# Patient Record
Sex: MALE | Race: WHITE | Employment: PART TIME | URBAN - METROPOLITAN AREA
[De-identification: names, ages, dates, MRNs, and addresses within clinical notes are randomized per-mention and may not be internally consistent; named-entity substitution may affect disease eponyms.]

---

## 2016-12-06 LAB — ANTIBODY: NONREACTIVE

## 2017-12-11 LAB
CHOLESTEROL, TOTAL: 194 MG/DL
CHOLESTEROL/HDL RATIO: 3.1
GLUCOSE BLD-MCNC: 96 MG/DL
HDLC SERPL-MCNC: 63 MG/DL (ref 35–70)
LDL CHOLESTEROL CALCULATED: 112 MG/DL (ref 0–160)
TRIGL SERPL-MCNC: 93 MG/DL
VLDLC SERPL CALC-MCNC: NORMAL MG/DL

## 2018-09-18 ENCOUNTER — OFFICE VISIT (OUTPATIENT)
Dept: ORTHOPEDIC SURGERY | Age: 68
End: 2018-09-18

## 2018-09-18 VITALS
HEIGHT: 70 IN | DIASTOLIC BLOOD PRESSURE: 86 MMHG | HEART RATE: 78 BPM | SYSTOLIC BLOOD PRESSURE: 142 MMHG | WEIGHT: 190 LBS | BODY MASS INDEX: 27.2 KG/M2

## 2018-09-18 DIAGNOSIS — M10.9 ACUTE GOUT OF LEFT WRIST, UNSPECIFIED CAUSE: ICD-10-CM

## 2018-09-18 DIAGNOSIS — M25.532 WRIST PAIN, LEFT: Primary | ICD-10-CM

## 2018-09-18 PROCEDURE — 4040F PNEUMOC VAC/ADMIN/RCVD: CPT | Performed by: PHYSICIAN ASSISTANT

## 2018-09-18 PROCEDURE — 1101F PT FALLS ASSESS-DOCD LE1/YR: CPT | Performed by: PHYSICIAN ASSISTANT

## 2018-09-18 PROCEDURE — G8427 DOCREV CUR MEDS BY ELIG CLIN: HCPCS | Performed by: PHYSICIAN ASSISTANT

## 2018-09-18 PROCEDURE — 4004F PT TOBACCO SCREEN RCVD TLK: CPT | Performed by: PHYSICIAN ASSISTANT

## 2018-09-18 PROCEDURE — 3017F COLORECTAL CA SCREEN DOC REV: CPT | Performed by: PHYSICIAN ASSISTANT

## 2018-09-18 PROCEDURE — 99203 OFFICE O/P NEW LOW 30 MIN: CPT | Performed by: PHYSICIAN ASSISTANT

## 2018-09-18 PROCEDURE — G8419 CALC BMI OUT NRM PARAM NOF/U: HCPCS | Performed by: PHYSICIAN ASSISTANT

## 2018-09-18 PROCEDURE — 1123F ACP DISCUSS/DSCN MKR DOCD: CPT | Performed by: PHYSICIAN ASSISTANT

## 2018-09-18 RX ORDER — INDOMETHACIN 50 MG/1
50 CAPSULE ORAL 3 TIMES DAILY
Qty: 30 CAPSULE | Refills: 0 | Status: SHIPPED | OUTPATIENT
Start: 2018-09-18 | End: 2019-08-07 | Stop reason: ALTCHOICE

## 2018-09-18 NOTE — PROGRESS NOTES
Subjective    Chief Complaint   Patient presents with    Wrist Pain     CK LEFT WRIST, PAIN OVER PAST WEEK - NO INJURY  PAIN HAS PROGRESSIVELY GOTTEN WORSE AND RADIATES INTO HAND. PATIENT C/O DIFFICULTY SLEEPING DUE TO DISCOMFORT AND INCREASED PAIN WITH MOVEMENT       Juan Lennon presents today for evaluation of pain in His  left wrist.  He has been having pain for the past 1 weeks. The pain is located over dorsal wrist. There is not a specific injury. The pain is better with rest and worse with movement, extension of wrist, palpation. The patient is right hand dominant. The patient is not employed. The patient  is complaining of night pain at times. he denies acute trauma. He denies injury. He states he has intermittent shooting pain to his fingers, he denies true numbness/tingling to left hand/fingers. He has noted swelling and erythema to his wrist.  He denies constitutional symptoms. Pt denies gout hx    Patient's medications, allergies, past medical, surgical, social and family histories were reviewed and updated as appropriate. Physical Exam  Constitutional:  Pt well groomed, no acute distress, well developed, no obvious deformities  Vitals:    09/18/18 1704   BP: (!) 142/86   Pulse: 78   Weight: 190 lb (86.2 kg)   Height: 5' 10\" (1.778 m)     -Oriented to person, place, and time  -mood and affect are appropriate    Wrist Exam: Left wrist: Pain over DRUJ only. No bony tenderness.    -There is not deformity  -There is not ecchymosis  - There is swelling to DRUJ only. -ROM: decreased with wrist extension and flexion. Painful ROM noted as well. Painful passive wrist extension. Full radial/ulnar deviation, supination/pronation.  strength good.       -special testing not indicated    Contralateral Exam:  -No obvious deformities  -No abrasions or cellulitis noted, NVI   -Full ROM   -No joint laxity  -no palpable tenderness noted    Neurological:   -There is not any complaints of numbness and tingling.    -Motor and sensory is at median, radial and ulnar nerve distributions. -NVI to upper extremities bilaterally. Skin:  No abrasions, lesions, obvious deformities noted   Erythema noted to dorsum of wrist only. Localized swelling as well          Xray:  3 view (AP/Lat/Oblique) of left wrist show:   no fracture or dislocation noted, soft tissue swelling    Assessment: left wrist rule out gout flare    Plan:  Natural history and expected course discussed. Questions answered. Rest, ice, compression, and elevation (RICE) therapy. Follow up in 1 week. with PCP  We discussed his x ray results today as well as his STS  We discussed possible gout flare given his swelling, atraumatic pain, painful ROM, erythema  We discussed recommendation for indomethacin treatment, this has been e prescribed to his pharmacy  We discussed recommendation for lab collection, uric acid/sed rate/crp  He will follow up with PCP    Priyank Blackwell PA-C      No orders of the defined types were placed in this encounter.

## 2018-09-24 ENCOUNTER — HOSPITAL ENCOUNTER (EMERGENCY)
Age: 68
Discharge: HOME | End: 2018-09-24
Payer: MEDICARE

## 2018-09-24 VITALS
BODY MASS INDEX: 27.2 KG/M2 | SYSTOLIC BLOOD PRESSURE: 146 MMHG | TEMPERATURE: 97.9 F | WEIGHT: 190 LBS | HEART RATE: 56 BPM | DIASTOLIC BLOOD PRESSURE: 86 MMHG | OXYGEN SATURATION: 99 % | HEIGHT: 70 IN | RESPIRATION RATE: 16 BRPM

## 2018-09-24 DIAGNOSIS — M25.532 LEFT WRIST PAIN: Primary | ICD-10-CM

## 2018-09-24 DIAGNOSIS — M25.432 WRIST SWELLING, LEFT: ICD-10-CM

## 2018-09-24 LAB
A/G RATIO: 1.2 (ref 1.1–2.2)
ALBUMIN SERPL-MCNC: 3.8 G/DL (ref 3.4–5)
ALP BLD-CCNC: 88 U/L (ref 40–129)
ALT SERPL-CCNC: 12 U/L (ref 10–40)
ANION GAP SERPL CALCULATED.3IONS-SCNC: 10 MMOL/L (ref 3–16)
AST SERPL-CCNC: 13 U/L (ref 15–37)
BASOPHILS ABSOLUTE: 0 K/UL (ref 0–0.2)
BASOPHILS RELATIVE PERCENT: 0.7 %
BILIRUB SERPL-MCNC: 0.3 MG/DL (ref 0–1)
BUN BLDV-MCNC: 18 MG/DL (ref 7–20)
C-REACTIVE PROTEIN: 10.5 MG/L (ref 0–5.1)
CALCIUM SERPL-MCNC: 9.4 MG/DL (ref 8.3–10.6)
CHLORIDE BLD-SCNC: 100 MMOL/L (ref 99–110)
CO2: 30 MMOL/L (ref 21–32)
CREAT SERPL-MCNC: 0.7 MG/DL (ref 0.8–1.3)
EOSINOPHILS ABSOLUTE: 0.2 K/UL (ref 0–0.6)
EOSINOPHILS RELATIVE PERCENT: 2.5 %
GFR AFRICAN AMERICAN: >60
GFR NON-AFRICAN AMERICAN: >60
GLOBULIN: 3.2 G/DL
GLUCOSE BLD-MCNC: 103 MG/DL (ref 70–99)
HCT VFR BLD CALC: 35.2 % (ref 40.5–52.5)
HEMOGLOBIN: 11.4 G/DL (ref 13.5–17.5)
LYMPHOCYTES ABSOLUTE: 1 K/UL (ref 1–5.1)
LYMPHOCYTES RELATIVE PERCENT: 16 %
MCH RBC QN AUTO: 26.3 PG (ref 26–34)
MCHC RBC AUTO-ENTMCNC: 32.4 G/DL (ref 31–36)
MCV RBC AUTO: 81.2 FL (ref 80–100)
MONOCYTES ABSOLUTE: 1.1 K/UL (ref 0–1.3)
MONOCYTES RELATIVE PERCENT: 16.6 %
NEUTROPHILS ABSOLUTE: 4.2 K/UL (ref 1.7–7.7)
NEUTROPHILS RELATIVE PERCENT: 64.2 %
PDW BLD-RTO: 16.8 % (ref 12.4–15.4)
PLATELET # BLD: 359 K/UL (ref 135–450)
PMV BLD AUTO: 7.8 FL (ref 5–10.5)
POTASSIUM SERPL-SCNC: 3.9 MMOL/L (ref 3.5–5.1)
RBC # BLD: 4.33 M/UL (ref 4.2–5.9)
SEDIMENTATION RATE, ERYTHROCYTE: 60 MM/HR (ref 0–20)
SODIUM BLD-SCNC: 140 MMOL/L (ref 136–145)
TOTAL PROTEIN: 7 G/DL (ref 6.4–8.2)
URIC ACID, SERUM: 5.9 MG/DL (ref 3.5–7.2)
WBC # BLD: 6.6 K/UL (ref 4–11)

## 2018-09-24 PROCEDURE — 85025 COMPLETE CBC W/AUTO DIFF WBC: CPT

## 2018-09-24 PROCEDURE — 84550 ASSAY OF BLOOD/URIC ACID: CPT

## 2018-09-24 PROCEDURE — 85652 RBC SED RATE AUTOMATED: CPT

## 2018-09-24 PROCEDURE — 99283 EMERGENCY DEPT VISIT LOW MDM: CPT

## 2018-09-24 PROCEDURE — 6370000000 HC RX 637 (ALT 250 FOR IP): Performed by: PHYSICIAN ASSISTANT

## 2018-09-24 PROCEDURE — 86140 C-REACTIVE PROTEIN: CPT

## 2018-09-24 PROCEDURE — 80053 COMPREHEN METABOLIC PANEL: CPT

## 2018-09-24 RX ORDER — PREDNISONE 20 MG/1
60 TABLET ORAL ONCE
Status: COMPLETED | OUTPATIENT
Start: 2018-09-24 | End: 2018-09-24

## 2018-09-24 RX ORDER — PREDNISONE 50 MG/1
50 TABLET ORAL DAILY
Qty: 4 TABLET | Refills: 0 | Status: SHIPPED | OUTPATIENT
Start: 2018-09-24 | End: 2018-09-28

## 2018-09-24 RX ORDER — ACETAMINOPHEN 325 MG/1
650 TABLET ORAL ONCE
Status: COMPLETED | OUTPATIENT
Start: 2018-09-24 | End: 2018-09-24

## 2018-09-24 RX ORDER — PREDNISONE 50 MG/1
50 TABLET ORAL DAILY
Qty: 3 TABLET | Refills: 0 | Status: SHIPPED | OUTPATIENT
Start: 2018-09-24 | End: 2018-09-24 | Stop reason: CLARIF

## 2018-09-24 RX ORDER — OXYCODONE HYDROCHLORIDE AND ACETAMINOPHEN 5; 325 MG/1; MG/1
1 TABLET ORAL EVERY 6 HOURS PRN
Qty: 10 TABLET | Refills: 0 | Status: SHIPPED | OUTPATIENT
Start: 2018-09-24 | End: 2018-09-30

## 2018-09-24 RX ORDER — OXYCODONE HYDROCHLORIDE AND ACETAMINOPHEN 5; 325 MG/1; MG/1
1 TABLET ORAL EVERY 8 HOURS PRN
Qty: 10 TABLET | Refills: 0 | Status: SHIPPED | OUTPATIENT
Start: 2018-09-24 | End: 2018-09-24 | Stop reason: CLARIF

## 2018-09-24 RX ADMIN — PREDNISONE 60 MG: 20 TABLET ORAL at 13:07

## 2018-09-24 RX ADMIN — ACETAMINOPHEN 650 MG: 325 TABLET ORAL at 10:46

## 2018-09-24 ASSESSMENT — PAIN DESCRIPTION - LOCATION
LOCATION: WRIST
LOCATION: WRIST

## 2018-09-24 ASSESSMENT — ENCOUNTER SYMPTOMS
EYES NEGATIVE: 1
COLOR CHANGE: 0
SHORTNESS OF BREATH: 0
ABDOMINAL PAIN: 0

## 2018-09-24 ASSESSMENT — PAIN DESCRIPTION - ORIENTATION
ORIENTATION: LEFT
ORIENTATION: LEFT

## 2018-09-24 ASSESSMENT — PAIN DESCRIPTION - DESCRIPTORS: DESCRIPTORS: CONSTANT

## 2018-09-24 ASSESSMENT — PAIN SCALES - GENERAL
PAINLEVEL_OUTOF10: 7
PAINLEVEL_OUTOF10: 7

## 2018-09-24 ASSESSMENT — PAIN DESCRIPTION - PAIN TYPE: TYPE: ACUTE PAIN

## 2018-09-24 NOTE — ED NOTES
Findings and plan of care discussed at bedside by provider. Pt verbalized understanding of plan of care, pt discharged with all instructions reviewed and any prescriptions as applicable. All belongings in hand including discharge instructions, prescriptions x2, and personal belongings. Pt in no acute distress.      Michael Trivedi RN  09/24/18 6810

## 2018-09-24 NOTE — ED PROVIDER NOTES
Given 9/24/18 0454)         I evaluated this patient in the ED. He describes atraumatic left wrist pain ×2 weeks. He has no fever. There is no erythema to the joint. He is not immunocompromised. He does not have a leukocytosis on CBC. His white count is 6.6. His hemoglobin is 11.4 and hematocrit 35.2. CMP is also unremarkable. His sed rate is elevated though nonspecific at 60. Uric acid is 5.9.  C-reactive protein is pending. Because the patient saw orthopedics last week, I paged them again. I spoke with the office staff of Dr. Ekaterina Rodriguez. They have agreed to see this patient in the office on Wednesday, 9/26 at 1:30 PM.  I relayed this information to the patient. He is given a dose of prednisone in the ED as well as Tylenol. He will be discharged on prednisone for 3 days and will additionally be given a prescription for Percocet to take at night as needed. I told him to discontinue the Indocin while he is on prednisone until he sees orthopedics    I estimate there is LOW risk for ACUTE FRACTURE, COMPARTMENT SYNDROME, DEEP VENOUS THROMBOSIS, CELLULITIS, SEPTIC ARTHRITIS, GOUT, TENDON OR NEUROVASCULAR INJURY, thus I consider the discharge disposition reasonable. Horacio Baltazar and I have discussed the diagnosis and risks, and we agree with discharging home to follow-up with their primary doctor or the referral orthopedist. We also discussed returning to the Emergency Department immediately if new or worsening symptoms occur. We have discussed the symptoms which are most concerning (e.g., changing or worsening pain, numbness, weakness) that necessitate immediate return. FINAL IMPRESSION:      1. Left wrist pain    2.  Wrist swelling, left          DISPOSITION/PLAN:   DISPOSITION Decision To Discharge      PATIENT REFERRED TO:  Parvez Clements MD  [address]  [phone]    Go in 2 days  1:30 PM      DISCHARGE MEDICATIONS:  Percocet 5/325 one tablet every 8 hours-- 10 tablets  Prednisone 50 mg 1 tablet daily-- 3 tablets             (Please note that portions of this note were completed with a voice recognition program.  Efforts were made to edit the dictations, but occasionally words are mis-transcribed.)    Zhang Alan PA-C (electronically signed)              Jennifer Sánchez, [place]  09/24/18 6773

## 2018-09-24 NOTE — PROGRESS NOTES
Orthopedic Surgery consult placed @ 1943  RE: left wrist pain per ANIKET Ring. [facility] with Dr. Carly Evans returned call @ [phone], spoke to Rony Daley.

## 2018-09-26 ENCOUNTER — OFFICE VISIT (OUTPATIENT)
Dept: ORTHOPEDIC SURGERY | Age: 68
End: 2018-09-26
Payer: MEDICARE

## 2018-09-26 VITALS — WEIGHT: 190 LBS | BODY MASS INDEX: 27.2 KG/M2 | HEIGHT: 70 IN

## 2018-09-26 DIAGNOSIS — M10.9 ACUTE GOUT OF LEFT WRIST, UNSPECIFIED CAUSE: ICD-10-CM

## 2018-09-26 DIAGNOSIS — M25.532 WRIST PAIN, LEFT: Primary | ICD-10-CM

## 2018-09-26 PROCEDURE — 3017F COLORECTAL CA SCREEN DOC REV: CPT | Performed by: ORTHOPAEDIC SURGERY

## 2018-09-26 PROCEDURE — 99213 OFFICE O/P EST LOW 20 MIN: CPT | Performed by: ORTHOPAEDIC SURGERY

## 2018-09-26 PROCEDURE — 1036F TOBACCO NON-USER: CPT | Performed by: ORTHOPAEDIC SURGERY

## 2018-09-26 PROCEDURE — 4040F PNEUMOC VAC/ADMIN/RCVD: CPT | Performed by: ORTHOPAEDIC SURGERY

## 2018-09-26 PROCEDURE — G8427 DOCREV CUR MEDS BY ELIG CLIN: HCPCS | Performed by: ORTHOPAEDIC SURGERY

## 2018-09-26 PROCEDURE — G8419 CALC BMI OUT NRM PARAM NOF/U: HCPCS | Performed by: ORTHOPAEDIC SURGERY

## 2018-09-26 PROCEDURE — 1123F ACP DISCUSS/DSCN MKR DOCD: CPT | Performed by: ORTHOPAEDIC SURGERY

## 2018-09-26 PROCEDURE — 1101F PT FALLS ASSESS-DOCD LE1/YR: CPT | Performed by: ORTHOPAEDIC SURGERY

## 2018-09-26 RX ORDER — MELOXICAM 15 MG/1
15 TABLET ORAL DAILY
Qty: 30 TABLET | Refills: 1 | Status: SHIPPED | OUTPATIENT
Start: 2018-09-29 | End: 2018-10-04 | Stop reason: ALTCHOICE

## 2018-09-26 NOTE — PROGRESS NOTES
Chief Complaint  Wrist Pain (left volar wrist pain, started on 9/3/18. progressively got worse. 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 He was seen in the emergency room and there was concern for a gout attack. He was placed on Indocin and steroid, he has been utilizing his brace intermittently. Overall his symptoms are much improved but not fully gone. No fevers. Medical History  Past Medical History:   Diagnosis Date    Atrial fibrillation ([address] 2007    S/P CARDIOVERSION, EF 49% on stress test 2009    Hyperlipidemia     Hypertension     SCC (squamous cell carcinoma)     Dr. Fay Mayberry     Past Surgical History:   Procedure Laterality Date    CARDIOVERSION  2005    COLONOSCOPY  [id number]    tics    TOTAL HIP ARTHROPLASTY Left 2013     Family History   Problem Relation Age of Onset    Cancer Mother         BONE    High Blood Pressure Mother     Heart Disease Father 79     Social History     Social History    Marital status:      Spouse name: N/A    Number of children: N/A    Years of education: N/A     Social History Main Topics    Smoking status: Never Smoker    Smokeless tobacco: Never Used    Alcohol use No      Comment: social    Drug use: No    Sexual activity: Not on file     Other Topics Concern    Not on file     Social History Narrative    No narrative on file     Current Outpatient Prescriptions   Medication Sig Dispense Refill    [START ON 9/29/2018] meloxicam (MOBIC) 15 MG tablet Take 1 tablet by mouth daily 30 tablet 1    predniSONE (DELTASONE) 50 MG tablet Take 1 tablet by mouth daily for 4 days 4 tablet 0    oxyCODONE-acetaminophen (PERCOCET) 5-325 MG per tablet Take 1 tablet by mouth every 6 hours as needed for Pain for up to 10 doses. Intended supply: 3 days. well perfused       Radiology:     X-rays reviewed in office:  3 views, left wrist, impression:  No fracture or dislocation. Overall good alignment      Assessment:  Left wrist pain, improved    Impression:   Encounter Diagnoses   Name Primary?  Wrist pain, left Yes    Acute gout of left wrist, unspecified cause        Office Procedures:  No orders of the defined types were placed in this encounter. Treatment Plan:  Patient has responded well with conservative measures for either a gouty attack or potentially pseudogout. He is much pleased with his progress. We will continue the current care plan. He will finish the Indocin. He does have a brace at home, I recommend he utilize this at night. We have ordered Mobic, which begins after Indocin is over. He will continue with observation of the left wrist.  As long as he continues to improve and symptoms full resolve he will follow-up as necessary. All questions and concerns were addressed today. Patient is in agreement with the plan. Luna Rendon MD  Hand & Upper Extremity Surgery  [address]  A partner of [facility] ([facility])        Please note that this transcription was created using voice recognition software. Any errors are unintentional and may be due to voice recognition transcription.

## 2018-10-04 ENCOUNTER — OFFICE VISIT (OUTPATIENT)
Dept: FAMILY MEDICINE CLINIC | Age: 68
End: 2018-10-04
Payer: MEDICARE

## 2018-10-04 VITALS
BODY MASS INDEX: 27.35 KG/M2 | OXYGEN SATURATION: 99 % | HEART RATE: 67 BPM | WEIGHT: 191 LBS | DIASTOLIC BLOOD PRESSURE: 71 MMHG | SYSTOLIC BLOOD PRESSURE: 123 MMHG | HEIGHT: 70 IN

## 2018-10-04 DIAGNOSIS — Z13.1 ENCOUNTER FOR SCREENING FOR DIABETES MELLITUS: ICD-10-CM

## 2018-10-04 DIAGNOSIS — Z87.39 HISTORY OF JOINT SWELLING: ICD-10-CM

## 2018-10-04 DIAGNOSIS — Z23 NEED FOR PROPHYLACTIC VACCINATION AND INOCULATION AGAINST VARICELLA: ICD-10-CM

## 2018-10-04 DIAGNOSIS — Z23 NEED FOR PROPHYLACTIC VACCINATION AND INOCULATION AGAINST INFLUENZA: ICD-10-CM

## 2018-10-04 DIAGNOSIS — M54.2 NECK PAIN: Primary | ICD-10-CM

## 2018-10-04 PROCEDURE — G8419 CALC BMI OUT NRM PARAM NOF/U: HCPCS | Performed by: FAMILY MEDICINE

## 2018-10-04 PROCEDURE — 1123F ACP DISCUSS/DSCN MKR DOCD: CPT | Performed by: FAMILY MEDICINE

## 2018-10-04 PROCEDURE — 90662 IIV NO PRSV INCREASED AG IM: CPT | Performed by: FAMILY MEDICINE

## 2018-10-04 PROCEDURE — G8427 DOCREV CUR MEDS BY ELIG CLIN: HCPCS | Performed by: FAMILY MEDICINE

## 2018-10-04 PROCEDURE — 99204 OFFICE O/P NEW MOD 45 MIN: CPT | Performed by: FAMILY MEDICINE

## 2018-10-04 PROCEDURE — 1036F TOBACCO NON-USER: CPT | Performed by: FAMILY MEDICINE

## 2018-10-04 PROCEDURE — G8482 FLU IMMUNIZE ORDER/ADMIN: HCPCS | Performed by: FAMILY MEDICINE

## 2018-10-04 PROCEDURE — G0008 ADMIN INFLUENZA VIRUS VAC: HCPCS | Performed by: FAMILY MEDICINE

## 2018-10-04 PROCEDURE — 3017F COLORECTAL CA SCREEN DOC REV: CPT | Performed by: FAMILY MEDICINE

## 2018-10-04 PROCEDURE — 1101F PT FALLS ASSESS-DOCD LE1/YR: CPT | Performed by: FAMILY MEDICINE

## 2018-10-04 PROCEDURE — 4040F PNEUMOC VAC/ADMIN/RCVD: CPT | Performed by: FAMILY MEDICINE

## 2018-10-04 RX ORDER — ASPIRIN 81 MG
1 TABLET, DELAYED RELEASE (ENTERIC COATED) ORAL
Status: ON HOLD | COMMUNITY
End: 2019-07-24 | Stop reason: SDUPTHER

## 2018-10-04 RX ORDER — LORATADINE 10 MG/1
10 TABLET ORAL
Status: ON HOLD | COMMUNITY
End: 2019-07-24 | Stop reason: HOSPADM

## 2018-10-04 RX ORDER — OMEPRAZOLE 20 MG/1
20 TABLET, DELAYED RELEASE ORAL
COMMUNITY
End: 2019-06-11 | Stop reason: CLARIF

## 2018-10-04 RX ORDER — SOTALOL HYDROCHLORIDE 120 MG/1
TABLET ORAL
COMMUNITY
Start: 2018-08-10 | End: 2019-06-11 | Stop reason: CLARIF

## 2018-10-04 ASSESSMENT — PATIENT HEALTH QUESTIONNAIRE - PHQ9
SUM OF ALL RESPONSES TO PHQ QUESTIONS 1-9: 0
SUM OF ALL RESPONSES TO PHQ9 QUESTIONS 1 & 2: 0
SUM OF ALL RESPONSES TO PHQ QUESTIONS 1-9: 0
1. LITTLE INTEREST OR PLEASURE IN DOING THINGS: 0
2. FEELING DOWN, DEPRESSED OR HOPELESS: 0

## 2018-10-04 ASSESSMENT — ENCOUNTER SYMPTOMS
WHEEZING: 0
CONSTIPATION: 0

## 2018-10-12 ENCOUNTER — OFFICE VISIT (OUTPATIENT)
Dept: ORTHOPEDIC SURGERY | Age: 68
End: 2018-10-12
Payer: MEDICARE

## 2018-10-12 VITALS — HEIGHT: 70 IN | WEIGHT: 190.92 LBS | BODY MASS INDEX: 27.33 KG/M2

## 2018-10-12 DIAGNOSIS — M25.532 WRIST PAIN, LEFT: Primary | ICD-10-CM

## 2018-10-12 DIAGNOSIS — M10.9 ACUTE GOUT OF LEFT WRIST, UNSPECIFIED CAUSE: ICD-10-CM

## 2018-10-12 PROCEDURE — 4040F PNEUMOC VAC/ADMIN/RCVD: CPT | Performed by: ORTHOPAEDIC SURGERY

## 2018-10-12 PROCEDURE — G8419 CALC BMI OUT NRM PARAM NOF/U: HCPCS | Performed by: ORTHOPAEDIC SURGERY

## 2018-10-12 PROCEDURE — 1123F ACP DISCUSS/DSCN MKR DOCD: CPT | Performed by: ORTHOPAEDIC SURGERY

## 2018-10-12 PROCEDURE — 20605 DRAIN/INJ JOINT/BURSA W/O US: CPT | Performed by: ORTHOPAEDIC SURGERY

## 2018-10-12 PROCEDURE — G8482 FLU IMMUNIZE ORDER/ADMIN: HCPCS | Performed by: ORTHOPAEDIC SURGERY

## 2018-10-12 PROCEDURE — 3017F COLORECTAL CA SCREEN DOC REV: CPT | Performed by: ORTHOPAEDIC SURGERY

## 2018-10-12 PROCEDURE — 99213 OFFICE O/P EST LOW 20 MIN: CPT | Performed by: ORTHOPAEDIC SURGERY

## 2018-10-12 PROCEDURE — 1101F PT FALLS ASSESS-DOCD LE1/YR: CPT | Performed by: ORTHOPAEDIC SURGERY

## 2018-10-12 PROCEDURE — 1036F TOBACCO NON-USER: CPT | Performed by: ORTHOPAEDIC SURGERY

## 2018-10-12 PROCEDURE — G8427 DOCREV CUR MEDS BY ELIG CLIN: HCPCS | Performed by: ORTHOPAEDIC SURGERY

## 2018-10-18 ENCOUNTER — APPOINTMENT (OUTPATIENT)
Dept: CT IMAGING | Age: 68
End: 2018-10-18
Payer: MEDICARE

## 2018-10-18 ENCOUNTER — HOSPITAL ENCOUNTER (EMERGENCY)
Age: 68
Discharge: HOME | End: 2018-10-18
Payer: MEDICARE

## 2018-10-18 VITALS
DIASTOLIC BLOOD PRESSURE: 83 MMHG | TEMPERATURE: 98.3 F | RESPIRATION RATE: 16 BRPM | BODY MASS INDEX: 27.26 KG/M2 | WEIGHT: 190 LBS | SYSTOLIC BLOOD PRESSURE: 137 MMHG | OXYGEN SATURATION: 99 % | HEART RATE: 62 BPM

## 2018-10-18 DIAGNOSIS — M54.2 CERVICAL PAIN: Primary | ICD-10-CM

## 2018-10-18 PROCEDURE — 72125 CT NECK SPINE W/O DYE: CPT

## 2018-10-18 PROCEDURE — 99283 EMERGENCY DEPT VISIT LOW MDM: CPT

## 2018-10-18 RX ORDER — DIAZEPAM 5 MG/1
5 TABLET ORAL EVERY 12 HOURS PRN
Qty: 12 TABLET | Refills: 0 | Status: SHIPPED | OUTPATIENT
Start: 2018-10-18 | End: 2018-10-24

## 2018-10-18 ASSESSMENT — ENCOUNTER SYMPTOMS
BACK PAIN: 0
ABDOMINAL PAIN: 0
SHORTNESS OF BREATH: 0
COLOR CHANGE: 0

## 2018-10-18 ASSESSMENT — PAIN SCALES - GENERAL: PAINLEVEL_OUTOF10: 3

## 2018-10-23 ENCOUNTER — OFFICE VISIT (OUTPATIENT)
Dept: ORTHOPEDIC SURGERY | Age: 68
End: 2018-10-23
Payer: MEDICARE

## 2018-10-23 VITALS — HEIGHT: 70 IN | WEIGHT: 190.04 LBS | BODY MASS INDEX: 27.21 KG/M2

## 2018-10-23 DIAGNOSIS — M25.532 WRIST PAIN, LEFT: Primary | ICD-10-CM

## 2018-10-23 DIAGNOSIS — M10.232: ICD-10-CM

## 2018-10-23 PROCEDURE — 1123F ACP DISCUSS/DSCN MKR DOCD: CPT | Performed by: ORTHOPAEDIC SURGERY

## 2018-10-23 PROCEDURE — 99213 OFFICE O/P EST LOW 20 MIN: CPT | Performed by: ORTHOPAEDIC SURGERY

## 2018-10-23 PROCEDURE — 1101F PT FALLS ASSESS-DOCD LE1/YR: CPT | Performed by: ORTHOPAEDIC SURGERY

## 2018-10-23 PROCEDURE — 1036F TOBACCO NON-USER: CPT | Performed by: ORTHOPAEDIC SURGERY

## 2018-10-23 PROCEDURE — G8427 DOCREV CUR MEDS BY ELIG CLIN: HCPCS | Performed by: ORTHOPAEDIC SURGERY

## 2018-10-23 PROCEDURE — G8482 FLU IMMUNIZE ORDER/ADMIN: HCPCS | Performed by: ORTHOPAEDIC SURGERY

## 2018-10-23 PROCEDURE — 3017F COLORECTAL CA SCREEN DOC REV: CPT | Performed by: ORTHOPAEDIC SURGERY

## 2018-10-23 PROCEDURE — G8419 CALC BMI OUT NRM PARAM NOF/U: HCPCS | Performed by: ORTHOPAEDIC SURGERY

## 2018-10-23 PROCEDURE — 4040F PNEUMOC VAC/ADMIN/RCVD: CPT | Performed by: ORTHOPAEDIC SURGERY

## 2018-10-23 RX ORDER — PREDNISONE 50 MG/1
50 TABLET ORAL DAILY
Qty: 10 TABLET | Refills: 0 | Status: SHIPPED | OUTPATIENT
Start: 2018-10-23 | End: 2018-11-02

## 2018-10-24 ENCOUNTER — OFFICE VISIT (OUTPATIENT)
Dept: ORTHOPEDIC SURGERY | Age: 68
End: 2018-10-24
Payer: MEDICARE

## 2018-10-24 VITALS
HEART RATE: 79 BPM | SYSTOLIC BLOOD PRESSURE: 123 MMHG | HEIGHT: 70 IN | DIASTOLIC BLOOD PRESSURE: 70 MMHG | WEIGHT: 190.04 LBS | BODY MASS INDEX: 27.21 KG/M2

## 2018-10-24 DIAGNOSIS — M50.30 DDD (DEGENERATIVE DISC DISEASE), CERVICAL: Primary | ICD-10-CM

## 2018-10-24 DIAGNOSIS — M54.2 NECK PAIN ON RIGHT SIDE: ICD-10-CM

## 2018-10-24 PROCEDURE — 1036F TOBACCO NON-USER: CPT | Performed by: PHYSICIAN ASSISTANT

## 2018-10-24 PROCEDURE — 99213 OFFICE O/P EST LOW 20 MIN: CPT | Performed by: PHYSICIAN ASSISTANT

## 2018-10-24 PROCEDURE — 1101F PT FALLS ASSESS-DOCD LE1/YR: CPT | Performed by: PHYSICIAN ASSISTANT

## 2018-10-24 PROCEDURE — 1123F ACP DISCUSS/DSCN MKR DOCD: CPT | Performed by: PHYSICIAN ASSISTANT

## 2018-10-24 PROCEDURE — G8427 DOCREV CUR MEDS BY ELIG CLIN: HCPCS | Performed by: PHYSICIAN ASSISTANT

## 2018-10-24 PROCEDURE — 3017F COLORECTAL CA SCREEN DOC REV: CPT | Performed by: PHYSICIAN ASSISTANT

## 2018-10-24 PROCEDURE — G8482 FLU IMMUNIZE ORDER/ADMIN: HCPCS | Performed by: PHYSICIAN ASSISTANT

## 2018-10-24 PROCEDURE — 4040F PNEUMOC VAC/ADMIN/RCVD: CPT | Performed by: PHYSICIAN ASSISTANT

## 2018-10-24 PROCEDURE — G8419 CALC BMI OUT NRM PARAM NOF/U: HCPCS | Performed by: PHYSICIAN ASSISTANT

## 2018-10-24 NOTE — PROGRESS NOTES
New Patient: SPINE    CHIEF COMPLAINT:    Chief Complaint   Patient presents with    Neck Pain       HISTORY OF PRESENT ILLNESS:                The patient is a 77 y.o. male history of invasive on Eliquis, hypertension referred by the ED for a 2 week history of atraumatic intermittent aching and stiffness affecting the right neck/trap. Symptoms are increased with cervical range of motion or 1st thing in the morning. Relief with Aleve. Other conservative care includes prednisone, muscle relaxers. He currently reports 95% improvement at this time. He denies any upper extremity radiating pain, no progressive numbness tingling weakness. No fine motor difficulty or gait instability. No recent trauma. Overall feeling much improved. Pain Assessment  Location of Pain: Neck  Severity of Pain: 1  Quality of Pain: Sharp, Dull, Aching  Duration of Pain: Persistent  Frequency of Pain: Constant  Aggravating Factors: Stairs, Walking, Standing, Squatting, Kneeling, Exercise, Straightening, Stretching, Bending  Limiting Behavior: Yes  Relieving Factors: Rest  Result of Injury: No  Work-Related Injury: No  Are there other pain locations you wish to document?: No    The pain assessment was noted & reviewed in the medical record today.      Current/Past Treatment:   · Physical Therapy: no  · Chiropractic:   no  · Injection:   no  Medications:  · Current:  NSAIDs  · Past: Prednisone, Valium   · Surgery/Consult: no     Work Status/Functionality: owns company installing in-ground pools     Past Medical History: Medical history form was reviewed today & scanned into the media tab  Past Medical History:   Diagnosis Date    Atrial fibrillation ([address] 2007    S/P CARDIOVERSION, EF 49% on stress test 2009    Hyperlipidemia     Hypertension     SCC (squamous cell carcinoma)     Dr. Katherine Nice      Past Surgical History:     Past Surgical History:   Procedure Laterality Date    CARDIOVERSION  2005    COLONOSCOPY  [id number]    tics progressive weakness  MUSCULOSKELETAL: ADMITs joint swelling or redness  PSYCHOLOGICAL: Denies anxiety, depression   SKIN: Denies skin changes, delayed healing, rash, itching   HEMATOLOGIC: ADMITs easy bleeding or bruising  ENDOCRINE: Denies excessive thirst, urination, heat/cold  RESPIRATORY: Denies current dyspnea, cough  GI: Denies nausea, vomiting, diarrhea   : Denies bowel or bladder issues       PHYSICAL EXAM:    Vitals: Blood pressure 123/70, pulse 79, height 5' 10\" (1.778 m), weight 190 lb 0.6 oz (86.2 kg). GENERAL EXAM:  · General Apparence: Patient is adequately groomed with no evidence of malnutrition. · Orientation: The patient is oriented to time, place and person. · Mood & Affect:The patient's mood and affect are appropriate  · Lymphatic: The lymphatic examination bilaterally reveals all areas to be without enlargement or induration  · Sensation: Sensation is intact without deficit  · Coordination/Balance: Good coordination     CERVICAL EXAMINATION:  · Inspection: Local inspection shows no step-off or bruising. Cervical alignment is normal.     · Palpation: No evidence of tenderness at the midline. Paraspinal tenderness is present, right trap. There is no step-off or paraspinal spasm. · Range of Motion: Intact flexion moderate loss of extension and lateral rotation  · Strength: 5/5 bilateral upper extremities   · Special Tests:    ·   Spurling's, L'Hermitte's & Kelsey's negative bilaterally. ·  Cubital and Carpal tunnel Tinel's negative bilaterally. · Skin:There are no rashes, ulcerations or lesions in right & left upper extremities. · Reflexes: Bilaterally triceps, biceps and brachioradialis are 1+. Clonus absent bilaterally at the feet. · Additional Examinations:       · RIGHT UPPER EXTREMITY:  Inspection/examination of the right upper extremity does not show any tenderness, deformity or injury. Range of motion is full. There is no gross instability.   There are no rashes,

## 2018-11-21 ENCOUNTER — NURSE ONLY (OUTPATIENT)
Dept: FAMILY MEDICINE CLINIC | Age: 68
End: 2018-11-21
Payer: MEDICARE

## 2018-11-21 ENCOUNTER — OFFICE VISIT (OUTPATIENT)
Dept: ORTHOPEDIC SURGERY | Age: 68
End: 2018-11-21
Payer: MEDICARE

## 2018-11-21 ENCOUNTER — TELEPHONE (OUTPATIENT)
Dept: ORTHOPEDIC SURGERY | Age: 68
End: 2018-11-21

## 2018-11-21 VITALS — BODY MASS INDEX: 27.21 KG/M2 | HEIGHT: 70 IN | WEIGHT: 190.04 LBS

## 2018-11-21 DIAGNOSIS — M19.031 ARTHRITIS OF WRIST, RIGHT: ICD-10-CM

## 2018-11-21 DIAGNOSIS — M25.531 RIGHT WRIST PAIN: Primary | ICD-10-CM

## 2018-11-21 DIAGNOSIS — R73.9 ELEVATED SERUM GLUCOSE: Primary | ICD-10-CM

## 2018-11-21 DIAGNOSIS — M10.9 ACUTE GOUT OF LEFT WRIST, UNSPECIFIED CAUSE: ICD-10-CM

## 2018-11-21 LAB
C-REACTIVE PROTEIN: 22.3 MG/L (ref 0–5.1)
SEDIMENTATION RATE, ERYTHROCYTE: 77 MM/HR (ref 0–20)
URIC ACID, SERUM: 4.7 MG/DL (ref 3.5–7.2)

## 2018-11-21 PROCEDURE — 1036F TOBACCO NON-USER: CPT | Performed by: ORTHOPAEDIC SURGERY

## 2018-11-21 PROCEDURE — 99213 OFFICE O/P EST LOW 20 MIN: CPT | Performed by: ORTHOPAEDIC SURGERY

## 2018-11-21 PROCEDURE — 36415 COLL VENOUS BLD VENIPUNCTURE: CPT | Performed by: FAMILY MEDICINE

## 2018-11-21 PROCEDURE — 20605 DRAIN/INJ JOINT/BURSA W/O US: CPT | Performed by: ORTHOPAEDIC SURGERY

## 2018-11-21 PROCEDURE — 1123F ACP DISCUSS/DSCN MKR DOCD: CPT | Performed by: ORTHOPAEDIC SURGERY

## 2018-11-21 PROCEDURE — G8427 DOCREV CUR MEDS BY ELIG CLIN: HCPCS | Performed by: ORTHOPAEDIC SURGERY

## 2018-11-21 PROCEDURE — G8482 FLU IMMUNIZE ORDER/ADMIN: HCPCS | Performed by: ORTHOPAEDIC SURGERY

## 2018-11-21 PROCEDURE — 1101F PT FALLS ASSESS-DOCD LE1/YR: CPT | Performed by: ORTHOPAEDIC SURGERY

## 2018-11-21 PROCEDURE — 4040F PNEUMOC VAC/ADMIN/RCVD: CPT | Performed by: ORTHOPAEDIC SURGERY

## 2018-11-21 PROCEDURE — L3908 WHO COCK-UP NONMOLDE PRE OTS: HCPCS | Performed by: ORTHOPAEDIC SURGERY

## 2018-11-21 PROCEDURE — 3017F COLORECTAL CA SCREEN DOC REV: CPT | Performed by: ORTHOPAEDIC SURGERY

## 2018-11-21 PROCEDURE — G8419 CALC BMI OUT NRM PARAM NOF/U: HCPCS | Performed by: ORTHOPAEDIC SURGERY

## 2018-11-21 RX ORDER — PREDNISONE 20 MG/1
20 TABLET ORAL DAILY
Qty: 10 TABLET | Refills: 0 | Status: SHIPPED | OUTPATIENT
Start: 2018-11-21 | End: 2018-12-01

## 2018-11-21 NOTE — PROGRESS NOTES
that this transcription was created using voice recognition software. Any errors are unintentional and may be due to voice recognition transcription.

## 2018-11-21 NOTE — PROGRESS NOTES
Patient came into the office per physician's request for the following blood test(s): uric acid, crp, sed rate, and a1c.       Blood drawn in office by Grace Rod    # of tubes sent: 1 sst 2 lav

## 2018-11-22 LAB
ESTIMATED AVERAGE GLUCOSE: 128.4 MG/DL
HBA1C MFR BLD: 6.1 %

## 2018-11-26 PROBLEM — R73.03 PRE-DIABETES: Status: ACTIVE | Noted: 2018-11-26

## 2018-11-27 ENCOUNTER — TELEPHONE (OUTPATIENT)
Dept: FAMILY MEDICINE CLINIC | Age: 68
End: 2018-11-27

## 2018-11-27 DIAGNOSIS — R70.0 ELEVATED SED RATE: Primary | ICD-10-CM

## 2019-06-11 ENCOUNTER — OFFICE VISIT (OUTPATIENT)
Dept: FAMILY MEDICINE CLINIC | Age: 69
End: 2019-06-11
Payer: MEDICARE

## 2019-06-11 VITALS
HEART RATE: 100 BPM | DIASTOLIC BLOOD PRESSURE: 62 MMHG | BODY MASS INDEX: 28.27 KG/M2 | WEIGHT: 197 LBS | TEMPERATURE: 98.8 F | SYSTOLIC BLOOD PRESSURE: 106 MMHG | OXYGEN SATURATION: 94 %

## 2019-06-11 DIAGNOSIS — D64.9 ANEMIA, UNSPECIFIED TYPE: Primary | ICD-10-CM

## 2019-06-11 DIAGNOSIS — I10 HYPERTENSION, UNSPECIFIED TYPE: ICD-10-CM

## 2019-06-11 DIAGNOSIS — I48.0 PAROXYSMAL ATRIAL FIBRILLATION (HCC): ICD-10-CM

## 2019-06-11 LAB
HCT VFR BLD CALC: 29.6 % (ref 40.5–52.5)
HEMOGLOBIN: 9.2 G/DL (ref 13.5–17.5)
MCH RBC QN AUTO: 25.2 PG (ref 26–34)
MCHC RBC AUTO-ENTMCNC: 30.9 G/DL (ref 31–36)
MCV RBC AUTO: 81.4 FL (ref 80–100)
PDW BLD-RTO: 19.6 % (ref 12.4–15.4)
PLATELET # BLD: 423 K/UL (ref 135–450)
PMV BLD AUTO: 7.9 FL (ref 5–10.5)
RBC # BLD: 3.64 M/UL (ref 4.2–5.9)
WBC # BLD: 7 K/UL (ref 4–11)

## 2019-06-11 PROCEDURE — 36415 COLL VENOUS BLD VENIPUNCTURE: CPT | Performed by: FAMILY MEDICINE

## 2019-06-11 PROCEDURE — 1123F ACP DISCUSS/DSCN MKR DOCD: CPT | Performed by: FAMILY MEDICINE

## 2019-06-11 PROCEDURE — 4040F PNEUMOC VAC/ADMIN/RCVD: CPT | Performed by: FAMILY MEDICINE

## 2019-06-11 PROCEDURE — G8419 CALC BMI OUT NRM PARAM NOF/U: HCPCS | Performed by: FAMILY MEDICINE

## 2019-06-11 PROCEDURE — 1036F TOBACCO NON-USER: CPT | Performed by: FAMILY MEDICINE

## 2019-06-11 PROCEDURE — 3017F COLORECTAL CA SCREEN DOC REV: CPT | Performed by: FAMILY MEDICINE

## 2019-06-11 PROCEDURE — 99214 OFFICE O/P EST MOD 30 MIN: CPT | Performed by: FAMILY MEDICINE

## 2019-06-11 PROCEDURE — G8427 DOCREV CUR MEDS BY ELIG CLIN: HCPCS | Performed by: FAMILY MEDICINE

## 2019-06-11 RX ORDER — SULFASALAZINE 500 MG/1
TABLET ORAL
COMMUNITY
Start: 2019-06-03 | End: 2020-02-13

## 2019-06-11 RX ORDER — AMIODARONE HYDROCHLORIDE 200 MG/1
TABLET ORAL DAILY
Status: ON HOLD | COMMUNITY
Start: 2019-04-24 | End: 2019-12-18 | Stop reason: HOSPADM

## 2019-06-11 RX ORDER — FERROUS SULFATE 325(65) MG
325 TABLET ORAL
Status: ON HOLD | COMMUNITY
Start: 2019-06-07 | End: 2019-07-24 | Stop reason: SDUPTHER

## 2019-06-11 RX ORDER — PANTOPRAZOLE SODIUM 40 MG/1
40 TABLET, DELAYED RELEASE ORAL
Status: ON HOLD | COMMUNITY
Start: 2019-06-07 | End: 2019-07-24 | Stop reason: HOSPADM

## 2019-06-11 NOTE — PROGRESS NOTES
2019     Braulio Carver (:  1950)is a 68 y.o. male, here for evaluation of the following medical concerns:    CC: anemia    HPI    Anemia  Was in hospital for fatigue had low Hgb, Hgb of 7.7 on 19  Had dark stool    EGD showed 2 cm segment Whyte's mucosa which was biopsied along with mild gastroduodenitis  colonoscopy showed hemorrhoids, a 4 mm polyp and diverticulosis   video capsule endoscopy which showed trace red tinged liquid   tagged RBC scan was negative for any active gastrointestinal evidence of bleeding      A fib  On eliquis, amiodarone, diltiazem as prescribed, going to see new cardiologist to discuss ablation    HTN  Controlled, taking medications as prescribed    Review of Systems   Constitutional: Positive for fatigue. Overall feeling better than when he was in hospital   Cardiovascular: Positive for palpitations. Pt can tell he's in A fib     Prior to Visit Medications    Medication Sig Taking? Authorizing Provider   amiodarone (CORDARONE) 200 MG tablet TAKE 1 TABLET BY MOUTH TWICE DAILY Yes Historical Provider, MD   pantoprazole (PROTONIX) 40 MG tablet Take 40 mg by mouth Yes Historical Provider, MD   sulfaSALAzine (AZULFIDINE) 500 MG tablet TAKE 1 TABLET BY MOUTH TWICE DAILY AS DIRECTED Yes Historical Provider, MD   ferrous sulfate 325 (65 Fe) MG tablet Take 325 mg by mouth Yes Historical Provider, MD   apixaban (ELIQUIS) 5 MG TABS tablet Take 5 mg by mouth Yes Historical Provider, MD   loratadine (CLARITIN) 10 MG tablet Take 10 mg by mouth Yes Historical Provider, MD   Multiple Vitamins-Minerals (MULTIVITAMIN-MINERALS) TABS tablet Take 1 tablet by mouth Yes Historical Provider, MD   zoster recombinant adjuvanted vaccine (SHINGRIX) 50 MCG SUSR injection 50 MCG IM then repeat 2-6 months.  Yes Pastor Carter MD   simvastatin (ZOCOR) 80 MG tablet TAKE ONE TABLET BY MOUTH EVERY EVENING Yes Claudean Hall, MD   lisinopril-hydrochlorothiazide (PRINZIDE;ZESTORETIC) 10-12.5 MG per tablet Take 1 tablet by mouth daily. Yes Sheeba Villanueva MD   atenolol (TENORMIN) 50 MG tablet Take 1 tablet by mouth daily. Yes Tiffany Krishnamurthy MD   indomethacin (INDOCIN) 50 MG capsule Take 1 capsule by mouth 3 times daily for 10 days  ANIKET White        Social History     Tobacco Use    Smoking status: Never Smoker    Smokeless tobacco: Never Used   Substance Use Topics    Alcohol use: No     Alcohol/week: 0.0 oz     Comment: social        Vitals:    06/11/19 1448   BP: 106/62   Pulse: 100  Comment: IRR   Temp: 98.8 °F (37.1 °C)   TempSrc: Oral   SpO2: 94%   Weight: 197 lb (89.4 kg)     Estimated body mass index is 28.27 kg/m² as calculated from the following:    Height as of 11/21/18: 5' 10\" (1.778 m). Weight as of this encounter: 197 lb (89.4 kg). Physical Exam  Constitutional: appears well-developed and well-nourished. No distress. HENT:   Head: Normocephalic and atraumatic   Eyes: EOM are normal. Right eye exhibits no discharge. Left eye exhibits no discharge   Pulmonary/Chest: Effort normal   Skin: Patient is not diaphoretic     ASSESSMENT/PLAN:  Chito Chaparro was seen today for follow-up from hospital.    Diagnoses and all orders for this visit:    Anemia, unspecified type  Presumably from aspirin and eliquis, now off of aspirin  - cont oral iron supplemenation  -     CBC    Hypertension, unspecified type  Controlled, continue taking medications as prescribed    Paroxysmal atrial fibrillation (HCC)   cont eliquis, amiodarone, diltiazem    Return in about 3 months (around 9/11/2019). An electronic signature was used to authenticate this note.     --Reno Guzman MD on 6/11/2019 at 3:32 PM

## 2019-06-24 ENCOUNTER — TELEPHONE (OUTPATIENT)
Dept: FAMILY MEDICINE CLINIC | Age: 69
End: 2019-06-24

## 2019-06-25 ENCOUNTER — OFFICE VISIT (OUTPATIENT)
Dept: CARDIOLOGY CLINIC | Age: 69
End: 2019-06-25
Payer: MEDICARE

## 2019-06-25 VITALS
HEART RATE: 98 BPM | SYSTOLIC BLOOD PRESSURE: 116 MMHG | WEIGHT: 197 LBS | DIASTOLIC BLOOD PRESSURE: 64 MMHG | HEIGHT: 69 IN | BODY MASS INDEX: 29.18 KG/M2

## 2019-06-25 DIAGNOSIS — I10 HYPERTENSION, UNSPECIFIED TYPE: ICD-10-CM

## 2019-06-25 DIAGNOSIS — I48.0 PAROXYSMAL ATRIAL FIBRILLATION (HCC): Primary | ICD-10-CM

## 2019-06-25 DIAGNOSIS — E78.2 MIXED HYPERLIPIDEMIA: ICD-10-CM

## 2019-06-25 PROCEDURE — 3017F COLORECTAL CA SCREEN DOC REV: CPT | Performed by: INTERNAL MEDICINE

## 2019-06-25 PROCEDURE — 1036F TOBACCO NON-USER: CPT | Performed by: INTERNAL MEDICINE

## 2019-06-25 PROCEDURE — 4040F PNEUMOC VAC/ADMIN/RCVD: CPT | Performed by: INTERNAL MEDICINE

## 2019-06-25 PROCEDURE — 1123F ACP DISCUSS/DSCN MKR DOCD: CPT | Performed by: INTERNAL MEDICINE

## 2019-06-25 PROCEDURE — G8427 DOCREV CUR MEDS BY ELIG CLIN: HCPCS | Performed by: INTERNAL MEDICINE

## 2019-06-25 PROCEDURE — 93000 ELECTROCARDIOGRAM COMPLETE: CPT | Performed by: INTERNAL MEDICINE

## 2019-06-25 PROCEDURE — G8419 CALC BMI OUT NRM PARAM NOF/U: HCPCS | Performed by: INTERNAL MEDICINE

## 2019-06-25 PROCEDURE — 99204 OFFICE O/P NEW MOD 45 MIN: CPT | Performed by: INTERNAL MEDICINE

## 2019-06-25 RX ORDER — DILTIAZEM HYDROCHLORIDE 120 MG/1
120 CAPSULE, COATED, EXTENDED RELEASE ORAL 2 TIMES DAILY
COMMUNITY
End: 2019-10-10

## 2019-06-25 NOTE — PATIENT INSTRUCTIONS
Plan:  Referral to EP to discuss A fib ablation  Continue current medications   Check blood pressure at home weekly  Regular exercise and following a healthy diet encouraged   Follow up with me as needed

## 2019-06-25 NOTE — PROGRESS NOTES
impulse is not displaced  · Heart tones are crisp and normal. irregular S1 and S2.  · Jugular venous pulsation Normal  · The carotid upstroke is normal in amplitude and contour without delay or bruit  · Peripheral pulses are symmetrical and full  Abdomen:  · No masses or tenderness  · Bowel sounds present  Extremities:  ·  No Cyanosis or Clubbing  ·  Lower extremity edema: No  · Skin: Warm and dry  Neurological:  · Alert and oriented. · Moves all extremities well  · No abnormalities of mood, affect, memory, mentation, or behavior are noted      DATA:    ECG:    ECHO (LUIZA) [facility] 6/9/19:   Summary:  Overall left ventricular ejection fraction is estimated to be 40-45%. There is mild global hypokinesis of the left ventricle. Mild aortic regurgitation. There is moderate mitral regurgitation. The left atrial size is moderately dilated. No left atrial clot detected. The right atrium is mildly dilated. Valves not fully evaluated due to respiratory distress. [id number] Score for Atrial Fibrillation Stroke Risk   Risk   Factors  Component Value   C CHF No 0   H HTN Yes 1   A2 Age >= 76 No,  (73 y.o.) 0   D DM No 0   S2 Prior Stroke/TIA No 0   V Vascular Disease No 0   A Age 74-69 Yes,  (73 y.o.) 1   Sc Sex male 0    [id number]  Score  2   Score last updated 6/66/05 6:93 PM    Click here for a link to the UpToDate guideline \"Atrial Fibrillation: Anticoagulation therapy to prevent embolization    Disclaimer: Risk Score calculation is dependent on accuracy of patient problem list and past encounter diagnosis. IMPRESSION:      -Persistent symptomatic worsening atrial fibrillation limiting daily activity  -Symptomatic, worsening and are life style limiting  -History of multiple cardioversions, failed antiarrhythmic therapy  -Discussed options for symptoms- Watchman and  Ablation  2. Anemia- unknown source of bleed. Anticoagulated on Eliquis for atrial fibrillation-worsening  3.  Essential approval.      Watchman protocol: discussed    ·  Before implant start Coumadin and remain on ASA  ·        LUIZA to size left atrial appendage  ·  After implant ASA and Coumadin for 45 days. ·  Transesophageal echo on day 45. If no leak around watchman, discontinue Coumadin and start Plavix along with ASA  ·  Transesophageal echo at 6 months. If no leak around watchman, discontinue Plavix and continue ASA for life      QUALITY MEASURES  1. Tobacco Cessation Counseling: NA  2. Retake of BP if >140/90:   NA  3. Documentation to PCP/referring for new patient:  Sent to PCP at close of office visit  4. CAD patient on anti-platelet: NA  5. CAD patient on STATIN therapy:  Yes  6. Patient with CHF and aFib on anticoagulation:  Yes, [place]'s attestation: This note was scribed in the presence of Dr. Elvi Rogers by Quintin May RN  I, Elvira Tejeda, personally performed the services described in this documentation, as scribed by the above signed scribe in my presence. It is both accurate and complete to my knowledge. I agree with the details independently gathered by the clinical support staff, while the remaining scribed note accurately describes my personal service to the patient.        All questions and concerns were addressed to the patient/family. Alternatives to my treatment were discussed. DANILO Garcia F.A.C.C. Electrophysiology  [facility]. [address]. [address].   [place], [address]  Phone: [phone]  Fax: [phone]

## 2019-06-25 NOTE — PROGRESS NOTES
1 TABLET BY MOUTH TWICE DAILY 4/24/19  Yes Historical Provider, MD   pantoprazole (PROTONIX) 40 MG tablet Take 40 mg by mouth 6/7/19  Yes Historical Provider, MD   sulfaSALAzine (AZULFIDINE) 500 MG tablet TAKE 1 TABLET BY MOUTH TWICE DAILY AS DIRECTED 6/3/19  Yes Historical Provider, MD   ferrous sulfate 325 (65 Fe) MG tablet Take 325 mg by mouth 6/7/19  Yes Historical Provider, MD   apixaban (ELIQUIS) 5 MG TABS tablet Take 5 mg by mouth 9/28/18  Yes Historical Provider, MD   loratadine (CLARITIN) 10 MG tablet Take 10 mg by mouth   Yes Historical Provider, MD   Multiple Vitamins-Minerals (MULTIVITAMIN-MINERALS) TABS tablet Take 1 tablet by mouth   Yes Historical Provider, MD   zoster recombinant adjuvanted vaccine (SHINGRIX) 50 MCG SUSR injection 50 MCG IM then repeat 2-6 months. 10/4/18 10/4/19 Yes Jessenia Gillespie MD   simvastatin (ZOCOR) 80 MG tablet TAKE ONE TABLET BY MOUTH EVERY EVENING 9/24/13  Yes Mario Zamora MD   lisinopril-hydrochlorothiazide (PRINZIDE;ZESTORETIC) 10-12.5 MG per tablet Take 1 tablet by mouth daily. 7/15/13  Yes Mario Zamora MD   indomethacin (INDOCIN) 50 MG capsule Take 1 capsule by mouth 3 times daily for 10 days 9/18/18 9/28/18  Dwaine Angelucci, PA   atenolol (TENORMIN) 50 MG tablet Take 1 tablet by mouth daily. 12/27/12   Dimitry Rudolph MD        Allergies:  Patient has no known allergies. Review of Systems:   · Constitutional: there has been no unanticipated weight loss. There's been no change in energy level, sleep pattern, or activity level. · Eyes: No visual changes or diplopia. No scleral icterus. · ENT: No Headaches, hearing loss or vertigo. No mouth sores or sore throat. · Cardiovascular: Reviewed in HPI  · Respiratory: No cough or wheezing, no sputum production. No hematemesis. · Gastrointestinal: No abdominal pain, appetite loss, blood in stools. No change in bowel or bladder habits.   · Genitourinary: No dysuria, trouble voiding, or hematuria. · Musculoskeletal:  No gait disturbance, weakness or joint complaints. · Integumentary: No rash or pruritis. · Neurological: No headache, diplopia, change in muscle strength, numbness or tingling. No change in gait, balance, coordination, mood, affect, memory, mentation, behavior. · Psychiatric: No anxiety, no depression. · Endocrine: No malaise, fatigue or temperature intolerance. No excessive thirst, fluid intake, or urination. No tremor. · Hematologic/Lymphatic: No abnormal bruising or bleeding, blood clots or swollen lymph nodes. · Allergic/Immunologic: No nasal congestion or hives. Physical Examination:    Vitals:    06/25/19 1254   BP: 116/64   Pulse: 98        Constitutional and General Appearance: NAD   Respiratory:  · Normal excursion and expansion without use of accessory muscles  · Resp Auscultation: Normal breath sounds without dullness  Cardiovascular:  · The apical impulses not displaced  · Heart tones are crisp and normal  · Cervical veins are not engorged  · The carotid upstroke is normal in amplitude and contour without delay or bruit  · Normal S1S2, No S3, No Murmur, irregular rhythm   · Peripheral pulses are symmetrical and full  · There is no clubbing, cyanosis of the extremities. · No edema  · Femoral Arteries: 2+ and equal  · Pedal Pulses: 2+ and equal   Abdomen:  · No masses or tenderness  · Liver/Spleen: No Abnormalities Noted  Neurological/Psychiatric:  · Alert and oriented in all spheres  · Moves all extremities well  · Exhibits normal gait balance and coordination  · No abnormalities of mood, affect, memory, mentation, or behavior are noted        LUIZA 2013  Summary   LVEF 50-55%, Normal size and wall function   The mitral valve appears normal in structure and function. Mild Mitral   regurgitation   Left atrial size is normal with no obvious mass or thrombus. ARCENIO: No thrombus. low velocities   The aortic valve is normal in structure and function.  Mild aortic valve   regurgitation. The aortic root is normal in size. The thoracic aorta is free of significant   plaque. The right ventricle is normal in size and function. The tricuspid valve is normal in structure and function. trace/mild   tricuspid regurgitation. The right atrium is normal in size. The pulmonic valve is normal in structure and function. No evidence of   pulmonic valve regurgitation. No pericardial effusion or pleural effusion noted. No intracardiac shunts by color Doppler       Stress test 2013  Interpetation Summary:  The LV EF is 60%  Normal global and regional wall motion in all territories. 1.Negative EKG for ischemia at 80% PMHR. 2.Negative nuclear stress imaging for inducible ischemia. LUIZA 2014  Summary:  Overall left ventricular ejection fraction is estimated to be 50-55%. The left ventricular wall motion is normal.  Mild spontaneous echo contrast is noted in the left atrial cavity. There is mild mitral regurgitation. Mild tricuspid regurgitation is present. Stress echocardiogram [facility] 10/2018  Interpetation Summary:  1. Negative ECG for ischemia with graded exercise test.  2. Suboptimal exercise stress echo despite the use of contrast      poorly visualized images. Would recommend alternative imaging      modality such as exercise nuclear. Findings right 84% of target heart rate. Baseline Echo Findings: The LV ejection fraction at rest is 50-55%. Post Stress Echo Findings: The LV ejection fraction post stress is  55-60%. Echocardiogram 4/12/19 [facility]   Summary:  Overall left ventricular ejection fraction is estimated to be 35-40%. There is mild to moderate global hypokinesis of the left ventricle. There is mild concentric left ventricular hypertrophy. The diastolic function is impaired and classified as Grade 1  (impaired relaxation). The left atrium is moderately dilated. The Aortic Valve is mildly calcified.   Mild to moderate aortic regurgitation. Cardioversion 4/18/19 [facility]   Conclusion:  1. Successful DC cardioversion to NSR      [facility]   Conclusion:  1. Successful DC cardioversion to NSR      LUIZA [facility] 6/5/19  Summary:  Overall left ventricular ejection fraction is estimated to be 40-45%. There is mild global hypokinesis of the left ventricle. Mild aortic regurgitation. There is moderate mitral regurgitation. The left atrial size is moderately dilated. No left atrial clot detected. The right atrium is mildly dilated. Valves not fully evaluated due to respiratory distress. Reason for Study: Afib. Cardioversion 6/5/19 [facility]   Conclusion:  1. Successful DC cardioversion to NSR        Assessment:   Palpitations - due to AF  A fib - refractory  SOB - due to AF  HTN - controlled   HLD  Mild to moderate aortic regurgitation       Plan:  Referral to EP to discuss A fib ablation  Continue current medications   Check blood pressure at home weekly  Regular exercise and following a healthy diet encouraged   Follow up with me as needed     Scribe's attestation: This note was scribed in the presence of Dr. Kash Winston M.D. By Obed Bush RN      The scribes docuementation has been prepared under my direction and personally reviewed by me in its entirety. I confirm that the note above accurately reflects all work, treatment, procedures, and medical decision making performed by me. Dr. Kash Winston MD      Thank you for allowing me to participate in the care of this individual.      Rita Reynolds.  Maria R Campa M.D., Adrianna Singh

## 2019-06-26 ENCOUNTER — OFFICE VISIT (OUTPATIENT)
Dept: CARDIOLOGY CLINIC | Age: 69
End: 2019-06-26
Payer: MEDICARE

## 2019-06-26 VITALS
HEART RATE: 74 BPM | SYSTOLIC BLOOD PRESSURE: 102 MMHG | HEIGHT: 69 IN | WEIGHT: 197.2 LBS | OXYGEN SATURATION: 97 % | DIASTOLIC BLOOD PRESSURE: 70 MMHG | BODY MASS INDEX: 29.21 KG/M2

## 2019-06-26 DIAGNOSIS — I48.0 PAROXYSMAL ATRIAL FIBRILLATION (HCC): Primary | ICD-10-CM

## 2019-06-26 PROCEDURE — G8427 DOCREV CUR MEDS BY ELIG CLIN: HCPCS | Performed by: INTERNAL MEDICINE

## 2019-06-26 PROCEDURE — 99205 OFFICE O/P NEW HI 60 MIN: CPT | Performed by: INTERNAL MEDICINE

## 2019-06-26 PROCEDURE — G8419 CALC BMI OUT NRM PARAM NOF/U: HCPCS | Performed by: INTERNAL MEDICINE

## 2019-06-26 NOTE — PATIENT INSTRUCTIONS
1. Schedule ablation. Katelin will be calling you to schedule  2. Check CBC before procedure  3.  Follow up after the procedure

## 2019-06-28 ENCOUNTER — TELEPHONE (OUTPATIENT)
Dept: CARDIOLOGY CLINIC | Age: 69
End: 2019-06-28

## 2019-07-19 ENCOUNTER — HOSPITAL ENCOUNTER (OUTPATIENT)
Age: 69
Discharge: HOME | End: 2019-07-19
Payer: MEDICARE

## 2019-07-19 DIAGNOSIS — I48.0 PAROXYSMAL ATRIAL FIBRILLATION (HCC): ICD-10-CM

## 2019-07-19 LAB
HCT VFR BLD CALC: 37.7 % (ref 40.5–52.5)
HEMOGLOBIN: 11.7 G/DL (ref 13.5–17.5)
MCH RBC QN AUTO: 26 PG (ref 26–34)
MCHC RBC AUTO-ENTMCNC: 31.1 G/DL (ref 31–36)
MCV RBC AUTO: 83.5 FL (ref 80–100)
PDW BLD-RTO: 19.7 % (ref 12.4–15.4)
PLATELET # BLD: 398 K/UL (ref 135–450)
PMV BLD AUTO: 8 FL (ref 5–10.5)
RBC # BLD: 4.51 M/UL (ref 4.2–5.9)
WBC # BLD: 5.2 K/UL (ref 4–11)

## 2019-07-19 PROCEDURE — 85027 COMPLETE CBC AUTOMATED: CPT

## 2019-07-19 PROCEDURE — 36415 COLL VENOUS BLD VENIPUNCTURE: CPT

## 2019-07-23 ENCOUNTER — HOSPITAL ENCOUNTER (OUTPATIENT)
Dept: CARDIAC CATH/INVASIVE PROCEDURES | Age: 69
Discharge: HOME | End: 2019-07-24
Attending: INTERNAL MEDICINE | Admitting: INTERNAL MEDICINE
Payer: MEDICARE

## 2019-07-23 ENCOUNTER — ANESTHESIA (OUTPATIENT)
Dept: CARDIAC CATH/INVASIVE PROCEDURES | Age: 69
End: 2019-07-23
Payer: MEDICARE

## 2019-07-23 ENCOUNTER — HOSPITAL ENCOUNTER (OUTPATIENT)
Dept: NON INVASIVE DIAGNOSTICS | Age: 69
Discharge: HOME | End: 2019-07-23
Payer: MEDICARE

## 2019-07-23 ENCOUNTER — ANESTHESIA EVENT (OUTPATIENT)
Dept: CARDIAC CATH/INVASIVE PROCEDURES | Age: 69
End: 2019-07-23
Payer: MEDICARE

## 2019-07-23 VITALS — TEMPERATURE: 95.4 F | SYSTOLIC BLOOD PRESSURE: 119 MMHG | OXYGEN SATURATION: 97 % | DIASTOLIC BLOOD PRESSURE: 69 MMHG

## 2019-07-23 PROBLEM — I48.19 PERSISTENT ATRIAL FIBRILLATION (HCC): Status: ACTIVE | Noted: 2019-07-23

## 2019-07-23 LAB
ANION GAP SERPL CALCULATED.3IONS-SCNC: 11 MMOL/L (ref 3–16)
BUN BLDV-MCNC: 16 MG/DL (ref 7–20)
CALCIUM SERPL-MCNC: 9.9 MG/DL (ref 8.3–10.6)
CHLORIDE BLD-SCNC: 98 MMOL/L (ref 99–110)
CO2: 27 MMOL/L (ref 21–32)
CREAT SERPL-MCNC: 1 MG/DL (ref 0.8–1.3)
GFR AFRICAN AMERICAN: >60
GFR NON-AFRICAN AMERICAN: >60
GLUCOSE BLD-MCNC: 95 MG/DL (ref 70–99)
HCT VFR BLD CALC: 39.1 % (ref 40.5–52.5)
HEMOGLOBIN: 12.7 G/DL (ref 13.5–17.5)
INR BLD: 1.04 (ref 0.86–1.14)
LV EF: 58 %
LVEF MODALITY: NORMAL
MCH RBC QN AUTO: 27 PG (ref 26–34)
MCHC RBC AUTO-ENTMCNC: 32.5 G/DL (ref 31–36)
MCV RBC AUTO: 83 FL (ref 80–100)
PDW BLD-RTO: 19.9 % (ref 12.4–15.4)
PLATELET # BLD: 372 K/UL (ref 135–450)
PMV BLD AUTO: 7.5 FL (ref 5–10.5)
POTASSIUM SERPL-SCNC: 4 MMOL/L (ref 3.5–5.1)
PROTHROMBIN TIME: 11.9 SEC (ref 9.8–13)
RBC # BLD: 4.71 M/UL (ref 4.2–5.9)
SODIUM BLD-SCNC: 136 MMOL/L (ref 136–145)
WBC # BLD: 5.5 K/UL (ref 4–11)

## 2019-07-23 PROCEDURE — C1894 INTRO/SHEATH, NON-LASER: HCPCS

## 2019-07-23 PROCEDURE — 85347 COAGULATION TIME ACTIVATED: CPT

## 2019-07-23 PROCEDURE — 6360000002 HC RX W HCPCS

## 2019-07-23 PROCEDURE — C1732 CATH, EP, DIAG/ABL, 3D/VECT: HCPCS

## 2019-07-23 PROCEDURE — 84439 ASSAY OF FREE THYROXINE: CPT

## 2019-07-23 PROCEDURE — 93657 TX L/R ATRIAL FIB ADDL: CPT

## 2019-07-23 PROCEDURE — 93320 DOPPLER ECHO COMPLETE: CPT

## 2019-07-23 PROCEDURE — 2580000003 HC RX 258

## 2019-07-23 PROCEDURE — 93655 ICAR CATH ABLTJ DSCRT ARRHYT: CPT | Performed by: INTERNAL MEDICINE

## 2019-07-23 PROCEDURE — C1730 CATH, EP, 19 OR FEW ELECT: HCPCS

## 2019-07-23 PROCEDURE — 2580000003 HC RX 258: Performed by: NURSE ANESTHETIST, CERTIFIED REGISTERED

## 2019-07-23 PROCEDURE — 93613 INTRACARDIAC EPHYS 3D MAPG: CPT | Performed by: INTERNAL MEDICINE

## 2019-07-23 PROCEDURE — 80048 BASIC METABOLIC PNL TOTAL CA: CPT

## 2019-07-23 PROCEDURE — C1769 GUIDE WIRE: HCPCS

## 2019-07-23 PROCEDURE — 2500000003 HC RX 250 WO HCPCS: Performed by: NURSE ANESTHETIST, CERTIFIED REGISTERED

## 2019-07-23 PROCEDURE — 93623 PRGRMD STIMJ&PACG IV RX NFS: CPT | Performed by: INTERNAL MEDICINE

## 2019-07-23 PROCEDURE — 2709999900 HC NON-CHARGEABLE SUPPLY

## 2019-07-23 PROCEDURE — 93312 ECHO TRANSESOPHAGEAL: CPT

## 2019-07-23 PROCEDURE — 93662 INTRACARDIAC ECG (ICE): CPT | Performed by: INTERNAL MEDICINE

## 2019-07-23 PROCEDURE — 2060000000 HC ICU INTERMEDIATE R&B

## 2019-07-23 PROCEDURE — 93623 PRGRMD STIMJ&PACG IV RX NFS: CPT

## 2019-07-23 PROCEDURE — C1759 CATH, INTRA ECHOCARDIOGRAPHY: HCPCS

## 2019-07-23 PROCEDURE — 93657 TX L/R ATRIAL FIB ADDL: CPT | Performed by: INTERNAL MEDICINE

## 2019-07-23 PROCEDURE — 93655 ICAR CATH ABLTJ DSCRT ARRHYT: CPT

## 2019-07-23 PROCEDURE — 93005 ELECTROCARDIOGRAM TRACING: CPT | Performed by: INTERNAL MEDICINE

## 2019-07-23 PROCEDURE — 7100000000 HC PACU RECOVERY - FIRST 15 MIN

## 2019-07-23 PROCEDURE — 6370000000 HC RX 637 (ALT 250 FOR IP): Performed by: INTERNAL MEDICINE

## 2019-07-23 PROCEDURE — 3700000001 HC ADD 15 MINUTES (ANESTHESIA)

## 2019-07-23 PROCEDURE — 3700000000 HC ANESTHESIA ATTENDED CARE

## 2019-07-23 PROCEDURE — 93325 DOPPLER ECHO COLOR FLOW MAPG: CPT

## 2019-07-23 PROCEDURE — 93662 INTRACARDIAC ECG (ICE): CPT

## 2019-07-23 PROCEDURE — 7100000001 HC PACU RECOVERY - ADDTL 15 MIN

## 2019-07-23 PROCEDURE — 93613 INTRACARDIAC EPHYS 3D MAPG: CPT

## 2019-07-23 PROCEDURE — 6360000002 HC RX W HCPCS: Performed by: INTERNAL MEDICINE

## 2019-07-23 PROCEDURE — 84443 ASSAY THYROID STIM HORMONE: CPT

## 2019-07-23 PROCEDURE — 36415 COLL VENOUS BLD VENIPUNCTURE: CPT

## 2019-07-23 PROCEDURE — 93656 COMPRE EP EVAL ABLTJ ATR FIB: CPT

## 2019-07-23 PROCEDURE — 6360000002 HC RX W HCPCS: Performed by: NURSE ANESTHETIST, CERTIFIED REGISTERED

## 2019-07-23 PROCEDURE — 85610 PROTHROMBIN TIME: CPT

## 2019-07-23 PROCEDURE — 85027 COMPLETE CBC AUTOMATED: CPT

## 2019-07-23 PROCEDURE — 93656 COMPRE EP EVAL ABLTJ ATR FIB: CPT | Performed by: INTERNAL MEDICINE

## 2019-07-23 PROCEDURE — 2500000003 HC RX 250 WO HCPCS

## 2019-07-23 RX ORDER — HYDROCHLOROTHIAZIDE 25 MG/1
12.5 TABLET ORAL DAILY
Status: DISCONTINUED | OUTPATIENT
Start: 2019-07-24 | End: 2019-07-24 | Stop reason: HOSPADM

## 2019-07-23 RX ORDER — OXYCODONE HYDROCHLORIDE AND ACETAMINOPHEN 5; 325 MG/1; MG/1
1 TABLET ORAL PRN
Status: ACTIVE | OUTPATIENT
Start: 2019-07-23 | End: 2019-07-23

## 2019-07-23 RX ORDER — SIMVASTATIN 40 MG
40 TABLET ORAL EVERY EVENING
Status: DISCONTINUED | OUTPATIENT
Start: 2019-07-23 | End: 2019-07-24 | Stop reason: HOSPADM

## 2019-07-23 RX ORDER — PROPOFOL 10 MG/ML
INJECTION, EMULSION INTRAVENOUS PRN
Status: DISCONTINUED | OUTPATIENT
Start: 2019-07-23 | End: 2019-07-23 | Stop reason: SDUPTHER

## 2019-07-23 RX ORDER — SODIUM CHLORIDE 0.9 % (FLUSH) 0.9 %
10 SYRINGE (ML) INJECTION EVERY 12 HOURS SCHEDULED
Status: DISCONTINUED | OUTPATIENT
Start: 2019-07-23 | End: 2019-07-24 | Stop reason: HOSPADM

## 2019-07-23 RX ORDER — ACETAMINOPHEN 325 MG/1
650 TABLET ORAL EVERY 4 HOURS PRN
Status: DISCONTINUED | OUTPATIENT
Start: 2019-07-23 | End: 2019-07-24 | Stop reason: HOSPADM

## 2019-07-23 RX ORDER — SODIUM CHLORIDE 9 MG/ML
INJECTION, SOLUTION INTRAVENOUS CONTINUOUS
Status: DISCONTINUED | OUTPATIENT
Start: 2019-07-23 | End: 2019-07-24 | Stop reason: HOSPADM

## 2019-07-23 RX ORDER — ROCURONIUM BROMIDE 10 MG/ML
INJECTION, SOLUTION INTRAVENOUS PRN
Status: DISCONTINUED | OUTPATIENT
Start: 2019-07-23 | End: 2019-07-23 | Stop reason: SDUPTHER

## 2019-07-23 RX ORDER — SODIUM CHLORIDE 0.9 % (FLUSH) 0.9 %
10 SYRINGE (ML) INJECTION PRN
Status: DISCONTINUED | OUTPATIENT
Start: 2019-07-23 | End: 2019-07-24 | Stop reason: HOSPADM

## 2019-07-23 RX ORDER — HEPARIN SODIUM 1000 [USP'U]/ML
INJECTION, SOLUTION INTRAVENOUS; SUBCUTANEOUS
Status: COMPLETED | OUTPATIENT
Start: 2019-07-23 | End: 2019-07-23

## 2019-07-23 RX ORDER — MEPERIDINE HYDROCHLORIDE 50 MG/ML
12.5 INJECTION INTRAMUSCULAR; INTRAVENOUS; SUBCUTANEOUS EVERY 5 MIN PRN
Status: DISCONTINUED | OUTPATIENT
Start: 2019-07-23 | End: 2019-07-24 | Stop reason: HOSPADM

## 2019-07-23 RX ORDER — LIDOCAINE HYDROCHLORIDE 20 MG/ML
INJECTION, SOLUTION INFILTRATION; PERINEURAL PRN
Status: DISCONTINUED | OUTPATIENT
Start: 2019-07-23 | End: 2019-07-23 | Stop reason: SDUPTHER

## 2019-07-23 RX ORDER — ONDANSETRON 2 MG/ML
INJECTION INTRAMUSCULAR; INTRAVENOUS PRN
Status: DISCONTINUED | OUTPATIENT
Start: 2019-07-23 | End: 2019-07-23 | Stop reason: SDUPTHER

## 2019-07-23 RX ORDER — ONDANSETRON 2 MG/ML
4 INJECTION INTRAMUSCULAR; INTRAVENOUS EVERY 10 MIN PRN
Status: DISCONTINUED | OUTPATIENT
Start: 2019-07-23 | End: 2019-07-24 | Stop reason: HOSPADM

## 2019-07-23 RX ORDER — DEXAMETHASONE SODIUM PHOSPHATE 4 MG/ML
INJECTION, SOLUTION INTRA-ARTICULAR; INTRALESIONAL; INTRAMUSCULAR; INTRAVENOUS; SOFT TISSUE PRN
Status: DISCONTINUED | OUTPATIENT
Start: 2019-07-23 | End: 2019-07-23 | Stop reason: SDUPTHER

## 2019-07-23 RX ORDER — HYDROCHLOROTHIAZIDE 25 MG/1
12.5 TABLET ORAL DAILY
Status: DISCONTINUED | OUTPATIENT
Start: 2019-07-23 | End: 2019-07-23

## 2019-07-23 RX ORDER — LISINOPRIL AND HYDROCHLOROTHIAZIDE 12.5; 1 MG/1; MG/1
1 TABLET ORAL DAILY
Status: DISCONTINUED | OUTPATIENT
Start: 2019-07-23 | End: 2019-07-23

## 2019-07-23 RX ORDER — LISINOPRIL 10 MG/1
10 TABLET ORAL DAILY
Status: DISCONTINUED | OUTPATIENT
Start: 2019-07-24 | End: 2019-07-24 | Stop reason: HOSPADM

## 2019-07-23 RX ORDER — FERROUS SULFATE 325(65) MG
325 TABLET ORAL
Status: DISCONTINUED | OUTPATIENT
Start: 2019-07-23 | End: 2019-07-24 | Stop reason: HOSPADM

## 2019-07-23 RX ORDER — HYDRALAZINE HYDROCHLORIDE 20 MG/ML
5 INJECTION INTRAMUSCULAR; INTRAVENOUS EVERY 10 MIN PRN
Status: DISCONTINUED | OUTPATIENT
Start: 2019-07-23 | End: 2019-07-24 | Stop reason: HOSPADM

## 2019-07-23 RX ORDER — HEPARIN SODIUM 10000 [USP'U]/100ML
INJECTION, SOLUTION INTRAVENOUS CONTINUOUS PRN
Status: DISCONTINUED | OUTPATIENT
Start: 2019-07-23 | End: 2019-07-24 | Stop reason: HOSPADM

## 2019-07-23 RX ORDER — PANTOPRAZOLE SODIUM 40 MG/1
40 TABLET, DELAYED RELEASE ORAL
Status: DISCONTINUED | OUTPATIENT
Start: 2019-07-24 | End: 2019-07-24 | Stop reason: HOSPADM

## 2019-07-23 RX ORDER — AMIODARONE HYDROCHLORIDE 200 MG/1
200 TABLET ORAL 2 TIMES DAILY
Status: DISCONTINUED | OUTPATIENT
Start: 2019-07-23 | End: 2019-07-24 | Stop reason: HOSPADM

## 2019-07-23 RX ORDER — ATENOLOL 25 MG/1
50 TABLET ORAL DAILY
Status: DISCONTINUED | OUTPATIENT
Start: 2019-07-23 | End: 2019-07-24 | Stop reason: HOSPADM

## 2019-07-23 RX ORDER — FUROSEMIDE 10 MG/ML
40 INJECTION INTRAMUSCULAR; INTRAVENOUS ONCE
Status: COMPLETED | OUTPATIENT
Start: 2019-07-23 | End: 2019-07-23

## 2019-07-23 RX ORDER — OXYCODONE HYDROCHLORIDE AND ACETAMINOPHEN 5; 325 MG/1; MG/1
2 TABLET ORAL PRN
Status: ACTIVE | OUTPATIENT
Start: 2019-07-23 | End: 2019-07-23

## 2019-07-23 RX ORDER — MIDAZOLAM HYDROCHLORIDE 1 MG/ML
2 INJECTION INTRAMUSCULAR; INTRAVENOUS ONCE
Status: COMPLETED | OUTPATIENT
Start: 2019-07-23 | End: 2019-07-23

## 2019-07-23 RX ORDER — SODIUM CHLORIDE 0.9 % (FLUSH) 0.9 %
SYRINGE (ML) INJECTION
Status: DISPENSED
Start: 2019-07-23 | End: 2019-07-24

## 2019-07-23 RX ORDER — DILTIAZEM HYDROCHLORIDE 120 MG/1
120 CAPSULE, COATED, EXTENDED RELEASE ORAL DAILY
Status: DISCONTINUED | OUTPATIENT
Start: 2019-07-23 | End: 2019-07-24 | Stop reason: HOSPADM

## 2019-07-23 RX ORDER — LABETALOL HYDROCHLORIDE 5 MG/ML
5 INJECTION, SOLUTION INTRAVENOUS EVERY 10 MIN PRN
Status: DISCONTINUED | OUTPATIENT
Start: 2019-07-23 | End: 2019-07-24 | Stop reason: HOSPADM

## 2019-07-23 RX ORDER — SODIUM CHLORIDE 9 MG/ML
INJECTION, SOLUTION INTRAVENOUS CONTINUOUS PRN
Status: DISCONTINUED | OUTPATIENT
Start: 2019-07-23 | End: 2019-07-23 | Stop reason: SDUPTHER

## 2019-07-23 RX ORDER — LISINOPRIL 10 MG/1
10 TABLET ORAL DAILY
Status: DISCONTINUED | OUTPATIENT
Start: 2019-07-23 | End: 2019-07-23

## 2019-07-23 RX ORDER — FENTANYL CITRATE 50 UG/ML
INJECTION, SOLUTION INTRAMUSCULAR; INTRAVENOUS PRN
Status: DISCONTINUED | OUTPATIENT
Start: 2019-07-23 | End: 2019-07-23 | Stop reason: SDUPTHER

## 2019-07-23 RX ORDER — INDOMETHACIN 25 MG/1
50 CAPSULE ORAL 3 TIMES DAILY
Status: DISCONTINUED | OUTPATIENT
Start: 2019-07-23 | End: 2019-07-24 | Stop reason: HOSPADM

## 2019-07-23 RX ADMIN — SODIUM CHLORIDE: 9 INJECTION, SOLUTION INTRAVENOUS at 16:58

## 2019-07-23 RX ADMIN — PHENYLEPHRINE HYDROCHLORIDE 50 MCG/MIN: 10 INJECTION INTRAVENOUS at 12:32

## 2019-07-23 RX ADMIN — DEXAMETHASONE SODIUM PHOSPHATE 8 MG: 4 INJECTION, SOLUTION INTRAMUSCULAR; INTRAVENOUS at 12:36

## 2019-07-23 RX ADMIN — HEPARIN SODIUM 4000 UNITS: 1000 INJECTION, SOLUTION INTRAVENOUS; SUBCUTANEOUS at 15:08

## 2019-07-23 RX ADMIN — HEPARIN SODIUM 1200 UNITS/HR: 10000 INJECTION, SOLUTION INTRAVENOUS at 13:35

## 2019-07-23 RX ADMIN — SUGAMMADEX 100 MG: 100 INJECTION, SOLUTION INTRAVENOUS at 16:58

## 2019-07-23 RX ADMIN — SODIUM CHLORIDE: 9 INJECTION, SOLUTION INTRAVENOUS at 12:01

## 2019-07-23 RX ADMIN — ONDANSETRON 4 MG: 2 INJECTION INTRAMUSCULAR; INTRAVENOUS at 12:36

## 2019-07-23 RX ADMIN — FERROUS SULFATE TAB 325 MG (65 MG ELEMENTAL FE) 325 MG: 325 (65 FE) TAB at 22:04

## 2019-07-23 RX ADMIN — FUROSEMIDE 40 MG: 10 INJECTION, SOLUTION INTRAMUSCULAR; INTRAVENOUS at 21:54

## 2019-07-23 RX ADMIN — ROCURONIUM BROMIDE 100 MG: 10 SOLUTION INTRAVENOUS at 12:28

## 2019-07-23 RX ADMIN — HEPARIN SODIUM 2000 UNITS: 1000 INJECTION, SOLUTION INTRAVENOUS; SUBCUTANEOUS at 13:48

## 2019-07-23 RX ADMIN — HEPARIN SODIUM 4000 UNITS: 1000 INJECTION, SOLUTION INTRAVENOUS; SUBCUTANEOUS at 13:35

## 2019-07-23 RX ADMIN — FENTANYL CITRATE 100 MCG: 50 INJECTION INTRAMUSCULAR; INTRAVENOUS at 12:26

## 2019-07-23 RX ADMIN — LIDOCAINE HYDROCHLORIDE 60 MG: 20 INJECTION, SOLUTION INFILTRATION; PERINEURAL at 12:27

## 2019-07-23 RX ADMIN — SIMVASTATIN 40 MG: 40 TABLET, FILM COATED ORAL at 22:04

## 2019-07-23 RX ADMIN — HEPARIN SODIUM 6000 UNITS: 1000 INJECTION, SOLUTION INTRAVENOUS; SUBCUTANEOUS at 13:19

## 2019-07-23 RX ADMIN — PROPOFOL 150 MG: 10 INJECTION, EMULSION INTRAVENOUS at 12:28

## 2019-07-23 RX ADMIN — PHENYLEPHRINE HYDROCHLORIDE 50 MCG: 10 INJECTION INTRAVENOUS at 12:36

## 2019-07-23 RX ADMIN — SODIUM CHLORIDE: 9 INJECTION, SOLUTION INTRAVENOUS at 12:26

## 2019-07-23 RX ADMIN — SODIUM CHLORIDE: 9 INJECTION, SOLUTION INTRAVENOUS at 11:30

## 2019-07-23 RX ADMIN — APIXABAN 5 MG: 5 TABLET, FILM COATED ORAL at 22:04

## 2019-07-23 RX ADMIN — AMIODARONE HYDROCHLORIDE 200 MG: 200 TABLET ORAL at 22:04

## 2019-07-23 RX ADMIN — MIDAZOLAM HYDROCHLORIDE 2 MG: 1 INJECTION INTRAMUSCULAR; INTRAVENOUS at 11:30

## 2019-07-23 ASSESSMENT — PULMONARY FUNCTION TESTS
PIF_VALUE: 20
PIF_VALUE: 19
PIF_VALUE: 19
PIF_VALUE: 20
PIF_VALUE: 19
PIF_VALUE: 20
PIF_VALUE: 20
PIF_VALUE: 19
PIF_VALUE: 19
PIF_VALUE: 20
PIF_VALUE: 19
PIF_VALUE: 20
PIF_VALUE: 18
PIF_VALUE: 20
PIF_VALUE: 19
PIF_VALUE: 19
PIF_VALUE: 20
PIF_VALUE: 19
PIF_VALUE: 20
PIF_VALUE: 19
PIF_VALUE: 19
PIF_VALUE: 5
PIF_VALUE: 20
PIF_VALUE: 20
PIF_VALUE: 19
PIF_VALUE: 20
PIF_VALUE: 19
PIF_VALUE: 20
PIF_VALUE: 7
PIF_VALUE: 19
PIF_VALUE: 20
PIF_VALUE: 19
PIF_VALUE: 0
PIF_VALUE: 19
PIF_VALUE: 20
PIF_VALUE: 19
PIF_VALUE: 18
PIF_VALUE: 20
PIF_VALUE: 19
PIF_VALUE: 20
PIF_VALUE: 19
PIF_VALUE: 20
PIF_VALUE: 20
PIF_VALUE: 19
PIF_VALUE: 21
PIF_VALUE: 20
PIF_VALUE: 1
PIF_VALUE: 20
PIF_VALUE: 19
PIF_VALUE: 20
PIF_VALUE: 20
PIF_VALUE: 19
PIF_VALUE: 20
PIF_VALUE: 19
PIF_VALUE: 20
PIF_VALUE: 19
PIF_VALUE: 20
PIF_VALUE: 8
PIF_VALUE: 19
PIF_VALUE: 20
PIF_VALUE: 19
PIF_VALUE: 20
PIF_VALUE: 19
PIF_VALUE: 20
PIF_VALUE: 20
PIF_VALUE: 18
PIF_VALUE: 19
PIF_VALUE: 18
PIF_VALUE: 20
PIF_VALUE: 19
PIF_VALUE: 20
PIF_VALUE: 19
PIF_VALUE: 26
PIF_VALUE: 20
PIF_VALUE: 20
PIF_VALUE: 19
PIF_VALUE: 20
PIF_VALUE: 19
PIF_VALUE: 20
PIF_VALUE: 20
PIF_VALUE: 19
PIF_VALUE: 19
PIF_VALUE: 20
PIF_VALUE: 19
PIF_VALUE: 19
PIF_VALUE: 20
PIF_VALUE: 19
PIF_VALUE: 19
PIF_VALUE: 20
PIF_VALUE: 19
PIF_VALUE: 20
PIF_VALUE: 19
PIF_VALUE: 19
PIF_VALUE: 0
PIF_VALUE: 19
PIF_VALUE: 20
PIF_VALUE: 19
PIF_VALUE: 20
PIF_VALUE: 19
PIF_VALUE: 20
PIF_VALUE: 20
PIF_VALUE: 19
PIF_VALUE: 19
PIF_VALUE: 20
PIF_VALUE: 19
PIF_VALUE: 17
PIF_VALUE: 19
PIF_VALUE: 18
PIF_VALUE: 20
PIF_VALUE: 20
PIF_VALUE: 19
PIF_VALUE: 20
PIF_VALUE: 20
PIF_VALUE: 19
PIF_VALUE: 19
PIF_VALUE: 20
PIF_VALUE: 19
PIF_VALUE: 18
PIF_VALUE: 19
PIF_VALUE: 18
PIF_VALUE: 19
PIF_VALUE: 18
PIF_VALUE: 19
PIF_VALUE: 20
PIF_VALUE: 18
PIF_VALUE: 19
PIF_VALUE: 19
PIF_VALUE: 18
PIF_VALUE: 20
PIF_VALUE: 18
PIF_VALUE: 19
PIF_VALUE: 20
PIF_VALUE: 20
PIF_VALUE: 19
PIF_VALUE: 20
PIF_VALUE: 21
PIF_VALUE: 20
PIF_VALUE: 19
PIF_VALUE: 17
PIF_VALUE: 20
PIF_VALUE: 19
PIF_VALUE: 19
PIF_VALUE: 20
PIF_VALUE: 21
PIF_VALUE: 19
PIF_VALUE: 25
PIF_VALUE: 19
PIF_VALUE: 20
PIF_VALUE: 20
PIF_VALUE: 19
PIF_VALUE: 20
PIF_VALUE: 19
PIF_VALUE: 19
PIF_VALUE: 20
PIF_VALUE: 18
PIF_VALUE: 19
PIF_VALUE: 20
PIF_VALUE: 5
PIF_VALUE: 19
PIF_VALUE: 20
PIF_VALUE: 20
PIF_VALUE: 19
PIF_VALUE: 20
PIF_VALUE: 19
PIF_VALUE: 20
PIF_VALUE: 19
PIF_VALUE: 20
PIF_VALUE: 20
PIF_VALUE: 8
PIF_VALUE: 19
PIF_VALUE: 19
PIF_VALUE: 20
PIF_VALUE: 19
PIF_VALUE: 3
PIF_VALUE: 20
PIF_VALUE: 19
PIF_VALUE: 20
PIF_VALUE: 19
PIF_VALUE: 20
PIF_VALUE: 21
PIF_VALUE: 20
PIF_VALUE: 19
PIF_VALUE: 18
PIF_VALUE: 11
PIF_VALUE: 20
PIF_VALUE: 19
PIF_VALUE: 18
PIF_VALUE: 19
PIF_VALUE: 19
PIF_VALUE: 20
PIF_VALUE: 20
PIF_VALUE: 19
PIF_VALUE: 20
PIF_VALUE: 19
PIF_VALUE: 20
PIF_VALUE: 19
PIF_VALUE: 20

## 2019-07-23 ASSESSMENT — PAIN SCALES - GENERAL
PAINLEVEL_OUTOF10: 0

## 2019-07-23 NOTE — PROGRESS NOTES
Report given to Dale Sanz /MILEY. family taken to room with RN. Skin assessment to be completed after bedrest is completed.

## 2019-07-23 NOTE — H&P
carcinoma). Past Surgical History:   has a past surgical history that includes Cardioversion (2005); Total hip arthroplasty (Left, 2013); and Colonoscopy ([id number]). Allergies:  Patient has no known allergies. Social History:   reports that he has never smoked. He has never used smokeless tobacco. He reports that he does not drink alcohol or use drugs. Family History: family history includes Cancer in his mother; Heart Disease (age of onset: 79) in his father; High Blood Pressure in his mother. Home Medications:    Prior to Admission medications    Medication Sig Start Date End Date Taking? Authorizing Provider   ROMANA GARCIA by Combination route   Yes Historical Provider, MD   diltiazem (CARDIZEM CD) 120 MG extended release capsule Take 120 mg by mouth daily   Yes Historical Provider, MD   amiodarone (CORDARONE) 200 MG tablet TAKE 1 TABLET BY MOUTH TWICE DAILY 4/24/19  Yes Historical Provider, MD   pantoprazole (PROTONIX) 40 MG tablet Take 40 mg by mouth 6/7/19  Yes Historical Provider, MD   sulfaSALAzine (AZULFIDINE) 500 MG tablet TAKE 1 TABLET BY MOUTH TWICE DAILY AS DIRECTED 6/3/19  Yes Historical Provider, MD   ferrous sulfate 325 (65 Fe) MG tablet Take 325 mg by mouth 6/7/19  Yes Historical Provider, MD   Multiple Vitamins-Minerals (MULTIVITAMIN-MINERALS) TABS tablet Take 1 tablet by mouth   Yes Historical Provider, MD   simvastatin (ZOCOR) 80 MG tablet TAKE ONE TABLET BY MOUTH EVERY EVENING 9/24/13  Yes Salma Cunningham MD   lisinopril-hydrochlorothiazide (PRINZIDE;ZESTORETIC) 10-12.5 MG per tablet Take 1 tablet by mouth daily. 7/15/13  Yes Bessie Patino MD   atenolol (TENORMIN) 50 MG tablet Take 1 tablet by mouth daily.  12/27/12  Yes Fanny Osorio MD   apixaban (ELIQUIS) 5 MG TABS tablet Take 5 mg by mouth 9/28/18   Historical Provider, MD   loratadine (CLARITIN) 10 MG tablet Take 10 mg by mouth    Historical Provider, MD   zoster recombinant adjuvanted vaccine ([address] [place] [address] [address] [place]) 50 MCG SUSR injection 50 MCG IM then repeat 2-6 months. 10/4/18 10/4/19  Lona Gaona MD   indomethacin (INDOCIN) 50 MG capsule Take 1 capsule by mouth 3 times daily for 10 days 9/18/18 9/28/18  ANIKET Ramon          REVIEW OF SYSTEMS:      All 14-point review of systems are completed and  pertinent positives are mentioned in the history of present illness. Other  systems are reviewed and are negative. Physical Examination:    Ht 5' 9\" (1.753 m)   Wt 187 lb (84.8 kg)   BMI 27.62 kg/m²      Constitutional and General Appearance:    alert, cooperative, no distress and appears stated age  [de-identified]:    PERRLA, no cervical lymphadenopathy. No masses palpable. Normal oral  mucosa  Respiratory:  · Normal excursion and expansion without use of accessory muscles  · Resp Auscultation: Normal breath sounds without dullness or wheezing  Cardiovascular:  · The apical impulse is not displaced  · Heart tones are crisp and normal. irregular S1 and S2.  · Jugular venous pulsation Normal  · The carotid upstroke is normal in amplitude and contour without delay or bruit  · Peripheral pulses are symmetrical and full  Abdomen:  · No masses or tenderness  · Bowel sounds present  Extremities:  ·  No Cyanosis or Clubbing  ·  Lower extremity edema: No  · Skin: Warm and dry  Neurological:  · Alert and oriented. · Moves all extremities well  · No abnormalities of mood, affect, memory, mentation, or behavior are noted      DATA:    ECG:    ECHO (LUIZA) [facility] 6/9/19:   Summary:  Overall left ventricular ejection fraction is estimated to be 40-45%. There is mild global hypokinesis of the left ventricle. Mild aortic regurgitation. There is moderate mitral regurgitation. The left atrial size is moderately dilated. No left atrial clot detected. The right atrium is mildly dilated. Valves not fully evaluated due to respiratory distress.     [id number] Score for Atrial Fibrillation Stroke Risk   Risk   Factors  Component Value   C CHF No 0 including hemorrhage. Watchman device only protects against the stroke associated with left atrial appendage related clots/thrombus. The procedure has been discussed in detail with patient and family members along with the risk and benefits. Success rate and complications associated with such a procedure have been discussed. Continued treatment with anti-coagulation agents will also be a reasonable strategy and can be pursued. It was also emphasized that the watchman procedure can only be pursued if there is no evidence of thrombus in the left atrial appendage. It was also explained the need for short term coumadin therapy followed by antiplatelet therapy. Patient will need a preoperative LUIZA. All the questions were answered. Patient wishes to proceed. We will proceed with further workup including if necessary insurance approval.      Watchman protocol: discussed    ·  Before implant start Coumadin and remain on ASA  ·        LUIZA to size left atrial appendage  ·  After implant ASA and Coumadin for 45 days. ·  Transesophageal echo on day 45. If no leak around watchman, discontinue Coumadin and start Plavix along with ASA  ·  Transesophageal echo at 6 months. If no leak around watchman, discontinue Plavix and continue ASA for life            All questions and concerns were addressed to the patient/family. Alternatives to my treatment were discussed. AUGUSTINE GrahamC. Electrophysiology  [facility] [address].   [place], [address]  Phone: [phone]  Fax: [phone]

## 2019-07-24 VITALS
SYSTOLIC BLOOD PRESSURE: 123 MMHG | HEART RATE: 78 BPM | TEMPERATURE: 97.4 F | HEIGHT: 69 IN | WEIGHT: 189.6 LBS | DIASTOLIC BLOOD PRESSURE: 78 MMHG | OXYGEN SATURATION: 97 % | RESPIRATION RATE: 18 BRPM | BODY MASS INDEX: 28.08 KG/M2

## 2019-07-24 LAB
EKG ATRIAL RATE: 100 BPM
EKG DIAGNOSIS: NORMAL
EKG Q-T INTERVAL: 396 MS
EKG QRS DURATION: 88 MS
EKG QTC CALCULATION (BAZETT): 495 MS
EKG R AXIS: 18 DEGREES
EKG T AXIS: 47 DEGREES
EKG VENTRICULAR RATE: 94 BPM
POC ACT LR: 220 SEC
POC ACT LR: 263 SEC
POC ACT LR: 269 SEC
POC ACT LR: 279 SEC
POC ACT LR: 327 SEC
POC ACT LR: 347 SEC
POC ACT LR: 354 SEC
T4 FREE: 1.2 NG/DL (ref 0.9–1.8)
TSH REFLEX FT4: 11.56 UIU/ML (ref 0.27–4.2)

## 2019-07-24 PROCEDURE — 2580000003 HC RX 258: Performed by: INTERNAL MEDICINE

## 2019-07-24 PROCEDURE — 99217 PR OBSERVATION CARE DISCHARGE MANAGEMENT: CPT | Performed by: NURSE PRACTITIONER

## 2019-07-24 PROCEDURE — 6370000000 HC RX 637 (ALT 250 FOR IP): Performed by: INTERNAL MEDICINE

## 2019-07-24 PROCEDURE — 93010 ELECTROCARDIOGRAM REPORT: CPT | Performed by: INTERNAL MEDICINE

## 2019-07-24 RX ORDER — ASPIRIN 81 MG
1 TABLET, DELAYED RELEASE (ENTERIC COATED) ORAL DAILY
Qty: 30 TABLET | Refills: 0 | COMMUNITY
Start: 2019-07-24 | End: 2022-07-27

## 2019-07-24 RX ORDER — FERROUS SULFATE 325(65) MG
325 TABLET ORAL
Qty: 30 TABLET | Refills: 3
Start: 2019-07-24 | End: 2019-10-02 | Stop reason: SDUPTHER

## 2019-07-24 RX ORDER — PANTOPRAZOLE SODIUM 40 MG/1
40 TABLET, DELAYED RELEASE ORAL 2 TIMES DAILY
Qty: 60 TABLET | Refills: 0 | Status: CANCELLED
Start: 2019-07-24 | End: 2019-08-23

## 2019-07-24 RX ORDER — OMEPRAZOLE 40 MG/1
40 CAPSULE, DELAYED RELEASE ORAL 2 TIMES DAILY WITH MEALS
Qty: 60 CAPSULE | Refills: 0 | Status: SHIPPED | OUTPATIENT
Start: 2019-07-24 | End: 2019-07-24 | Stop reason: SDUPTHER

## 2019-07-24 RX ADMIN — APIXABAN 5 MG: 5 TABLET, FILM COATED ORAL at 10:04

## 2019-07-24 RX ADMIN — FERROUS SULFATE TAB 325 MG (65 MG ELEMENTAL FE) 325 MG: 325 (65 FE) TAB at 10:03

## 2019-07-24 RX ADMIN — LISINOPRIL 10 MG: 10 TABLET ORAL at 10:02

## 2019-07-24 RX ADMIN — HYDROCHLOROTHIAZIDE 12.5 MG: 25 TABLET ORAL at 10:03

## 2019-07-24 RX ADMIN — AMIODARONE HYDROCHLORIDE 200 MG: 200 TABLET ORAL at 10:04

## 2019-07-24 RX ADMIN — Medication 10 ML: at 09:45

## 2019-07-24 RX ADMIN — ATENOLOL 50 MG: 25 TABLET ORAL at 10:01

## 2019-07-24 RX ADMIN — FERROUS SULFATE TAB 325 MG (65 MG ELEMENTAL FE) 325 MG: 325 (65 FE) TAB at 14:18

## 2019-07-24 RX ADMIN — DILTIAZEM HYDROCHLORIDE 120 MG: 120 CAPSULE, COATED, EXTENDED RELEASE ORAL at 10:04

## 2019-07-24 RX ADMIN — PANTOPRAZOLE SODIUM 40 MG: 40 TABLET, DELAYED RELEASE ORAL at 10:05

## 2019-07-24 RX ADMIN — INDOMETHACIN 50 MG: 25 CAPSULE ORAL at 14:10

## 2019-07-24 ASSESSMENT — PAIN SCALES - GENERAL
PAINLEVEL_OUTOF10: 2
PAINLEVEL_OUTOF10: 0

## 2019-07-24 NOTE — FLOWSHEET NOTE
07/23/19 1943   Assessment   Charting Type Shift assessment   Neurological   Neuro (WDL) WDL   Level of Consciousness 0   Orientation Level Oriented X4   Cognition Appropriate judgement; Appropriate safety awareness; Appropriate attention/concentration; Appropriate for developmental age; Follows commands   Sensation RLE Full sensation   Sensation LLE Full sensation   NIH/MNIHSS Stroke Scale Assessed No   [place] Coma Scale   Eye Opening 4   Best Verbal Response 5   Best Motor Response 6   [place] Coma Scale Score 15   HEENT   HEENT (WDL) WDL   Respiratory   Respiratory (WDL) WDL   Cardiac   Cardiac (WDL) X  (s/p afib ablation)   Cardiac Regularity Regular   Heart Sounds S1, S2   Cardiac Rhythm NSR   Rhythm Interpretation   Pulse 72   Cardiac Monitor   Telemetry Monitor On Yes   Telemetry Audible Yes   Telemetry Alarms Set Yes   Gastrointestinal   Abdominal (WDL) WDL   Peripheral Vascular   Peripheral Vascular (WDL) WDL   Edema None   RLE Neurovascular Assessment   Capillary Refill Less than/equal to 3 seconds   Color Appropriate for ethnicity   Temperature Cool   R Pedal Pulse +2   Puncture Site Assessment 1   Location Femoral - right   Site Assessment No redness, drainage, swelling or hematoma   Hemostasis Intervention Other (comment)  (sutures)   Dressing Applied Transparent occlusive dressing   Multiple puncture sites No   Skin Color/Condition   Skin Color/Condition (WDL) WDL   Skin Integrity   Skin Integrity (WDL) WDL   Musculoskeletal   Musculoskeletal (WDL) WDL   Genitourinary   Genitourinary (WDL) X  (griffin)   Flank Tenderness No   Suprapubic Tenderness No   Dysuria BEATRICE   Anus/Rectum   Anus/Rectum (WDL) WDL   Urethral Catheter Straight-tip 16 fr   Placement Date/Time: 07/23/19 1106   Urethral Catheter Timeout: Patient;Procedure;Sterile technique; Appropriate Equipment;Correct Position  Catheter Type: Straight-tip  Tube Size (fr): 16 fr  Catheter Balloon Size: 10 mL  Collection Container: Standar. ..    Catheter

## 2019-07-24 NOTE — PROCEDURES
[address] [address]                            CARDIAC CATHETERIZATION    PATIENT NAME: Kimberly Perdomo                     :        1950  MED REC NO:   [id number]                          ROOM:       3605  ACCOUNT NO:   [de-identified]                           ADMIT DATE: 2019  PROVIDER:     Andrei Leger MD    DATE OF PROCEDURE:  2019    PROCEDURE NOTE    PROCEDURE PERFORMED:  1. Complex electrophysiology study with attempted induction of  arrhythmia. 2.  Drug challenge with IV isoproterenol. 3.  Left atrial pacing, mapping, and recording using a coronary sinus  catheter. 4.  Three-dimensional map using a CARTO mapping system. 5.  Transseptal puncture through intact septum x1.  6.  Intracardiac ICE catheter placement. 7.  Pulmonary vein isolation with wide circumferential lesions at the  antrum. 8.  Left atrial roof line from the right superior to the left superior  pulmonary vein. 9.  Anterior left atrial ablation from the right superior pulmonary vein  down to the lateral mitral isthmus. 10.  Posterior wall ablation. 11.  Cavotricuspid isthmus ablation. 12.  Direct current cardioversion x1. 13.  Extensive scarring of the left atrium demonstrated by  three-dimensional mapping and voltage mapping. INDICATION:  Drug refractory symptomatic persistent atrial fibrillation. PROCEDURE IN DETAIL:  The patient was brought to the EP lab in fasting,  non-sedated state, procedure being performed and the patient was  identified. A transesophageal echocardiogram was done after the patient  was intubated. Please see separate report. After ensuring adequate  sedation, three separate venous access were obtained on the right  femoral vein. [place] catheter from [facility] [address]. was advanced into the  coronary sinus with pacing, mapping, and recording.   Next, 9-[place]  sheath was placed for intracardiac ICE synchronized  joule shock, the patient converted to right sinus rhythm. I also  checked the line created at the isthmus, by pacing on the line, there  was no capture. Post ablation, a complete EP study was done. Post ablation sinus cycle length 1187 milliseconds, AH interval 48  milliseconds, HV interval 54 milliseconds. Next, decremental coronary  sinus pacing was done. AV block 560. There was no VA conduction at  baseline. AV corey ERP was 600:480 and VERP was 600:270. Next, the  patient was started on IV isoproterenol. On IV isoproterenol, the  patient's sinus cycle length was 850 milliseconds, AV block was 540  milliseconds. There was no VA conduction. AV corey ERP was 600:410 and  then VERP was 600:260. The patient remained in sinus rhythm. At this  point, I deemed the procedure being complete. Catheters were withdrawn. Sheaths were aspirated and pulled. Figure-of-eight suture was applied  and then handheld pressure was applied to achieve hemostasis. The  patient tolerated the procedure well, made complete hemodynamic and  neurovascular recovery. CONCLUSION:  1. Complex electrophysiology study with normal baseline conduction  parameters. 2.  Status post pulmonary vein isolation on extensive left atrial  ablation. 3.  Status post cavotricuspid isthmus ablation. PLAN:  Continue all medications. I will give the patient Lasix x1 and  reevaluate him in the morning.         Jimbo Alvarenga MD    D: 07/23/2019 18:02:33       T: 07/23/2019 18:59:34     [id number]  Job#: [id number]     Doc#: [id number]    CC:

## 2019-07-25 ENCOUNTER — TELEPHONE (OUTPATIENT)
Dept: CARDIOLOGY CLINIC | Age: 69
End: 2019-07-25

## 2019-07-25 DIAGNOSIS — I48.0 PAROXYSMAL ATRIAL FIBRILLATION (HCC): Primary | ICD-10-CM

## 2019-07-26 ENCOUNTER — TELEPHONE (OUTPATIENT)
Dept: CARDIOLOGY CLINIC | Age: 69
End: 2019-07-26

## 2019-07-26 DIAGNOSIS — E05.90 HYPERTHYROIDISM: Primary | ICD-10-CM

## 2019-07-26 PROCEDURE — 93312 ECHO TRANSESOPHAGEAL: CPT | Performed by: INTERNAL MEDICINE

## 2019-07-26 NOTE — TELEPHONE ENCOUNTER
----- Message from DARSHANA Loja CNP sent at 7/25/2019  5:22 PM EDT -----  Please inform the patient that we send a thyroid study while hospitalized due to amiodarone use. Reviewed with Dr. Claudia Marshall and MD wants the patient to decrease amiodarone to 200mg daily and be referred to Dr. Titus Gomez for further evaluation.

## 2019-07-29 RX ORDER — OMEPRAZOLE 40 MG/1
CAPSULE, DELAYED RELEASE ORAL
Qty: 180 CAPSULE | Refills: 0 | Status: SHIPPED | OUTPATIENT
Start: 2019-07-29 | End: 2020-02-13

## 2019-08-02 ENCOUNTER — OFFICE VISIT (OUTPATIENT)
Dept: PULMONOLOGY | Age: 69
End: 2019-08-02
Payer: MEDICARE

## 2019-08-02 VITALS
HEIGHT: 69 IN | HEART RATE: 78 BPM | DIASTOLIC BLOOD PRESSURE: 78 MMHG | OXYGEN SATURATION: 96 % | BODY MASS INDEX: 27.99 KG/M2 | WEIGHT: 189 LBS | SYSTOLIC BLOOD PRESSURE: 104 MMHG | RESPIRATION RATE: 16 BRPM | TEMPERATURE: 98 F

## 2019-08-02 DIAGNOSIS — R06.83 SNORING: Primary | ICD-10-CM

## 2019-08-02 DIAGNOSIS — G47.30 OBSERVED SLEEP APNEA: ICD-10-CM

## 2019-08-02 DIAGNOSIS — G47.00 INSOMNIA, UNSPECIFIED TYPE: ICD-10-CM

## 2019-08-02 DIAGNOSIS — G25.81 RLS (RESTLESS LEGS SYNDROME): ICD-10-CM

## 2019-08-02 DIAGNOSIS — R53.83 OTHER FATIGUE: ICD-10-CM

## 2019-08-02 DIAGNOSIS — I48.19 PERSISTENT ATRIAL FIBRILLATION (HCC): ICD-10-CM

## 2019-08-02 PROCEDURE — G8427 DOCREV CUR MEDS BY ELIG CLIN: HCPCS | Performed by: NURSE PRACTITIONER

## 2019-08-02 PROCEDURE — 99204 OFFICE O/P NEW MOD 45 MIN: CPT | Performed by: NURSE PRACTITIONER

## 2019-08-02 PROCEDURE — G8419 CALC BMI OUT NRM PARAM NOF/U: HCPCS | Performed by: NURSE PRACTITIONER

## 2019-08-02 ASSESSMENT — SLEEP AND FATIGUE QUESTIONNAIRES
HOW LIKELY ARE YOU TO NOD OFF OR FALL ASLEEP WHEN YOU ARE A PASSENGER IN A CAR FOR AN HOUR WITHOUT A BREAK: 0
HOW LIKELY ARE YOU TO NOD OFF OR FALL ASLEEP WHILE LYING DOWN TO REST IN THE AFTERNOON WHEN CIRCUMSTANCES PERMIT: 0
NECK CIRCUMFERENCE (INCHES): 17
HOW LIKELY ARE YOU TO NOD OFF OR FALL ASLEEP WHILE SITTING AND READING: 0
HOW LIKELY ARE YOU TO NOD OFF OR FALL ASLEEP IN A CAR, WHILE STOPPED FOR A FEW MINUTES IN TRAFFIC: 0
HOW LIKELY ARE YOU TO NOD OFF OR FALL ASLEEP WHILE WATCHING TV: 0
ESS TOTAL SCORE: 0
HOW LIKELY ARE YOU TO NOD OFF OR FALL ASLEEP WHILE SITTING QUIETLY AFTER LUNCH WITHOUT ALCOHOL: 0
HOW LIKELY ARE YOU TO NOD OFF OR FALL ASLEEP WHILE SITTING AND TALKING TO SOMEONE: 0
HOW LIKELY ARE YOU TO NOD OFF OR FALL ASLEEP WHILE SITTING INACTIVE IN A PUBLIC PLACE: 0

## 2019-08-02 NOTE — LETTER
Genesis Galloway, APRN - CNP    CC providers:  Rachel Hernandez MD  [facility] [address]  [facility]

## 2019-08-07 ENCOUNTER — OFFICE VISIT (OUTPATIENT)
Dept: CARDIOLOGY CLINIC | Age: 69
End: 2019-08-07
Payer: MEDICARE

## 2019-08-07 VITALS
HEART RATE: 76 BPM | DIASTOLIC BLOOD PRESSURE: 74 MMHG | SYSTOLIC BLOOD PRESSURE: 118 MMHG | HEIGHT: 69 IN | BODY MASS INDEX: 28.14 KG/M2 | WEIGHT: 190 LBS

## 2019-08-07 DIAGNOSIS — R00.2 PALPITATION: Primary | ICD-10-CM

## 2019-08-07 PROCEDURE — 1036F TOBACCO NON-USER: CPT | Performed by: INTERNAL MEDICINE

## 2019-08-07 PROCEDURE — 99215 OFFICE O/P EST HI 40 MIN: CPT | Performed by: INTERNAL MEDICINE

## 2019-08-07 PROCEDURE — 3017F COLORECTAL CA SCREEN DOC REV: CPT | Performed by: INTERNAL MEDICINE

## 2019-08-07 PROCEDURE — 93000 ELECTROCARDIOGRAM COMPLETE: CPT | Performed by: INTERNAL MEDICINE

## 2019-08-07 PROCEDURE — 4040F PNEUMOC VAC/ADMIN/RCVD: CPT | Performed by: INTERNAL MEDICINE

## 2019-08-07 PROCEDURE — G8427 DOCREV CUR MEDS BY ELIG CLIN: HCPCS | Performed by: INTERNAL MEDICINE

## 2019-08-07 PROCEDURE — G8419 CALC BMI OUT NRM PARAM NOF/U: HCPCS | Performed by: INTERNAL MEDICINE

## 2019-08-07 PROCEDURE — 1123F ACP DISCUSS/DSCN MKR DOCD: CPT | Performed by: INTERNAL MEDICINE

## 2019-08-07 PROCEDURE — G8598 ASA/ANTIPLAT THER USED: HCPCS | Performed by: INTERNAL MEDICINE

## 2019-08-07 RX ORDER — LISINOPRIL 10 MG/1
10 TABLET ORAL DAILY
Qty: 30 TABLET | Refills: 5 | Status: SHIPPED | OUTPATIENT
Start: 2019-08-07 | End: 2019-08-07 | Stop reason: SDUPTHER

## 2019-08-07 RX ORDER — HYDROCHLOROTHIAZIDE 25 MG/1
25 TABLET ORAL EVERY MORNING
Qty: 90 TABLET | Refills: 3 | Status: SHIPPED | OUTPATIENT
Start: 2019-08-07 | End: 2020-01-29

## 2019-08-07 RX ORDER — LISINOPRIL 10 MG/1
10 TABLET ORAL DAILY
Qty: 90 TABLET | Refills: 3 | Status: SHIPPED | OUTPATIENT
Start: 2019-08-07 | End: 2020-01-29

## 2019-08-07 RX ORDER — HYDROCHLOROTHIAZIDE 25 MG/1
25 TABLET ORAL EVERY MORNING
Qty: 30 TABLET | Refills: 5 | Status: SHIPPED | OUTPATIENT
Start: 2019-08-07 | End: 2019-08-07 | Stop reason: SDUPTHER

## 2019-08-07 NOTE — PROGRESS NOTES
sounds present  Extremities:  ·  No Cyanosis or Clubbing  ·  Lower extremity edema: No  · Skin: Warm and dry  Neurological:  · Alert and oriented. · Moves all extremities well  · No abnormalities of mood, affect, memory, mentation, or behavior are noted      DATA:    ECG:  Sinus Rhythm  ECHO (LUIZA) [facility] 6/9/19:   Summary:  Overall left ventricular ejection fraction is estimated to be 40-45%. There is mild global hypokinesis of the left ventricle. Mild aortic regurgitation. There is moderate mitral regurgitation. The left atrial size is moderately dilated. No left atrial clot detected. The right atrium is mildly dilated. Valves not fully evaluated due to respiratory distress. [id number] Score for Atrial Fibrillation Stroke Risk   Risk   Factors  Component Value   C CHF EF 35-40% 1   H HTN Yes 1   A2 Age >= 76 No,  (73 y.o.) 0   D DM No 0   S2 Prior Stroke/TIA No 0   V Vascular Disease No 0   A Age 74-69 Yes,  (73 y.o.) 1   Sc Sex male 0    [id number]  Score  3   Score last updated 4/16/06 2:34 PM    Click here for a link to the UpToDate guideline \"Atrial Fibrillation: Anticoagulation therapy to prevent embolization    Disclaimer: Risk Score calculation is dependent on accuracy of patient problem list and past encounter diagnosis. IMPRESSION:    1. Longstanding HTN  2. BASSAM ? Sleep study scheduled for 9/5/19  3. Shortness of breath, started in March   4. GI Bleed   5. Atrial Fibrillation    RECOMMENDATIONS:  1 stop combination Lisinopril 10 mg/HCTZ 12.5 mg  daily   2. Increase HCTZ to 25 mg daily  3. Lisinopril 10 mg daily  4. 2nd opinion Dr. Char Obrien or Aneudy Mclean for Watchman       HASBLED score:3    [employer] discussed    I discussed left atrial colsure with watchman device. Alternatives including surgical ligation of ARCENIO also discussed. I explained to the patient that most ischemic stroke in patient with atrial fibrillation are due to a thrombus/clot in the left atrial appendage.  However some

## 2019-08-08 ENCOUNTER — OFFICE VISIT (OUTPATIENT)
Dept: CARDIOLOGY CLINIC | Age: 69
End: 2019-08-08
Payer: MEDICARE

## 2019-08-08 VITALS
OXYGEN SATURATION: 94 % | BODY MASS INDEX: 28.58 KG/M2 | WEIGHT: 193 LBS | HEIGHT: 69 IN | SYSTOLIC BLOOD PRESSURE: 124 MMHG | DIASTOLIC BLOOD PRESSURE: 68 MMHG | HEART RATE: 81 BPM

## 2019-08-08 DIAGNOSIS — I10 ESSENTIAL HYPERTENSION: ICD-10-CM

## 2019-08-08 DIAGNOSIS — R06.02 SOB (SHORTNESS OF BREATH): ICD-10-CM

## 2019-08-08 DIAGNOSIS — E78.2 MIXED HYPERLIPIDEMIA: ICD-10-CM

## 2019-08-08 DIAGNOSIS — I48.19 PERSISTENT ATRIAL FIBRILLATION (HCC): Primary | ICD-10-CM

## 2019-08-08 DIAGNOSIS — I25.5 ISCHEMIC CARDIOMYOPATHY: ICD-10-CM

## 2019-08-08 PROBLEM — I42.9 CARDIOMYOPATHY (HCC): Status: ACTIVE | Noted: 2019-08-08

## 2019-08-08 PROCEDURE — G8419 CALC BMI OUT NRM PARAM NOF/U: HCPCS | Performed by: INTERNAL MEDICINE

## 2019-08-08 PROCEDURE — 99214 OFFICE O/P EST MOD 30 MIN: CPT | Performed by: INTERNAL MEDICINE

## 2019-08-08 PROCEDURE — G8427 DOCREV CUR MEDS BY ELIG CLIN: HCPCS | Performed by: INTERNAL MEDICINE

## 2019-08-08 RX ORDER — METOPROLOL SUCCINATE 50 MG/1
50 TABLET, EXTENDED RELEASE ORAL DAILY
Qty: 30 TABLET | Refills: 11 | Status: SHIPPED | OUTPATIENT
Start: 2019-08-08 | End: 2019-08-16 | Stop reason: ALTCHOICE

## 2019-08-08 NOTE — PROGRESS NOTES
EVERY EVENING 9/24/13  Yes Orquidea Trujillo MD   zoster recombinant adjuvanted vaccine (SHINGRIX) 50 MCG SUSR injection 50 MCG IM then repeat 2-6 months. 10/4/18 10/4/19  Addi Harris MD        Allergies:  Patient has no known allergies. Review of Systems:   · Constitutional: there has been no unanticipated weight loss. There's been no change in energy level, sleep pattern, or activity level. · Eyes: No visual changes or diplopia. No scleral icterus. · ENT: No Headaches, hearing loss or vertigo. No mouth sores or sore throat. · Cardiovascular: Reviewed in HPI  · Respiratory: No cough or wheezing, no sputum production. No hematemesis. · Gastrointestinal: No abdominal pain, appetite loss, blood in stools. No change in bowel or bladder habits. · Genitourinary: No dysuria, trouble voiding, or hematuria. · Musculoskeletal:  No gait disturbance, weakness or joint complaints. · Integumentary: No rash or pruritis. · Neurological: No headache, diplopia, change in muscle strength, numbness or tingling. No change in gait, balance, coordination, mood, affect, memory, mentation, behavior. · Psychiatric: No anxiety, no depression. · Endocrine: No malaise, fatigue or temperature intolerance. No excessive thirst, fluid intake, or urination. No tremor. · Hematologic/Lymphatic: No abnormal bruising or bleeding, blood clots or swollen lymph nodes. · Allergic/Immunologic: No nasal congestion or hives.     Physical Examination:    Vitals:    08/08/19 1438   BP: 124/68   Pulse: 81   SpO2: 94%        Constitutional and General Appearance: NAD   Respiratory:  · Normal excursion and expansion without use of accessory muscles  · Resp Auscultation: Normal breath sounds without dullness  Cardiovascular:  · The apical impulses not displaced  · Heart tones are crisp and normal  · Cervical veins are not engorged  · The carotid upstroke is normal in amplitude and contour without delay or bruit  · Normal S1S2, No S3, 2/6 ENOCH

## 2019-08-08 NOTE — LETTER
[address]   Cardiac Consultation    Referring Provider:  Bowen Torres MD     Chief Complaint   Patient presents with    New Patient     second opinion for Watchman    Shortness of Breath        History of Present Illness: Mame Oliver is a 68 y.o. who presents as new patient today for second opinion for Watchman procedure. He has a history of afib since 2005, pre-diabetes, HTN, HLD, squamous cell carcinoma of the skin on nose and arm, and GI bleed on Eliquis 05/2019 requiring transfusion. Formerly saw Dr. Venessa Ponce at [facility]. In 05/2019 he had been in atrial fibrillation for 2 months and was scheduled for a routine ablation. Noted to be anemic (H/H=9.2/29.6) and his procedure was cancelled and admitted. He was transfused w/ 1U PRBC and given IV iron supplementation. He had anEGD by GI which showed 2 cm segment Whyte's mucosa and mild gastroduodenitis. His colonoscopy revealed hemorrhoids, a 4 mm polyp and diverticulosis. He had a video capsule endoscopy which showed trace red tinged liquid and a tagged RBC scan was negative for any active bleeding. He has undergone cardioversions 6 times at [facility]. His ECHO from 06/05/19 showed his EF was 40-45%; mild global HK; Mild AR. Moderate MR; moderate LAE; mild PENELOPE; NO left atrial clot detected. His EKG from 06/25/19 showed AFib . He was admitted on 7/23/2019 and had an elective EP study and extensive RFCA for PAF. His EKG from 08/07/19 NSR HR 77bpm; nonspecific ST depression. Note LUIZA from 7/23/19 showed no evidence for mass or thrombus in LA or appendage. Today he reports he has SOB which is typically due to AFib. He reports SOB occurs with exertion such as stairs with faster heart rate. He has been in SR since his ablation but still feels SOB with exertion such as stairs or longer walking. He is scheduled for sleep study next month. He has been on eliquis since around 2014.  He reports being increased on simvastatin (ZOCOR) 80 MG tablet TAKE ONE TABLET BY MOUTH EVERY EVENING 9/24/13  Yes Orquidea Trujillo MD   zoster recombinant adjuvanted vaccine (SHINGRIX) 50 MCG SUSR injection 50 MCG IM then repeat 2-6 months. 10/4/18 10/4/19  Addi Harris MD        Allergies:  Patient has no known allergies. Review of Systems:   · Constitutional: there has been no unanticipated weight loss. There's been no change in energy level, sleep pattern, or activity level. · Eyes: No visual changes or diplopia. No scleral icterus. · ENT: No Headaches, hearing loss or vertigo. No mouth sores or sore throat. · Cardiovascular: Reviewed in HPI  · Respiratory: No cough or wheezing, no sputum production. No hematemesis. · Gastrointestinal: No abdominal pain, appetite loss, blood in stools. No change in bowel or bladder habits. · Genitourinary: No dysuria, trouble voiding, or hematuria. · Musculoskeletal:  No gait disturbance, weakness or joint complaints. · Integumentary: No rash or pruritis. · Neurological: No headache, diplopia, change in muscle strength, numbness or tingling. No change in gait, balance, coordination, mood, affect, memory, mentation, behavior. · Psychiatric: No anxiety, no depression. · Endocrine: No malaise, fatigue or temperature intolerance. No excessive thirst, fluid intake, or urination. No tremor. · Hematologic/Lymphatic: No abnormal bruising or bleeding, blood clots or swollen lymph nodes. · Allergic/Immunologic: No nasal congestion or hives.     Physical Examination:    Vitals:    08/08/19 1438   BP: 124/68   Pulse: 81   SpO2: 94%        Constitutional and General Appearance: NAD   Respiratory:  · Normal excursion and expansion without use of accessory muscles  · Resp Auscultation: Normal breath sounds without dullness  Cardiovascular:  · The apical impulses not displaced  · Heart tones are crisp and normal  · Cervical veins are not engorged

## 2019-08-15 ENCOUNTER — TELEPHONE (OUTPATIENT)
Dept: ENDOCRINOLOGY | Age: 69
End: 2019-08-15

## 2019-08-16 ENCOUNTER — TELEPHONE (OUTPATIENT)
Dept: CARDIOLOGY CLINIC | Age: 69
End: 2019-08-16

## 2019-08-16 ENCOUNTER — OFFICE VISIT (OUTPATIENT)
Dept: FAMILY MEDICINE CLINIC | Age: 69
End: 2019-08-16
Payer: MEDICARE

## 2019-08-16 VITALS
BODY MASS INDEX: 28.29 KG/M2 | RESPIRATION RATE: 16 BRPM | OXYGEN SATURATION: 98 % | SYSTOLIC BLOOD PRESSURE: 118 MMHG | DIASTOLIC BLOOD PRESSURE: 64 MMHG | WEIGHT: 191 LBS | HEIGHT: 69 IN | HEART RATE: 85 BPM

## 2019-08-16 DIAGNOSIS — I48.19 PERSISTENT ATRIAL FIBRILLATION (HCC): Primary | ICD-10-CM

## 2019-08-16 DIAGNOSIS — Z00.00 HEALTHCARE MAINTENANCE: Primary | ICD-10-CM

## 2019-08-16 PROCEDURE — 3017F COLORECTAL CA SCREEN DOC REV: CPT | Performed by: FAMILY MEDICINE

## 2019-08-16 PROCEDURE — G8598 ASA/ANTIPLAT THER USED: HCPCS | Performed by: FAMILY MEDICINE

## 2019-08-16 PROCEDURE — 4040F PNEUMOC VAC/ADMIN/RCVD: CPT | Performed by: FAMILY MEDICINE

## 2019-08-16 PROCEDURE — 1123F ACP DISCUSS/DSCN MKR DOCD: CPT | Performed by: FAMILY MEDICINE

## 2019-08-16 PROCEDURE — G0439 PPPS, SUBSEQ VISIT: HCPCS | Performed by: FAMILY MEDICINE

## 2019-08-16 ASSESSMENT — LIFESTYLE VARIABLES
HOW MANY STANDARD DRINKS CONTAINING ALCOHOL DO YOU HAVE ON A TYPICAL DAY: 0
AUDIT-C TOTAL SCORE: 2
HOW OFTEN DURING THE LAST YEAR HAVE YOU FAILED TO DO WHAT WAS NORMALLY EXPECTED FROM YOU BECAUSE OF DRINKING: 0
HOW OFTEN DURING THE LAST YEAR HAVE YOU HAD A FEELING OF GUILT OR REMORSE AFTER DRINKING: 0
HAVE YOU OR SOMEONE ELSE BEEN INJURED AS A RESULT OF YOUR DRINKING: 0
HAS A RELATIVE, FRIEND, DOCTOR, OR ANOTHER HEALTH PROFESSIONAL EXPRESSED CONCERN ABOUT YOUR DRINKING OR SUGGESTED YOU CUT DOWN: 0
HOW OFTEN DURING THE LAST YEAR HAVE YOU FOUND THAT YOU WERE NOT ABLE TO STOP DRINKING ONCE YOU HAD STARTED: 0
HOW OFTEN DO YOU HAVE A DRINK CONTAINING ALCOHOL: 2
HOW OFTEN DO YOU HAVE SIX OR MORE DRINKS ON ONE OCCASION: 0
HOW OFTEN DURING THE LAST YEAR HAVE YOU BEEN UNABLE TO REMEMBER WHAT HAPPENED THE NIGHT BEFORE BECAUSE YOU HAD BEEN DRINKING: 0
HOW OFTEN DURING THE LAST YEAR HAVE YOU NEEDED AN ALCOHOLIC DRINK FIRST THING IN THE MORNING TO GET YOURSELF GOING AFTER A NIGHT OF HEAVY DRINKING: 0
AUDIT TOTAL SCORE: 2

## 2019-08-16 ASSESSMENT — PATIENT HEALTH QUESTIONNAIRE - PHQ9
SUM OF ALL RESPONSES TO PHQ QUESTIONS 1-9: 0
SUM OF ALL RESPONSES TO PHQ QUESTIONS 1-9: 0

## 2019-08-20 NOTE — TELEPHONE ENCOUNTER
Spoke with patient. Let him know that he will be getting a call for one of the future Watchman dates and how these are scheduled. He understood and will wait for the call.

## 2019-09-05 ENCOUNTER — HOSPITAL ENCOUNTER (OUTPATIENT)
Dept: SLEEP CENTER | Age: 69
Discharge: HOME | End: 2019-09-07
Payer: MEDICARE

## 2019-09-05 DIAGNOSIS — R06.83 SNORING: ICD-10-CM

## 2019-09-05 DIAGNOSIS — R53.83 OTHER FATIGUE: ICD-10-CM

## 2019-09-05 DIAGNOSIS — I48.19 PERSISTENT ATRIAL FIBRILLATION (HCC): ICD-10-CM

## 2019-09-05 DIAGNOSIS — G47.30 OBSERVED SLEEP APNEA: ICD-10-CM

## 2019-09-05 DIAGNOSIS — G47.00 INSOMNIA, UNSPECIFIED TYPE: ICD-10-CM

## 2019-09-05 DIAGNOSIS — G25.81 RLS (RESTLESS LEGS SYNDROME): ICD-10-CM

## 2019-09-05 PROCEDURE — 95810 POLYSOM 6/> YRS 4/> PARAM: CPT

## 2019-09-09 ENCOUNTER — TELEPHONE (OUTPATIENT)
Dept: PULMONOLOGY | Age: 69
End: 2019-09-09

## 2019-09-09 DIAGNOSIS — G47.33 OSA (OBSTRUCTIVE SLEEP APNEA): Primary | ICD-10-CM

## 2019-09-12 ENCOUNTER — OFFICE VISIT (OUTPATIENT)
Dept: CARDIOLOGY CLINIC | Age: 69
End: 2019-09-12
Payer: MEDICARE

## 2019-09-12 VITALS
SYSTOLIC BLOOD PRESSURE: 110 MMHG | DIASTOLIC BLOOD PRESSURE: 60 MMHG | WEIGHT: 181 LBS | HEART RATE: 80 BPM | HEIGHT: 69 IN | OXYGEN SATURATION: 98 % | BODY MASS INDEX: 26.81 KG/M2

## 2019-09-12 DIAGNOSIS — R06.02 SOB (SHORTNESS OF BREATH): ICD-10-CM

## 2019-09-12 DIAGNOSIS — I48.0 PAF (PAROXYSMAL ATRIAL FIBRILLATION) (HCC): ICD-10-CM

## 2019-09-12 DIAGNOSIS — D64.9 ANEMIA, UNSPECIFIED TYPE: ICD-10-CM

## 2019-09-12 DIAGNOSIS — I42.9 CARDIOMYOPATHY, UNSPECIFIED TYPE (HCC): ICD-10-CM

## 2019-09-12 DIAGNOSIS — G47.33 OSA (OBSTRUCTIVE SLEEP APNEA): ICD-10-CM

## 2019-09-12 DIAGNOSIS — I50.22 CHRONIC SYSTOLIC HEART FAILURE (HCC): Primary | ICD-10-CM

## 2019-09-12 PROCEDURE — 1036F TOBACCO NON-USER: CPT | Performed by: NURSE PRACTITIONER

## 2019-09-12 PROCEDURE — 4040F PNEUMOC VAC/ADMIN/RCVD: CPT | Performed by: NURSE PRACTITIONER

## 2019-09-12 PROCEDURE — 3017F COLORECTAL CA SCREEN DOC REV: CPT | Performed by: NURSE PRACTITIONER

## 2019-09-12 PROCEDURE — 1123F ACP DISCUSS/DSCN MKR DOCD: CPT | Performed by: NURSE PRACTITIONER

## 2019-09-12 PROCEDURE — G8598 ASA/ANTIPLAT THER USED: HCPCS | Performed by: NURSE PRACTITIONER

## 2019-09-12 PROCEDURE — G8419 CALC BMI OUT NRM PARAM NOF/U: HCPCS | Performed by: NURSE PRACTITIONER

## 2019-09-12 PROCEDURE — 99214 OFFICE O/P EST MOD 30 MIN: CPT | Performed by: NURSE PRACTITIONER

## 2019-09-12 PROCEDURE — G8427 DOCREV CUR MEDS BY ELIG CLIN: HCPCS | Performed by: NURSE PRACTITIONER

## 2019-09-12 NOTE — LETTER
[facility]   Cardiac Follow-up    Primary Care Doctor:  Jose Orourke MD    Chief Complaint   Patient presents with    Shortness of Breath        History of Present Illness:   I had the pleasure of seeing Raynald Saint in follow up for cardiomyopathy. He follows with Dr. Frank Shah and EP. Hx of afib s/p multiple DCCV then s/p EP study with extensive RFCA for Afib, HTN, mild cardiomyopathy with LVEF 40-45%, previous EF 35-40%. Since last visit, Dr. Frank Shah started Toprol in place of atenolol. However, looks like the toprol was discontinued on 8/16/19? Not sure why. Patient is not sure if he is taking it now. Continues on the HCTZ 25mg daily. No dizziness. Some lightheadedness at times. Gets short of breath at rest. Has some bendopnea. No shortness of breath with activity. 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 Raynald Saint describes symptoms including dyspnea but denies chest pain, orthopnea, early saiety, syncope. Home weights: 180lbs    Past Medical History:   has a past medical history of Arthritis, Atrial fibrillation ([address] Hyperlipidemia, Hypertension, and SCC (squamous cell carcinoma). Surgical History:   has a past surgical history that includes Cardioversion (2005); Total hip arthroplasty (Left, 2013); Colonoscopy ([id number]); and ablation of dysrhythmic focus. Social History:   reports that he has never smoked. He has never used smokeless tobacco. He reports that he does not drink alcohol or use drugs. Family History:   Family History   Problem Relation Age of Onset    Cancer Mother         BONE    High Blood Pressure Mother     Heart Disease Father 79       Home Medications:  Prior to Admission medications    Medication Sig Start Date End Date Taking?  Authorizing Provider   hydrochlorothiazide (HYDRODIURIL) 25 MG tablet TAKE 1 TABLET BY MOUTH EVERY MORNING 8/7/19   DARSHANA Hirsch - CNP

## 2019-09-12 NOTE — PROGRESS NOTES
[address]   Cardiac Follow-up    Primary Care Doctor:  Sumit Medeiros MD    Chief Complaint   Patient presents with    Shortness of Breath        History of Present Illness:   I had the pleasure of seeing Marshall Carrizales in follow up for cardiomyopathy. He follows with Dr. Jefferson Hopper and EP. Hx of afib s/p multiple DCCV then s/p EP study with extensive RFCA for Afib, HTN, mild cardiomyopathy with LVEF 40-45%, previous EF 35-40%. Since last visit, Dr. Jefferson Hopper started Toprol in place of atenolol. However, looks like the toprol was discontinued on 8/16/19? Not sure why. Patient is not sure if he is taking it now. Continues on the HCTZ 25mg daily. No dizziness. Some lightheadedness at times. Gets short of breath at rest. Has some bendopnea. No shortness of breath with activity. 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 Marshall Carrizales describes symptoms including dyspnea but denies chest pain, orthopnea, early saiety, syncope. Home weights: 180lbs    Past Medical History:   has a past medical history of Arthritis, Atrial fibrillation ([facility] [address] Hyperlipidemia, Hypertension, and SCC (squamous cell carcinoma). Surgical History:   has a past surgical history that includes Cardioversion (2005); Total hip arthroplasty (Left, 2013); Colonoscopy ([id number]); and ablation of dysrhythmic focus. Social History:   reports that he has never smoked. He has never used smokeless tobacco. He reports that he does not drink alcohol or use drugs. Family History:   Family History   Problem Relation Age of Onset    Cancer Mother         BONE    High Blood Pressure Mother     Heart Disease Father 79       Home Medications:  Prior to Admission medications    Medication Sig Start Date End Date Taking?  Authorizing Provider   hydrochlorothiazide (HYDRODIURIL) 25 MG tablet TAKE 1 TABLET BY MOUTH EVERY MORNING 8/7/19   DARSHANA Garland - CNP   lisinopril (PRINIVIL;ZESTRIL) 10 MG tablet BNP: No results found for: PROBNP  Lipids:   Lab Results   Component Value Date    CHOL 194 12/11/2017        Lab Results   Component Value Date    TRIG 93 12/11/2017        Lab Results   Component Value Date    HDL 63 12/11/2017        Lab Results   Component Value Date    LDLCALC 112 12/11/2017        Lab Results   Component Value Date    LABVLDL 14 07/06/2012        Lab Results   Component Value Date    CHOLHDLRATIO 3.1 12/11/2017       EF:   Lab Results   Component Value Date    LVEF 58 07/23/2019       Recent Testing:  Echo 2013   Summary   Mild concentric left ventricular hypertrophy is present.   Ejection fraction is visually estimated to be 50-55 %.   Moderately dilated left atrium. Stress echocardiogram [facility] 10/2018  Interpetation Summary:  1. Negative ECG for ischemia with graded exercise test.  2. Suboptimal exercise stress echo despite the use of contrast      poorly visualized images. Would recommend alternative imaging      modality such as exercise nuclear. Findings right 84% of target heart rate. Baseline Echo Findings: The LV ejection fraction at rest is 50-55%. Post Stress Echo Findings: The LV ejection fraction post stress is  55-60%.    Cardioversion 4/18/19 [facility]   Conclusion:  1. Successful DC cardioversion to NSR        Cardioversion 4/23/19 [facility]   Conclusion:  1. Successful DC cardioversion to NSR    Echo 4/2019  Left Ventricle: Overall left ventricular ejection fraction is  estimated to be 35-40%. The left ventricle is normal in size. There  is mild concentric left ventricular hypertrophy. There is mild to  moderate global hypokinesis of the left ventricle. No left  ventricular thrombus detected. Right Ventricle: The right ventricle is normal size. There is normal  right ventricular wall thickness. The right ventricular systolic  function is normal.  Left Atrium: The left atrium is moderately dilated  Right Atrium: Right atrial size is normal.    Mitral Valve:  The

## 2019-09-13 ENCOUNTER — TELEPHONE (OUTPATIENT)
Dept: CARDIOLOGY CLINIC | Age: 69
End: 2019-09-13

## 2019-09-17 ENCOUNTER — HOSPITAL ENCOUNTER (OUTPATIENT)
Dept: SLEEP CENTER | Age: 69
Discharge: HOME | End: 2019-09-19
Payer: MEDICARE

## 2019-09-17 DIAGNOSIS — G47.33 OSA (OBSTRUCTIVE SLEEP APNEA): ICD-10-CM

## 2019-09-17 PROCEDURE — 95811 POLYSOM 6/>YRS CPAP 4/> PARM: CPT

## 2019-09-20 ENCOUNTER — TELEPHONE (OUTPATIENT)
Dept: PULMONOLOGY | Age: 69
End: 2019-09-20

## 2019-09-20 DIAGNOSIS — G47.33 OSA (OBSTRUCTIVE SLEEP APNEA): Primary | ICD-10-CM

## 2019-10-01 DIAGNOSIS — I42.9 CARDIOMYOPATHY, UNSPECIFIED TYPE (HCC): ICD-10-CM

## 2019-10-01 DIAGNOSIS — R06.02 SOB (SHORTNESS OF BREATH): ICD-10-CM

## 2019-10-01 DIAGNOSIS — D64.9 ANEMIA, UNSPECIFIED TYPE: ICD-10-CM

## 2019-10-01 LAB
HCT VFR BLD CALC: 38.1 % (ref 40.5–52.5)
HEMOGLOBIN: 12.1 G/DL (ref 13.5–17.5)
MCH RBC QN AUTO: 27.2 PG (ref 26–34)
MCHC RBC AUTO-ENTMCNC: 31.8 G/DL (ref 31–36)
MCV RBC AUTO: 85.5 FL (ref 80–100)
PDW BLD-RTO: 17.1 % (ref 12.4–15.4)
PLATELET # BLD: 301 K/UL (ref 135–450)
PMV BLD AUTO: 8.1 FL (ref 5–10.5)
RBC # BLD: 4.46 M/UL (ref 4.2–5.9)
WBC # BLD: 4.6 K/UL (ref 4–11)

## 2019-10-02 LAB
ANION GAP SERPL CALCULATED.3IONS-SCNC: 15 MMOL/L (ref 3–16)
BUN BLDV-MCNC: 15 MG/DL (ref 7–20)
CALCIUM SERPL-MCNC: 10.6 MG/DL (ref 8.3–10.6)
CHLORIDE BLD-SCNC: 96 MMOL/L (ref 99–110)
CO2: 28 MMOL/L (ref 21–32)
CREAT SERPL-MCNC: 1 MG/DL (ref 0.8–1.3)
GFR AFRICAN AMERICAN: >60
GFR NON-AFRICAN AMERICAN: >60
GLUCOSE BLD-MCNC: 93 MG/DL (ref 70–99)
IRON SATURATION: 31 % (ref 20–50)
IRON: 102 UG/DL (ref 59–158)
POTASSIUM SERPL-SCNC: 4 MMOL/L (ref 3.5–5.1)
PRO-BNP: 389 PG/ML (ref 0–124)
SODIUM BLD-SCNC: 139 MMOL/L (ref 136–145)
TOTAL IRON BINDING CAPACITY: 330 UG/DL (ref 260–445)

## 2019-10-02 RX ORDER — FERROUS SULFATE 325(65) MG
TABLET ORAL
Qty: 30 TABLET | Refills: 0 | Status: SHIPPED | OUTPATIENT
Start: 2019-10-02 | End: 2019-11-02 | Stop reason: SDUPTHER

## 2019-10-03 ENCOUNTER — TELEPHONE (OUTPATIENT)
Dept: CARDIOLOGY CLINIC | Age: 69
End: 2019-10-03

## 2019-10-07 RX ORDER — SIMVASTATIN 80 MG
80 TABLET ORAL NIGHTLY
Qty: 90 TABLET | Refills: 3 | Status: ON HOLD | OUTPATIENT
Start: 2019-10-07 | End: 2019-11-19 | Stop reason: HOSPADM

## 2019-10-10 ENCOUNTER — OFFICE VISIT (OUTPATIENT)
Dept: CARDIOLOGY CLINIC | Age: 69
End: 2019-10-10
Payer: MEDICARE

## 2019-10-10 VITALS
OXYGEN SATURATION: 97 % | HEART RATE: 63 BPM | WEIGHT: 181.8 LBS | DIASTOLIC BLOOD PRESSURE: 62 MMHG | BODY MASS INDEX: 26.93 KG/M2 | SYSTOLIC BLOOD PRESSURE: 120 MMHG | HEIGHT: 69 IN

## 2019-10-10 DIAGNOSIS — I48.0 PAF (PAROXYSMAL ATRIAL FIBRILLATION) (HCC): ICD-10-CM

## 2019-10-10 DIAGNOSIS — G47.33 OSA (OBSTRUCTIVE SLEEP APNEA): ICD-10-CM

## 2019-10-10 DIAGNOSIS — I42.9 CARDIOMYOPATHY, UNSPECIFIED TYPE (HCC): ICD-10-CM

## 2019-10-10 DIAGNOSIS — I50.22 CHRONIC SYSTOLIC HEART FAILURE (HCC): Primary | ICD-10-CM

## 2019-10-10 DIAGNOSIS — I10 ESSENTIAL HYPERTENSION: ICD-10-CM

## 2019-10-10 PROCEDURE — 3017F COLORECTAL CA SCREEN DOC REV: CPT | Performed by: NURSE PRACTITIONER

## 2019-10-10 PROCEDURE — 99213 OFFICE O/P EST LOW 20 MIN: CPT | Performed by: NURSE PRACTITIONER

## 2019-10-10 PROCEDURE — G8598 ASA/ANTIPLAT THER USED: HCPCS | Performed by: NURSE PRACTITIONER

## 2019-10-10 PROCEDURE — G8484 FLU IMMUNIZE NO ADMIN: HCPCS | Performed by: NURSE PRACTITIONER

## 2019-10-10 PROCEDURE — G8427 DOCREV CUR MEDS BY ELIG CLIN: HCPCS | Performed by: NURSE PRACTITIONER

## 2019-10-10 PROCEDURE — G8419 CALC BMI OUT NRM PARAM NOF/U: HCPCS | Performed by: NURSE PRACTITIONER

## 2019-10-10 PROCEDURE — 1123F ACP DISCUSS/DSCN MKR DOCD: CPT | Performed by: NURSE PRACTITIONER

## 2019-10-10 PROCEDURE — 1036F TOBACCO NON-USER: CPT | Performed by: NURSE PRACTITIONER

## 2019-10-10 PROCEDURE — 4040F PNEUMOC VAC/ADMIN/RCVD: CPT | Performed by: NURSE PRACTITIONER

## 2019-10-10 RX ORDER — METOPROLOL SUCCINATE 50 MG/1
50 TABLET, EXTENDED RELEASE ORAL DAILY
Status: ON HOLD | COMMUNITY
End: 2020-05-21 | Stop reason: SDUPTHER

## 2019-10-15 NOTE — TELEPHONE ENCOUNTER
Margarito   Called to ask Daya Velazquez if he would be able to move from 11/18/19 to 11/4/19 procedure date? He would like to keep it on 11/18/19 because of work. He is leaving 12/19/19 for [place]. He also wanted to confirm that he will remain on Eliquis 5 mg BID post procedure. He was told he might have to switch to coumadin. I instructed him that as long as he is tolerating Eliquis, we will not have to switch his AC. He vu and asked him to contact me with any further questions between now and the next time we speak.

## 2019-10-24 ENCOUNTER — OFFICE VISIT (OUTPATIENT)
Dept: ENDOCRINOLOGY | Age: 69
End: 2019-10-24
Payer: MEDICARE

## 2019-10-24 VITALS
HEART RATE: 63 BPM | SYSTOLIC BLOOD PRESSURE: 125 MMHG | WEIGHT: 186.4 LBS | BODY MASS INDEX: 27.61 KG/M2 | HEIGHT: 69 IN | OXYGEN SATURATION: 98 % | DIASTOLIC BLOOD PRESSURE: 73 MMHG

## 2019-10-24 DIAGNOSIS — E03.2 HYPOTHYROIDISM DUE TO MEDICATION: ICD-10-CM

## 2019-10-24 DIAGNOSIS — E03.2 HYPOTHYROIDISM DUE TO MEDICATION: Primary | ICD-10-CM

## 2019-10-24 LAB
T4 FREE: 0.8 NG/DL (ref 0.9–1.8)
THYROID PEROXIDASE (TPO) ABS: 8 IU/ML
TSH SERPL DL<=0.05 MIU/L-ACNC: 23.29 UIU/ML (ref 0.27–4.2)

## 2019-10-24 PROCEDURE — 3017F COLORECTAL CA SCREEN DOC REV: CPT | Performed by: INTERNAL MEDICINE

## 2019-10-24 PROCEDURE — 99203 OFFICE O/P NEW LOW 30 MIN: CPT | Performed by: INTERNAL MEDICINE

## 2019-10-24 PROCEDURE — 4040F PNEUMOC VAC/ADMIN/RCVD: CPT | Performed by: INTERNAL MEDICINE

## 2019-10-24 PROCEDURE — G8598 ASA/ANTIPLAT THER USED: HCPCS | Performed by: INTERNAL MEDICINE

## 2019-10-24 PROCEDURE — 1036F TOBACCO NON-USER: CPT | Performed by: INTERNAL MEDICINE

## 2019-10-24 PROCEDURE — G8417 CALC BMI ABV UP PARAM F/U: HCPCS | Performed by: INTERNAL MEDICINE

## 2019-10-24 PROCEDURE — G8427 DOCREV CUR MEDS BY ELIG CLIN: HCPCS | Performed by: INTERNAL MEDICINE

## 2019-10-24 PROCEDURE — 1123F ACP DISCUSS/DSCN MKR DOCD: CPT | Performed by: INTERNAL MEDICINE

## 2019-10-24 PROCEDURE — G8484 FLU IMMUNIZE NO ADMIN: HCPCS | Performed by: INTERNAL MEDICINE

## 2019-10-25 DIAGNOSIS — E03.2 HYPOTHYROIDISM DUE TO MEDICATION: Primary | ICD-10-CM

## 2019-10-25 RX ORDER — LEVOTHYROXINE SODIUM 0.05 MG/1
50 TABLET ORAL DAILY
Qty: 90 TABLET | Refills: 5 | Status: SHIPPED | OUTPATIENT
Start: 2019-10-25 | End: 2019-12-31 | Stop reason: SDUPTHER

## 2019-10-25 RX ORDER — LEVOTHYROXINE SODIUM 0.05 MG/1
50 TABLET ORAL DAILY
Qty: 30 TABLET | Refills: 5 | Status: SHIPPED | OUTPATIENT
Start: 2019-10-25 | End: 2019-10-25 | Stop reason: SDUPTHER

## 2019-11-06 RX ORDER — CHLORHEXIDINE GLUCONATE 4 G/100ML
SOLUTION TOPICAL
Qty: 1 BOTTLE | Refills: 0 | Status: ON HOLD | OUTPATIENT
Start: 2019-11-06 | End: 2019-11-19 | Stop reason: HOSPADM

## 2019-11-12 DIAGNOSIS — I48.19 PERSISTENT ATRIAL FIBRILLATION (HCC): ICD-10-CM

## 2019-11-12 LAB
ABO/RH: NORMAL
ANION GAP SERPL CALCULATED.3IONS-SCNC: 16 MMOL/L (ref 3–16)
ANTIBODY SCREEN: NORMAL
BILIRUBIN URINE: NEGATIVE
BLOOD, URINE: NEGATIVE
BUN BLDV-MCNC: 14 MG/DL (ref 7–20)
CALCIUM SERPL-MCNC: 10.6 MG/DL (ref 8.3–10.6)
CHLORIDE BLD-SCNC: 96 MMOL/L (ref 99–110)
CLARITY: CLEAR
CO2: 26 MMOL/L (ref 21–32)
COLOR: YELLOW
CREAT SERPL-MCNC: 1 MG/DL (ref 0.8–1.3)
GFR AFRICAN AMERICAN: >60
GFR NON-AFRICAN AMERICAN: >60
GLUCOSE BLD-MCNC: 119 MG/DL (ref 70–99)
GLUCOSE URINE: NEGATIVE MG/DL
HCT VFR BLD CALC: 38.1 % (ref 40.5–52.5)
HEMOGLOBIN: 12.8 G/DL (ref 13.5–17.5)
KETONES, URINE: NEGATIVE MG/DL
LEUKOCYTE ESTERASE, URINE: NEGATIVE
MCH RBC QN AUTO: 29.9 PG (ref 26–34)
MCHC RBC AUTO-ENTMCNC: 33.5 G/DL (ref 31–36)
MCV RBC AUTO: 89.1 FL (ref 80–100)
MICROSCOPIC EXAMINATION: NORMAL
NITRITE, URINE: NEGATIVE
PDW BLD-RTO: 16.9 % (ref 12.4–15.4)
PH UA: 7 (ref 5–8)
PLATELET # BLD: 316 K/UL (ref 135–450)
PMV BLD AUTO: 7.8 FL (ref 5–10.5)
POTASSIUM SERPL-SCNC: 3.9 MMOL/L (ref 3.5–5.1)
PROTEIN UA: NEGATIVE MG/DL
RBC # BLD: 4.27 M/UL (ref 4.2–5.9)
SODIUM BLD-SCNC: 138 MMOL/L (ref 136–145)
SPECIFIC GRAVITY UA: 1.02 (ref 1–1.03)
URINE REFLEX TO CULTURE: NORMAL
URINE TYPE: NORMAL
UROBILINOGEN, URINE: 0.2 E.U./DL
WBC # BLD: 5.6 K/UL (ref 4–11)

## 2019-11-15 NOTE — TELEPHONE ENCOUNTER
Left Bubba Ng a detailed message to confirm Monday's procedure, date, time and any q/c regarding pre-procedure instructions and that Dr. Kashmir Gandhi has reviewed labs for Monday and no further recs at this time. If he should have any q/c over the weekend to call the [facility] phone over the weekend and left the number.

## 2019-11-18 ENCOUNTER — TELEPHONE (OUTPATIENT)
Dept: CARDIOLOGY CLINIC | Age: 69
End: 2019-11-18

## 2019-11-18 ENCOUNTER — ANESTHESIA (OUTPATIENT)
Dept: CARDIAC CATH/INVASIVE PROCEDURES | Age: 69
DRG: 274 | End: 2019-11-18
Payer: MEDICARE

## 2019-11-18 ENCOUNTER — ANESTHESIA EVENT (OUTPATIENT)
Dept: CARDIAC CATH/INVASIVE PROCEDURES | Age: 69
DRG: 274 | End: 2019-11-18
Payer: MEDICARE

## 2019-11-18 ENCOUNTER — HOSPITAL ENCOUNTER (INPATIENT)
Dept: CARDIAC CATH/INVASIVE PROCEDURES | Age: 69
LOS: 1 days | Discharge: HOME | DRG: 274 | End: 2019-11-19
Attending: INTERNAL MEDICINE | Admitting: INTERNAL MEDICINE
Payer: MEDICARE

## 2019-11-18 VITALS
TEMPERATURE: 96.4 F | RESPIRATION RATE: 1 BRPM | DIASTOLIC BLOOD PRESSURE: 67 MMHG | SYSTOLIC BLOOD PRESSURE: 114 MMHG | OXYGEN SATURATION: 95 %

## 2019-11-18 DIAGNOSIS — Z95.818 PRESENCE OF WATCHMAN LEFT ATRIAL APPENDAGE CLOSURE DEVICE: Primary | ICD-10-CM

## 2019-11-18 DIAGNOSIS — I48.0 PAF (PAROXYSMAL ATRIAL FIBRILLATION) (HCC): ICD-10-CM

## 2019-11-18 LAB
ABO/RH: NORMAL
ANTIBODY SCREEN: NORMAL
EKG ATRIAL RATE: 61 BPM
EKG DIAGNOSIS: NORMAL
EKG P AXIS: 59 DEGREES
EKG P-R INTERVAL: 170 MS
EKG Q-T INTERVAL: 440 MS
EKG QRS DURATION: 80 MS
EKG QTC CALCULATION (BAZETT): 442 MS
EKG R AXIS: 17 DEGREES
EKG T AXIS: 41 DEGREES
EKG VENTRICULAR RATE: 61 BPM
INR BLD: 1.04 (ref 0.86–1.14)
POC ACT LR: 271 SEC
POC ACT LR: 276 SEC
POC ACT LR: 325 SEC
PROTHROMBIN TIME: 11.9 SEC (ref 9.8–13)

## 2019-11-18 PROCEDURE — 2580000003 HC RX 258

## 2019-11-18 PROCEDURE — 6360000002 HC RX W HCPCS: Performed by: NURSE ANESTHETIST, CERTIFIED REGISTERED

## 2019-11-18 PROCEDURE — C1769 GUIDE WIRE: HCPCS

## 2019-11-18 PROCEDURE — 6360000002 HC RX W HCPCS: Performed by: INTERNAL MEDICINE

## 2019-11-18 PROCEDURE — 33340 PERQ CLSR TCAT L ATR APNDGE: CPT

## 2019-11-18 PROCEDURE — C1894 INTRO/SHEATH, NON-LASER: HCPCS

## 2019-11-18 PROCEDURE — 2709999900 HC NON-CHARGEABLE SUPPLY

## 2019-11-18 PROCEDURE — 2580000003 HC RX 258: Performed by: INTERNAL MEDICINE

## 2019-11-18 PROCEDURE — 86900 BLOOD TYPING SEROLOGIC ABO: CPT

## 2019-11-18 PROCEDURE — 93355 ECHO TRANSESOPHAGEAL (TEE): CPT

## 2019-11-18 PROCEDURE — 93005 ELECTROCARDIOGRAM TRACING: CPT | Performed by: INTERNAL MEDICINE

## 2019-11-18 PROCEDURE — C1893 INTRO/SHEATH, FIXED,NON-PEEL: HCPCS

## 2019-11-18 PROCEDURE — 2500000003 HC RX 250 WO HCPCS: Performed by: NURSE ANESTHETIST, CERTIFIED REGISTERED

## 2019-11-18 PROCEDURE — 3700000000 HC ANESTHESIA ATTENDED CARE

## 2019-11-18 PROCEDURE — 2780000010 HC IMPLANT OTHER

## 2019-11-18 PROCEDURE — 86901 BLOOD TYPING SEROLOGIC RH(D): CPT

## 2019-11-18 PROCEDURE — 6360000004 HC RX CONTRAST MEDICATION: Performed by: INTERNAL MEDICINE

## 2019-11-18 PROCEDURE — B24BZZ4 ULTRASONOGRAPHY OF HEART WITH AORTA, TRANSESOPHAGEAL: ICD-10-PCS | Performed by: INTERNAL MEDICINE

## 2019-11-18 PROCEDURE — 2720000010 HC SURG SUPPLY STERILE

## 2019-11-18 PROCEDURE — 1200000000 HC SEMI PRIVATE

## 2019-11-18 PROCEDURE — 2580000003 HC RX 258: Performed by: NURSE ANESTHETIST, CERTIFIED REGISTERED

## 2019-11-18 PROCEDURE — 6360000002 HC RX W HCPCS

## 2019-11-18 PROCEDURE — 93010 ELECTROCARDIOGRAM REPORT: CPT | Performed by: INTERNAL MEDICINE

## 2019-11-18 PROCEDURE — 86850 RBC ANTIBODY SCREEN: CPT

## 2019-11-18 PROCEDURE — 6370000000 HC RX 637 (ALT 250 FOR IP): Performed by: INTERNAL MEDICINE

## 2019-11-18 PROCEDURE — 3700000001 HC ADD 15 MINUTES (ANESTHESIA)

## 2019-11-18 PROCEDURE — 85610 PROTHROMBIN TIME: CPT

## 2019-11-18 PROCEDURE — 85347 COAGULATION TIME ACTIVATED: CPT

## 2019-11-18 PROCEDURE — 2500000003 HC RX 250 WO HCPCS

## 2019-11-18 PROCEDURE — 33340 PERQ CLSR TCAT L ATR APNDGE: CPT | Performed by: INTERNAL MEDICINE

## 2019-11-18 PROCEDURE — 7100000000 HC PACU RECOVERY - FIRST 15 MIN

## 2019-11-18 PROCEDURE — 02L73DK OCCLUSION OF LEFT ATRIAL APPENDAGE WITH INTRALUMINAL DEVICE, PERCUTANEOUS APPROACH: ICD-10-PCS | Performed by: INTERNAL MEDICINE

## 2019-11-18 RX ORDER — ACETAMINOPHEN 325 MG/1
650 TABLET ORAL EVERY 4 HOURS PRN
Status: DISCONTINUED | OUTPATIENT
Start: 2019-11-18 | End: 2019-11-18 | Stop reason: SDUPTHER

## 2019-11-18 RX ORDER — VECURONIUM BROMIDE 1 MG/ML
INJECTION, POWDER, LYOPHILIZED, FOR SOLUTION INTRAVENOUS PRN
Status: DISCONTINUED | OUTPATIENT
Start: 2019-11-18 | End: 2019-11-18 | Stop reason: SDUPTHER

## 2019-11-18 RX ORDER — FENTANYL CITRATE 50 UG/ML
25 INJECTION, SOLUTION INTRAMUSCULAR; INTRAVENOUS EVERY 5 MIN PRN
Status: DISCONTINUED | OUTPATIENT
Start: 2019-11-18 | End: 2019-11-18 | Stop reason: SDUPTHER

## 2019-11-18 RX ORDER — AMIODARONE HYDROCHLORIDE 200 MG/1
200 TABLET ORAL DAILY
Status: DISCONTINUED | OUTPATIENT
Start: 2019-11-18 | End: 2019-11-19 | Stop reason: HOSPADM

## 2019-11-18 RX ORDER — LIDOCAINE HYDROCHLORIDE 20 MG/ML
INJECTION, SOLUTION EPIDURAL; INFILTRATION; INTRACAUDAL; PERINEURAL PRN
Status: DISCONTINUED | OUTPATIENT
Start: 2019-11-18 | End: 2019-11-18 | Stop reason: SDUPTHER

## 2019-11-18 RX ORDER — ACETAMINOPHEN 325 MG/1
650 TABLET ORAL EVERY 4 HOURS PRN
Status: DISCONTINUED | OUTPATIENT
Start: 2019-11-18 | End: 2019-11-19 | Stop reason: HOSPADM

## 2019-11-18 RX ORDER — EPHEDRINE SULFATE/0.9% NACL/PF 50 MG/5 ML
SYRINGE (ML) INTRAVENOUS PRN
Status: DISCONTINUED | OUTPATIENT
Start: 2019-11-18 | End: 2019-11-18 | Stop reason: SDUPTHER

## 2019-11-18 RX ORDER — SODIUM CHLORIDE 9 MG/ML
INJECTION, SOLUTION INTRAVENOUS CONTINUOUS
Status: DISCONTINUED | OUTPATIENT
Start: 2019-11-18 | End: 2019-11-19

## 2019-11-18 RX ORDER — ONDANSETRON 2 MG/ML
INJECTION INTRAMUSCULAR; INTRAVENOUS PRN
Status: DISCONTINUED | OUTPATIENT
Start: 2019-11-18 | End: 2019-11-18 | Stop reason: SDUPTHER

## 2019-11-18 RX ORDER — HYDROCHLOROTHIAZIDE 25 MG/1
25 TABLET ORAL EVERY MORNING
Status: DISCONTINUED | OUTPATIENT
Start: 2019-11-19 | End: 2019-11-19 | Stop reason: HOSPADM

## 2019-11-18 RX ORDER — ASPIRIN 81 MG/1
81 TABLET, CHEWABLE ORAL DAILY
Status: DISCONTINUED | OUTPATIENT
Start: 2019-11-18 | End: 2019-11-19 | Stop reason: HOSPADM

## 2019-11-18 RX ORDER — SULFASALAZINE 500 MG/1
500 TABLET ORAL DAILY
Status: DISCONTINUED | OUTPATIENT
Start: 2019-11-19 | End: 2019-11-19 | Stop reason: HOSPADM

## 2019-11-18 RX ORDER — MIDAZOLAM HYDROCHLORIDE 1 MG/ML
INJECTION INTRAMUSCULAR; INTRAVENOUS PRN
Status: DISCONTINUED | OUTPATIENT
Start: 2019-11-18 | End: 2019-11-18 | Stop reason: SDUPTHER

## 2019-11-18 RX ORDER — PROPOFOL 10 MG/ML
INJECTION, EMULSION INTRAVENOUS PRN
Status: DISCONTINUED | OUTPATIENT
Start: 2019-11-18 | End: 2019-11-18 | Stop reason: SDUPTHER

## 2019-11-18 RX ORDER — SIMVASTATIN 40 MG
80 TABLET ORAL NIGHTLY
Status: DISCONTINUED | OUTPATIENT
Start: 2019-11-18 | End: 2019-11-19 | Stop reason: HOSPADM

## 2019-11-18 RX ORDER — LEVOTHYROXINE SODIUM 0.05 MG/1
50 TABLET ORAL
Status: DISCONTINUED | OUTPATIENT
Start: 2019-11-19 | End: 2019-11-19 | Stop reason: HOSPADM

## 2019-11-18 RX ORDER — HEPARIN SODIUM 1000 [USP'U]/ML
INJECTION, SOLUTION INTRAVENOUS; SUBCUTANEOUS PRN
Status: DISCONTINUED | OUTPATIENT
Start: 2019-11-18 | End: 2019-11-18 | Stop reason: SDUPTHER

## 2019-11-18 RX ORDER — SODIUM CHLORIDE 0.9 % (FLUSH) 0.9 %
10 SYRINGE (ML) INJECTION EVERY 12 HOURS SCHEDULED
Status: DISCONTINUED | OUTPATIENT
Start: 2019-11-18 | End: 2019-11-19 | Stop reason: HOSPADM

## 2019-11-18 RX ORDER — OXYCODONE HYDROCHLORIDE AND ACETAMINOPHEN 5; 325 MG/1; MG/1
2 TABLET ORAL PRN
Status: ACTIVE | OUTPATIENT
Start: 2019-11-18 | End: 2019-11-18

## 2019-11-18 RX ORDER — M-VIT,TX,IRON,MINS/CALC/FOLIC 27MG-0.4MG
1 TABLET ORAL DAILY
Status: DISCONTINUED | OUTPATIENT
Start: 2019-11-19 | End: 2019-11-19 | Stop reason: HOSPADM

## 2019-11-18 RX ORDER — PHENYLEPHRINE HCL IN 0.9% NACL 1 MG/10 ML
SYRINGE (ML) INTRAVENOUS PRN
Status: DISCONTINUED | OUTPATIENT
Start: 2019-11-18 | End: 2019-11-18 | Stop reason: SDUPTHER

## 2019-11-18 RX ORDER — DEXAMETHASONE SODIUM PHOSPHATE 10 MG/ML
INJECTION, SOLUTION INTRAMUSCULAR; INTRAVENOUS PRN
Status: DISCONTINUED | OUTPATIENT
Start: 2019-11-18 | End: 2019-11-18 | Stop reason: SDUPTHER

## 2019-11-18 RX ORDER — PANTOPRAZOLE SODIUM 40 MG/1
40 TABLET, DELAYED RELEASE ORAL
Status: DISCONTINUED | OUTPATIENT
Start: 2019-11-19 | End: 2019-11-19 | Stop reason: HOSPADM

## 2019-11-18 RX ORDER — FENTANYL CITRATE 50 UG/ML
INJECTION, SOLUTION INTRAMUSCULAR; INTRAVENOUS PRN
Status: DISCONTINUED | OUTPATIENT
Start: 2019-11-18 | End: 2019-11-18 | Stop reason: SDUPTHER

## 2019-11-18 RX ORDER — SODIUM CHLORIDE 9 MG/ML
INJECTION, SOLUTION INTRAVENOUS CONTINUOUS PRN
Status: DISCONTINUED | OUTPATIENT
Start: 2019-11-18 | End: 2019-11-18 | Stop reason: SDUPTHER

## 2019-11-18 RX ORDER — GLYCOPYRROLATE 0.2 MG/ML
INJECTION INTRAMUSCULAR; INTRAVENOUS PRN
Status: DISCONTINUED | OUTPATIENT
Start: 2019-11-18 | End: 2019-11-18 | Stop reason: SDUPTHER

## 2019-11-18 RX ORDER — SODIUM CHLORIDE 0.9 % (FLUSH) 0.9 %
10 SYRINGE (ML) INJECTION PRN
Status: DISCONTINUED | OUTPATIENT
Start: 2019-11-18 | End: 2019-11-19 | Stop reason: HOSPADM

## 2019-11-18 RX ORDER — OXYCODONE HYDROCHLORIDE AND ACETAMINOPHEN 5; 325 MG/1; MG/1
1 TABLET ORAL PRN
Status: ACTIVE | OUTPATIENT
Start: 2019-11-18 | End: 2019-11-18

## 2019-11-18 RX ORDER — FERROUS SULFATE TAB EC 324 MG (65 MG FE EQUIVALENT) 324 (65 FE) MG
325 TABLET DELAYED RESPONSE ORAL 2 TIMES DAILY WITH MEALS
Status: DISCONTINUED | OUTPATIENT
Start: 2019-11-18 | End: 2019-11-19 | Stop reason: HOSPADM

## 2019-11-18 RX ORDER — ONDANSETRON 2 MG/ML
4 INJECTION INTRAMUSCULAR; INTRAVENOUS
Status: ACTIVE | OUTPATIENT
Start: 2019-11-18 | End: 2019-11-18

## 2019-11-18 RX ORDER — SUCCINYLCHOLINE/SOD CL,ISO/PF 200MG/10ML
SYRINGE (ML) INTRAVENOUS PRN
Status: DISCONTINUED | OUTPATIENT
Start: 2019-11-18 | End: 2019-11-18 | Stop reason: SDUPTHER

## 2019-11-18 RX ADMIN — APIXABAN 5 MG: 5 TABLET, FILM COATED ORAL at 23:04

## 2019-11-18 RX ADMIN — LIDOCAINE HYDROCHLORIDE 60 MG: 20 INJECTION, SOLUTION EPIDURAL; INFILTRATION; INTRACAUDAL; PERINEURAL at 15:17

## 2019-11-18 RX ADMIN — HEPARIN SODIUM 6000 UNITS: 1000 INJECTION, SOLUTION INTRAVENOUS; SUBCUTANEOUS at 15:37

## 2019-11-18 RX ADMIN — HEPARIN SODIUM 2000 UNITS: 1000 INJECTION, SOLUTION INTRAVENOUS; SUBCUTANEOUS at 15:54

## 2019-11-18 RX ADMIN — SODIUM CHLORIDE: 9 INJECTION, SOLUTION INTRAVENOUS at 15:05

## 2019-11-18 RX ADMIN — MIDAZOLAM 2 MG: 1 INJECTION INTRAMUSCULAR; INTRAVENOUS at 15:14

## 2019-11-18 RX ADMIN — GLYCOPYRROLATE 0.2 MG: 0.2 INJECTION, SOLUTION INTRAMUSCULAR; INTRAVENOUS at 15:30

## 2019-11-18 RX ADMIN — Medication 10 MG: at 15:37

## 2019-11-18 RX ADMIN — VECURONIUM BROMIDE 7 MG: 1 INJECTION, POWDER, LYOPHILIZED, FOR SOLUTION INTRAVENOUS at 15:24

## 2019-11-18 RX ADMIN — HEPARIN SODIUM 1000 UNITS: 1000 INJECTION, SOLUTION INTRAVENOUS; SUBCUTANEOUS at 16:10

## 2019-11-18 RX ADMIN — ONDANSETRON 4 MG: 2 INJECTION INTRAMUSCULAR; INTRAVENOUS at 15:30

## 2019-11-18 RX ADMIN — Medication 10 ML: at 23:04

## 2019-11-18 RX ADMIN — Medication 15 MG: at 15:54

## 2019-11-18 RX ADMIN — ASPIRIN 81 MG 81 MG: 81 TABLET ORAL at 18:07

## 2019-11-18 RX ADMIN — Medication 100 MCG: at 16:19

## 2019-11-18 RX ADMIN — SODIUM CHLORIDE: 9 INJECTION, SOLUTION INTRAVENOUS at 16:36

## 2019-11-18 RX ADMIN — Medication 100 MG: at 15:17

## 2019-11-18 RX ADMIN — SIMVASTATIN 80 MG: 40 TABLET, FILM COATED ORAL at 21:38

## 2019-11-18 RX ADMIN — CEFAZOLIN 2 G: 1 INJECTION, POWDER, FOR SOLUTION INTRAMUSCULAR; INTRAVENOUS; PARENTERAL at 15:21

## 2019-11-18 RX ADMIN — FENTANYL CITRATE 100 MCG: 50 INJECTION INTRAMUSCULAR; INTRAVENOUS at 15:17

## 2019-11-18 RX ADMIN — DEXAMETHASONE SODIUM PHOSPHATE 8 MG: 10 INJECTION, SOLUTION INTRAMUSCULAR; INTRAVENOUS at 15:30

## 2019-11-18 RX ADMIN — Medication 100 MCG: at 16:03

## 2019-11-18 RX ADMIN — IOPAMIDOL 65 ML: 755 INJECTION, SOLUTION INTRAVENOUS at 16:53

## 2019-11-18 RX ADMIN — SUGAMMADEX 200 MG: 100 INJECTION, SOLUTION INTRAVENOUS at 16:31

## 2019-11-18 RX ADMIN — Medication 10 MG: at 15:47

## 2019-11-18 RX ADMIN — HEPARIN SODIUM 2000 UNITS: 1000 INJECTION, SOLUTION INTRAVENOUS; SUBCUTANEOUS at 16:09

## 2019-11-18 RX ADMIN — FERROUS SULFATE TAB EC 324 MG (65 MG FE EQUIVALENT) 324 MG: 324 (65 FE) TABLET DELAYED RESPONSE at 18:07

## 2019-11-18 RX ADMIN — Medication 100 MCG: at 15:31

## 2019-11-18 RX ADMIN — PROPOFOL 60 MG: 10 INJECTION, EMULSION INTRAVENOUS at 15:17

## 2019-11-18 RX ADMIN — Medication 100 MCG: at 15:35

## 2019-11-18 RX ADMIN — Medication 15 MG: at 15:43

## 2019-11-18 RX ADMIN — Medication 100 MCG: at 16:11

## 2019-11-18 ASSESSMENT — PULMONARY FUNCTION TESTS
PIF_VALUE: 18
PIF_VALUE: 17
PIF_VALUE: 1
PIF_VALUE: 1
PIF_VALUE: 18
PIF_VALUE: 0
PIF_VALUE: 17
PIF_VALUE: 0
PIF_VALUE: 18
PIF_VALUE: 17
PIF_VALUE: 18
PIF_VALUE: 17
PIF_VALUE: 18
PIF_VALUE: 17
PIF_VALUE: 15
PIF_VALUE: 17
PIF_VALUE: 18
PIF_VALUE: 18
PIF_VALUE: 20
PIF_VALUE: 16
PIF_VALUE: 18
PIF_VALUE: 17
PIF_VALUE: 18
PIF_VALUE: 20
PIF_VALUE: 4
PIF_VALUE: 17
PIF_VALUE: 31
PIF_VALUE: 0
PIF_VALUE: 10
PIF_VALUE: 0
PIF_VALUE: 17
PIF_VALUE: 15
PIF_VALUE: 19
PIF_VALUE: 18
PIF_VALUE: 17
PIF_VALUE: 17
PIF_VALUE: 18
PIF_VALUE: 17
PIF_VALUE: 17
PIF_VALUE: 18
PIF_VALUE: 0
PIF_VALUE: 19
PIF_VALUE: 2
PIF_VALUE: 17
PIF_VALUE: 15
PIF_VALUE: 19
PIF_VALUE: 18
PIF_VALUE: 18
PIF_VALUE: 19
PIF_VALUE: 17
PIF_VALUE: 18
PIF_VALUE: 17
PIF_VALUE: 0
PIF_VALUE: 19
PIF_VALUE: 17
PIF_VALUE: 1
PIF_VALUE: 18
PIF_VALUE: 17
PIF_VALUE: 19
PIF_VALUE: 18
PIF_VALUE: 17
PIF_VALUE: 18
PIF_VALUE: 17
PIF_VALUE: 18
PIF_VALUE: 17
PIF_VALUE: 17
PIF_VALUE: 18
PIF_VALUE: 18
PIF_VALUE: 17
PIF_VALUE: 19
PIF_VALUE: 18
PIF_VALUE: 18
PIF_VALUE: 17
PIF_VALUE: 17
PIF_VALUE: 18
PIF_VALUE: 2
PIF_VALUE: 17
PIF_VALUE: 18
PIF_VALUE: 17

## 2019-11-18 ASSESSMENT — PAIN SCALES - GENERAL
PAINLEVEL_OUTOF10: 0

## 2019-11-18 ASSESSMENT — ENCOUNTER SYMPTOMS: SHORTNESS OF BREATH: 1

## 2019-11-18 ASSESSMENT — LIFESTYLE VARIABLES: SMOKING_STATUS: 0

## 2019-11-19 VITALS
OXYGEN SATURATION: 90 % | BODY MASS INDEX: 28.9 KG/M2 | SYSTOLIC BLOOD PRESSURE: 102 MMHG | RESPIRATION RATE: 18 BRPM | HEART RATE: 68 BPM | HEIGHT: 69 IN | TEMPERATURE: 98.1 F | DIASTOLIC BLOOD PRESSURE: 61 MMHG | WEIGHT: 195.11 LBS

## 2019-11-19 PROBLEM — I48.0 PAROXYSMAL ATRIAL FIBRILLATION (HCC): Status: ACTIVE | Noted: 2019-11-19

## 2019-11-19 LAB
ANION GAP SERPL CALCULATED.3IONS-SCNC: 13 MMOL/L (ref 3–16)
BUN BLDV-MCNC: 18 MG/DL (ref 7–20)
CALCIUM SERPL-MCNC: 9.3 MG/DL (ref 8.3–10.6)
CHLORIDE BLD-SCNC: 96 MMOL/L (ref 99–110)
CO2: 26 MMOL/L (ref 21–32)
CREAT SERPL-MCNC: 1 MG/DL (ref 0.8–1.3)
GFR AFRICAN AMERICAN: >60
GFR NON-AFRICAN AMERICAN: >60
GLUCOSE BLD-MCNC: 143 MG/DL (ref 70–99)
HCT VFR BLD CALC: 36.5 % (ref 40.5–52.5)
HEMOGLOBIN: 12.1 G/DL (ref 13.5–17.5)
MCH RBC QN AUTO: 29.6 PG (ref 26–34)
MCHC RBC AUTO-ENTMCNC: 33.1 G/DL (ref 31–36)
MCV RBC AUTO: 89.2 FL (ref 80–100)
PDW BLD-RTO: 16.9 % (ref 12.4–15.4)
PLATELET # BLD: 307 K/UL (ref 135–450)
PMV BLD AUTO: 7.7 FL (ref 5–10.5)
POTASSIUM SERPL-SCNC: 3.9 MMOL/L (ref 3.5–5.1)
RBC # BLD: 4.09 M/UL (ref 4.2–5.9)
SODIUM BLD-SCNC: 135 MMOL/L (ref 136–145)
WBC # BLD: 5 K/UL (ref 4–11)

## 2019-11-19 PROCEDURE — 93308 TTE F-UP OR LMTD: CPT

## 2019-11-19 PROCEDURE — 85027 COMPLETE CBC AUTOMATED: CPT

## 2019-11-19 PROCEDURE — 93325 DOPPLER ECHO COLOR FLOW MAPG: CPT

## 2019-11-19 PROCEDURE — 99238 HOSP IP/OBS DSCHRG MGMT 30/<: CPT | Performed by: NURSE PRACTITIONER

## 2019-11-19 PROCEDURE — 80048 BASIC METABOLIC PNL TOTAL CA: CPT

## 2019-11-19 PROCEDURE — 2580000003 HC RX 258: Performed by: INTERNAL MEDICINE

## 2019-11-19 PROCEDURE — 6370000000 HC RX 637 (ALT 250 FOR IP): Performed by: INTERNAL MEDICINE

## 2019-11-19 PROCEDURE — 36415 COLL VENOUS BLD VENIPUNCTURE: CPT

## 2019-11-19 RX ORDER — ASPIRIN 81 MG/1
81 TABLET, CHEWABLE ORAL DAILY
Qty: 30 TABLET | Refills: 3 | Status: SHIPPED | OUTPATIENT
Start: 2019-11-20 | End: 2021-03-19 | Stop reason: DRUGHIGH

## 2019-11-19 RX ORDER — PRAVASTATIN SODIUM 80 MG/1
80 TABLET ORAL DAILY
Qty: 30 TABLET | Refills: 5 | Status: SHIPPED | OUTPATIENT
Start: 2019-11-19 | End: 2019-12-12 | Stop reason: SDUPTHER

## 2019-11-19 RX ADMIN — APIXABAN 5 MG: 5 TABLET, FILM COATED ORAL at 09:03

## 2019-11-19 RX ADMIN — FERROUS SULFATE TAB EC 324 MG (65 MG FE EQUIVALENT) 324 MG: 324 (65 FE) TABLET DELAYED RESPONSE at 09:02

## 2019-11-19 RX ADMIN — Medication 10 ML: at 09:03

## 2019-11-19 RX ADMIN — LEVOTHYROXINE SODIUM 50 MCG: 50 TABLET ORAL at 06:36

## 2019-11-19 RX ADMIN — PANTOPRAZOLE SODIUM 40 MG: 40 TABLET, DELAYED RELEASE ORAL at 06:36

## 2019-11-19 RX ADMIN — AMIODARONE HYDROCHLORIDE 200 MG: 200 TABLET ORAL at 09:03

## 2019-11-19 RX ADMIN — HYDROCHLOROTHIAZIDE 25 MG: 25 TABLET ORAL at 09:03

## 2019-11-19 RX ADMIN — MULTIPLE VITAMINS W/ MINERALS TAB 1 TABLET: TAB at 09:03

## 2019-11-19 RX ADMIN — SULFASALAZINE 500 MG: 500 TABLET ORAL at 09:03

## 2019-11-19 RX ADMIN — ASPIRIN 81 MG 81 MG: 81 TABLET ORAL at 09:02

## 2019-11-19 ASSESSMENT — PAIN SCALES - GENERAL
PAINLEVEL_OUTOF10: 0

## 2019-11-21 ENCOUNTER — TELEPHONE (OUTPATIENT)
Dept: PULMONOLOGY | Age: 69
End: 2019-11-21

## 2019-11-26 ENCOUNTER — OFFICE VISIT (OUTPATIENT)
Dept: PULMONOLOGY | Age: 69
End: 2019-11-26
Payer: MEDICARE

## 2019-11-26 ENCOUNTER — TELEPHONE (OUTPATIENT)
Dept: PULMONOLOGY | Age: 69
End: 2019-11-26

## 2019-11-26 VITALS
HEART RATE: 63 BPM | OXYGEN SATURATION: 97 % | BODY MASS INDEX: 26.81 KG/M2 | WEIGHT: 181 LBS | DIASTOLIC BLOOD PRESSURE: 76 MMHG | SYSTOLIC BLOOD PRESSURE: 119 MMHG | TEMPERATURE: 98.3 F | HEIGHT: 69 IN | RESPIRATION RATE: 16 BRPM

## 2019-11-26 DIAGNOSIS — R68.2 DRY MOUTH: ICD-10-CM

## 2019-11-26 DIAGNOSIS — Z71.89 CPAP USE COUNSELING: ICD-10-CM

## 2019-11-26 DIAGNOSIS — G47.33 MODERATE OBSTRUCTIVE SLEEP APNEA: Primary | ICD-10-CM

## 2019-11-26 PROCEDURE — 99214 OFFICE O/P EST MOD 30 MIN: CPT | Performed by: NURSE PRACTITIONER

## 2019-11-26 PROCEDURE — G8598 ASA/ANTIPLAT THER USED: HCPCS | Performed by: NURSE PRACTITIONER

## 2019-11-26 PROCEDURE — 4040F PNEUMOC VAC/ADMIN/RCVD: CPT | Performed by: NURSE PRACTITIONER

## 2019-11-26 PROCEDURE — 1036F TOBACCO NON-USER: CPT | Performed by: NURSE PRACTITIONER

## 2019-11-26 PROCEDURE — 1111F DSCHRG MED/CURRENT MED MERGE: CPT | Performed by: NURSE PRACTITIONER

## 2019-11-26 PROCEDURE — G8417 CALC BMI ABV UP PARAM F/U: HCPCS | Performed by: NURSE PRACTITIONER

## 2019-11-26 PROCEDURE — 3017F COLORECTAL CA SCREEN DOC REV: CPT | Performed by: NURSE PRACTITIONER

## 2019-11-26 PROCEDURE — G8482 FLU IMMUNIZE ORDER/ADMIN: HCPCS | Performed by: NURSE PRACTITIONER

## 2019-11-26 PROCEDURE — 1123F ACP DISCUSS/DSCN MKR DOCD: CPT | Performed by: NURSE PRACTITIONER

## 2019-11-26 PROCEDURE — G8427 DOCREV CUR MEDS BY ELIG CLIN: HCPCS | Performed by: NURSE PRACTITIONER

## 2019-11-26 ASSESSMENT — SLEEP AND FATIGUE QUESTIONNAIRES
ESS TOTAL SCORE: 0
NECK CIRCUMFERENCE (INCHES): 17
HOW LIKELY ARE YOU TO NOD OFF OR FALL ASLEEP WHILE SITTING INACTIVE IN A PUBLIC PLACE: 0
HOW LIKELY ARE YOU TO NOD OFF OR FALL ASLEEP WHILE SITTING AND TALKING TO SOMEONE: 0
HOW LIKELY ARE YOU TO NOD OFF OR FALL ASLEEP WHILE SITTING QUIETLY AFTER LUNCH WITHOUT ALCOHOL: 0
HOW LIKELY ARE YOU TO NOD OFF OR FALL ASLEEP WHILE LYING DOWN TO REST IN THE AFTERNOON WHEN CIRCUMSTANCES PERMIT: 0
HOW LIKELY ARE YOU TO NOD OFF OR FALL ASLEEP WHILE WATCHING TV: 0
HOW LIKELY ARE YOU TO NOD OFF OR FALL ASLEEP WHILE SITTING AND READING: 0
HOW LIKELY ARE YOU TO NOD OFF OR FALL ASLEEP IN A CAR, WHILE STOPPED FOR A FEW MINUTES IN TRAFFIC: 0
HOW LIKELY ARE YOU TO NOD OFF OR FALL ASLEEP WHEN YOU ARE A PASSENGER IN A CAR FOR AN HOUR WITHOUT A BREAK: 0

## 2019-12-10 ENCOUNTER — TELEPHONE (OUTPATIENT)
Dept: FAMILY MEDICINE CLINIC | Age: 69
End: 2019-12-10

## 2019-12-12 ENCOUNTER — OFFICE VISIT (OUTPATIENT)
Dept: CARDIOLOGY CLINIC | Age: 69
End: 2019-12-12
Payer: MEDICARE

## 2019-12-12 VITALS
WEIGHT: 183 LBS | SYSTOLIC BLOOD PRESSURE: 118 MMHG | OXYGEN SATURATION: 97 % | HEART RATE: 63 BPM | HEIGHT: 69 IN | DIASTOLIC BLOOD PRESSURE: 60 MMHG | BODY MASS INDEX: 27.11 KG/M2

## 2019-12-12 DIAGNOSIS — I10 ESSENTIAL HYPERTENSION: ICD-10-CM

## 2019-12-12 DIAGNOSIS — I42.9 CARDIOMYOPATHY, UNSPECIFIED TYPE (HCC): ICD-10-CM

## 2019-12-12 DIAGNOSIS — E78.2 MIXED HYPERLIPIDEMIA: ICD-10-CM

## 2019-12-12 DIAGNOSIS — I48.0 PAF (PAROXYSMAL ATRIAL FIBRILLATION) (HCC): Primary | ICD-10-CM

## 2019-12-12 DIAGNOSIS — G47.33 MODERATE OBSTRUCTIVE SLEEP APNEA: ICD-10-CM

## 2019-12-12 DIAGNOSIS — Z95.818 PRESENCE OF WATCHMAN LEFT ATRIAL APPENDAGE CLOSURE DEVICE: ICD-10-CM

## 2019-12-12 DIAGNOSIS — I50.22 CHRONIC SYSTOLIC HEART FAILURE (HCC): ICD-10-CM

## 2019-12-12 PROCEDURE — 1111F DSCHRG MED/CURRENT MED MERGE: CPT | Performed by: NURSE PRACTITIONER

## 2019-12-12 PROCEDURE — 99213 OFFICE O/P EST LOW 20 MIN: CPT | Performed by: NURSE PRACTITIONER

## 2019-12-12 PROCEDURE — 4040F PNEUMOC VAC/ADMIN/RCVD: CPT | Performed by: NURSE PRACTITIONER

## 2019-12-12 PROCEDURE — G8417 CALC BMI ABV UP PARAM F/U: HCPCS | Performed by: NURSE PRACTITIONER

## 2019-12-12 PROCEDURE — G8482 FLU IMMUNIZE ORDER/ADMIN: HCPCS | Performed by: NURSE PRACTITIONER

## 2019-12-12 PROCEDURE — 3017F COLORECTAL CA SCREEN DOC REV: CPT | Performed by: NURSE PRACTITIONER

## 2019-12-12 PROCEDURE — 1036F TOBACCO NON-USER: CPT | Performed by: NURSE PRACTITIONER

## 2019-12-12 PROCEDURE — G8427 DOCREV CUR MEDS BY ELIG CLIN: HCPCS | Performed by: NURSE PRACTITIONER

## 2019-12-12 PROCEDURE — G8598 ASA/ANTIPLAT THER USED: HCPCS | Performed by: NURSE PRACTITIONER

## 2019-12-12 PROCEDURE — 1123F ACP DISCUSS/DSCN MKR DOCD: CPT | Performed by: NURSE PRACTITIONER

## 2019-12-12 RX ORDER — PRAVASTATIN SODIUM 80 MG/1
80 TABLET ORAL DAILY
Qty: 90 TABLET | Refills: 1 | Status: SHIPPED | OUTPATIENT
Start: 2019-12-12 | End: 2020-06-19

## 2019-12-12 ASSESSMENT — ENCOUNTER SYMPTOMS
SHORTNESS OF BREATH: 0
GASTROINTESTINAL NEGATIVE: 1

## 2019-12-13 ENCOUNTER — OFFICE VISIT (OUTPATIENT)
Dept: FAMILY MEDICINE CLINIC | Age: 69
End: 2019-12-13
Payer: MEDICARE

## 2019-12-13 VITALS
DIASTOLIC BLOOD PRESSURE: 78 MMHG | WEIGHT: 181 LBS | SYSTOLIC BLOOD PRESSURE: 124 MMHG | BODY MASS INDEX: 26.81 KG/M2 | HEIGHT: 69 IN | OXYGEN SATURATION: 97 % | RESPIRATION RATE: 16 BRPM | HEART RATE: 70 BPM

## 2019-12-13 DIAGNOSIS — I48.0 PAROXYSMAL ATRIAL FIBRILLATION (HCC): ICD-10-CM

## 2019-12-13 DIAGNOSIS — R79.89 HIGH SERUM CALCIUM: Primary | ICD-10-CM

## 2019-12-13 DIAGNOSIS — I10 ESSENTIAL HYPERTENSION: ICD-10-CM

## 2019-12-13 LAB
PARATHYROID HORMONE INTACT: 6.3 PG/ML (ref 14–72)
PROSTATE SPECIFIC ANTIGEN: 2.53 NG/ML (ref 0–4)

## 2019-12-13 PROCEDURE — 1123F ACP DISCUSS/DSCN MKR DOCD: CPT | Performed by: FAMILY MEDICINE

## 2019-12-13 PROCEDURE — 99214 OFFICE O/P EST MOD 30 MIN: CPT | Performed by: FAMILY MEDICINE

## 2019-12-13 PROCEDURE — G8598 ASA/ANTIPLAT THER USED: HCPCS | Performed by: FAMILY MEDICINE

## 2019-12-13 PROCEDURE — 4040F PNEUMOC VAC/ADMIN/RCVD: CPT | Performed by: FAMILY MEDICINE

## 2019-12-13 PROCEDURE — G8417 CALC BMI ABV UP PARAM F/U: HCPCS | Performed by: FAMILY MEDICINE

## 2019-12-13 PROCEDURE — 36415 COLL VENOUS BLD VENIPUNCTURE: CPT | Performed by: FAMILY MEDICINE

## 2019-12-13 PROCEDURE — 1036F TOBACCO NON-USER: CPT | Performed by: FAMILY MEDICINE

## 2019-12-13 PROCEDURE — G8482 FLU IMMUNIZE ORDER/ADMIN: HCPCS | Performed by: FAMILY MEDICINE

## 2019-12-13 PROCEDURE — 3017F COLORECTAL CA SCREEN DOC REV: CPT | Performed by: FAMILY MEDICINE

## 2019-12-13 PROCEDURE — G8427 DOCREV CUR MEDS BY ELIG CLIN: HCPCS | Performed by: FAMILY MEDICINE

## 2019-12-13 PROCEDURE — 1111F DSCHRG MED/CURRENT MED MERGE: CPT | Performed by: FAMILY MEDICINE

## 2019-12-13 ASSESSMENT — ENCOUNTER SYMPTOMS: BLOOD IN STOOL: 0

## 2019-12-15 LAB
CALCIUM IONIZED, CALC AT PH 7.4: 1.82
CALCIUM IONIZED: 1.76

## 2019-12-16 ENCOUNTER — TELEPHONE (OUTPATIENT)
Dept: FAMILY MEDICINE CLINIC | Age: 69
End: 2019-12-16

## 2019-12-17 ENCOUNTER — TELEPHONE (OUTPATIENT)
Dept: ENDOCRINOLOGY | Age: 69
End: 2019-12-17

## 2019-12-17 DIAGNOSIS — E83.52 HYPERCALCEMIA: ICD-10-CM

## 2019-12-17 DIAGNOSIS — E55.9 VITAMIN D DEFICIENCY: ICD-10-CM

## 2019-12-17 DIAGNOSIS — E03.2 HYPOTHYROIDISM DUE TO MEDICATION: Primary | ICD-10-CM

## 2019-12-17 RX ORDER — AMIODARONE HYDROCHLORIDE 200 MG/1
200 TABLET ORAL DAILY
Qty: 30 TABLET | Refills: 5 | Status: CANCELLED | OUTPATIENT
Start: 2019-12-17

## 2019-12-17 NOTE — TELEPHONE ENCOUNTER
Nayana Perez is a current patient of Dr. Catrachita Lawrence.  Advised him to follow up with him and will route labs to him in epic

## 2019-12-18 ENCOUNTER — HOSPITAL ENCOUNTER (OUTPATIENT)
Dept: CARDIAC CATH/INVASIVE PROCEDURES | Age: 69
Discharge: HOME | End: 2019-12-18
Attending: INTERNAL MEDICINE | Admitting: INTERNAL MEDICINE
Payer: MEDICARE

## 2019-12-18 ENCOUNTER — ANESTHESIA (OUTPATIENT)
Dept: OPERATING ROOM | Age: 69
End: 2019-12-18
Payer: MEDICARE

## 2019-12-18 ENCOUNTER — ANESTHESIA EVENT (OUTPATIENT)
Dept: CARDIAC CATH/INVASIVE PROCEDURES | Age: 69
End: 2019-12-18
Payer: MEDICARE

## 2019-12-18 ENCOUNTER — ANESTHESIA EVENT (OUTPATIENT)
Dept: OPERATING ROOM | Age: 69
End: 2019-12-18
Payer: MEDICARE

## 2019-12-18 ENCOUNTER — ANESTHESIA (OUTPATIENT)
Dept: CARDIAC CATH/INVASIVE PROCEDURES | Age: 69
End: 2019-12-18
Payer: MEDICARE

## 2019-12-18 ENCOUNTER — TELEPHONE (OUTPATIENT)
Dept: CARDIOLOGY | Age: 69
End: 2019-12-18

## 2019-12-18 VITALS — WEIGHT: 182.6 LBS | HEIGHT: 69 IN | BODY MASS INDEX: 27.05 KG/M2

## 2019-12-18 VITALS — OXYGEN SATURATION: 99 % | DIASTOLIC BLOOD PRESSURE: 83 MMHG | SYSTOLIC BLOOD PRESSURE: 131 MMHG

## 2019-12-18 LAB
A/G RATIO: 1.3 (ref 1.1–2.2)
ALBUMIN SERPL-MCNC: 3.8 G/DL (ref 3.4–5)
ALP BLD-CCNC: 134 U/L (ref 40–129)
ALT SERPL-CCNC: 34 U/L (ref 10–40)
ANION GAP SERPL CALCULATED.3IONS-SCNC: 11 MMOL/L (ref 3–16)
ANION GAP SERPL CALCULATED.3IONS-SCNC: 12 MMOL/L (ref 3–16)
AST SERPL-CCNC: 40 U/L (ref 15–37)
BILIRUB SERPL-MCNC: 0.4 MG/DL (ref 0–1)
BUN BLDV-MCNC: 23 MG/DL (ref 7–20)
BUN BLDV-MCNC: 23 MG/DL (ref 7–20)
CALCIUM SERPL-MCNC: 11.7 MG/DL (ref 8.3–10.6)
CALCIUM SERPL-MCNC: 12.4 MG/DL (ref 8.3–10.6)
CHLORIDE BLD-SCNC: 98 MMOL/L (ref 99–110)
CHLORIDE BLD-SCNC: 99 MMOL/L (ref 99–110)
CO2: 24 MMOL/L (ref 21–32)
CO2: 29 MMOL/L (ref 21–32)
CREAT SERPL-MCNC: 1.1 MG/DL (ref 0.8–1.3)
CREAT SERPL-MCNC: 1.2 MG/DL (ref 0.8–1.3)
EKG ATRIAL RATE: 58 BPM
EKG DIAGNOSIS: NORMAL
EKG P AXIS: 13 DEGREES
EKG P-R INTERVAL: 190 MS
EKG Q-T INTERVAL: 442 MS
EKG QRS DURATION: 92 MS
EKG QTC CALCULATION (BAZETT): 433 MS
EKG R AXIS: 21 DEGREES
EKG T AXIS: 42 DEGREES
EKG VENTRICULAR RATE: 58 BPM
GFR AFRICAN AMERICAN: >60
GFR AFRICAN AMERICAN: >60
GFR NON-AFRICAN AMERICAN: 60
GFR NON-AFRICAN AMERICAN: >60
GLOBULIN: 2.9 G/DL
GLUCOSE BLD-MCNC: 84 MG/DL (ref 70–99)
GLUCOSE BLD-MCNC: 92 MG/DL (ref 70–99)
HCT VFR BLD CALC: 37.3 % (ref 40.5–52.5)
HEMOGLOBIN: 12.4 G/DL (ref 13.5–17.5)
INR BLD: 1.29 (ref 0.86–1.14)
MCH RBC QN AUTO: 30.3 PG (ref 26–34)
MCHC RBC AUTO-ENTMCNC: 33.3 G/DL (ref 31–36)
MCV RBC AUTO: 91 FL (ref 80–100)
PDW BLD-RTO: 14.4 % (ref 12.4–15.4)
PLATELET # BLD: 329 K/UL (ref 135–450)
PMV BLD AUTO: 7.6 FL (ref 5–10.5)
POTASSIUM SERPL-SCNC: 3.4 MMOL/L (ref 3.5–5.1)
POTASSIUM SERPL-SCNC: 3.8 MMOL/L (ref 3.5–5.1)
PROTHROMBIN TIME: 15 SEC (ref 10–13.2)
RBC # BLD: 4.1 M/UL (ref 4.2–5.9)
SODIUM BLD-SCNC: 135 MMOL/L (ref 136–145)
SODIUM BLD-SCNC: 138 MMOL/L (ref 136–145)
T4 FREE: 1.1 NG/DL (ref 0.9–1.8)
TOTAL PROTEIN: 6.7 G/DL (ref 6.4–8.2)
TSH REFLEX FT4: 18.75 UIU/ML (ref 0.27–4.2)
WBC # BLD: 6.2 K/UL (ref 4–11)

## 2019-12-18 PROCEDURE — 3700000000 HC ANESTHESIA ATTENDED CARE

## 2019-12-18 PROCEDURE — 93325 DOPPLER ECHO COLOR FLOW MAPG: CPT

## 2019-12-18 PROCEDURE — 80053 COMPREHEN METABOLIC PANEL: CPT

## 2019-12-18 PROCEDURE — 3700000001 HC ADD 15 MINUTES (ANESTHESIA)

## 2019-12-18 PROCEDURE — 85027 COMPLETE CBC AUTOMATED: CPT

## 2019-12-18 PROCEDURE — 2500000003 HC RX 250 WO HCPCS: Performed by: NURSE ANESTHETIST, CERTIFIED REGISTERED

## 2019-12-18 PROCEDURE — 93005 ELECTROCARDIOGRAM TRACING: CPT | Performed by: INTERNAL MEDICINE

## 2019-12-18 PROCEDURE — 84439 ASSAY OF FREE THYROXINE: CPT

## 2019-12-18 PROCEDURE — 6360000002 HC RX W HCPCS: Performed by: NURSE ANESTHETIST, CERTIFIED REGISTERED

## 2019-12-18 PROCEDURE — 2580000003 HC RX 258

## 2019-12-18 PROCEDURE — 2580000003 HC RX 258: Performed by: NURSE ANESTHETIST, CERTIFIED REGISTERED

## 2019-12-18 PROCEDURE — 84443 ASSAY THYROID STIM HORMONE: CPT

## 2019-12-18 PROCEDURE — 6370000000 HC RX 637 (ALT 250 FOR IP)

## 2019-12-18 PROCEDURE — 85610 PROTHROMBIN TIME: CPT

## 2019-12-18 PROCEDURE — 93320 DOPPLER ECHO COMPLETE: CPT

## 2019-12-18 PROCEDURE — 93312 ECHO TRANSESOPHAGEAL: CPT

## 2019-12-18 RX ORDER — SODIUM CHLORIDE 9 MG/ML
INJECTION, SOLUTION INTRAVENOUS CONTINUOUS PRN
Status: DISCONTINUED | OUTPATIENT
Start: 2019-12-18 | End: 2019-12-18 | Stop reason: SDUPTHER

## 2019-12-18 RX ORDER — CLOPIDOGREL BISULFATE 75 MG/1
75 TABLET ORAL DAILY
Qty: 30 TABLET | Refills: 3 | Status: SHIPPED | OUTPATIENT
Start: 2019-12-18 | End: 2019-12-18

## 2019-12-18 RX ORDER — CLOPIDOGREL BISULFATE 75 MG/1
75 TABLET ORAL DAILY
Qty: 90 TABLET | Refills: 3 | Status: ON HOLD | OUTPATIENT
Start: 2019-12-18 | End: 2020-05-21 | Stop reason: HOSPADM

## 2019-12-18 RX ORDER — LIDOCAINE HYDROCHLORIDE 20 MG/ML
INJECTION, SOLUTION EPIDURAL; INFILTRATION; INTRACAUDAL; PERINEURAL PRN
Status: DISCONTINUED | OUTPATIENT
Start: 2019-12-18 | End: 2019-12-18 | Stop reason: SDUPTHER

## 2019-12-18 RX ORDER — PROPOFOL 10 MG/ML
INJECTION, EMULSION INTRAVENOUS PRN
Status: DISCONTINUED | OUTPATIENT
Start: 2019-12-18 | End: 2019-12-18 | Stop reason: SDUPTHER

## 2019-12-18 RX ORDER — SODIUM CHLORIDE 9 MG/ML
INJECTION, SOLUTION INTRAVENOUS ONCE
Status: DISCONTINUED | OUTPATIENT
Start: 2019-12-18 | End: 2019-12-18 | Stop reason: HOSPADM

## 2019-12-18 RX ORDER — CLOPIDOGREL BISULFATE 75 MG/1
75 TABLET ORAL DAILY
Status: DISCONTINUED | OUTPATIENT
Start: 2019-12-18 | End: 2019-12-18 | Stop reason: HOSPADM

## 2019-12-18 RX ADMIN — CLOPIDOGREL BISULFATE 75 MG: 75 TABLET ORAL at 14:02

## 2019-12-18 RX ADMIN — LIDOCAINE HYDROCHLORIDE 50 MG: 20 INJECTION, SOLUTION EPIDURAL; INFILTRATION; INTRACAUDAL; PERINEURAL at 12:11

## 2019-12-18 RX ADMIN — PROPOFOL 20 MG: 10 INJECTION, EMULSION INTRAVENOUS at 12:14

## 2019-12-18 RX ADMIN — PROPOFOL 50 MG: 10 INJECTION, EMULSION INTRAVENOUS at 12:11

## 2019-12-18 RX ADMIN — PROPOFOL 30 MG: 10 INJECTION, EMULSION INTRAVENOUS at 12:17

## 2019-12-18 RX ADMIN — PROPOFOL 30 MG: 10 INJECTION, EMULSION INTRAVENOUS at 12:21

## 2019-12-18 RX ADMIN — SODIUM CHLORIDE: 9 INJECTION, SOLUTION INTRAVENOUS at 11:45

## 2019-12-18 ASSESSMENT — ENCOUNTER SYMPTOMS
SHORTNESS OF BREATH: 1
SHORTNESS OF BREATH: 1

## 2019-12-18 NOTE — TELEPHONE ENCOUNTER
Please arrange for for the patient to have a LUIZA in 5 months with Dr. Nicole Perez and anaesthesia. Thank you.

## 2019-12-19 ENCOUNTER — HOSPITAL ENCOUNTER (OUTPATIENT)
Age: 69
Discharge: HOME | End: 2019-12-19
Payer: MEDICARE

## 2019-12-19 ENCOUNTER — OFFICE VISIT (OUTPATIENT)
Dept: FAMILY MEDICINE CLINIC | Age: 69
End: 2019-12-19
Payer: MEDICARE

## 2019-12-19 VITALS
DIASTOLIC BLOOD PRESSURE: 64 MMHG | SYSTOLIC BLOOD PRESSURE: 116 MMHG | HEART RATE: 55 BPM | BODY MASS INDEX: 26.91 KG/M2 | OXYGEN SATURATION: 98 % | WEIGHT: 182.2 LBS | TEMPERATURE: 97.6 F

## 2019-12-19 DIAGNOSIS — E55.9 VITAMIN D DEFICIENCY: ICD-10-CM

## 2019-12-19 DIAGNOSIS — R53.83 FATIGUE, UNSPECIFIED TYPE: ICD-10-CM

## 2019-12-19 DIAGNOSIS — K21.9 GASTROESOPHAGEAL REFLUX DISEASE, ESOPHAGITIS PRESENCE NOT SPECIFIED: ICD-10-CM

## 2019-12-19 DIAGNOSIS — I10 ESSENTIAL HYPERTENSION: ICD-10-CM

## 2019-12-19 DIAGNOSIS — E83.52 HYPERCALCEMIA: Primary | ICD-10-CM

## 2019-12-19 DIAGNOSIS — E03.2 HYPOTHYROIDISM DUE TO MEDICATION: ICD-10-CM

## 2019-12-19 DIAGNOSIS — E83.52 HYPERCALCEMIA: ICD-10-CM

## 2019-12-19 LAB
PHOSPHORUS: 2.9 MG/DL (ref 2.5–4.9)
VITAMIN D 25-HYDROXY: 34.8 NG/ML

## 2019-12-19 PROCEDURE — 1036F TOBACCO NON-USER: CPT | Performed by: FAMILY MEDICINE

## 2019-12-19 PROCEDURE — G8482 FLU IMMUNIZE ORDER/ADMIN: HCPCS | Performed by: FAMILY MEDICINE

## 2019-12-19 PROCEDURE — 82306 VITAMIN D 25 HYDROXY: CPT

## 2019-12-19 PROCEDURE — 99214 OFFICE O/P EST MOD 30 MIN: CPT | Performed by: FAMILY MEDICINE

## 2019-12-19 PROCEDURE — 83970 ASSAY OF PARATHORMONE: CPT

## 2019-12-19 PROCEDURE — G8417 CALC BMI ABV UP PARAM F/U: HCPCS | Performed by: FAMILY MEDICINE

## 2019-12-19 PROCEDURE — 82652 VIT D 1 25-DIHYDROXY: CPT

## 2019-12-19 PROCEDURE — 1111F DSCHRG MED/CURRENT MED MERGE: CPT | Performed by: FAMILY MEDICINE

## 2019-12-19 PROCEDURE — 82542 COL CHROMOTOGRAPHY QUAL/QUAN: CPT

## 2019-12-19 PROCEDURE — 1123F ACP DISCUSS/DSCN MKR DOCD: CPT | Performed by: FAMILY MEDICINE

## 2019-12-19 PROCEDURE — 3017F COLORECTAL CA SCREEN DOC REV: CPT | Performed by: FAMILY MEDICINE

## 2019-12-19 PROCEDURE — 84100 ASSAY OF PHOSPHORUS: CPT

## 2019-12-19 PROCEDURE — G8598 ASA/ANTIPLAT THER USED: HCPCS | Performed by: FAMILY MEDICINE

## 2019-12-19 PROCEDURE — 36415 COLL VENOUS BLD VENIPUNCTURE: CPT

## 2019-12-19 PROCEDURE — 4040F PNEUMOC VAC/ADMIN/RCVD: CPT | Performed by: FAMILY MEDICINE

## 2019-12-19 PROCEDURE — G8427 DOCREV CUR MEDS BY ELIG CLIN: HCPCS | Performed by: FAMILY MEDICINE

## 2019-12-19 ASSESSMENT — ENCOUNTER SYMPTOMS: BLOOD IN STOOL: 0

## 2019-12-20 ENCOUNTER — TELEPHONE (OUTPATIENT)
Dept: FAMILY MEDICINE CLINIC | Age: 69
End: 2019-12-20

## 2019-12-20 LAB
PARATHYROID HORMONE INTACT: 5.7 PG/ML (ref 14–72)
VITAMIN D 1,25-DIHYDROXY: 81.8 PG/ML (ref 19.9–79.3)

## 2019-12-23 ENCOUNTER — TELEPHONE (OUTPATIENT)
Dept: CARDIOLOGY CLINIC | Age: 69
End: 2019-12-23

## 2019-12-23 ENCOUNTER — TELEPHONE (OUTPATIENT)
Dept: FAMILY MEDICINE CLINIC | Age: 69
End: 2019-12-23

## 2019-12-26 LAB — PTH RELATED PEPTIDE: 2.3 PMOL/L (ref 0–2.3)

## 2019-12-31 ENCOUNTER — TELEPHONE (OUTPATIENT)
Dept: FAMILY MEDICINE CLINIC | Age: 69
End: 2019-12-31

## 2019-12-31 ENCOUNTER — TELEPHONE (OUTPATIENT)
Dept: ENDOCRINOLOGY | Age: 69
End: 2019-12-31

## 2019-12-31 RX ORDER — LEVOTHYROXINE SODIUM 0.07 MG/1
75 TABLET ORAL DAILY
Qty: 30 TABLET | Refills: 1 | Status: SHIPPED | OUTPATIENT
Start: 2019-12-31 | End: 2019-12-31 | Stop reason: SDUPTHER

## 2019-12-31 RX ORDER — LEVOTHYROXINE SODIUM 0.07 MG/1
75 TABLET ORAL DAILY
Qty: 30 TABLET | Refills: 1 | Status: SHIPPED | OUTPATIENT
Start: 2019-12-31 | End: 2020-01-02

## 2020-01-02 RX ORDER — LEVOTHYROXINE SODIUM 0.07 MG/1
75 TABLET ORAL DAILY
Qty: 90 TABLET | Refills: 1 | Status: SHIPPED | OUTPATIENT
Start: 2020-01-02 | End: 2020-02-13

## 2020-01-02 RX ORDER — LEVOTHYROXINE SODIUM 0.07 MG/1
75 TABLET ORAL DAILY
Qty: 90 TABLET | Refills: 1 | Status: SHIPPED | OUTPATIENT
Start: 2020-01-02 | End: 2020-08-10

## 2020-01-10 NOTE — TELEPHONE ENCOUNTER
Noted. Recommend keep appointment to discuss other treatment options.   Can see sooner if needed/able

## 2020-01-16 ENCOUNTER — TELEPHONE (OUTPATIENT)
Dept: CARDIOLOGY CLINIC | Age: 70
End: 2020-01-16

## 2020-01-17 NOTE — TELEPHONE ENCOUNTER
Spoke with patient. (message taken incorrectly). He was taken off Eliquis and started on clopidogrel (plavix) 75 mg and Asprin 81 mg. States his bleeding is worse on those two medications. Please advise.

## 2020-01-17 NOTE — TELEPHONE ENCOUNTER
Due to Watchman, I am unable to authorize stopping either ASA or Plavix. I will discuss with Dr. Sander Kline. Please inform the patient that Dr. Sander Kline will address this when he returns on 1/21/2020.

## 2020-01-21 NOTE — TELEPHONE ENCOUNTER
For ongoing bleeding he can take ASA every other day for 2 weeks.  If bleeding has stopped, he should remain on both ASA and Plavix

## 2020-01-21 NOTE — TELEPHONE ENCOUNTER
Spoke with Madi Silverman, I relayed RPS message. Pt v/u and he will contact the office in 2 weeks to give us an update.

## 2020-01-29 RX ORDER — LISINOPRIL 10 MG/1
10 TABLET ORAL DAILY
Qty: 90 TABLET | Refills: 3 | Status: ON HOLD | OUTPATIENT
Start: 2020-01-29 | End: 2020-05-21 | Stop reason: SDUPTHER

## 2020-01-29 RX ORDER — HYDROCHLOROTHIAZIDE 25 MG/1
25 TABLET ORAL EVERY MORNING
Qty: 90 TABLET | Refills: 3 | Status: SHIPPED | OUTPATIENT
Start: 2020-01-29 | End: 2020-05-19

## 2020-01-29 NOTE — TELEPHONE ENCOUNTER
12/12/19    Plan:   1. Stop the simvastatin. Start pravastatin instead due to interaction between amiodarone and simvastatin  2. No change in other heart/ BP medicines  3. Okay to stop the iron supplement  4. Follow up with family doctor about the Prilosec for stomach (possibly stop once off anticoagulation)  5. Continue with plan for LUIZA with Dr. Mindi Crawford next week - he will be able to tell you if able to stop the Eliquis at that time.   6. Keep follow up appointments as scheduled in April and May 2020

## 2020-02-13 ENCOUNTER — TELEPHONE (OUTPATIENT)
Dept: FAMILY MEDICINE CLINIC | Age: 70
End: 2020-02-13

## 2020-02-13 ENCOUNTER — TELEPHONE (OUTPATIENT)
Dept: CARDIOLOGY CLINIC | Age: 70
End: 2020-02-13

## 2020-02-13 ENCOUNTER — OFFICE VISIT (OUTPATIENT)
Dept: FAMILY MEDICINE CLINIC | Age: 70
End: 2020-02-13
Payer: MEDICARE

## 2020-02-13 ENCOUNTER — TELEPHONE (OUTPATIENT)
Dept: ENDOCRINOLOGY | Age: 70
End: 2020-02-13

## 2020-02-13 VITALS
HEART RATE: 74 BPM | HEIGHT: 69 IN | SYSTOLIC BLOOD PRESSURE: 110 MMHG | DIASTOLIC BLOOD PRESSURE: 70 MMHG | OXYGEN SATURATION: 98 % | WEIGHT: 185 LBS | TEMPERATURE: 97.7 F | BODY MASS INDEX: 27.4 KG/M2

## 2020-02-13 PROBLEM — E83.52 HYPERCALCEMIA: Status: ACTIVE | Noted: 2020-02-13

## 2020-02-13 PROCEDURE — 93000 ELECTROCARDIOGRAM COMPLETE: CPT | Performed by: FAMILY MEDICINE

## 2020-02-13 PROCEDURE — G8427 DOCREV CUR MEDS BY ELIG CLIN: HCPCS | Performed by: FAMILY MEDICINE

## 2020-02-13 PROCEDURE — 1036F TOBACCO NON-USER: CPT | Performed by: FAMILY MEDICINE

## 2020-02-13 PROCEDURE — 99215 OFFICE O/P EST HI 40 MIN: CPT | Performed by: FAMILY MEDICINE

## 2020-02-13 PROCEDURE — G8417 CALC BMI ABV UP PARAM F/U: HCPCS | Performed by: FAMILY MEDICINE

## 2020-02-13 PROCEDURE — 4040F PNEUMOC VAC/ADMIN/RCVD: CPT | Performed by: FAMILY MEDICINE

## 2020-02-13 PROCEDURE — G8482 FLU IMMUNIZE ORDER/ADMIN: HCPCS | Performed by: FAMILY MEDICINE

## 2020-02-13 PROCEDURE — 3017F COLORECTAL CA SCREEN DOC REV: CPT | Performed by: FAMILY MEDICINE

## 2020-02-13 PROCEDURE — 1123F ACP DISCUSS/DSCN MKR DOCD: CPT | Performed by: FAMILY MEDICINE

## 2020-02-13 RX ORDER — OMEPRAZOLE 20 MG/1
20 TABLET, DELAYED RELEASE ORAL DAILY
Status: ON HOLD | COMMUNITY
End: 2022-03-01 | Stop reason: HOSPADM

## 2020-02-13 ASSESSMENT — PATIENT HEALTH QUESTIONNAIRE - PHQ9
SUM OF ALL RESPONSES TO PHQ9 QUESTIONS 1 & 2: 0
SUM OF ALL RESPONSES TO PHQ QUESTIONS 1-9: 0
SUM OF ALL RESPONSES TO PHQ QUESTIONS 1-9: 0
1. LITTLE INTEREST OR PLEASURE IN DOING THINGS: 0
2. FEELING DOWN, DEPRESSED OR HOPELESS: 0

## 2020-02-13 ASSESSMENT — ENCOUNTER SYMPTOMS
SHORTNESS OF BREATH: 0
BLOOD IN STOOL: 0

## 2020-02-13 NOTE — TELEPHONE ENCOUNTER
Called Dr Brayden Valero office patients cardiologist to get clarifications on him being off of blood thinners for his eye lid surgery with Dr Fermin Diaz @ [facility] on 2/24/2020. [phone]. They will be checking and calling back          Also called Dr Ramon Robbins  [phone]. Dr Arvind Rodriguez would like to Talk to Dr about patients surgery having on 2/24/2020.

## 2020-02-13 NOTE — TELEPHONE ENCOUNTER
Spoke to him.    Will evaluate him for non-PTH mediated hypercalcemia and repeat thyroid labs when he comes for appointment on 02/26/20

## 2020-02-13 NOTE — TELEPHONE ENCOUNTER
Dr. Margaret Diallo would like to talk to [facility] about an upcomping eye procedure.     [phone]

## 2020-02-13 NOTE — TELEPHONE ENCOUNTER
Left message on voice mail per Dr Nona Krueger that Dr Mindi Crawford postponed his surgery till march.

## 2020-02-14 ENCOUNTER — TELEPHONE (OUTPATIENT)
Dept: FAMILY MEDICINE CLINIC | Age: 70
End: 2020-02-14

## 2020-02-18 ENCOUNTER — TELEPHONE (OUTPATIENT)
Dept: FAMILY MEDICINE CLINIC | Age: 70
End: 2020-02-18

## 2020-02-18 NOTE — TELEPHONE ENCOUNTER
Caller is requesting the status of H&P      Form is in media please fax to [phone] when completed.     Patient is having surgery Monday 2.24.2020
DISPLAY PLAN FREE TEXT

## 2020-02-20 ENCOUNTER — OFFICE VISIT (OUTPATIENT)
Dept: PULMONOLOGY | Age: 70
End: 2020-02-20
Payer: MEDICARE

## 2020-02-20 VITALS
WEIGHT: 187 LBS | RESPIRATION RATE: 16 BRPM | HEART RATE: 60 BPM | HEIGHT: 69 IN | SYSTOLIC BLOOD PRESSURE: 117 MMHG | TEMPERATURE: 97.6 F | OXYGEN SATURATION: 98 % | DIASTOLIC BLOOD PRESSURE: 72 MMHG | BODY MASS INDEX: 27.7 KG/M2

## 2020-02-20 PROCEDURE — 3017F COLORECTAL CA SCREEN DOC REV: CPT | Performed by: NURSE PRACTITIONER

## 2020-02-20 PROCEDURE — 99214 OFFICE O/P EST MOD 30 MIN: CPT | Performed by: NURSE PRACTITIONER

## 2020-02-20 PROCEDURE — G8417 CALC BMI ABV UP PARAM F/U: HCPCS | Performed by: NURSE PRACTITIONER

## 2020-02-20 PROCEDURE — G8427 DOCREV CUR MEDS BY ELIG CLIN: HCPCS | Performed by: NURSE PRACTITIONER

## 2020-02-20 PROCEDURE — 1123F ACP DISCUSS/DSCN MKR DOCD: CPT | Performed by: NURSE PRACTITIONER

## 2020-02-20 PROCEDURE — 1036F TOBACCO NON-USER: CPT | Performed by: NURSE PRACTITIONER

## 2020-02-20 PROCEDURE — G8482 FLU IMMUNIZE ORDER/ADMIN: HCPCS | Performed by: NURSE PRACTITIONER

## 2020-02-20 PROCEDURE — 4040F PNEUMOC VAC/ADMIN/RCVD: CPT | Performed by: NURSE PRACTITIONER

## 2020-02-20 ASSESSMENT — SLEEP AND FATIGUE QUESTIONNAIRES
HOW LIKELY ARE YOU TO NOD OFF OR FALL ASLEEP WHILE SITTING INACTIVE IN A PUBLIC PLACE: 0
HOW LIKELY ARE YOU TO NOD OFF OR FALL ASLEEP WHILE LYING DOWN TO REST IN THE AFTERNOON WHEN CIRCUMSTANCES PERMIT: 0
HOW LIKELY ARE YOU TO NOD OFF OR FALL ASLEEP IN A CAR, WHILE STOPPED FOR A FEW MINUTES IN TRAFFIC: 0
HOW LIKELY ARE YOU TO NOD OFF OR FALL ASLEEP WHILE WATCHING TV: 0
HOW LIKELY ARE YOU TO NOD OFF OR FALL ASLEEP WHILE SITTING AND TALKING TO SOMEONE: 0
ESS TOTAL SCORE: 0
HOW LIKELY ARE YOU TO NOD OFF OR FALL ASLEEP WHILE SITTING AND READING: 0
HOW LIKELY ARE YOU TO NOD OFF OR FALL ASLEEP WHEN YOU ARE A PASSENGER IN A CAR FOR AN HOUR WITHOUT A BREAK: 0
HOW LIKELY ARE YOU TO NOD OFF OR FALL ASLEEP WHILE SITTING QUIETLY AFTER LUNCH WITHOUT ALCOHOL: 0
NECK CIRCUMFERENCE (INCHES): 16.25

## 2020-02-20 NOTE — PROGRESS NOTES
Wakes up 3-4 times at night total. 2 nocturia. It takes few- unknown minutes to fall back a sleep. No naps during the day. No headache in am. No weight gain. 1 caffienated beverages during the day. Occasional alcohol. ESS is 0. Sleep Medicine 2/20/2020 11/26/2019 8/2/2019   Sitting and reading 0 0 0   Watching TV 0 0 0   Sitting, inactive in a public place (e.g. a theatre or a meeting) 0 0 0   As a passenger in a car for an hour without a break 0 0 0   Lying down to rest in the afternoon when circumstances permit 0 0 0   Sitting and talking to someone 0 0 0   Sitting quietly after a lunch without alcohol 0 0 0   In a car, while stopped for a few minutes in traffic 0 0 0   Total score 0 0 0   Neck circumference 16.25 17 17       Past Medical History:  Past Medical History:   Diagnosis Date    Arthritis     Atrial fibrillation ([address] 2007    S/P CARDIOVERSION, EF 49% on stress test 2009    Hyperlipidemia     Hypertension     SCC (squamous cell carcinoma)     Dr. Locke Situ Sleep apnea     Systolic heart failure [facility])        Past Surgical History:        Procedure Laterality Date    ABLATION OF DYSRHYTHMIC FOCUS      Afib PVI etc / right sided flutter line    CARDIOVERSION  2005    COLONOSCOPY  [id number]    tics    TOTAL HIP ARTHROPLASTY Left 2013       Allergies:  has No Known Allergies. Social History:    TOBACCO:   reports that he has never smoked. He has never used smokeless tobacco.  ETOH:   reports no history of alcohol use.     Family History:       Problem Relation Age of Onset    Cancer Mother         BONE    High Blood Pressure Mother     Heart Disease Father 79       Current Medications:    Current Outpatient Medications:     omeprazole (PRILOSEC OTC) 20 MG tablet, Take 20 mg by mouth daily, Disp: , Rfl:     lisinopril (PRINIVIL;ZESTRIL) 10 MG tablet, TAKE 1 TABLET BY MOUTH DAILY, Disp: 90 tablet, Rfl: 3    hydrochlorothiazide (HYDRODIURIL) 25 MG tablet, TAKE 1 TABLET BY MOUTH EVERY H20    2/20/20-no CPAP use in past 30 days    Assessment:       · Moderate BASSAM. CPAP 9 cm H2O. poor compliance on review today  · Dry mouth with CPAP  · Snoring  · Observed sleep apnea  · Sleep maintenance insomnia- BASSAM likely contributing  · HTN and atrial fibrillation-followed by cardiology  · Mild RLS symptoms        Plan:      · Patient declines to continue with CPAP  · Discussed severity of sleep apnea and importance of CPAP use/treatment  · Could consider BIPAP titration as patient not tolerating CPAP- patient declines. · Patient would like to discuss surgical options to treat sleep apnea- will send referral to Dr. Trang Chacon  Discussed BASSAM treatment options including PAP therapy, oral appliances, hypoglossal nerve stimulator, and oral airway surgery. Positional therapy, sleep in nonsupine position. Discussed pathophysiology of BASSAM with patient today. · Advised to use CPAP 6-8 hrs at night and during naps. · Replacement of mask, tubing, head straps every 3-6 months or sooner if damaged. · Patient instructed to contact [employer] company for any mask, tubing or machine trouble shooting if problems arise. · Sleep hygiene  · Cognitive behavioral therapy was discussed with patient including stimulus control and sleep restriction  · D/W patient non pharmacological therapy for RLS including avoiding aggravating factors (sleep deprivation, mental alerting activities at time of rest, antihistamines), moderate regular exercise, leg message and heating pads/hot shower. · Avoid sedatives, alcohol and caffeinated drinks at bed time. · No driving motorized vehicles or operating heavy machinery while fatigue, drowsy or sleepy. · Weight loss is also recommended as a long-term intervention. · Complications of BASSAM if not treated were discussed with patient patient to include systemic hypertension, pulmonary hypertension, cardiovascular morbidities, car accidents and all cause mortality.   Patient education handout

## 2020-02-20 NOTE — PATIENT INSTRUCTIONS
your ability to fall asleep. Regular exercise is recommended to help you deepen the sleep. 3- Avoid heavy, spicy, or sugary foods 4-6 hours before bedtime: These can affect your ability to stay asleep. 4- Have a light snack before bed: Having an empty stomach can interfere with your sleep. Dairy products and turkey contain tryptophan, which acts as a natural sleep inducer. 5- Stay away from caffeine, nicotine and alcohol at least 4-6 hours before bed: Caffeine and nicotine are stimulants that interfere with your ability to fall asleep. While alcohol has an immediate sleep-inducing effect, a few hours later, as alcohol levels in your blood start to fall, there is a stimulant effect and you will experience fragmented sleep. 6- Take a hot bath 90 minutes before bedtime:  A hot bath will raise your body temperature, but it is the drop in body temperature that may leave you feeling sleepy  7- Develop sleep rituals: it is important to give your body cues that it is time to slow down and sleep. Listen to relaxing music, read something soothing for 15 minutes, have a cup of caffeine free tea, or do relaxation exercises such as yoga or deep breathing help relieve anxiety and reduce muscle tension. 8- Fix a bedtime and an awakening time: Even on weekends! When your sleep cycle has a regular rhythm, you will feel better. 9- Sleep only when sleepy: This reduces the time you are awake in bed. 10- Get into your favorite sleeping position: If you can't fall asleep within 15-30 minutes, get up and do something boring until you feel sleepy. Sit quietly in the dark or read the warranty on your refrigerator. Don't expose yourself to bright light while you are up, it gives cues to your brain that it is time to wake up. 11- Only use your bed for sleeping: Dont use the bed as an office, workroom or recreation room. Let your body \"know\" that the bed is associated with sleeping  12- Use comfortable bedding.  Uncomfortable bedding can prevent good sleep. Evaluate whether or not this is a source of your problem, and make appropriate changes. 13- Make sure your bed and bedroom are quiet and comfortable: A hot room can be uncomfortable. A cooler room, along with enough blankets to stay warm is recommended. Get a blackout shade or wear a slumber mask and wear earplugs or get a \"white noise\" machine for light and noise distractions. 14- Use sunlight to set your biological clock: When you get up in the morning, go outside and turn your face to the sun for 15 minutes. 13- Dont take your worries to bed: Leave worries about job, school, daily life, etc., behind when you go to bed. Some people find it useful to assign a \"worry period\" during the evening or afternoon for these issues. CPAP Equipment Cleaning and Disinfecting Schedule  Equipment Cleaning Frequency Instructions  Disinfecting Frequency   Non-Disposable Filters  Weekly Mild soapy water, Rinse, Air Dry Not Required   Disposable Filters Change as needed  2-4 weeks Do Not Wash Not Required   Hose/tubing Daily Mild soapy water, Rinse, Air Dry Once a week   Mask / Nasal Pillows Daily Mild soapy water, Rinse, Air Dry Once a week   Headgear Weekly Hand wash, Mild soapy water, Rinse, Dry  Not Required   Humidifier Daily Empty water daily  Mild soapy water, Rinse well, Air Dry  Once a week   CPAP Unit As Needed Dust with damp cloth,  No detergents or sprays Not Required         Disinfect (per schedule) with 1 part white vinegar and 3 parts water- soak mask and water chamber for 30 minutes every 1-2 weeks, more often if sick. Allow water/vinegar mixture to run through tubing. Allow all equipment to air dry. Drying Hints:   Always hang tubing away from direct sunlight, as this will cause the tubing to become yellow, brittle and crack over a period of time. DO NOT attach the wet tubing to your CPAP unit to blow-dry it. The moisture from the tubing can drain back into your machine. Moisture in your unit can cause sudden pressure increases or short circuits  DO's and DON'Ts:  - Don't use alcohol-based products to clean your mask, because it can cause the materials to become hard and brittle. - Don't put headgear in the washer or dryer  - Don't use any caustic or household cleaning solutions such as bleach on your CPAP   equipment.  - Do follow the recommended cleaning schedule. - Do change your disposable filter frequently. Adapted From: [employer]/cleaning. shtm.   These are general suggestions for all models please follow specific s recommendations and specific instructions

## 2020-02-20 NOTE — PROGRESS NOTES
Orders verbalized by Beryl Davidson CNP;  3 mo ( Pt refused to schedule at this time wants to [facility] first\")  Referal to Dr Jamie Jack

## 2020-02-24 DIAGNOSIS — E03.2 HYPOTHYROIDISM DUE TO MEDICATION: ICD-10-CM

## 2020-02-24 DIAGNOSIS — E83.52 HYPERCALCEMIA: ICD-10-CM

## 2020-02-24 DIAGNOSIS — E55.9 VITAMIN D DEFICIENCY: ICD-10-CM

## 2020-02-24 LAB
A/G RATIO: 1.4 (ref 1.1–2.2)
ALBUMIN SERPL-MCNC: 3.9 G/DL (ref 3.4–5)
ALP BLD-CCNC: 80 U/L (ref 40–129)
ALT SERPL-CCNC: 15 U/L (ref 10–40)
ANION GAP SERPL CALCULATED.3IONS-SCNC: 14 MMOL/L (ref 3–16)
AST SERPL-CCNC: 21 U/L (ref 15–37)
BILIRUB SERPL-MCNC: 0.3 MG/DL (ref 0–1)
BUN BLDV-MCNC: 19 MG/DL (ref 7–20)
CALCIUM SERPL-MCNC: 11.1 MG/DL (ref 8.3–10.6)
CHLORIDE BLD-SCNC: 98 MMOL/L (ref 99–110)
CO2: 28 MMOL/L (ref 21–32)
CREAT SERPL-MCNC: 1.1 MG/DL (ref 0.8–1.3)
GFR AFRICAN AMERICAN: >60
GFR NON-AFRICAN AMERICAN: >60
GLOBULIN: 2.7 G/DL
GLUCOSE BLD-MCNC: 103 MG/DL (ref 70–99)
PARATHYROID HORMONE INTACT: 6.4 PG/ML (ref 14–72)
PHOSPHORUS: 3.1 MG/DL (ref 2.5–4.9)
POTASSIUM SERPL-SCNC: 3.8 MMOL/L (ref 3.5–5.1)
SODIUM BLD-SCNC: 140 MMOL/L (ref 136–145)
T4 FREE: 1.5 NG/DL (ref 0.9–1.8)
TOTAL PROTEIN: 6.6 G/DL (ref 6.4–8.2)
TSH SERPL DL<=0.05 MIU/L-ACNC: 5.77 UIU/ML (ref 0.27–4.2)
VITAMIN D 25-HYDROXY: 41 NG/ML

## 2020-02-26 ENCOUNTER — OFFICE VISIT (OUTPATIENT)
Dept: ENDOCRINOLOGY | Age: 70
End: 2020-02-26
Payer: MEDICARE

## 2020-02-26 VITALS
HEART RATE: 66 BPM | OXYGEN SATURATION: 96 % | BODY MASS INDEX: 28.14 KG/M2 | WEIGHT: 190 LBS | SYSTOLIC BLOOD PRESSURE: 124 MMHG | HEIGHT: 69 IN | DIASTOLIC BLOOD PRESSURE: 78 MMHG

## 2020-02-26 LAB — VITAMIN D 1,25-DIHYDROXY: 72.2 PG/ML (ref 19.9–79.3)

## 2020-02-26 PROCEDURE — 1123F ACP DISCUSS/DSCN MKR DOCD: CPT | Performed by: INTERNAL MEDICINE

## 2020-02-26 PROCEDURE — G8427 DOCREV CUR MEDS BY ELIG CLIN: HCPCS | Performed by: INTERNAL MEDICINE

## 2020-02-26 PROCEDURE — 99214 OFFICE O/P EST MOD 30 MIN: CPT | Performed by: INTERNAL MEDICINE

## 2020-02-26 PROCEDURE — 4040F PNEUMOC VAC/ADMIN/RCVD: CPT | Performed by: INTERNAL MEDICINE

## 2020-02-26 PROCEDURE — 1036F TOBACCO NON-USER: CPT | Performed by: INTERNAL MEDICINE

## 2020-02-26 PROCEDURE — G8417 CALC BMI ABV UP PARAM F/U: HCPCS | Performed by: INTERNAL MEDICINE

## 2020-02-26 PROCEDURE — G8482 FLU IMMUNIZE ORDER/ADMIN: HCPCS | Performed by: INTERNAL MEDICINE

## 2020-02-26 PROCEDURE — 3017F COLORECTAL CA SCREEN DOC REV: CPT | Performed by: INTERNAL MEDICINE

## 2020-02-26 ASSESSMENT — ENCOUNTER SYMPTOMS
BACK PAIN: 0
COUGH: 0
PHOTOPHOBIA: 0

## 2020-02-26 NOTE — PROGRESS NOTES
Endocrinology  Hernando Vela M.D. Phone: [phone]   FAX: [phone]       Lorie Chen   YOB: 1950    Date of Visit:  2/26/2020    No Known Allergies  Outpatient Medications Marked as Taking for the 2/26/20 encounter (Office Visit) with Mariusz Rangel MD   Medication Sig Dispense Refill    omeprazole (PRILOSEC OTC) 20 MG tablet Take 20 mg by mouth daily      lisinopril (PRINIVIL;ZESTRIL) 10 MG tablet TAKE 1 TABLET BY MOUTH DAILY 90 tablet 3    hydrochlorothiazide (HYDRODIURIL) 25 MG tablet TAKE 1 TABLET BY MOUTH EVERY MORNING 90 tablet 3    levothyroxine (SYNTHROID) 75 MCG tablet TAKE 1 TABLET BY MOUTH DAILY 90 tablet 1    clopidogrel (PLAVIX) 75 MG tablet TAKE 1 TABLET BY MOUTH DAILY 90 tablet 3    pravastatin (PRAVACHOL) 80 MG tablet Take 1 tablet by mouth daily 90 tablet 1    aspirin 81 MG chewable tablet Take 1 tablet by mouth daily 30 tablet 3    metoprolol succinate (TOPROL XL) 50 MG extended release tablet Take 50 mg by mouth daily      Multiple Vitamins-Minerals (MULTIVITAMIN-MINERALS) TABS tablet Take 1 tablet by mouth daily 30 tablet 0         Vitals:    02/26/20 1617   BP: 124/78   Site: Right Upper Arm   Position: Sitting   Cuff Size: Medium Adult   Pulse: 66   SpO2: 96%   Weight: 190 lb (86.2 kg)   Height: 5' 9\" (1.753 m)     Body mass index is 28.06 kg/m².      Wt Readings from Last 3 Encounters:   02/26/20 190 lb (86.2 kg)   02/20/20 187 lb (84.8 kg)   02/13/20 185 lb (83.9 kg)     BP Readings from Last 3 Encounters:   02/26/20 124/78   02/20/20 117/72   02/13/20 110/70        Past Medical History:   Diagnosis Date    Arthritis     Atrial fibrillation ([address] 2007    S/P CARDIOVERSION, EF 49% on stress test 2009    Hyperlipidemia     Hypertension     SCC (squamous cell carcinoma)     Dr. Vamshi Tubbs Sleep apnea     Systolic heart failure [facility])      Past Surgical History:   Procedure Laterality Date    ABLATION OF DYSRHYTHMIC FOCUS      Afib PVI etc / right sided flutter line    CARDIOVERSION  2005    COLONOSCOPY  [id number]    tics    TOTAL HIP ARTHROPLASTY Left 2013     Family History   Problem Relation Age of Onset    Cancer Mother         BONE    High Blood Pressure Mother     Heart Disease Father 79     Social History     Tobacco Use   Smoking Status Never Smoker   Smokeless Tobacco Never Used      Social History     Substance and Sexual Activity   Alcohol Use No    Alcohol/week: 0.0 standard drinks    Comment: social       HPI        La Zavala is a 65 y.o. male who is here for  Management thyroid disease  Cardiologist :  DARSHANA Blanc - CNP    PCP Marta Mckeon MD        Patient has a PMH of HTN, CHF, Afib ( s/p ablation)     He has A-fib for many yrs. S/p ablation 07/19  Was started on Amiodarone in 05/19. Was on 200 mg BID  Now taking 200 mg once a day. Labs in 07/19 showed high TSH of 11.2 with free T4 of 1.2    TSH normal in 04/19     Repeat TSH was high in 10/19    Was started on levothyroxine 50 mcg daily. In 10/19    Dose was increased to 75 mcg daily in 12/19    No significant weight changes. Feels tired in the evenings  Has some intolerance. No constipation, dry skin. FH of hypothyroidism: no  FH of thyroid cancer: no      Hypercalcemia       Was found to to have calcium in 12/19    Not taking any calcium or vitamin D supplement. Ca normal in the past      Review of Systems   Constitutional: Negative for chills, diaphoresis and fever. Eyes: Negative for photophobia. Respiratory: Negative for cough. Cardiovascular: Negative for chest pain and palpitations. Endocrine: Negative for polydipsia. Genitourinary: Negative for dysuria, flank pain, frequency, hematuria and urgency. Musculoskeletal: Positive for myalgias. Negative for back pain and neck pain. Skin: Negative for rash. Allergic/Immunologic: Negative for environmental allergies. Neurological: Positive for headaches.  Negative for 11.7---> 11.1    Elevated 1,25 hydroxy vitamin D    Need to rule out lymphoma and granulomatous disease. Will refer to Dr. Gregg Navarro at [facility] for work-up. May need pulmonary evaluation to rule out granulomatous disease of the lungs if work-up by heme/onc is negative. 3. HTN/CHF/Afib. As per cardiology.

## 2020-03-11 ENCOUNTER — HOSPITAL ENCOUNTER (OUTPATIENT)
Dept: GENERAL RADIOLOGY | Age: 70
Discharge: HOME | End: 2020-03-11
Payer: MEDICARE

## 2020-03-11 ENCOUNTER — HOSPITAL ENCOUNTER (OUTPATIENT)
Dept: CT IMAGING | Age: 70
Discharge: HOME | End: 2020-03-11
Payer: MEDICARE

## 2020-03-11 ENCOUNTER — HOSPITAL ENCOUNTER (OUTPATIENT)
Age: 70
Discharge: HOME | End: 2020-03-11
Payer: MEDICARE

## 2020-03-11 LAB
A/G RATIO: 1.2 (ref 1.1–2.2)
ALBUMIN SERPL-MCNC: 3.8 G/DL (ref 3.4–5)
ALP BLD-CCNC: 74 U/L (ref 40–129)
ALT SERPL-CCNC: 13 U/L (ref 10–40)
ANION GAP SERPL CALCULATED.3IONS-SCNC: 13 MMOL/L (ref 3–16)
AST SERPL-CCNC: 21 U/L (ref 15–37)
BILIRUB SERPL-MCNC: 0.3 MG/DL (ref 0–1)
BUN BLDV-MCNC: 17 MG/DL (ref 7–20)
CALCIUM SERPL-MCNC: 10.1 MG/DL (ref 8.3–10.6)
CHLORIDE BLD-SCNC: 99 MMOL/L (ref 99–110)
CO2: 27 MMOL/L (ref 21–32)
CREAT SERPL-MCNC: 1 MG/DL (ref 0.8–1.3)
GFR AFRICAN AMERICAN: >60
GFR NON-AFRICAN AMERICAN: >60
GLOBULIN: 3.2 G/DL
GLUCOSE BLD-MCNC: 82 MG/DL (ref 70–99)
POTASSIUM SERPL-SCNC: 3.7 MMOL/L (ref 3.5–5.1)
SODIUM BLD-SCNC: 139 MMOL/L (ref 136–145)
T4 FREE: 1.4 NG/DL (ref 0.9–1.8)
TOTAL PROTEIN: 7 G/DL (ref 6.4–8.2)
TSH SERPL DL<=0.05 MIU/L-ACNC: 4.88 UIU/ML (ref 0.27–4.2)

## 2020-03-11 PROCEDURE — 84443 ASSAY THYROID STIM HORMONE: CPT

## 2020-03-11 PROCEDURE — 74177 CT ABD & PELVIS W/CONTRAST: CPT

## 2020-03-11 PROCEDURE — 80053 COMPREHEN METABOLIC PANEL: CPT

## 2020-03-11 PROCEDURE — 36415 COLL VENOUS BLD VENIPUNCTURE: CPT

## 2020-03-11 PROCEDURE — 6360000004 HC RX CONTRAST MEDICATION: Performed by: INTERNAL MEDICINE

## 2020-03-11 PROCEDURE — 84439 ASSAY OF FREE THYROXINE: CPT

## 2020-03-11 PROCEDURE — 77075 RADEX OSSEOUS SURVEY COMPL: CPT

## 2020-03-11 RX ADMIN — IOPAMIDOL 100 ML: 755 INJECTION, SOLUTION INTRAVENOUS at 16:17

## 2020-03-11 RX ADMIN — IOHEXOL 50 ML: 240 INJECTION, SOLUTION INTRATHECAL; INTRAVASCULAR; INTRAVENOUS; ORAL at 16:18

## 2020-03-30 ENCOUNTER — TELEPHONE (OUTPATIENT)
Dept: CARDIOLOGY CLINIC | Age: 70
End: 2020-03-30

## 2020-03-30 NOTE — PROGRESS NOTES
PCP at close of office visit  4. CAD patient on anti-platelet: NA  5. CAD patient on STATIN therapy:  NA  6. Patient with CHF and aFib on anticoagulation:  Yes on Asprin and plavix     All questions and concerns were addressed to the patient/family. Alternatives to my treatment were discussed. This note was scribed in the presence of Cassy Lopez MD by Sanju Faria RN. Dr. Cassy Lopez MD  Electrophysiology  [facility] [address] [place] [address]. [address]. [place], [address]  Phone: [phone]  Fax: [phone]   3/31/2020     NOTE: This report was transcribed using voice recognition software. Every effort was made to ensure accuracy, however, inadvertent computerized transcription errors may be present. The scribe's documentation has been prepared under my direction and personally reviewed by me in its entirety. I confirm that the note above accurately reflects all work, physical examination, the discussion of treatments and procedures, and medical decision making performed by me. Shahnaz Sands MD personally performed the services described in this documentation as scribed by nurse in my presence, and is both accurate and complete.     Electronically signed by Sharon Mercado MD on 3/31/2020 at 1:54 PM

## 2020-03-31 ENCOUNTER — OFFICE VISIT (OUTPATIENT)
Dept: CARDIOLOGY CLINIC | Age: 70
End: 2020-03-31
Payer: MEDICARE

## 2020-03-31 ENCOUNTER — TELEPHONE (OUTPATIENT)
Dept: CARDIOLOGY CLINIC | Age: 70
End: 2020-03-31

## 2020-03-31 ENCOUNTER — ANESTHESIA EVENT (OUTPATIENT)
Dept: CARDIAC CATH/INVASIVE PROCEDURES | Age: 70
End: 2020-03-31
Payer: MEDICARE

## 2020-03-31 VITALS
WEIGHT: 189 LBS | HEART RATE: 55 BPM | OXYGEN SATURATION: 99 % | BODY MASS INDEX: 27.99 KG/M2 | HEIGHT: 69 IN | DIASTOLIC BLOOD PRESSURE: 60 MMHG | SYSTOLIC BLOOD PRESSURE: 100 MMHG

## 2020-03-31 PROCEDURE — 4040F PNEUMOC VAC/ADMIN/RCVD: CPT | Performed by: INTERNAL MEDICINE

## 2020-03-31 PROCEDURE — G8482 FLU IMMUNIZE ORDER/ADMIN: HCPCS | Performed by: INTERNAL MEDICINE

## 2020-03-31 PROCEDURE — 99214 OFFICE O/P EST MOD 30 MIN: CPT | Performed by: INTERNAL MEDICINE

## 2020-03-31 PROCEDURE — 3017F COLORECTAL CA SCREEN DOC REV: CPT | Performed by: INTERNAL MEDICINE

## 2020-03-31 PROCEDURE — G8427 DOCREV CUR MEDS BY ELIG CLIN: HCPCS | Performed by: INTERNAL MEDICINE

## 2020-03-31 PROCEDURE — 93000 ELECTROCARDIOGRAM COMPLETE: CPT | Performed by: INTERNAL MEDICINE

## 2020-03-31 PROCEDURE — 1036F TOBACCO NON-USER: CPT | Performed by: INTERNAL MEDICINE

## 2020-03-31 PROCEDURE — G8417 CALC BMI ABV UP PARAM F/U: HCPCS | Performed by: INTERNAL MEDICINE

## 2020-03-31 PROCEDURE — 1123F ACP DISCUSS/DSCN MKR DOCD: CPT | Performed by: INTERNAL MEDICINE

## 2020-03-31 NOTE — TELEPHONE ENCOUNTER
Patient was seen in office today. Patient is scheduled with Dr. Angeline Warren for LUIZA & Cardioversion with anesthesia on 4/1/20 at [facility], arrival time of 6:30am to the Cath Lab. Please have patient arrive to the main entrance of [facility] at 6:15am and check in with the registration desk. Instructions reviewed with JOSIANE Shafer. Remind patient to be NPO after midnight (8 hours prior). Do not apply lotions/creams on skin the day of procedure. Patient is scheduled with Dr. Angeline Warren for Afib Ablation with anesthesia on 4/15/20 at [facility], arrival time of 9:30am to the Cath Lab. Please have patient arrive to the main entrance of [facility] at 9:15am and check in with the registration desk. HOLD MULTAQ 2 DAYS PRIOR TO ABLATION. Remind patient to be NPO after midnight (8 hours prior). Do not apply lotions/creams on skin the day of procedure.

## 2020-03-31 NOTE — PATIENT INSTRUCTIONS
RECOMMENDATIONS:  1. Medications: amiodarone, tikosyn (requires an admission) and Multaq (nausea). Ablation vs pace and ablate discussed. Risk, benefit, alternative, rationale of the procedure were discussed with the patient which included but were not limited to allergic reaction to the medications, pain, bleeding, infection, nerve injury, injury to diaphragm(breathing muscle), pulmonary embolus(blood clot in lungs), deep vein blood clot, pneumothorax, hemothorax, acute renal failure, cardiac perforation,  tamponade, need for emergent surgery (open heart), need for any future surgery, pulmonary vein stenosis requiring stent or angioplasty, left atrial to esophageal fistula requiring emergent surgery, stroke, myocardial infarction, need for permanent pacemaker, lead dislodgement and death. Given the complex nature of the disease no guarantee was given on the success of the procedure. Explained to patient that discontinuation of anticoagulation is not an indication for the procedure. 2.  Cardioversion discussed. 3.  Plan cardioversion first then do afib ablation  4. FOR CARDIOVERSION: Nothing to eat or drink after midnight. You can take your morning medications with a sip of water only. No lotions or creams on your skin. 5.  Follow up after ablation. HOLD MULTAQ 2 DAYS PRIOR TO ABLATION. NOTHING TO EAT OR DRINK AFTER MIDNIGHT THE NIGHT BEFORE AND DO NOT APPLY ANY CREAM OR LOTIONS.     6.  Multaq 400 mg twice daily

## 2020-04-01 ENCOUNTER — ANESTHESIA (OUTPATIENT)
Dept: CARDIAC CATH/INVASIVE PROCEDURES | Age: 70
End: 2020-04-01
Payer: MEDICARE

## 2020-04-01 ENCOUNTER — HOSPITAL ENCOUNTER (OUTPATIENT)
Dept: CARDIAC CATH/INVASIVE PROCEDURES | Age: 70
Discharge: HOME | End: 2020-04-01
Attending: INTERNAL MEDICINE | Admitting: INTERNAL MEDICINE
Payer: MEDICARE

## 2020-04-01 VITALS — OXYGEN SATURATION: 100 % | DIASTOLIC BLOOD PRESSURE: 77 MMHG | SYSTOLIC BLOOD PRESSURE: 111 MMHG

## 2020-04-01 VITALS — HEIGHT: 69 IN | WEIGHT: 188.8 LBS | BODY MASS INDEX: 27.96 KG/M2

## 2020-04-01 LAB
A/G RATIO: 1.3 (ref 1.1–2.2)
ALBUMIN SERPL-MCNC: 3.8 G/DL (ref 3.4–5)
ALP BLD-CCNC: 73 U/L (ref 40–129)
ALT SERPL-CCNC: 17 U/L (ref 10–40)
ANION GAP SERPL CALCULATED.3IONS-SCNC: 12 MMOL/L (ref 3–16)
AST SERPL-CCNC: 22 U/L (ref 15–37)
BILIRUB SERPL-MCNC: <0.2 MG/DL (ref 0–1)
BUN BLDV-MCNC: 19 MG/DL (ref 7–20)
CALCIUM SERPL-MCNC: 9.5 MG/DL (ref 8.3–10.6)
CHLORIDE BLD-SCNC: 104 MMOL/L (ref 99–110)
CO2: 26 MMOL/L (ref 21–32)
CREAT SERPL-MCNC: 1.1 MG/DL (ref 0.8–1.3)
EKG ATRIAL RATE: 340 BPM
EKG ATRIAL RATE: 66 BPM
EKG DIAGNOSIS: NORMAL
EKG DIAGNOSIS: NORMAL
EKG P AXIS: 63 DEGREES
EKG P-R INTERVAL: 184 MS
EKG Q-T INTERVAL: 332 MS
EKG Q-T INTERVAL: 422 MS
EKG QRS DURATION: 76 MS
EKG QRS DURATION: 80 MS
EKG QTC CALCULATION (BAZETT): 442 MS
EKG QTC CALCULATION (BAZETT): 463 MS
EKG R AXIS: 19 DEGREES
EKG R AXIS: 19 DEGREES
EKG T AXIS: 48 DEGREES
EKG T AXIS: 49 DEGREES
EKG VENTRICULAR RATE: 117 BPM
EKG VENTRICULAR RATE: 66 BPM
GFR AFRICAN AMERICAN: >60
GFR NON-AFRICAN AMERICAN: >60
GLOBULIN: 2.9 G/DL
GLUCOSE BLD-MCNC: 100 MG/DL (ref 70–99)
HCT VFR BLD CALC: 35.2 % (ref 40.5–52.5)
HEMOGLOBIN: 11.8 G/DL (ref 13.5–17.5)
INR BLD: 0.91 (ref 0.86–1.14)
MCH RBC QN AUTO: 30.7 PG (ref 26–34)
MCHC RBC AUTO-ENTMCNC: 33.4 G/DL (ref 31–36)
MCV RBC AUTO: 91.8 FL (ref 80–100)
PDW BLD-RTO: 14.9 % (ref 12.4–15.4)
PLATELET # BLD: 307 K/UL (ref 135–450)
PMV BLD AUTO: 7.5 FL (ref 5–10.5)
POTASSIUM SERPL-SCNC: 3.8 MMOL/L (ref 3.5–5.1)
PROTHROMBIN TIME: 10.6 SEC (ref 10–13.2)
RBC # BLD: 3.83 M/UL (ref 4.2–5.9)
SODIUM BLD-SCNC: 142 MMOL/L (ref 136–145)
TOTAL PROTEIN: 6.7 G/DL (ref 6.4–8.2)
WBC # BLD: 5.9 K/UL (ref 4–11)

## 2020-04-01 PROCEDURE — 2580000003 HC RX 258: Performed by: ANESTHESIOLOGY

## 2020-04-01 PROCEDURE — 3700000000 HC ANESTHESIA ATTENDED CARE

## 2020-04-01 PROCEDURE — 92960 CARDIOVERSION ELECTRIC EXT: CPT

## 2020-04-01 PROCEDURE — 85027 COMPLETE CBC AUTOMATED: CPT

## 2020-04-01 PROCEDURE — 85610 PROTHROMBIN TIME: CPT

## 2020-04-01 PROCEDURE — 92960 CARDIOVERSION ELECTRIC EXT: CPT | Performed by: INTERNAL MEDICINE

## 2020-04-01 PROCEDURE — 93005 ELECTROCARDIOGRAM TRACING: CPT | Performed by: INTERNAL MEDICINE

## 2020-04-01 PROCEDURE — 6360000002 HC RX W HCPCS: Performed by: NURSE ANESTHETIST, CERTIFIED REGISTERED

## 2020-04-01 PROCEDURE — 93010 ELECTROCARDIOGRAM REPORT: CPT | Performed by: INTERNAL MEDICINE

## 2020-04-01 PROCEDURE — 80053 COMPREHEN METABOLIC PANEL: CPT

## 2020-04-01 PROCEDURE — 2500000003 HC RX 250 WO HCPCS: Performed by: NURSE ANESTHETIST, CERTIFIED REGISTERED

## 2020-04-01 PROCEDURE — 2580000003 HC RX 258

## 2020-04-01 RX ORDER — SODIUM CHLORIDE 0.9 % (FLUSH) 0.9 %
10 SYRINGE (ML) INJECTION EVERY 12 HOURS SCHEDULED
Status: DISCONTINUED | OUTPATIENT
Start: 2020-04-01 | End: 2020-04-01 | Stop reason: HOSPADM

## 2020-04-01 RX ORDER — SODIUM CHLORIDE 9 MG/ML
INJECTION, SOLUTION INTRAVENOUS ONCE
Status: COMPLETED | OUTPATIENT
Start: 2020-04-01 | End: 2020-04-01

## 2020-04-01 RX ORDER — SODIUM CHLORIDE 0.9 % (FLUSH) 0.9 %
10 SYRINGE (ML) INJECTION PRN
Status: DISCONTINUED | OUTPATIENT
Start: 2020-04-01 | End: 2020-04-01 | Stop reason: HOSPADM

## 2020-04-01 RX ORDER — LIDOCAINE HYDROCHLORIDE 20 MG/ML
INJECTION, SOLUTION INFILTRATION; PERINEURAL PRN
Status: DISCONTINUED | OUTPATIENT
Start: 2020-04-01 | End: 2020-04-01 | Stop reason: SDUPTHER

## 2020-04-01 RX ORDER — PROPOFOL 10 MG/ML
INJECTION, EMULSION INTRAVENOUS PRN
Status: DISCONTINUED | OUTPATIENT
Start: 2020-04-01 | End: 2020-04-01 | Stop reason: SDUPTHER

## 2020-04-01 RX ORDER — SODIUM CHLORIDE, SODIUM LACTATE, POTASSIUM CHLORIDE, CALCIUM CHLORIDE 600; 310; 30; 20 MG/100ML; MG/100ML; MG/100ML; MG/100ML
INJECTION, SOLUTION INTRAVENOUS CONTINUOUS
Status: DISCONTINUED | OUTPATIENT
Start: 2020-04-01 | End: 2020-04-01 | Stop reason: HOSPADM

## 2020-04-01 RX ADMIN — PROPOFOL 60 MG: 10 INJECTION, EMULSION INTRAVENOUS at 08:22

## 2020-04-01 RX ADMIN — LIDOCAINE HYDROCHLORIDE 60 MG: 20 INJECTION, SOLUTION INFILTRATION; PERINEURAL at 08:22

## 2020-04-01 RX ADMIN — SODIUM CHLORIDE: 9 INJECTION, SOLUTION INTRAVENOUS at 08:14

## 2020-04-01 NOTE — ANESTHESIA PRE PROCEDURE
Alban Lara MD        famotidine (PEPCID) injection 20 mg  20 mg Intravenous Once lAban Lara MD        0.9 % sodium chloride infusion   Intravenous Once Jonathon Barfield MD           Allergies:  No Known Allergies    Problem List:    Patient Active Problem List   Diagnosis Code    Hyperlipidemia E78.5    HTN (hypertension) I10    Squamous cell carcinoma of skin     Bilateral shoulder tendinopathy M67.911, M67.912    Primary osteoarthritis of both shoulders M19.011, M19.012    History of joint swelling Z87.39    Pre-diabetes R73.03    Persistent atrial fibrillation I48.19    Cardiomyopathy ([facility] I42.9    Anemia D64.9    Chronic systolic heart failure (HCC) I50.22    Moderate obstructive sleep apnea G47.33    Presence of Watchman left atrial appendage closure device Z95.818    Paroxysmal atrial fibrillation ([facility] I48.0    Hypercalcemia E83.52       Past Medical History:        Diagnosis Date    Arthritis     Atrial fibrillation ([facility] 2007    S/P CARDIOVERSION, EF 49% on stress test 2009    Hyperlipidemia     Hypertension     SCC (squamous cell carcinoma)     Dr. Yany Nieves Sleep apnea     Systolic heart failure [facility])        Past Surgical History:        Procedure Laterality Date    ABLATION OF DYSRHYTHMIC FOCUS      Afib PVI etc / right sided flutter line    CARDIOVERSION  2005    COLONOSCOPY  [id number]    tics    TOTAL HIP ARTHROPLASTY Left 2013       Social History:    Social History     Tobacco Use    Smoking status: Never Smoker    Smokeless tobacco: Never Used   Substance Use Topics    Alcohol use: No     Alcohol/week: 0.0 standard drinks     Comment: social                                Counseling given: Not Answered      Vital Signs (Current):   Vitals:    04/01/20 0630   Weight: 188 lb 12.8 oz (85.6 kg)   Height: 5' 9\" (1.753 m)                                              BP Readings from Last 3 Encounters:   03/31/20 100/60   02/26/20 124/78 02/20/20 117/72       NPO Status:                                                                                 BMI:   Wt Readings from Last 3 Encounters:   04/01/20 188 lb 12.8 oz (85.6 kg)   03/31/20 189 lb (85.7 kg)   02/26/20 190 lb (86.2 kg)     Body mass index is 27.88 kg/m². CBC:   Lab Results   Component Value Date    WBC 5.9 04/01/2020    RBC 3.83 04/01/2020    HGB 11.8 04/01/2020    HCT 35.2 04/01/2020    MCV 91.8 04/01/2020    RDW 14.9 04/01/2020     04/01/2020       CMP:   Lab Results   Component Value Date     04/01/2020    K 3.8 04/01/2020     04/01/2020    CO2 26 04/01/2020    BUN 19 04/01/2020    CREATININE 1.1 04/01/2020    GFRAA >60 04/01/2020    GFRAA >60 06/14/2013    AGRATIO 1.3 04/01/2020    LABGLOM >60 04/01/2020    GLUCOSE 100 04/01/2020    PROT 6.7 04/01/2020    PROT 7.0 07/06/2012    CALCIUM 9.5 04/01/2020    BILITOT <0.2 04/01/2020    ALKPHOS 73 04/01/2020    AST 22 04/01/2020    ALT 17 04/01/2020       POC Tests: No results for input(s): POCGLU, POCNA, POCK, POCCL, POCBUN, POCHEMO, POCHCT in the last 72 hours.     Coags:   Lab Results   Component Value Date    PROTIME 10.6 04/01/2020    INR 0.91 04/01/2020    APTT 31.7 06/13/2013       HCG (If Applicable): No results found for: PREGTESTUR, PREGSERUM, HCG, HCGQUANT     ABGs: No results found for: PHART, PO2ART, AAA0VHS, PVN3QUA, BEART, V5HMWLNM     Type & Screen (If Applicable):  No results found for: LABABO, [address]    Anesthesia Evaluation  Patient summary reviewed and Nursing notes reviewed no history of anesthetic complications:   Airway: Mallampati: II     Neck ROM: full   Dental:          Pulmonary:Negative Pulmonary ROS and normal exam    (+) sleep apnea:                             Cardiovascular:Negative CV ROS    (+) hypertension:, dysrhythmias: atrial fibrillation, hyperlipidemia                  Neuro/Psych:   Negative Neuro/Psych ROS              GI/Hepatic/Renal: Neg GI/Hepatic/Renal ROS       (-) 4/1/2020

## 2020-04-01 NOTE — PROCEDURES
me.    Lisette Gaona MD  Cardiac Electrophysiology  [facility]  [phone] [address] [place] [address]

## 2020-04-14 ENCOUNTER — TELEPHONE (OUTPATIENT)
Dept: CARDIOLOGY CLINIC | Age: 70
End: 2020-04-14

## 2020-05-05 RX ORDER — LISINOPRIL 10 MG/1
10 TABLET ORAL DAILY
Qty: 90 TABLET | Refills: 1 | OUTPATIENT
Start: 2020-05-05

## 2020-05-18 ENCOUNTER — TELEPHONE (OUTPATIENT)
Dept: CARDIOLOGY CLINIC | Age: 70
End: 2020-05-18

## 2020-05-19 ENCOUNTER — APPOINTMENT (OUTPATIENT)
Dept: GENERAL RADIOLOGY | Age: 70
DRG: 309 | End: 2020-05-19
Payer: MEDICARE

## 2020-05-19 ENCOUNTER — HOSPITAL ENCOUNTER (INPATIENT)
Age: 70
LOS: 2 days | Discharge: HOME | DRG: 309 | End: 2020-05-21
Attending: EMERGENCY MEDICINE | Admitting: INTERNAL MEDICINE
Payer: MEDICARE

## 2020-05-19 PROBLEM — I72.8 SPLENIC ARTERY ANEURYSM (HCC): Status: ACTIVE | Noted: 2020-05-19

## 2020-05-19 PROBLEM — K57.30 DIVERTICULOSIS OF COLON WITHOUT DIVERTICULITIS: Status: ACTIVE | Noted: 2020-05-19

## 2020-05-19 PROBLEM — I48.91 ATRIAL FIBRILLATION WITH RAPID VENTRICULAR RESPONSE (HCC): Status: ACTIVE | Noted: 2020-05-19

## 2020-05-19 PROBLEM — K44.9 HH (HIATUS HERNIA): Status: ACTIVE | Noted: 2020-05-19

## 2020-05-19 PROBLEM — N40.0 PROSTATE ENLARGEMENT: Status: ACTIVE | Noted: 2020-05-19

## 2020-05-19 PROBLEM — I25.10 CORONARY ARTERY CALCIFICATION SEEN ON CT SCAN: Status: ACTIVE | Noted: 2020-05-19

## 2020-05-19 PROBLEM — E03.4 HYPOTHYROIDISM DUE TO ACQUIRED ATROPHY OF THYROID: Status: ACTIVE | Noted: 2020-05-19

## 2020-05-19 PROBLEM — K80.20 CALCULUS OF GALLBLADDER WITHOUT CHOLECYSTITIS WITHOUT OBSTRUCTION: Status: ACTIVE | Noted: 2020-05-19

## 2020-05-19 PROBLEM — G47.61 PERIODIC LIMB MOVEMENT SLEEP DISORDER: Status: ACTIVE | Noted: 2020-05-19

## 2020-05-19 PROBLEM — N17.9 AKI (ACUTE KIDNEY INJURY) (HCC): Status: ACTIVE | Noted: 2020-05-19

## 2020-05-19 PROBLEM — R91.8 PULMONARY INFILTRATE: Status: ACTIVE | Noted: 2020-05-19

## 2020-05-19 LAB
A/G RATIO: 1.3 (ref 1.1–2.2)
A/G RATIO: 1.4 (ref 1.1–2.2)
ALBUMIN SERPL-MCNC: 3.8 G/DL (ref 3.4–5)
ALBUMIN SERPL-MCNC: 4.2 G/DL (ref 3.4–5)
ALP BLD-CCNC: 80 U/L (ref 40–129)
ALP BLD-CCNC: 86 U/L (ref 40–129)
ALT SERPL-CCNC: 17 U/L (ref 10–40)
ALT SERPL-CCNC: 18 U/L (ref 10–40)
ANION GAP SERPL CALCULATED.3IONS-SCNC: 10 MMOL/L (ref 3–16)
ANION GAP SERPL CALCULATED.3IONS-SCNC: 8 MMOL/L (ref 3–16)
APTT: 31.5 SEC (ref 24.2–36.2)
AST SERPL-CCNC: 20 U/L (ref 15–37)
AST SERPL-CCNC: 21 U/L (ref 15–37)
BASOPHILS ABSOLUTE: 0.1 K/UL (ref 0–0.2)
BASOPHILS ABSOLUTE: 0.1 K/UL (ref 0–0.2)
BASOPHILS RELATIVE PERCENT: 1 %
BASOPHILS RELATIVE PERCENT: 1.1 %
BILIRUB SERPL-MCNC: 0.3 MG/DL (ref 0–1)
BILIRUB SERPL-MCNC: <0.2 MG/DL (ref 0–1)
BUN BLDV-MCNC: 26 MG/DL (ref 7–20)
BUN BLDV-MCNC: 27 MG/DL (ref 7–20)
CALCIUM SERPL-MCNC: 10 MG/DL (ref 8.3–10.6)
CALCIUM SERPL-MCNC: 9.2 MG/DL (ref 8.3–10.6)
CHLORIDE BLD-SCNC: 101 MMOL/L (ref 99–110)
CHLORIDE BLD-SCNC: 97 MMOL/L (ref 99–110)
CO2: 26 MMOL/L (ref 21–32)
CO2: 26 MMOL/L (ref 21–32)
CREAT SERPL-MCNC: 1.1 MG/DL (ref 0.8–1.3)
CREAT SERPL-MCNC: 1.3 MG/DL (ref 0.8–1.3)
D DIMER: 313 NG/ML DDU (ref 0–229)
D DIMER: <200 NG/ML DDU (ref 0–229)
EKG ATRIAL RATE: 133 BPM
EKG ATRIAL RATE: 96 BPM
EKG DIAGNOSIS: NORMAL
EKG DIAGNOSIS: NORMAL
EKG Q-T INTERVAL: 332 MS
EKG Q-T INTERVAL: 334 MS
EKG QRS DURATION: 78 MS
EKG QRS DURATION: 82 MS
EKG QTC CALCULATION (BAZETT): 432 MS
EKG QTC CALCULATION (BAZETT): 511 MS
EKG R AXIS: 21 DEGREES
EKG R AXIS: 32 DEGREES
EKG T AXIS: 17 DEGREES
EKG T AXIS: 81 DEGREES
EKG VENTRICULAR RATE: 102 BPM
EKG VENTRICULAR RATE: 141 BPM
EOSINOPHILS ABSOLUTE: 0.3 K/UL (ref 0–0.6)
EOSINOPHILS ABSOLUTE: 0.4 K/UL (ref 0–0.6)
EOSINOPHILS RELATIVE PERCENT: 3.9 %
EOSINOPHILS RELATIVE PERCENT: 4.6 %
FERRITIN: 310.7 NG/ML (ref 30–400)
FIBRINOGEN: 441 MG/DL (ref 200–397)
GFR AFRICAN AMERICAN: >60
GFR AFRICAN AMERICAN: >60
GFR NON-AFRICAN AMERICAN: 54
GFR NON-AFRICAN AMERICAN: >60
GLOBULIN: 3 G/DL
GLOBULIN: 3.1 G/DL
GLUCOSE BLD-MCNC: 101 MG/DL (ref 70–99)
GLUCOSE BLD-MCNC: 110 MG/DL (ref 70–99)
HCT VFR BLD CALC: 40.2 % (ref 40.5–52.5)
HCT VFR BLD CALC: 41.1 % (ref 40.5–52.5)
HEMOGLOBIN: 13.3 G/DL (ref 13.5–17.5)
HEMOGLOBIN: 13.8 G/DL (ref 13.5–17.5)
INR BLD: 0.91 (ref 0.86–1.14)
INR BLD: 0.92 (ref 0.86–1.14)
LACTATE DEHYDROGENASE: 96 U/L (ref 100–190)
LYMPHOCYTES ABSOLUTE: 1.1 K/UL (ref 1–5.1)
LYMPHOCYTES ABSOLUTE: 1.3 K/UL (ref 1–5.1)
LYMPHOCYTES RELATIVE PERCENT: 12.8 %
LYMPHOCYTES RELATIVE PERCENT: 20.4 %
MCH RBC QN AUTO: 30 PG (ref 26–34)
MCH RBC QN AUTO: 30.1 PG (ref 26–34)
MCHC RBC AUTO-ENTMCNC: 33.2 G/DL (ref 31–36)
MCHC RBC AUTO-ENTMCNC: 33.5 G/DL (ref 31–36)
MCV RBC AUTO: 89.7 FL (ref 80–100)
MCV RBC AUTO: 90.6 FL (ref 80–100)
MONOCYTES ABSOLUTE: 1.1 K/UL (ref 0–1.3)
MONOCYTES ABSOLUTE: 1.3 K/UL (ref 0–1.3)
MONOCYTES RELATIVE PERCENT: 14.4 %
MONOCYTES RELATIVE PERCENT: 16.1 %
NEUTROPHILS ABSOLUTE: 3.8 K/UL (ref 1.7–7.7)
NEUTROPHILS ABSOLUTE: 6.1 K/UL (ref 1.7–7.7)
NEUTROPHILS RELATIVE PERCENT: 57.9 %
NEUTROPHILS RELATIVE PERCENT: 67.8 %
PDW BLD-RTO: 14.8 % (ref 12.4–15.4)
PDW BLD-RTO: 15.1 % (ref 12.4–15.4)
PLATELET # BLD: 345 K/UL (ref 135–450)
PLATELET # BLD: 362 K/UL (ref 135–450)
PMV BLD AUTO: 7.7 FL (ref 5–10.5)
PMV BLD AUTO: 7.9 FL (ref 5–10.5)
POTASSIUM REFLEX MAGNESIUM: 3.8 MMOL/L (ref 3.5–5.1)
POTASSIUM SERPL-SCNC: 4.2 MMOL/L (ref 3.5–5.1)
PRO-BNP: 2694 PG/ML (ref 0–124)
PROCALCITONIN: 0.11 NG/ML (ref 0–0.15)
PROTHROMBIN TIME: 10.6 SEC (ref 10–13.2)
PROTHROMBIN TIME: 10.7 SEC (ref 10–13.2)
RBC # BLD: 4.44 M/UL (ref 4.2–5.9)
RBC # BLD: 4.59 M/UL (ref 4.2–5.9)
SODIUM BLD-SCNC: 133 MMOL/L (ref 136–145)
SODIUM BLD-SCNC: 135 MMOL/L (ref 136–145)
TOTAL PROTEIN: 6.8 G/DL (ref 6.4–8.2)
TOTAL PROTEIN: 7.3 G/DL (ref 6.4–8.2)
TROPONIN: <0.01 NG/ML
TROPONIN: <0.01 NG/ML
TSH SERPL DL<=0.05 MIU/L-ACNC: 2.9 UIU/ML (ref 0.27–4.2)
WBC # BLD: 6.5 K/UL (ref 4–11)
WBC # BLD: 9 K/UL (ref 4–11)

## 2020-05-19 PROCEDURE — 36415 COLL VENOUS BLD VENIPUNCTURE: CPT

## 2020-05-19 PROCEDURE — 93010 ELECTROCARDIOGRAM REPORT: CPT | Performed by: INTERNAL MEDICINE

## 2020-05-19 PROCEDURE — 83880 ASSAY OF NATRIURETIC PEPTIDE: CPT

## 2020-05-19 PROCEDURE — 85384 FIBRINOGEN ACTIVITY: CPT

## 2020-05-19 PROCEDURE — 87449 NOS EACH ORGANISM AG IA: CPT

## 2020-05-19 PROCEDURE — 2580000003 HC RX 258: Performed by: INTERNAL MEDICINE

## 2020-05-19 PROCEDURE — 93005 ELECTROCARDIOGRAM TRACING: CPT | Performed by: EMERGENCY MEDICINE

## 2020-05-19 PROCEDURE — U0002 COVID-19 LAB TEST NON-CDC: HCPCS

## 2020-05-19 PROCEDURE — 84145 PROCALCITONIN (PCT): CPT

## 2020-05-19 PROCEDURE — 85379 FIBRIN DEGRADATION QUANT: CPT

## 2020-05-19 PROCEDURE — 99223 1ST HOSP IP/OBS HIGH 75: CPT | Performed by: INTERNAL MEDICINE

## 2020-05-19 PROCEDURE — 85610 PROTHROMBIN TIME: CPT

## 2020-05-19 PROCEDURE — 2580000003 HC RX 258: Performed by: EMERGENCY MEDICINE

## 2020-05-19 PROCEDURE — 85025 COMPLETE CBC W/AUTO DIFF WBC: CPT

## 2020-05-19 PROCEDURE — 84484 ASSAY OF TROPONIN QUANT: CPT

## 2020-05-19 PROCEDURE — 2500000003 HC RX 250 WO HCPCS: Performed by: INTERNAL MEDICINE

## 2020-05-19 PROCEDURE — 71045 X-RAY EXAM CHEST 1 VIEW: CPT

## 2020-05-19 PROCEDURE — U0003 INFECTIOUS AGENT DETECTION BY NUCLEIC ACID (DNA OR RNA); SEVERE ACUTE RESPIRATORY SYNDROME CORONAVIRUS 2 (SARS-COV-2) (CORONAVIRUS DISEASE [COVID-19]), AMPLIFIED PROBE TECHNIQUE, MAKING USE OF HIGH THROUGHPUT TECHNOLOGIES AS DESCRIBED BY CMS-2020-01-R: HCPCS

## 2020-05-19 PROCEDURE — 6370000000 HC RX 637 (ALT 250 FOR IP): Performed by: INTERNAL MEDICINE

## 2020-05-19 PROCEDURE — 93005 ELECTROCARDIOGRAM TRACING: CPT | Performed by: INTERNAL MEDICINE

## 2020-05-19 PROCEDURE — 83615 LACTATE (LD) (LDH) ENZYME: CPT

## 2020-05-19 PROCEDURE — 85730 THROMBOPLASTIN TIME PARTIAL: CPT

## 2020-05-19 PROCEDURE — 99285 EMERGENCY DEPT VISIT HI MDM: CPT

## 2020-05-19 PROCEDURE — 82728 ASSAY OF FERRITIN: CPT

## 2020-05-19 PROCEDURE — 1200000000 HC SEMI PRIVATE

## 2020-05-19 PROCEDURE — 6360000002 HC RX W HCPCS: Performed by: EMERGENCY MEDICINE

## 2020-05-19 PROCEDURE — 84443 ASSAY THYROID STIM HORMONE: CPT

## 2020-05-19 PROCEDURE — 96365 THER/PROPH/DIAG IV INF INIT: CPT

## 2020-05-19 PROCEDURE — 80053 COMPREHEN METABOLIC PANEL: CPT

## 2020-05-19 RX ORDER — SODIUM CHLORIDE 0.9 % (FLUSH) 0.9 %
10 SYRINGE (ML) INJECTION EVERY 12 HOURS SCHEDULED
Status: DISCONTINUED | OUTPATIENT
Start: 2020-05-19 | End: 2020-05-21 | Stop reason: HOSPADM

## 2020-05-19 RX ORDER — POLYETHYLENE GLYCOL 3350 17 G/17G
17 POWDER, FOR SOLUTION ORAL DAILY PRN
Status: DISCONTINUED | OUTPATIENT
Start: 2020-05-19 | End: 2020-05-21 | Stop reason: HOSPADM

## 2020-05-19 RX ORDER — SODIUM CHLORIDE 0.9 % (FLUSH) 0.9 %
10 SYRINGE (ML) INJECTION PRN
Status: DISCONTINUED | OUTPATIENT
Start: 2020-05-19 | End: 2020-05-21 | Stop reason: HOSPADM

## 2020-05-19 RX ORDER — PRAVASTATIN SODIUM 40 MG
80 TABLET ORAL DAILY
Status: DISCONTINUED | OUTPATIENT
Start: 2020-05-19 | End: 2020-05-21 | Stop reason: HOSPADM

## 2020-05-19 RX ORDER — POTASSIUM CHLORIDE 7.45 MG/ML
10 INJECTION INTRAVENOUS PRN
Status: DISCONTINUED | OUTPATIENT
Start: 2020-05-19 | End: 2020-05-21 | Stop reason: HOSPADM

## 2020-05-19 RX ORDER — ASPIRIN 81 MG/1
81 TABLET, CHEWABLE ORAL DAILY
Status: DISCONTINUED | OUTPATIENT
Start: 2020-05-19 | End: 2020-05-21 | Stop reason: HOSPADM

## 2020-05-19 RX ORDER — 0.9 % SODIUM CHLORIDE 0.9 %
1000 INTRAVENOUS SOLUTION INTRAVENOUS ONCE
Status: DISCONTINUED | OUTPATIENT
Start: 2020-05-19 | End: 2020-05-19

## 2020-05-19 RX ORDER — CLOPIDOGREL BISULFATE 75 MG/1
75 TABLET ORAL DAILY
Status: DISCONTINUED | OUTPATIENT
Start: 2020-05-19 | End: 2020-05-19

## 2020-05-19 RX ORDER — SODIUM CHLORIDE 9 MG/ML
INJECTION, SOLUTION INTRAVENOUS CONTINUOUS
Status: DISCONTINUED | OUTPATIENT
Start: 2020-05-19 | End: 2020-05-20

## 2020-05-19 RX ORDER — METOPROLOL SUCCINATE 50 MG/1
50 TABLET, EXTENDED RELEASE ORAL DAILY
Status: DISCONTINUED | OUTPATIENT
Start: 2020-05-19 | End: 2020-05-19

## 2020-05-19 RX ORDER — PROCHLORPERAZINE EDISYLATE 5 MG/ML
10 INJECTION INTRAMUSCULAR; INTRAVENOUS EVERY 6 HOURS PRN
Status: DISCONTINUED | OUTPATIENT
Start: 2020-05-19 | End: 2020-05-21 | Stop reason: HOSPADM

## 2020-05-19 RX ORDER — ACETAMINOPHEN 650 MG/1
650 SUPPOSITORY RECTAL EVERY 6 HOURS PRN
Status: DISCONTINUED | OUTPATIENT
Start: 2020-05-19 | End: 2020-05-21 | Stop reason: HOSPADM

## 2020-05-19 RX ORDER — ACETAMINOPHEN 325 MG/1
650 TABLET ORAL EVERY 6 HOURS PRN
Status: DISCONTINUED | OUTPATIENT
Start: 2020-05-19 | End: 2020-05-21 | Stop reason: HOSPADM

## 2020-05-19 RX ORDER — LISINOPRIL 10 MG/1
10 TABLET ORAL DAILY
Status: DISCONTINUED | OUTPATIENT
Start: 2020-05-19 | End: 2020-05-19

## 2020-05-19 RX ORDER — LEVOTHYROXINE SODIUM 0.07 MG/1
1 TABLET ORAL DAILY
Status: DISCONTINUED | OUTPATIENT
Start: 2020-05-19 | End: 2020-05-19

## 2020-05-19 RX ORDER — LISINOPRIL 5 MG/1
5 TABLET ORAL DAILY
Status: DISCONTINUED | OUTPATIENT
Start: 2020-05-20 | End: 2020-05-21 | Stop reason: HOSPADM

## 2020-05-19 RX ORDER — MAGNESIUM SULFATE IN WATER 40 MG/ML
2 INJECTION, SOLUTION INTRAVENOUS PRN
Status: DISCONTINUED | OUTPATIENT
Start: 2020-05-19 | End: 2020-05-21 | Stop reason: HOSPADM

## 2020-05-19 RX ORDER — POTASSIUM CHLORIDE 20 MEQ/1
40 TABLET, EXTENDED RELEASE ORAL PRN
Status: DISCONTINUED | OUTPATIENT
Start: 2020-05-19 | End: 2020-05-21 | Stop reason: HOSPADM

## 2020-05-19 RX ORDER — PANTOPRAZOLE SODIUM 40 MG/1
40 TABLET, DELAYED RELEASE ORAL
Status: DISCONTINUED | OUTPATIENT
Start: 2020-05-20 | End: 2020-05-21 | Stop reason: HOSPADM

## 2020-05-19 RX ADMIN — DRONEDARONE 400 MG: 400 TABLET, FILM COATED ORAL at 17:32

## 2020-05-19 RX ADMIN — CEFTRIAXONE SODIUM 1 G: 1 INJECTION, POWDER, FOR SOLUTION INTRAMUSCULAR; INTRAVENOUS at 11:26

## 2020-05-19 RX ADMIN — DILTIAZEM HYDROCHLORIDE 5 MG/HR: 5 INJECTION INTRAVENOUS at 22:17

## 2020-05-19 RX ADMIN — AZITHROMYCIN MONOHYDRATE 500 MG: 500 INJECTION, POWDER, LYOPHILIZED, FOR SOLUTION INTRAVENOUS at 12:42

## 2020-05-19 RX ADMIN — SODIUM CHLORIDE: 9 INJECTION, SOLUTION INTRAVENOUS at 14:09

## 2020-05-19 ASSESSMENT — PAIN SCALES - GENERAL
PAINLEVEL_OUTOF10: 4
PAINLEVEL_OUTOF10: 0
PAINLEVEL_OUTOF10: 0

## 2020-05-19 NOTE — ED PROVIDER NOTES
Head: Normocephalic and atraumatic. Nose: Nose normal. No congestion. Mouth/Throat:      Mouth: Mucous membranes are moist.   Eyes:      Extraocular Movements: Extraocular movements intact. Pupils: Pupils are equal, round, and reactive to light. Neck:      Musculoskeletal: Normal range of motion and neck supple. Cardiovascular:      Rate and Rhythm: Tachycardia present. Rhythm irregular. Heart sounds: No murmur. Comments: Equal radial pulses  Pulmonary:      Effort: Pulmonary effort is normal.      Breath sounds: Normal breath sounds. No wheezing, rhonchi or rales. Abdominal:      General: There is no distension. Palpations: Abdomen is soft. Tenderness: There is no abdominal tenderness. Musculoskeletal: Normal range of motion. General: No deformity. Skin:     General: Skin is warm. Findings: No rash. Neurological:      Mental Status: He is alert and oriented to person, place, and time. Motor: No abnormal muscle tone. Coordination: Coordination normal.   Psychiatric:         Behavior: Behavior normal.           ED Course    ED Medication Orders (From admission, onward)    Start Ordered     Status Ordering Provider    05/19/20 1100 05/19/20 1059  cefTRIAXone (ROCEPHIN) 1 g IVPB in 50 mL D5W minibag  ONCE      Last MAR action:  Stopped - by Radha Thomas on 05/19/20 at 1242 ARTUR BRAXTON    05/19/20 1100 05/19/20 1059  azithromycin (ZITHROMAX) 500 mg in D5W 250ml addavial  ONCE      Last MAR action:  Stopped - by Tc Nixon on 05/19/20 at 1342 ARTUR BRAXTON          EKG  SVT versus sinus tachycardia rate 130, normal axis, QTC prolonged at 511 otherwise normal intervals no diagnostic ischemic changes noted. Radiology  No results found.       Labs  Results for orders placed or performed during the hospital encounter of 05/19/20   Strep Pneumoniae Antigen   Result Value Ref Range    STREP PNEUMONIAE ANTIGEN, URINE 40 - 129 U/L    ALT 18 10 - 40 U/L    AST 21 15 - 37 U/L    Globulin 3.1 g/dL   Troponin   Result Value Ref Range    Troponin <0.01 <0.01 ng/mL   Brain Natriuretic Peptide   Result Value Ref Range    Pro-BNP 2,694 (H) 0 - 124 pg/mL   Protime-INR   Result Value Ref Range    Protime 10.7 10.0 - 13.2 sec    INR 0.92 0.86 - 1.14   D-dimer, quantitative   Result Value Ref Range    D-Dimer, Quant <200 0 - 229 ng/mL DDU   Procalcitonin   Result Value Ref Range    Procalcitonin 0.11 0.00 - 0.15 ng/mL   COVID-19   Result Value Ref Range    SARS-CoV-2, PCR Not Detected Not Detected   TSH without Reflex   Result Value Ref Range    TSH 2.90 0.27 - 4.20 uIU/mL   CBC Auto Differential   Result Value Ref Range    WBC 6.5 4.0 - 11.0 K/uL    RBC 4.44 4.20 - 5.90 M/uL    Hemoglobin 13.3 (L) 13.5 - 17.5 g/dL    Hematocrit 40.2 (L) 40.5 - 52.5 %    MCV 90.6 80.0 - 100.0 fL    MCH 30.1 26.0 - 34.0 pg    MCHC 33.2 31.0 - 36.0 g/dL    RDW 15.1 12.4 - 15.4 %    Platelets 801 226 - 956 K/uL    MPV 7.7 5.0 - 10.5 fL    Neutrophils % 57.9 %    Lymphocytes % 20.4 %    Monocytes % 16.1 %    Eosinophils % 4.6 %    Basophils % 1.0 %    Neutrophils Absolute 3.8 1.7 - 7.7 K/uL    Lymphocytes Absolute 1.3 1.0 - 5.1 K/uL    Monocytes Absolute 1.1 0.0 - 1.3 K/uL    Eosinophils Absolute 0.3 0.0 - 0.6 K/uL    Basophils Absolute 0.1 0.0 - 0.2 K/uL   Comprehensive Metabolic Panel w/ Reflex to MG   Result Value Ref Range    Sodium 135 (L) 136 - 145 mmol/L    Potassium reflex Magnesium 3.8 3.5 - 5.1 mmol/L    Chloride 101 99 - 110 mmol/L    CO2 26 21 - 32 mmol/L    Anion Gap 8 3 - 16    Glucose 101 (H) 70 - 99 mg/dL    BUN 26 (H) 7 - 20 mg/dL    CREATININE 1.1 0.8 - 1.3 mg/dL    GFR Non-African American >60 >60    GFR African American >60 >60    Calcium 9.2 8.3 - 10.6 mg/dL    Total Protein 6.8 6.4 - 8.2 g/dL    Alb 3.8 3.4 - 5.0 g/dL    Albumin/Globulin Ratio 1.3 1.1 - 2.2    Total Bilirubin <0.2 0.0 - 1.0 mg/dL    Alkaline Phosphatase 80 40 - 129 U/L sec   Fibrinogen   Result Value Ref Range    Fibrinogen 394 200 - 397 mg/dL   D-Dimer, Quantitative   Result Value Ref Range    D-Dimer, Quant <200 0 - 229 ng/mL DDU   EKG 12 Lead   Result Value Ref Range    Ventricular Rate 141 BPM    Atrial Rate 133 BPM    QRS Duration 78 ms    Q-T Interval 334 ms    QTc Calculation (Bazett) 511 ms    R Axis 32 degrees    T Axis 81 degrees    Diagnosis       Supraventricular tachycardia Possible atrial tachycardia. Nonspecific ST and T wave abnormalityAbnormal ECGWhen compared with ECG of 01-APR-2020 08:51,Vent. rate has increased BY  75 BPMST elevation now present in Inferior leadsNonspecific T wave abnormality now evident in Lateral leadsConfirmed by Alex Carmona MD, [employer] (1986) on 5/19/2020 1:44:55 PM   EKG 12 Lead   Result Value Ref Range    Ventricular Rate 102 BPM    Atrial Rate 96 BPM    QRS Duration 82 ms    Q-T Interval 332 ms    QTc Calculation (Bazett) 432 ms    R Axis 21 degrees    T Axis 17 degrees    Diagnosis       Atrial flutterAbnormal ECGWhen compared with ECG of 19-MAY-2020 09:25, atrial flutter is now presenthas replaced Supraventricular tachycardiaST no longer elevated in Inferior leadsConfirmed by Alex Carmona MD, [employer] (1986) on 5/19/2020 8:38:44 PM   EKG 12 Lead   Result Value Ref Range    Ventricular Rate 63 BPM    Atrial Rate 63 BPM    P-R Interval 180 ms    QRS Duration 84 ms    Q-T Interval 430 ms    QTc Calculation (Bazett) 440 ms    P Axis 68 degrees    R Axis 28 degrees    T Axis 57 degrees    Diagnosis       Sinus rhythm with occasional Premature ventricular complexesOtherwise normal ECGWhen compared with ECG of 19-MAY-2020 09:40,Sinus rhythm has replaced Atrial flutterVent. rate has decreased BY  39 BPMT wave amplitude has increased in Anterior leadsConfirmed by Alex Carmona MD, [employer] (1986) on 5/21/2020 4:16:46 PM         MDM  66-year-old male presents with palpitations shortness of breath exertional dyspnea. On arrival was found to be tachycardic in the 130s. Otherwise maintaining blood pressure, other vitals within normal limits. EKG showing SVT versus sinus tach. Patient appears to be changing rhythms every few minutes going in between A. fib to a flutter to paroxysmal supraventricular tachycardia. I did consider rate control medication for this patient however after settling in his room patient for the most part has a rate in the 70s. He will intermittently increase and become tachycardic but will come back down to the 70s so I feel the risk outweighs the benefit at this point. He has no troponin leak. Unclear cause of symptoms. He does state that he had a dry cough and has nonspecific right lower lobe findings on chest x-ray. Possible pneumonia versus viral illness. Will treat with antibiotics initially. Patient denies any known sick contacts or known exposures to coronavirus however may be possibility. In any case we will plan to admit for further management. Discussed with hospitalist who agrees to manage further care    Clinical Impression:  1. Atrial arrhythmia    2. Palpitations    3. Dyspnea, unspecified type          Disposition:  Admit to telemetry in stable condition. Blood pressure 118/77, pulse 65, temperature 97.9 °F (36.6 °C), temperature source Oral, resp. rate 18, height 5' 9\" (1.753 m), weight 188 lb 6.4 oz (85.5 kg), SpO2 100 %. Patient was given scripts for the following medications. I counseled patient how to take these medications. Discharge Medication List as of 5/21/2020  1:30 PM          Disposition referral (if applicable):  Patrick Castellanos MD  [address] [place] [address]  [phone]              Total critical care time is 20 minutes, which excludes separately billable procedures and updating family.  Time spent is specifically for management of the presenting complaint and symptoms initially, direct bedside care, reevaluation, review of records, and consultation. There was a high probability of clinically significant life-threatening deterioration in the patient's condition, which required my urgent intervention. This chart was generated in part by using Dragon Dictation system and may contain errors related to that system including errors in grammar, punctuation, and spelling, as well as words and phrases that may be inappropriate. If there are any questions or concerns please feel free to contact the dictating provider for clarification.      MD Neli Beth MD  05/22/20 5933

## 2020-05-19 NOTE — ED NOTES
Writer called [address] for COVID-19 test approval. Approval was granted by [facility] at this time.      Jose D Carson RN  05/19/20 7843

## 2020-05-19 NOTE — CARE COORDINATION
CASE MANAGEMENT INITIAL ASSESSMENT      Reviewed chart and completed assessment via telephone with: Patient   Explained Case Management role/services. Primary contact information: PT wife is Gail Vyas [phone]    Admit date/status: ED  Diagnosis: SOB  Is this a Readmission?:no    Insurance:  [place] [employer]  Precert required for SNF - Y        3 night stay required - N    Living arrangements, Adls, care needs, prior to admission: Pt reported that he lives with his wife and was IPTA    Transportation: self/ family    Durable Medical Equipment at home: none    Services in the home and/or outpatient, prior to admission: none    Dialysis Facility (if applicable) none  · Name:  · Address:  · Dialysis Schedule:  · Phone:  · Fax:    PT/OT recs: none    Hospital Exemption Notification (HEN): if SNF is needed    Barriers to discharge: none    Plan/comments: Pt reported that he plans on returning home with his wife at [place] with no DCP needs. Please advise if Needs arise.   JOSEPH Metz      [facility] on chart for MD signature

## 2020-05-19 NOTE — ACP (ADVANCE CARE PLANNING)
Advance Care Planning   Advance Care Planning Clinical Specialist  Conversation Note      Date of ACP Conversation: 5/19/2020    Conversation Conducted with:   Patient with [address]: Named in Advance Directive or Healthcare Power of Royal Coyle wife    ACP Clinical Specialist: JOSEPH Gordillo      *When Decision Maker makes decisions on behalf of the incapacitated patient: Decision Maker is asked to consider and make decisions based on patient values, known preferences, or best interests. Current Designated Health Care Decision Maker:   Primary Decision Maker: Belgica Plush - [phone]    Secondary Decision Maker: Miriam Phoenix - Child - [phone]    Supplemental (Other) Decision Maker: Sandra Francoiser - Child - [phone]  SW was able to verify the above information with the Pt via phone. Hospitalization  If your health were to worsen and it became clear that your chance of recovery was unlikely, what would your preferences be regarding hospitalization?:    Choice:  [x]  The patient would want hospitalization  []  The patient would prefer comfort-focused treatment without hospitalization. Ventilation  If you were in your present state of health and suddenly became very ill and were unable to breathe on your own, what would your preference be about the use of a ventilator (breathing machine) if it were available to you? If patient would desire the use of a ventilator (breathing machine), answer \"yes\", if not \"no\":yes    If your health were to worsen and it became clear that your chance of recovery was unlikely, would that change your answer? Yes    Resuscitation  CPR works best to restart the heart when there is a sudden event, like a heart attack, in someone who is otherwise healthy. Unfortunately, CPR does not typically restart the heart for people who have serious health conditions or who are very sick.     In the event your heart stopped, would you want attempts to restart your heart (answer \"yes\") or would you prefer a natural death (answer \"no\")? yes    If your health were to worsen and it became clear that your chance of recovery was unlikely, would that change your answer? Yes    [x] Yes  [] No   Educated Patient / Lisa Robles regarding differences between Advance Directives and portable DNR orders. Length of ACP Conversation in minutes:  15 minutes    Conversation Outcomes:  [x] ACP discussion completed  [] Existing advance directive reviewed with patient; no changes to patient's previously recorded wishes   [] New Advance Directive completed   [] Portable Do Not Rescitate prepared for Provider review and signature  [] POLST/POST/MOLST/MOST prepared for Provider review and signature      Follow-up plan:    SW spoke with the Pt via phone. Pt reported that he does have Advanced Directives in place. SW asked if he could have his family e-mail the directives so that they can be placed in the Pt chart. Pt agreed to have them eamil the information. No further ACP follow up needed.   JOSEPH Krause

## 2020-05-19 NOTE — CONSULTS
INPATIENT PULMONARY CRITICAL CARE CONSULT NOTE      Chief Complaint/Referring Provider:  Patient is being seen at the request of Dr. Wander Gallardo  for a consultation for Pulmonary nodule      Presenting HPI: Patient came to the ER because of persisting palpitations and shortness of breath    As per the admitting provider-70 y.o. male who presented to [facility] with shortness of breath accompanied by palpitations and shortness of breath with exertion that started early in the morning. Had atrial fibrillation in the past, for which he underwent cardioversion and ablation. Currently on antiarrhythmics. Earlier in the morning patient developed palpitations severe enough that made him concerned for another episode of atrial fibrillation and patient came to the emergency room. Accompanied by shortness of breath, worse with exertion but also apparent at rest.  No chest pain. Nothing else that makes the patient feel better or worse  No other accompanying symptoms  Had similar episodes in the past but shortness of breath was not as much.   Comfortable at the time of this evaluation    Patient on questioning states that he has not been feeling well since Sunday, patient states that he has been having palpitations and patient states that he has history of atrial fibrillation in the past for which he takes medications and he knows how it feels and patient states that it was feeling similar to when he had previous episodes along with that patient states that he was having some shortness of breath along with that patient also has had some dizziness, patient did not have any history of headaches or blurring of vision or passing out, patient did not have any significant rhinorrhea or nasal congestion, patient did not have any epistaxis or hemoptysis, patient was not having any otalgia or ear discharge, patient does not have any difficulty in swallowing, no coughing or choking while eating, patient did not have any fever or chills, patient states that he has had some nonspecific abdominal discomfort along with that patient states since this morning he has been having diarrhea, patient does not have any hematochezia or any melena, patient does not have any dysuria, patient does not have any increasing leg edema, patient on questioning further states that the only thing he did differently was that he stripped the tiles on Saturday, 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, patient did not have any exposure to any chemicals or fumes, patient has not been a smoker, patient does not have any change in the ambient environment or any mold exposures, patient is not having any loss of weight, patient does not have any confusion lethargy, patient has H/O BASSAM and was given CPAP patient could not tolerate that and patient gave it back to the DME company and has not been using it for long time, patient does not have any confusion lethargy, no other pertinent review of system of concern       Patient Active Problem List    Diagnosis Date Noted    Atrial fibrillation with rapid ventricular response ([address] 05/19/2020    Coronary artery calcification seen on CT scan 05/19/2020    HH (hiatus hernia) 05/19/2020    Prostate enlargement 05/19/2020    Splenic artery aneurysm ([address] 05/19/2020    Calculus of gallbladder without cholecystitis without obstruction 05/19/2020    Diverticulosis of colon without diverticulitis 05/19/2020    Periodic limb movement sleep disorder 05/19/2020    Pulmonary infiltrate 05/19/2020    Hypothyroidism due to acquired atrophy of thyroid 05/19/2020    KEVEN (acute kidney injury) ([address] 05/19/2020    Hypercalcemia 02/13/2020    Paroxysmal atrial fibrillation ([address] 11/19/2019    Presence of Watchman left atrial appendage closure device 11/18/2019    Anemia 09/12/2019    Chronic systolic heart failure ([address] 09/12/2019    Moderate obstructive sleep apnea 09/12/2019    Cardiomyopathy Soft. Bowel sounds present. No distension or hernia. No tenderness. Musculoskeletal: No cyanosis. No clubbing. No obvious joint deformity. Lymphadenopathy: No cervical or supraclavicular adenopathy. Skin: Skin is warm and dry. No rash or nodules on the exposed extremities. Psychiatric: Normal mood and affect. Behavior is normal.  No anxiety. Neurologic: Alert, awake and oriented. PERRL. Speech fluent        Results:  CBC:   Recent Labs     05/19/20  0935   WBC 9.0   HGB 13.8   HCT 41.1   MCV 89.7        BMP:   Recent Labs     05/19/20  0935   *   K 4.2   CL 97*   CO2 26   BUN 27*   CREATININE 1.3     LIVER PROFILE:   Recent Labs     05/19/20  0935   AST 21   ALT 18   BILITOT 0.3   ALKPHOS 86     PT/INR:   Recent Labs     05/19/20  0935   PROTIME 10.7   INR 0.92       Imaging:  I have reviewed radiology images personally. XR CHEST PORTABLE   Final Result   New nodular opacity in the right lower lobe. Focal pneumonia/pneumonitis   versus pulmonary nodule. No findings of congestive failure. IMPRESSION:   Nonemergent follow-up CT chest is recommended. Xr Chest Portable    Result Date: 5/19/2020  EXAMINATION: ONE XRAY VIEW OF THE CHEST 5/19/2020 9:50 am COMPARISON: CT chest, 03/11/2020 HISTORY: ORDERING SYSTEM PROVIDED HISTORY: afib TECHNOLOGIST PROVIDED HISTORY: Reason for exam:->afib Reason for Exam: afib Acuity: Acute Type of Exam: Initial FINDINGS: The cardiac silhouette, mediastinal hilar contours are normal.  There is an irregularly shaped, focal opacity measuring approximately 1.2 cm in the right lung base. Lungs are otherwise clear. New nodular opacity in the right lower lobe. Focal pneumonia/pneumonitis versus pulmonary nodule. No findings of congestive failure. IMPRESSION: Nonemergent follow-up CT chest is recommended. Results for Maykel Bell (MRN [id number]) as of 5/19/2020 15:52   Ref.  Range 2/24/2020 10:28 3/11/2020 14:25 4/1/2020 06:47 Latest Ref Range: 3 - 16  14 13 12 10   GFR Non- Latest Ref Range: >60  >60 >60 >60 54 (A)   GFR  Latest Ref Range: >60  >60 >60 >60 >60   Glucose Latest Ref Range: 70 - 99 mg/dL 103 (H) 82 100 (H) 110 (H)   Calcium Latest Ref Range: 8.3 - 10.6 mg/dL 11.1 (H) 10.1 9.5 10.0   Phosphorus Latest Ref Range: 2.5 - 4.9 mg/dL 3.1      Total Protein Latest Ref Range: 6.4 - 8.2 g/dL 6.6 7.0 6.7 7.3   Procalcitonin Latest Ref Range: 0.00 - 0.15 ng/mL    0.11     Results for Laura Coley (MRN [id number]) as of 5/19/2020 15:52   Ref. Range 12/18/2019 10:30 4/1/2020 06:47 5/19/2020 09:35   WBC Latest Ref Range: 4.0 - 11.0 K/uL 6.2 5.9 9.0   RBC Latest Ref Range: 4.20 - 5.90 M/uL 4.10 (L) 3.83 (L) 4.59   Hemoglobin Quant Latest Ref Range: 13.5 - 17.5 g/dL 12.4 (L) 11.8 (L) 13.8   Hematocrit Latest Ref Range: 40.5 - 52.5 % 37.3 (L) 35.2 (L) 41.1   MCV Latest Ref Range: 80.0 - 100.0 fL 91.0 91.8 89.7   MCH Latest Ref Range: 26.0 - 34.0 pg 30.3 30.7 30.0   MCHC Latest Ref Range: 31.0 - 36.0 g/dL 33.3 33.4 33.5   MPV Latest Ref Range: 5.0 - 10.5 fL 7.6 7.5 7.9   RDW Latest Ref Range: 12.4 - 15.4 % 14.4 14.9 14.8   Platelet Count Latest Ref Range: 135 - 450 K/uL 329 307 362   Neutrophils % Latest Units: %   67.8   Lymphocyte % Latest Units: %   12.8     Results for Laura Coley (MRN [id number]) as of 5/19/2020 15:52   Ref. Range 10/24/2019 08:52 2/24/2020 10:28 3/11/2020 14:25   TSH Latest Ref Range: 0.27 - 4.20 uIU/mL 23.29 (H) 5.77 (H) 4.88 (H)       Echocardiogram: December 2019- Conclusions      Summary   Ejection fraction is visually estimated to be 55-60 %. Mild mitral and tricuspid regurgitation. Watchman device is well seated within the left atrial appendage. No leak   around the device   Mild to moderate aortic regurgitation.     PFT: None in the epic    Patient had a sleep study done in September of last year and patient was found to have a moderate BASSAM with AHI of 23.4/h along with at this time  · Cardiology consult has been requested to evaluate if patient needs any agent for rate control  · Cardiology also needs to decide if patient needs any anticoagulation in the long run  · Patient appears to be intravascular depleted and has acute kidney injury and gentle hydration is being given to the patient  · Monitor for any fluid overload  · Covid 19 testing has been sent by ER -results to be followed   · Monitor input output and BMP  · Correct electrolytes on whenever necessary basis  · PUD and DVT prophylaxis as per IM    Case d/w patient and nursing           Electronically signed by:  Agnieszka Miles MD    5/19/2020    4:08 PM.

## 2020-05-19 NOTE — CONSULTS
Electrophysiology Consultation   Date: 5/19/2020  Admit Date:  5/19/2020  Reason for Consultation: Atrial fibrillation  Consult Requesting Physician: Yosvany Snow MD     Chief Complaint   Patient presents with    Other     pt had recent ablasion. . starting sunday pt was short of breath. . called cardio and they thought it may be his afib acting up again    Shortness of Breath    Fatigue     denigns chest pain     HPI:   Mr. Mar Albarado is a pleasant 41-year-old male with a medical history significant for persistent atrial fibrillation status post ablation with Dr. Josh Iyer, hypertension, diabetes mellitus type 2, and nonischemic cardiomyopathy with recovered ejection fraction who presents from home with symptomatic atrial fibrillation with RVR. According to patient he was in his usual state of health until Sunday when he began to suffer from palpitations consistent with his atrial fibrillation. 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 He was hopeful that his new medication, dronedarone, would help get him back in normal rhythm and keep his heart rate controlled however his symptoms persisted. He recheck to my office to discuss options however we miss each other on the phone. He called today to follow-up and see what the next x-rays could possibly be hoping to undergo another cardioversion. He was directed to the emergency room for further evaluation and care. Of note, patient denies fevers, chest pain, orthopnea, PND, lower extremity edema, abdominal swelling, visual changes, headaches, sore throat, cough, abdominal pain, nausea, vomiting, bleeding, bruising, dysuria, muscle/joint pain, confusion, depression, anxiety, skin lesions, etc. he does report having some bloody stools.     ER/Hospital Course:  Patient was evaluated in the emergency room. Because of shortness of breath there is concern for COVID-19. His chest radiograph was concerning for new nodular opacity in his right lower lobe concerning for focal pneumonia/pneumonitis versus pulmonary nodule. Nonemergent CT scan was recommended at that time. His CMP was reassuring. His troponins were reassuring. His CBC was reassuring. His EKG was distant with atrial fibrillation with RVR. He was started on broad-spectrum antibiotics for community-acquired pneumonia (he may need hospital-acquired therapy given his recent cardioversion). Electrophysiology was consulted to further assist patient is management. Current rhythm: Atrial fibrillation with RVR. Known history of atrial fibrillation: yes -previous cardioversion and ablation with Dr. Kris Shultz. Valvular disease: No  Associated symptoms: palpitations and shortness of breath  Heart rate is not controlled  Previous cardioversion and/or ablation: yes  History of CAD: No  History of ETOH abuse/drug abuse: No  History of thyroid disease: No  Elevated BNP: No  Left atrial size is Normal    Past Medical History:   Diagnosis Date    Arthritis     Atrial fibrillation ([address] 2007    S/P CARDIOVERSION, EF 49% on stress test 2009    Hyperlipidemia     Hypertension     SCC (squamous cell carcinoma)     Dr. Juanita Weiss Sleep apnea     Systolic heart failure [facility])         Past Surgical History:   Procedure Laterality Date    ABLATION OF DYSRHYTHMIC FOCUS      Afib PVI etc / right sided flutter line    CARDIOVERSION  2005    COLONOSCOPY  [id number]    tics    TOTAL HIP ARTHROPLASTY Left 2013       No Known Allergies    Social History:  Reviewed. reports that he has never smoked. He has never used smokeless tobacco. He reports that he does not drink alcohol or use drugs. Family History:  Reviewed. family history includes Cancer in his mother; Heart Disease (age of onset: 79) in his father; High Blood Pressure in his mother. No premature CAD. Review of System:  All other systems reviewed except for that noted above. Pertinent negatives and positives are:     · General: negative for fever, chills   · Ophthalmic ROS: negative for - eye pain or loss of vision  · ENT ROS: negative for - headaches, sore throat   · Respiratory: negative for - cough, sputum  · Cardiovascular: Reviewed in HPI  · Gastrointestinal: negative for - abdominal pain, diarrhea, N/V  · Hematology: negative for - bleeding, blood clots, bruising or jaundice  · Genito-Urinary:  negative for - Dysuria or incontinence  · Musculoskeletal: negative for - Joint swelling, muscle pain  · Neurological: negative for - confusion, dizziness, headaches   · Psychiatric: No anxiety, no depression. · Dermatological: negative for - rash    Physical Examination:  Vitals:    20 1350   BP: 107/82   Pulse: 76   Resp: 16   Temp: 97.5 °F (36.4 °C)   SpO2: 100%        Intake/Output Summary (Last 24 hours) at 2020 1645  Last data filed at 2020 1400  Gross per 24 hour   Intake 250 ml   Output --   Net 250 ml     In: 250   Out: -    Wt Readings from Last 3 Encounters:   20 185 lb (83.9 kg)   20 188 lb 12.8 oz (85.6 kg)   20 189 lb (85.7 kg)     Temp  Av.4 °F (36.3 °C)  Min: 97.2 °F (36.2 °C)  Max: 97.6 °F (36.4 °C)  Pulse  Av  Min: 75  Max: 145  BP  Min: 88/63  Max: 113/85  SpO2  Av.3 %  Min: 98 %  Max: 100 %    · Telemetry: Atrial fibrillation with RVR. · Constitutional: Alert. Oriented to person, place, and time. No distress. · Head: Normocephalic and atraumatic. · Mouth/Throat: Lips appear moist. Oropharynx is clear and moist.  · Cardiovascular: Irregular rate and rhythm without murmurs/gallops/regurgitation  · Pulmonary/Chest: Bilateral respiratory sounds present. No respiratory accessory muscle use. · Abdominal: Soft. Normal bowel sounds present. No distension, No tenderness. · Musculoskeletal: No tenderness.   Trace bilateral lower extremity edema. · Neurological: Alert and oriented. Cranial nerve II-XII grossly intact, No gross deficit to touch. · Skin: Skin is warm and dry. No rash, lesions, ulcerations noted. · Psychiatric: No anxiety nor agitation. Labs:  Reviewed. Recent Labs     05/19/20  0935   *   K 4.2   CL 97*   CO2 26   BUN 27*   CREATININE 1.3     Recent Labs     05/19/20  0935   WBC 9.0   HGB 13.8   HCT 41.1   MCV 89.7        Lab Results   Component Value Date    TROPONINI <0.01 05/19/2020     No results found for: BNP  Lab Results   Component Value Date    PROTIME 10.7 05/19/2020    PROTIME 10.6 04/01/2020    PROTIME 15.0 12/18/2019    INR 0.92 05/19/2020    INR 0.91 04/01/2020    INR 1.29 12/18/2019     Lab Results   Component Value Date    CHOL 194 12/11/2017    HDL 63 12/11/2017    HDL 61 10/21/2011    TRIG 93 12/11/2017       Diagnostic and imaging results reviewed. ECG: Atrial tachycardia with RVR. Echo: 11/19/2019   Summary   This is a limited echo to assess for pericardial effusion following Watchman   Device implantation. Overall left ventricular function is normal. Ejection fraction is visually   estimated to be 55-60%. No significant pericardial effusion    I independently reviewed the ECG and telemetry.     Scheduled Meds:   aspirin  81 mg Oral Daily    clopidogrel  75 mg Oral Daily    dronedarone hcl  400 mg Oral BID WC    lisinopril  10 mg Oral Daily    metoprolol succinate  50 mg Oral Daily    [START ON 5/20/2020] pantoprazole  40 mg Oral QAM AC    pravastatin  80 mg Oral Daily    sodium chloride flush  10 mL Intravenous 2 times per day    enoxaparin  30 mg Subcutaneous BID    levothyroxine  75 mcg Oral Daily     Continuous Infusions:   sodium chloride 50 mL/hr at 05/19/20 1409     PRN Meds:.sodium chloride flush, acetaminophen **OR** acetaminophen, polyethylene glycol, potassium chloride **OR** potassium alternative oral replacement **OR** potassium chloride, magnesium sulfate, prochlorperazine     Assessment:   Patient Active Problem List    Diagnosis Date Noted    Atrial fibrillation with rapid ventricular response ([address] 05/19/2020    Coronary artery calcification seen on CT scan 05/19/2020    HH (hiatus hernia) 05/19/2020    Prostate enlargement 05/19/2020    Splenic artery aneurysm ([address] 05/19/2020    Calculus of gallbladder without cholecystitis without obstruction 05/19/2020    Diverticulosis of colon without diverticulitis 05/19/2020    Periodic limb movement sleep disorder 05/19/2020    Pulmonary infiltrate 05/19/2020    Hypothyroidism due to acquired atrophy of thyroid 05/19/2020    KEVEN (acute kidney injury) ([address] 05/19/2020    Hypercalcemia 02/13/2020    Paroxysmal atrial fibrillation ([address] 11/19/2019    Presence of Watchman left atrial appendage closure device 11/18/2019    Anemia 09/12/2019    Chronic systolic heart failure ([address] 09/12/2019    Moderate obstructive sleep apnea 09/12/2019    Cardiomyopathy ([address] 08/08/2019    Persistent atrial fibrillation 07/23/2019    Pre-diabetes 11/26/2018    History of joint swelling 10/04/2018    Bilateral shoulder tendinopathy 03/14/2016    Primary osteoarthritis of both shoulders 03/14/2016    Squamous cell carcinoma of skin 11/07/2011    HTN (hypertension) 10/13/2010    Hyperlipidemia 08/17/2010      Active Hospital Problems    Diagnosis Date Noted    Atrial fibrillation with rapid ventricular response ([address] [I48.91] 05/19/2020    Coronary artery calcification seen on CT scan [I25.10] 05/19/2020    HH (hiatus hernia) [K44.9] 05/19/2020    Prostate enlargement [N40.0] 05/19/2020    Splenic artery aneurysm ([facility]) [I72.8] 05/19/2020    Calculus of gallbladder without cholecystitis without obstruction [K80.20] 05/19/2020    Diverticulosis of colon without diverticulitis [K57.30] 05/19/2020    Periodic limb movement sleep disorder [G47.61] 05/19/2020    Pulmonary infiltrate [R91.8] lisinopril for more blood pressure room. - Increase metoprolol to succinate to 75 mg BID. - We continue following with you. 2.  Pneumonia. - Per primary service. - Agree with COVID19 testing. 3.  Hypertension.  - Plan as per above. 4.  Diabetes mellitus type 2.  - Per primary team.    5.  Nonischemic cardiomyopathy with recovered ejection fraction.  - Continue to monitor clinically. Thank you for allowing me to participate in the care of Teddy Mota . If you have any questions/comments, please do not hesitate to contact us.     Kyler Florence MD  Cardiac Electrophysiology  [address] [facility]  [phone] [facility]

## 2020-05-20 LAB
A/G RATIO: 1.4 (ref 1.1–2.2)
ALBUMIN SERPL-MCNC: 3.6 G/DL (ref 3.4–5)
ALP BLD-CCNC: 72 U/L (ref 40–129)
ALT SERPL-CCNC: 17 U/L (ref 10–40)
ANION GAP SERPL CALCULATED.3IONS-SCNC: 6 MMOL/L (ref 3–16)
APTT: 31.6 SEC (ref 24.2–36.2)
AST SERPL-CCNC: 19 U/L (ref 15–37)
BASOPHILS ABSOLUTE: 0.1 K/UL (ref 0–0.2)
BASOPHILS RELATIVE PERCENT: 0.9 %
BILIRUB SERPL-MCNC: <0.2 MG/DL (ref 0–1)
BUN BLDV-MCNC: 21 MG/DL (ref 7–20)
CALCIUM SERPL-MCNC: 9.1 MG/DL (ref 8.3–10.6)
CHLORIDE BLD-SCNC: 106 MMOL/L (ref 99–110)
CO2: 27 MMOL/L (ref 21–32)
CREAT SERPL-MCNC: 1.1 MG/DL (ref 0.8–1.3)
D DIMER: 221 NG/ML DDU (ref 0–229)
EOSINOPHILS ABSOLUTE: 0.4 K/UL (ref 0–0.6)
EOSINOPHILS RELATIVE PERCENT: 6 %
FIBRINOGEN: 416 MG/DL (ref 200–397)
GFR AFRICAN AMERICAN: >60
GFR NON-AFRICAN AMERICAN: >60
GLOBULIN: 2.6 G/DL
GLUCOSE BLD-MCNC: 102 MG/DL (ref 70–99)
HCT VFR BLD CALC: 41 % (ref 40.5–52.5)
HEMOGLOBIN: 13.1 G/DL (ref 13.5–17.5)
INR BLD: 0.9 (ref 0.86–1.14)
L. PNEUMOPHILA SEROGP 1 UR AG: NORMAL
LYMPHOCYTES ABSOLUTE: 1.2 K/UL (ref 1–5.1)
LYMPHOCYTES RELATIVE PERCENT: 19.3 %
MCH RBC QN AUTO: 30.7 PG (ref 26–34)
MCHC RBC AUTO-ENTMCNC: 31.9 G/DL (ref 31–36)
MCV RBC AUTO: 96.4 FL (ref 80–100)
MONOCYTES ABSOLUTE: 1 K/UL (ref 0–1.3)
MONOCYTES RELATIVE PERCENT: 16 %
NEUTROPHILS ABSOLUTE: 3.7 K/UL (ref 1.7–7.7)
NEUTROPHILS RELATIVE PERCENT: 57.8 %
PDW BLD-RTO: 15.8 % (ref 12.4–15.4)
PLATELET # BLD: 309 K/UL (ref 135–450)
PMV BLD AUTO: 7.6 FL (ref 5–10.5)
POTASSIUM REFLEX MAGNESIUM: 4 MMOL/L (ref 3.5–5.1)
PROTHROMBIN TIME: 10.4 SEC (ref 10–13.2)
RBC # BLD: 4.25 M/UL (ref 4.2–5.9)
REASON FOR REJECTION: NORMAL
REJECTED TEST: NORMAL
SARS-COV-2, PCR: NOT DETECTED
SODIUM BLD-SCNC: 139 MMOL/L (ref 136–145)
STREP PNEUMONIAE ANTIGEN, URINE: NORMAL
TOTAL PROTEIN: 6.2 G/DL (ref 6.4–8.2)
WBC # BLD: 6.3 K/UL (ref 4–11)

## 2020-05-20 PROCEDURE — 99232 SBSQ HOSP IP/OBS MODERATE 35: CPT | Performed by: INTERNAL MEDICINE

## 2020-05-20 PROCEDURE — 2060000000 HC ICU INTERMEDIATE R&B

## 2020-05-20 PROCEDURE — 85730 THROMBOPLASTIN TIME PARTIAL: CPT

## 2020-05-20 PROCEDURE — 2580000003 HC RX 258: Performed by: INTERNAL MEDICINE

## 2020-05-20 PROCEDURE — 85025 COMPLETE CBC W/AUTO DIFF WBC: CPT

## 2020-05-20 PROCEDURE — 99233 SBSQ HOSP IP/OBS HIGH 50: CPT | Performed by: NURSE PRACTITIONER

## 2020-05-20 PROCEDURE — 6370000000 HC RX 637 (ALT 250 FOR IP): Performed by: INTERNAL MEDICINE

## 2020-05-20 PROCEDURE — 80053 COMPREHEN METABOLIC PANEL: CPT

## 2020-05-20 PROCEDURE — 85610 PROTHROMBIN TIME: CPT

## 2020-05-20 PROCEDURE — 85379 FIBRIN DEGRADATION QUANT: CPT

## 2020-05-20 PROCEDURE — 36415 COLL VENOUS BLD VENIPUNCTURE: CPT

## 2020-05-20 PROCEDURE — 85384 FIBRINOGEN ACTIVITY: CPT

## 2020-05-20 RX ORDER — LATANOPROST 50 UG/ML
SOLUTION/ DROPS OPHTHALMIC
COMMUNITY
Start: 2020-04-14 | End: 2021-10-22

## 2020-05-20 RX ORDER — DORZOLAMIDE HYDROCHLORIDE AND TIMOLOL MALEATE 20; 5 MG/ML; MG/ML
SOLUTION/ DROPS OPHTHALMIC
COMMUNITY
Start: 2020-04-14

## 2020-05-20 RX ADMIN — DRONEDARONE 400 MG: 400 TABLET, FILM COATED ORAL at 16:00

## 2020-05-20 RX ADMIN — ASPIRIN 81 MG 81 MG: 81 TABLET ORAL at 09:08

## 2020-05-20 RX ADMIN — Medication 10 ML: at 20:22

## 2020-05-20 RX ADMIN — METOPROLOL SUCCINATE 75 MG: 50 TABLET, EXTENDED RELEASE ORAL at 09:07

## 2020-05-20 RX ADMIN — PANTOPRAZOLE SODIUM 40 MG: 40 TABLET, DELAYED RELEASE ORAL at 05:43

## 2020-05-20 RX ADMIN — DRONEDARONE 400 MG: 400 TABLET, FILM COATED ORAL at 09:06

## 2020-05-20 RX ADMIN — PRAVASTATIN SODIUM 80 MG: 40 TABLET ORAL at 09:07

## 2020-05-20 RX ADMIN — LISINOPRIL 5 MG: 5 TABLET ORAL at 09:07

## 2020-05-20 ASSESSMENT — PAIN SCALES - GENERAL
PAINLEVEL_OUTOF10: 0

## 2020-05-20 NOTE — PROGRESS NOTES
sounds present  Extremities:  ·  No cyanosis or clubbing  ·  No lower extremity edema  ·  Skin: warm and dry  Neurological:  · Alert and oriented  · Moves all extremities well  · No abnormalities of mood, affect, memory, mentation, or behavior are noted    Data  EKG 5/19/2020: Afib     LUIZA 12/18/2019:  Ejection fraction is visually estimated to be 55-60 %. Mild mitral and tricuspid regurgitation. Watchman device is well seated within the left atrial appendage. No leak around the device  Mild to moderate aortic regurgitation. All labs and testing reviewed. Lab Review     Renal Profile:   Lab Results   Component Value Date    CREATININE 1.1 05/20/2020    BUN 21 05/20/2020     05/20/2020    K 4.0 05/20/2020     05/20/2020    CO2 27 05/20/2020     CBC:    Lab Results   Component Value Date    WBC 6.3 05/20/2020    RBC 4.25 05/20/2020    HGB 13.1 05/20/2020    HCT 41.0 05/20/2020    MCV 96.4 05/20/2020    RDW 15.8 05/20/2020     05/20/2020     BNP:  No results found for: BNP  Fasting Lipid Panel:    Lab Results   Component Value Date    CHOL 194 12/11/2017    HDL 63 12/11/2017    HDL 61 10/21/2011    TRIG 93 12/11/2017     Cardiac Enzymes:  CK/MbTroponin  Lab Results   Component Value Date    TROPONINI <0.01 05/19/2020     PT/ INR   Lab Results   Component Value Date    INR 0.90 05/20/2020    INR 0.91 05/19/2020    INR 0.92 05/19/2020    PROTIME 10.4 05/20/2020    PROTIME 10.6 05/19/2020    PROTIME 10.7 05/19/2020     PTT No results found for: PTT   Lab Results   Component Value Date    MG 1.9 06/13/2013      Lab Results   Component Value Date    TSH 2.90 05/19/2020       Assessment:  1. Atrial fibrillation/AT of unknown duration (known PAF): ongoing   -currently in uncontrolled AF   -Multaq started 5/19/2020 (acquired hypothyroidism on amiodarone)   -s/p multiple CV then extensive RFCA for PAF 7/23/2019    -s/p CV 4/2020              -s/p Watchman implant 11/2019  2. HTN: controlled  3.

## 2020-05-20 NOTE — CARE COORDINATION
Chart reviewed, no new discharge needs noted. Covid19 negative. Please notify DCP if discharge needs arise.   ERIN Allen-JOSIANE

## 2020-05-20 NOTE — PROGRESS NOTES
INPATIENT PULMONARY CRITICAL CARE PROGRESS NOTE      Reason for visit    Pulmonary nodule        SUBJECTIVE: Patient continues to have atrial fibrillation with variable ventricular rate on the monitor, patient's Cardizem drip has been tapered to DC, patient's Lopressor was increased by EP, patient is afebrile and he medically maintained, patient is not hypoxemic and is on room air oxygen with saturation of 99%, patient does not have any chest pain or palpitations or shortness of breath, patient has had low urine output as documented in the epic overnight with cumulative fluid balance of -55 mL, patient's glycemic control was acceptable, no other pertinent review of system of concern      Physical Exam:  Blood pressure 119/80, pulse 112, temperature 98.1 °F (36.7 °C), temperature source Oral, resp. rate 13, height 5' 9\" (1.753 m), weight 185 lb (83.9 kg), SpO2 99 %.'     Constitutional:  No acute distress. HENT:  Oropharynx is clear and moist. No thyromegaly. Eyes:  Conjunctivae are normal. Pupils equal, round, and reactive to light. No scleral icterus. Neck: . No tracheal deviation present. No obvious thyroid mass. Cardiovascular: S1-S2 irregularly irregular, normal heart sounds. No right ventricular heave. No lower extremity edema. Pulmonary/Chest: No wheezes. No rales. Chest wall is not dull to percussion. No accessory muscle usage or stridor. Abdominal: Soft. Bowel sounds present. No distension or hernia. No tenderness. Musculoskeletal: No cyanosis. No clubbing. No obvious joint deformity. Lymphadenopathy: No cervical or supraclavicular adenopathy. Skin: Skin is warm and dry. No rash or nodules on the exposed extremities. Psychiatric: Normal mood and affect. Behavior is normal.  No anxiety. Neurologic: Alert, awake and oriented. PERRL.   Speech fluent         Results:  CBC:   Recent Labs     05/19/20  0935 05/19/20  1705 05/20/20  0554   WBC 9.0 6.5 6.3   HGB 13.8 13.3* 13.1*   HCT 41.1 40.2* 41.0   MCV 89.7 90.6 96.4    345 309     BMP:   Recent Labs     05/19/20  0935 05/19/20  1705 05/20/20  0554   * 135* 139   K 4.2 3.8 4.0   CL 97* 101 106   CO2 26 26 27   BUN 27* 26* 21*   CREATININE 1.3 1.1 1.1     LIVER PROFILE:   Recent Labs     05/19/20  0935 05/19/20  1705 05/20/20  0554   AST 21 20 19   ALT 18 17 17   BILITOT 0.3 <0.2 <0.2   ALKPHOS 86 80 72     PT/INR:   Recent Labs     05/19/20  0935 05/19/20  1705 05/20/20  0636   PROTIME 10.7 10.6 10.4   INR 0.92 0.91 0.90     APTT:   Recent Labs     05/19/20  1705 05/20/20  0636   APTT 31.5 31.6       Imaging:  I have reviewed radiology images personally. XR CHEST PORTABLE   Final Result   New nodular opacity in the right lower lobe. Focal pneumonia/pneumonitis   versus pulmonary nodule. No findings of congestive failure. IMPRESSION:   Nonemergent follow-up CT chest is recommended. Xr Chest Portable    Result Date: 5/19/2020  EXAMINATION: ONE XRAY VIEW OF THE CHEST 5/19/2020 9:50 am COMPARISON: CT chest, 03/11/2020 HISTORY: ORDERING SYSTEM PROVIDED HISTORY: afib TECHNOLOGIST PROVIDED HISTORY: Reason for exam:->afib Reason for Exam: afib Acuity: Acute Type of Exam: Initial FINDINGS: The cardiac silhouette, mediastinal hilar contours are normal.  There is an irregularly shaped, focal opacity measuring approximately 1.2 cm in the right lung base. Lungs are otherwise clear. New nodular opacity in the right lower lobe. Focal pneumonia/pneumonitis versus pulmonary nodule. No findings of congestive failure. IMPRESSION: Nonemergent follow-up CT chest is recommended. Results for Roddy Delatorre (MRN [id number]) as of 5/20/2020 14:38   Ref.  Range 3/11/2020 14:25 4/1/2020 06:47 5/19/2020 09:35 5/19/2020 17:05 5/20/2020 05:54   Sodium Latest Ref Range: 136 - 145 mmol/L 139 142 133 (L) 135 (L) 139   Potassium Latest Ref Range: 3.5 - 5.1 mmol/L 3.7 3.8 4.2 3.8 4.0   Chloride Latest Ref Range: 99 - 110 mmol/L 99 Presumptive Negat. .. LEGIONELLA ANTIGEN, URINE Unknown   Rpt       Results for Roni Verde (MRN [id number]) as of 5/20/2020 14:38   Ref. Range 10/24/2019 08:52 2/24/2020 10:28 3/11/2020 14:25 5/19/2020 17:05   TSH Latest Ref Range: 0.27 - 4.20 uIU/mL 23.29 (H) 5.77 (H) 4.88 (H) 2.90           Assessment:  Principal Problem:    Atrial fibrillation with rapid ventricular response (HCC)  Active Problems: Moderate obstructive sleep apnea    Prostate enlargement    Splenic artery aneurysm (HCC)    Periodic limb movement sleep disorder    Hypothyroidism due to acquired atrophy of thyroid    KEVEN (acute kidney injury) ([facility] [address]  Resolved Problems:    * No resolved hospital problems.  *          Plan:   · Oxygen supplementation, if required, to keep saturation being 90 to 94% only  · Pulmonary toilet  · Patient recently CT of the chest done 2 months back was reviewed and patient does not have any significant lung nodule or any mass of concern  · Patient pulmonary infiltrate seen on x-ray chest may be second to atelectasis or any pulmonary for a second to infective process and needs to be monitored  · Will repeat two-view x-ray tomorrow morning  · Patient got 1 dose of Rocephin and Zithromax in the ER  · Would not recommend any antibiotics from pulmonary standpoint of view at this time  · Patient does not need any bronchodilators or steroids from pulmonary standpoint of view  · TSH was reviewed   · Patient also has history of significant sleep apnea for which he was given CPAP but patient was not able to tolerate it and has not been able to use it which may also contributing to patient's atrial arrhythmias  · Patient is on Multaq,Lopressor and lisinopril at this time  · Cardiology consult has been requested to evaluate if patient needs any agent for rate control  · Cardiology also needs to decide if patient needs any anticoagulation in the long run  · Patient appears to be intravascular depleted and has acute kidney

## 2020-05-20 NOTE — PLAN OF CARE
Problem: Falls - Risk of:  Goal: Will remain free from falls  Description: Will remain free from falls  Outcome: Ongoing  Goal: Absence of physical injury  Description: Absence of physical injury  Outcome: Ongoing     Patient understands use of call light. Call light is within reach. Bed in lowest position. Non-skid footwear on feet.

## 2020-05-21 ENCOUNTER — APPOINTMENT (OUTPATIENT)
Dept: GENERAL RADIOLOGY | Age: 70
DRG: 309 | End: 2020-05-21
Payer: MEDICARE

## 2020-05-21 VITALS
OXYGEN SATURATION: 100 % | BODY MASS INDEX: 27.91 KG/M2 | SYSTOLIC BLOOD PRESSURE: 118 MMHG | HEART RATE: 65 BPM | WEIGHT: 188.4 LBS | RESPIRATION RATE: 18 BRPM | HEIGHT: 69 IN | TEMPERATURE: 97.9 F | DIASTOLIC BLOOD PRESSURE: 77 MMHG

## 2020-05-21 LAB
A/G RATIO: 1.4 (ref 1.1–2.2)
ALBUMIN SERPL-MCNC: 3.5 G/DL (ref 3.4–5)
ALP BLD-CCNC: 70 U/L (ref 40–129)
ALT SERPL-CCNC: 16 U/L (ref 10–40)
ANION GAP SERPL CALCULATED.3IONS-SCNC: 9 MMOL/L (ref 3–16)
APTT: 28.8 SEC (ref 24.2–36.2)
AST SERPL-CCNC: 18 U/L (ref 15–37)
BASOPHILS ABSOLUTE: 0.1 K/UL (ref 0–0.2)
BASOPHILS RELATIVE PERCENT: 1.2 %
BILIRUB SERPL-MCNC: <0.2 MG/DL (ref 0–1)
BUN BLDV-MCNC: 20 MG/DL (ref 7–20)
CALCIUM SERPL-MCNC: 9.2 MG/DL (ref 8.3–10.6)
CHLORIDE BLD-SCNC: 105 MMOL/L (ref 99–110)
CO2: 25 MMOL/L (ref 21–32)
CREAT SERPL-MCNC: 1 MG/DL (ref 0.8–1.3)
D DIMER: <200 NG/ML DDU (ref 0–229)
EKG ATRIAL RATE: 63 BPM
EKG DIAGNOSIS: NORMAL
EKG P AXIS: 68 DEGREES
EKG P-R INTERVAL: 180 MS
EKG Q-T INTERVAL: 430 MS
EKG QRS DURATION: 84 MS
EKG QTC CALCULATION (BAZETT): 440 MS
EKG R AXIS: 28 DEGREES
EKG T AXIS: 57 DEGREES
EKG VENTRICULAR RATE: 63 BPM
EOSINOPHILS ABSOLUTE: 0.4 K/UL (ref 0–0.6)
EOSINOPHILS RELATIVE PERCENT: 6.5 %
FIBRINOGEN: 394 MG/DL (ref 200–397)
GFR AFRICAN AMERICAN: >60
GFR NON-AFRICAN AMERICAN: >60
GLOBULIN: 2.5 G/DL
GLUCOSE BLD-MCNC: 97 MG/DL (ref 70–99)
HCT VFR BLD CALC: 35 % (ref 40.5–52.5)
HEMOGLOBIN: 11.6 G/DL (ref 13.5–17.5)
INR BLD: 0.94 (ref 0.86–1.14)
LYMPHOCYTES ABSOLUTE: 0.9 K/UL (ref 1–5.1)
LYMPHOCYTES RELATIVE PERCENT: 17.3 %
MCH RBC QN AUTO: 30.1 PG (ref 26–34)
MCHC RBC AUTO-ENTMCNC: 33.1 G/DL (ref 31–36)
MCV RBC AUTO: 90.9 FL (ref 80–100)
MONOCYTES ABSOLUTE: 1 K/UL (ref 0–1.3)
MONOCYTES RELATIVE PERCENT: 18.6 %
NEUTROPHILS ABSOLUTE: 3.1 K/UL (ref 1.7–7.7)
NEUTROPHILS RELATIVE PERCENT: 56.4 %
PDW BLD-RTO: 15 % (ref 12.4–15.4)
PLATELET # BLD: 284 K/UL (ref 135–450)
PMV BLD AUTO: 7.6 FL (ref 5–10.5)
POTASSIUM REFLEX MAGNESIUM: 4.2 MMOL/L (ref 3.5–5.1)
PROTHROMBIN TIME: 10.9 SEC (ref 10–13.2)
RBC # BLD: 3.85 M/UL (ref 4.2–5.9)
SODIUM BLD-SCNC: 139 MMOL/L (ref 136–145)
TOTAL PROTEIN: 6 G/DL (ref 6.4–8.2)
WBC # BLD: 5.5 K/UL (ref 4–11)

## 2020-05-21 PROCEDURE — 99232 SBSQ HOSP IP/OBS MODERATE 35: CPT | Performed by: INTERNAL MEDICINE

## 2020-05-21 PROCEDURE — 36415 COLL VENOUS BLD VENIPUNCTURE: CPT

## 2020-05-21 PROCEDURE — 93005 ELECTROCARDIOGRAM TRACING: CPT | Performed by: NURSE PRACTITIONER

## 2020-05-21 PROCEDURE — 80053 COMPREHEN METABOLIC PANEL: CPT

## 2020-05-21 PROCEDURE — 71046 X-RAY EXAM CHEST 2 VIEWS: CPT

## 2020-05-21 PROCEDURE — 85025 COMPLETE CBC W/AUTO DIFF WBC: CPT

## 2020-05-21 PROCEDURE — 93010 ELECTROCARDIOGRAM REPORT: CPT | Performed by: INTERNAL MEDICINE

## 2020-05-21 PROCEDURE — 6370000000 HC RX 637 (ALT 250 FOR IP): Performed by: INTERNAL MEDICINE

## 2020-05-21 PROCEDURE — 85730 THROMBOPLASTIN TIME PARTIAL: CPT

## 2020-05-21 PROCEDURE — 85610 PROTHROMBIN TIME: CPT

## 2020-05-21 PROCEDURE — 85384 FIBRINOGEN ACTIVITY: CPT

## 2020-05-21 PROCEDURE — 85379 FIBRIN DEGRADATION QUANT: CPT

## 2020-05-21 PROCEDURE — 99232 SBSQ HOSP IP/OBS MODERATE 35: CPT | Performed by: NURSE PRACTITIONER

## 2020-05-21 PROCEDURE — 2580000003 HC RX 258: Performed by: INTERNAL MEDICINE

## 2020-05-21 RX ORDER — METOPROLOL SUCCINATE 50 MG/1
75 TABLET, EXTENDED RELEASE ORAL DAILY
Qty: 45 TABLET | Refills: 5 | Status: SHIPPED | OUTPATIENT
Start: 2020-05-21 | End: 2020-05-21

## 2020-05-21 RX ORDER — METOPROLOL SUCCINATE 50 MG/1
TABLET, EXTENDED RELEASE ORAL
Qty: 135 TABLET | Refills: 3 | Status: SHIPPED | OUTPATIENT
Start: 2020-05-21 | End: 2021-03-19 | Stop reason: ALTCHOICE

## 2020-05-21 RX ORDER — LISINOPRIL 10 MG/1
5 TABLET ORAL DAILY
Qty: 90 TABLET | Refills: 3 | Status: SHIPPED
Start: 2020-05-21 | End: 2021-03-24

## 2020-05-21 RX ADMIN — Medication 10 ML: at 08:46

## 2020-05-21 RX ADMIN — PRAVASTATIN SODIUM 80 MG: 40 TABLET ORAL at 08:45

## 2020-05-21 RX ADMIN — METOPROLOL SUCCINATE 75 MG: 50 TABLET, EXTENDED RELEASE ORAL at 08:45

## 2020-05-21 RX ADMIN — DRONEDARONE 400 MG: 400 TABLET, FILM COATED ORAL at 08:45

## 2020-05-21 RX ADMIN — ASPIRIN 81 MG 81 MG: 81 TABLET ORAL at 08:45

## 2020-05-21 RX ADMIN — PANTOPRAZOLE SODIUM 40 MG: 40 TABLET, DELAYED RELEASE ORAL at 05:08

## 2020-05-21 RX ADMIN — LISINOPRIL 5 MG: 5 TABLET ORAL at 08:45

## 2020-05-21 ASSESSMENT — PAIN SCALES - GENERAL
PAINLEVEL_OUTOF10: 0
PAINLEVEL_OUTOF10: 0

## 2020-05-21 NOTE — PROGRESS NOTES
Rales/Wheezes/Rhonchi. Cardiovascular:  Regular rate and rhythm with normal S1/S2 without murmurs, rubs or gallops. Abdomen: Soft, non-tender, non-distended with normal bowel sounds. Musculoskeletal:  No clubbing, cyanosis or edema bilaterally. Full range of motion without deformity. Skin: Skin color, texture, turgor normal.  No rashes or lesions. Neurologic:  Neurovascularly intact without any focal sensory/motor deficits. Cranial nerves: II-XII intact, grossly non-focal.  Psychiatric:  Alert and oriented, thought content appropriate, normal insight  Capillary Refill: Brisk,< 3 seconds   Peripheral Pulses: +2 palpable, equal bilaterally       Labs: For convenience and continuity at follow-up the following most recent labs are provided:      CBC:    Lab Results   Component Value Date    WBC 5.5 05/21/2020    HGB 11.6 05/21/2020    HCT 35.0 05/21/2020     05/21/2020       Renal:    Lab Results   Component Value Date     05/21/2020    K 4.2 05/21/2020     05/21/2020    CO2 25 05/21/2020    BUN 20 05/21/2020    CREATININE 1.0 05/21/2020    CALCIUM 9.2 05/21/2020    PHOS 3.1 02/24/2020         Significant Diagnostic Studies    Radiology:   XR CHEST STANDARD (2 VW)   Final Result   Persistent opacity in the lateral right base. Findings may be artifactual   due to overlapping structures or related to mild pneumonia. Short-term   follow-up to resolution recommended. If this finding persists, dedicated   chest CT recommended for further evaluation. XR CHEST PORTABLE   Final Result   New nodular opacity in the right lower lobe. Focal pneumonia/pneumonitis   versus pulmonary nodule. No findings of congestive failure. IMPRESSION:   Nonemergent follow-up CT chest is recommended.                 Consults:     IP CONSULT TO CARDIOLOGY  IP CONSULT TO PULMONOLOGY    Disposition:  Home     Condition at Discharge: Stable    Discharge Instructions/Follow-up:  PCP, pulmonology    Code

## 2020-05-21 NOTE — PROGRESS NOTES
[address]     Electrophysiology                                     Progress Note    Admission date:  2020    Reason for follow up visit: afib    HPI/CC: Yumiko Fernandes was admitted on 2020 with SOB and palpitations. EKG showed AT with a HR of 141. IV Cardizem was stopped  due to hypotension. COVID-19 negative. Spontaneously converted to sinus on 2020 at 16:30. Subjective: He denies chest pain, palpitations, shortness of breath, and dizziness. Vitals:  Blood pressure 118/77, pulse 65, temperature 97.9 °F (36.6 °C), temperature source Oral, resp. rate 18, height 5' 9\" (1.753 m), weight 188 lb 6.4 oz (85.5 kg), SpO2 100 %.   Temp  Av.9 °F (36.6 °C)  Min: 97.6 °F (36.4 °C)  Max: 98.1 °F (36.7 °C)  Pulse  Av.6  Min: 65  Max: 112  BP  Min: 111/73  Max: 119/77  SpO2  Av.2 %  Min: 98 %  Max: 100 %    24 hour I/O    Intake/Output Summary (Last 24 hours) at 2020 1006  Last data filed at 2020 0507  Gross per 24 hour   Intake 515 ml   Output 600 ml   Net -85 ml     Current Facility-Administered Medications   Medication Dose Route Frequency Provider Last Rate Last Dose    aspirin chewable tablet 81 mg  81 mg Oral Daily Iwona Thayer MD   81 mg at 20 0845    dronedarone hcl (MULTAQ) tablet 400 mg  400 mg Oral BID WC Iwona Thayer MD   400 mg at 20 0845    pantoprazole (PROTONIX) tablet 40 mg  40 mg Oral QAM AC Kingsley Love MD   40 mg at 20 0508    pravastatin (PRAVACHOL) tablet 80 mg  80 mg Oral Daily Kingsley Love MD   80 mg at 20 0845    sodium chloride flush 0.9 % injection 10 mL  10 mL Intravenous 2 times per day Iwona Thayer MD   10 mL at 20 0846    sodium chloride flush 0.9 % injection 10 mL  10 mL Intravenous PRN Iwona Thayer MD        acetaminophen (TYLENOL) tablet 650 mg  650 mg Oral Q6H PRN Iwona Thayer MD        Or    acetaminophen (TYLENOL) suppository 650 mg  650 mg Rectal Q6H PRN Sertia

## 2020-05-21 NOTE — PROGRESS NOTES
05/19/20 1705 05/20/20  0554 05/21/20  0457   WBC 6.5 6.3 5.5   HGB 13.3* 13.1* 11.6*   HCT 40.2* 41.0 35.0*   MCV 90.6 96.4 90.9    309 284     BMP:   Recent Labs     05/19/20 1705 05/20/20  0554 05/21/20  0457   * 139 139   K 3.8 4.0 4.2    106 105   CO2 26 27 25   BUN 26* 21* 20   CREATININE 1.1 1.1 1.0     LIVER PROFILE:   Recent Labs     05/19/20 1705 05/20/20  0554 05/21/20  0457   AST 20 19 18   ALT 17 17 16   BILITOT <0.2 <0.2 <0.2   ALKPHOS 80 72 70     PT/INR:   Recent Labs     05/19/20 1705 05/20/20  0636 05/21/20  0457   PROTIME 10.6 10.4 10.9   INR 0.91 0.90 0.94     APTT:   Recent Labs     05/19/20 1705 05/20/20  0636 05/21/20  0457   APTT 31.5 31.6 28.8       Imaging:  I have reviewed radiology images personally. XR CHEST STANDARD (2 VW)   Final Result   Persistent opacity in the lateral right base. Findings may be artifactual   due to overlapping structures or related to mild pneumonia. Short-term   follow-up to resolution recommended. If this finding persists, dedicated   chest CT recommended for further evaluation. XR CHEST PORTABLE   Final Result   New nodular opacity in the right lower lobe. Focal pneumonia/pneumonitis   versus pulmonary nodule. No findings of congestive failure. IMPRESSION:   Nonemergent follow-up CT chest is recommended. Results for Urbano You (MRN [id number]) as of 5/21/2020 11:09   Ref.  Range 5/19/2020 09:35 5/19/2020 17:05 5/20/2020 05:54 5/21/2020 04:57   Sodium Latest Ref Range: 136 - 145 mmol/L 133 (L) 135 (L) 139 139   Potassium Latest Ref Range: 3.5 - 5.1 mmol/L 4.2 3.8 4.0 4.2   Chloride Latest Ref Range: 99 - 110 mmol/L 97 (L) 101 106 105   CO2 Latest Ref Range: 21 - 32 mmol/L 26 26 27 25   BUN Latest Ref Range: 7 - 20 mg/dL 27 (H) 26 (H) 21 (H) 20   Creatinine Latest Ref Range: 0.8 - 1.3 mg/dL 1.3 1.1 1.1 1.0   Anion Gap Latest Ref Range: 3 - 16  10 8 6 9   GFR Non- Latest Ref Range: >60 54 (A) >60 >60 >60   GFR  Latest Ref Range: >60  >60 >60 >60 >60   Glucose Latest Ref Range: 70 - 99 mg/dL 110 (H) 101 (H) 102 (H) 97   Calcium Latest Ref Range: 8.3 - 10.6 mg/dL 10.0 9.2 9.1 9.2   Total Protein Latest Ref Range: 6.4 - 8.2 g/dL 7.3 6.8 6.2 (L) 6.0 (L)   Procalcitonin Latest Ref Range: 0.00 - 0.15 ng/mL 0.11        Results for Marc Mckenzie (MRN [id number]) as of 5/21/2020 11:09   Ref.  Range 5/19/2020 09:35 5/19/2020 17:05 5/20/2020 05:54 5/21/2020 04:57   WBC Latest Ref Range: 4.0 - 11.0 K/uL 9.0 6.5 6.3 5.5   RBC Latest Ref Range: 4.20 - 5.90 M/uL 4.59 4.44 4.25 3.85 (L)   Hemoglobin Quant Latest Ref Range: 13.5 - 17.5 g/dL 13.8 13.3 (L) 13.1 (L) 11.6 (L)   Hematocrit Latest Ref Range: 40.5 - 52.5 % 41.1 40.2 (L) 41.0 35.0 (L)   MCV Latest Ref Range: 80.0 - 100.0 fL 89.7 90.6 96.4 90.9   MCH Latest Ref Range: 26.0 - 34.0 pg 30.0 30.1 30.7 30.1   MCHC Latest Ref Range: 31.0 - 36.0 g/dL 33.5 33.2 31.9 33.1   MPV Latest Ref Range: 5.0 - 10.5 fL 7.9 7.7 7.6 7.6   RDW Latest Ref Range: 12.4 - 15.4 % 14.8 15.1 15.8 (H) 15.0   Platelet Count Latest Ref Range: 135 - 450 K/uL 362 345 309 284   Neutrophils % Latest Units: % 67.8 57.9 57.8 56.4   Lymphocyte % Latest Units: % 12.8 20.4 19.3 17.3   Monocytes % Latest Units: % 14.4 16.1 16.0 18.6   Eosinophils % Latest Units: % 3.9 4.6 6.0 6.5     TWO XRAY VIEWS OF THE CHEST       5/21/2020 7:51 am       COMPARISON:   Chest radiograph May 19, 2020 and priors.       HISTORY:   ORDERING SYSTEM PROVIDED HISTORY: Pulmonary infiltrate/nodule   TECHNOLOGIST PROVIDED HISTORY:   Reason for exam:->Pulmonary infiltrate/nodule       FINDINGS:   There is persistent asymmetric opacity in the lateral right base which is   somewhat nodular in appearance.  Lungs otherwise clear.  No pneumothorax or   pleural effusion.  Cardiac and mediastinal contours are without acute   process.  No acute osseous abnormality.           Impression   Persistent opacity in the lateral right base.  Findings may be artifactual   due to overlapping structures or related to mild pneumonia.  Short-term   follow-up to resolution recommended.  If this finding persists, dedicated   chest CT recommended for further evaluation. Assessment:  Principal Problem:    Atrial fibrillation with rapid ventricular response (HCC)  Active Problems: Moderate obstructive sleep apnea    Prostate enlargement    Splenic artery aneurysm (HCC)    Periodic limb movement sleep disorder    Hypothyroidism due to acquired atrophy of thyroid    KEVEN (acute kidney injury) ([facility] [address]  Resolved Problems:    * No resolved hospital problems. *          Plan:   · Oxygen supplementation, if required, to keep saturation being 90 to 94% only-patient is on RA   · Pulmonary toilet  · Patient recently CT of the chest done 2 months back was reviewed and patient does not have any significant lung nodule or any mass of concern  · CXR reviewed   · Cand d/w PCP about Rpting CXR in 2-3 weeks   · Patient got 1 dose of Rocephin and Zithromax in the ER  · Would not recommend any antibiotics from pulmonary standpoint of view at this time  · Patient does not need any bronchodilators or steroids from pulmonary standpoint of view  · TSH was reviewed   · Patient also has history of significant sleep apnea for which he was given CPAP but patient was not able to tolerate it and has not been able to use it which may also contributing to patient's atrial arrhythmias  · Patient is on Multaq,Lopressor and lisinopril at this time  · Cardiology follow up   · Monitor for any fluid overload  · Diet and lifestyle modifications   · Covid 19 testing has been sent by ER -negative  · Monitor input output and BMP  · Correct electrolytes on whenever necessary basis  · PUD and DVT prophylaxis as per IM    ? Discharge planning     No other recommendations from Pulmonary/CCM stand point -will sign off -please call on PRN basis if patient is not discharged     Case

## 2020-05-22 ENCOUNTER — CARE COORDINATION (OUTPATIENT)
Dept: CASE MANAGEMENT | Age: 70
End: 2020-05-22

## 2020-05-22 ENCOUNTER — TELEPHONE (OUTPATIENT)
Dept: FAMILY MEDICINE CLINIC | Age: 70
End: 2020-05-22

## 2020-05-22 ASSESSMENT — ENCOUNTER SYMPTOMS
RHINORRHEA: 0
VOMITING: 0
NAUSEA: 0
PHOTOPHOBIA: 0
WHEEZING: 0
SHORTNESS OF BREATH: 1
COUGH: 1
ABDOMINAL PAIN: 0
BACK PAIN: 0
DIARRHEA: 0

## 2020-05-22 NOTE — TELEPHONE ENCOUNTER
[facility] Transitions Initial Follow Up Call    Outreach made within 2 business days of discharge: Yes    Patient: Ariel Bradford Patient : 1950   MRN: <[id number]>  Reason for Admission: There are no discharge diagnoses documented for the most recent discharge. Discharge Date: 20       Spoke with: Shima Gomez    Discharge department/facility: [facility]    TCM Interactive Patient Contact:  Was patient able to fill all prescriptions: Yes  Was patient instructed to bring all medications to the follow-up visit:   Is patient taking all medications as directed in the discharge summary?  Yes  Does patient understand their discharge instructions: Yes  Does patient have questions or concerns that need addressed prior to 7-14 day follow up office visit: no    Scheduled appointment with PCP within 7-14 days    Follow Up  Future Appointments   Date Time Provider Stuart Aguilar   2020  2:15 PM MD Maddy Sandoval [facility]   2020  3:00 PM DARSHANA Conrad - ZORAN Talbert [facility]   2020  5:20 PM Balta Samson MD AND ENDO [facility]   2020  3:45 PM MD Maddy Zayas [address], [place]

## 2020-05-29 ENCOUNTER — CARE COORDINATION (OUTPATIENT)
Dept: CASE MANAGEMENT | Age: 70
End: 2020-05-29

## 2020-05-29 NOTE — CARE COORDINATION
Patient contacted regarding COVID-19 risk and screening. Discussed COVID-19 related testing which was available at this time. Test results were negative. Patient informed of results, if available? Yes. Care Transition Nurse/ Ambulatory Care Manager contacted the patient by telephone to perform follow-up assessment. Verified name and  with patient as identifiers. Patient has following risk factors of: heart failure. Symptoms reviewed with patient who verbalized the following symptoms: no new symptoms and no worsening symptoms. Due to no new or worsening symptoms encounter was not routed to provider for escalation. Education provided regarding infection prevention, and signs and symptoms of COVID-19 and when to seek medical attention with patient who verbalized understanding. Discussed exposure protocols and quarantine from [address] you at higher risk for severe illness  and given an opportunity for questions and concerns. The patient agrees to contact the COVID-19 hotline [phone] or PCP office for questions related to their healthcare. CTN/ACM provided contact information for future reference. From CDC: Are you at higher risk for severe illness?  Wash your hands often.  Avoid close contact (6 feet, which is about two arm lengths) with people who are sick.  Put distance between yourself and other people if COVID-19 is spreading in your community.  Clean and disinfect frequently touched surfaces.  Avoid all cruise travel and non-essential air travel.  Call your healthcare professional if you have concerns about COVID-19 and your underlying condition or if you are sick. For more information on steps you can take to protect yourself, see CDC's How to [place] with patient who reported he is doing really well. Patient denied any swelling, sob, or cp. Patient stated his weight is stable and he has returned to work.  Patient denied any further

## 2020-06-08 NOTE — PROGRESS NOTES
edema: No  · Skin: Warm and dry  Neurological:  · Alert and oriented. · Moves all extremities well  · No abnormalities of mood, affect, memory, mentation, or behavior are noted    DATA:    EC2020 SR 63  ECG:  3/31/20 afib  ECHO: 2019  Summary   Ejection fraction is visually estimated to be 55-60 %. Mild mitral and tricuspid regurgitation. Watchman device is well seated within the left atrial appendage. No leak   around the device   Mild to moderate aortic regurgitation. [id number] Score for Atrial Fibrillation Stroke Risk   Risk   Factors  Component Value   C CHF No 0   H HTN Yes 1   A2 Age >= 76 No,  (71 y.o.) 0   D DM No 0   S2 Prior Stroke/TIA No 0   V Vascular Disease No 0   A Age 74-69 Yes,  (71 y.o.) 1   Sc Sex male 0    [id number]  Score  2   Score last updated 3/30/20 1:21 PM EDT    Click here for a link to the UpToDate guideline \"Atrial Fibrillation: Anticoagulation therapy to prevent embolization    Disclaimer: Risk Score calculation is dependent on accuracy of patient problem list and past encounter diagnosis. IMPRESSION:    1. Persistent atrial [id number] Score 2 (HTN and age)    Mr. Kobe Stoner is a pleasant 64-year-old male with a medical history significant for persistent atrial fibrillation status post ablation with Dr. Kayla Sanches, hypertension, diabetes mellitus type 2, and nonischemic cardiomyopathy with recovered ejection fraction who presents from home with recent onset of atrial fibrillation with RVR. According to patient his symptoms started on  afternoon when he began to suffer from palpitations, malaise, lethargy, and fatigue. He presents now for evaluation. We discussed rate control, rhythm control, AVN + pacemaker, and repeat atrial fibrillation ablation. We reviewed risks and benefits of each approach. We discussed side effects of class IC, class II, class III, and class IV antiarrhythmics.  Patient would like to proceed with

## 2020-06-09 ENCOUNTER — OFFICE VISIT (OUTPATIENT)
Dept: CARDIOLOGY CLINIC | Age: 70
End: 2020-06-09
Payer: MEDICARE

## 2020-06-09 VITALS
HEART RATE: 62 BPM | DIASTOLIC BLOOD PRESSURE: 62 MMHG | OXYGEN SATURATION: 98 % | SYSTOLIC BLOOD PRESSURE: 94 MMHG | HEIGHT: 69 IN | BODY MASS INDEX: 27.7 KG/M2 | WEIGHT: 187 LBS

## 2020-06-09 PROCEDURE — 4040F PNEUMOC VAC/ADMIN/RCVD: CPT | Performed by: INTERNAL MEDICINE

## 2020-06-09 PROCEDURE — 3017F COLORECTAL CA SCREEN DOC REV: CPT | Performed by: INTERNAL MEDICINE

## 2020-06-09 PROCEDURE — 99214 OFFICE O/P EST MOD 30 MIN: CPT | Performed by: INTERNAL MEDICINE

## 2020-06-09 PROCEDURE — 1111F DSCHRG MED/CURRENT MED MERGE: CPT | Performed by: INTERNAL MEDICINE

## 2020-06-09 PROCEDURE — 1123F ACP DISCUSS/DSCN MKR DOCD: CPT | Performed by: INTERNAL MEDICINE

## 2020-06-09 PROCEDURE — G8417 CALC BMI ABV UP PARAM F/U: HCPCS | Performed by: INTERNAL MEDICINE

## 2020-06-09 PROCEDURE — 1036F TOBACCO NON-USER: CPT | Performed by: INTERNAL MEDICINE

## 2020-06-09 PROCEDURE — G8427 DOCREV CUR MEDS BY ELIG CLIN: HCPCS | Performed by: INTERNAL MEDICINE

## 2020-06-11 PROCEDURE — 93000 ELECTROCARDIOGRAM COMPLETE: CPT | Performed by: INTERNAL MEDICINE

## 2020-06-19 RX ORDER — PRAVASTATIN SODIUM 80 MG/1
80 TABLET ORAL DAILY
Qty: 90 TABLET | Refills: 1 | Status: SHIPPED | OUTPATIENT
Start: 2020-06-19 | End: 2020-12-16 | Stop reason: SDUPTHER

## 2020-06-24 ENCOUNTER — VIRTUAL VISIT (OUTPATIENT)
Dept: ENDOCRINOLOGY | Age: 70
End: 2020-06-24
Payer: MEDICARE

## 2020-06-24 PROCEDURE — 99441 PR PHYS/QHP TELEPHONE EVALUATION 5-10 MIN: CPT | Performed by: INTERNAL MEDICINE

## 2020-06-24 ASSESSMENT — ENCOUNTER SYMPTOMS
COUGH: 0
BACK PAIN: 0
PHOTOPHOBIA: 0

## 2020-06-24 NOTE — PROGRESS NOTES
Endocrinology  Maci Fernandez M.D. Phone: [phone]   FAX: [phone]       Yumiko Fernandes   YOB: 1950    Date of Visit:  6/24/2020    No Known Allergies  Outpatient Medications Marked as Taking for the 6/24/20 encounter (Virtual Visit) with Maria Antonia Mcgraw MD   Medication Sig Dispense Refill    pravastatin (PRAVACHOL) 80 MG tablet TAKE 1 TABLET BY MOUTH DAILY 90 tablet 1    lisinopril (PRINIVIL;ZESTRIL) 10 MG tablet Take 0.5 tablets by mouth daily 90 tablet 3    metoprolol succinate (TOPROL XL) 50 MG extended release tablet TAKE 1& 1/2 TABLETS BY MOUTH EVERY  tablet 3    dorzolamide-timolol (COSOPT) 22.3-6.8 MG/ML ophthalmic solution INT 1 GTT INTO OU BID      latanoprost (XALATAN) 0.005 % ophthalmic solution INT 1 GTT IN OU QD IN THE ANDREA      dronedarone hcl (MULTAQ) 400 MG TABS Take 1 tablet by mouth 2 times daily (with meals) 60 tablet 3    omeprazole (PRILOSEC OTC) 20 MG tablet Take 20 mg by mouth daily      levothyroxine (SYNTHROID) 75 MCG tablet TAKE 1 TABLET BY MOUTH DAILY 90 tablet 1    aspirin 81 MG chewable tablet Take 1 tablet by mouth daily 30 tablet 3    Multiple Vitamins-Minerals (MULTIVITAMIN-MINERALS) TABS tablet Take 1 tablet by mouth daily 30 tablet 0         There were no vitals filed for this visit. There is no height or weight on file to calculate BMI.      Wt Readings from Last 3 Encounters:   06/09/20 187 lb (84.8 kg)   05/21/20 188 lb 6.4 oz (85.5 kg)   04/01/20 188 lb 12.8 oz (85.6 kg)     BP Readings from Last 3 Encounters:   06/09/20 94/62   05/21/20 118/77   04/01/20 111/77        Past Medical History:   Diagnosis Date    Arthritis     Atrial fibrillation ([address] 2007    S/P CARDIOVERSION, EF 49% on stress test 2009    Hyperlipidemia     Hypertension     SCC (squamous cell carcinoma)     Dr. Gerald Flynn Sleep apnea     Systolic heart failure [facility])      Past Surgical History:   Procedure Laterality Date    ABLATION OF DYSRHYTHMIC FOCUS

## 2020-06-30 ENCOUNTER — TELEPHONE (OUTPATIENT)
Dept: ENDOCRINOLOGY | Age: 70
End: 2020-06-30

## 2020-07-22 RX ORDER — APIXABAN 5 MG/1
TABLET, FILM COATED ORAL
Qty: 180 TABLET | OUTPATIENT
Start: 2020-07-22

## 2020-08-06 RX ORDER — DRONEDARONE 400 MG/1
TABLET, FILM COATED ORAL
Qty: 60 TABLET | Refills: 3 | Status: SHIPPED | OUTPATIENT
Start: 2020-08-06 | End: 2020-11-30

## 2020-08-10 RX ORDER — LEVOTHYROXINE SODIUM 0.07 MG/1
75 TABLET ORAL DAILY
Qty: 90 TABLET | Refills: 1 | Status: SHIPPED | OUTPATIENT
Start: 2020-08-10 | End: 2020-11-20

## 2020-10-13 ENCOUNTER — OFFICE VISIT (OUTPATIENT)
Dept: FAMILY MEDICINE CLINIC | Age: 70
End: 2020-10-13
Payer: MEDICARE

## 2020-10-13 VITALS
WEIGHT: 193 LBS | SYSTOLIC BLOOD PRESSURE: 136 MMHG | TEMPERATURE: 96.9 F | HEART RATE: 56 BPM | DIASTOLIC BLOOD PRESSURE: 84 MMHG | BODY MASS INDEX: 28.5 KG/M2

## 2020-10-13 PROCEDURE — G8427 DOCREV CUR MEDS BY ELIG CLIN: HCPCS | Performed by: FAMILY MEDICINE

## 2020-10-13 PROCEDURE — G0008 ADMIN INFLUENZA VIRUS VAC: HCPCS | Performed by: FAMILY MEDICINE

## 2020-10-13 PROCEDURE — G8484 FLU IMMUNIZE NO ADMIN: HCPCS | Performed by: FAMILY MEDICINE

## 2020-10-13 PROCEDURE — 99214 OFFICE O/P EST MOD 30 MIN: CPT | Performed by: FAMILY MEDICINE

## 2020-10-13 PROCEDURE — G8417 CALC BMI ABV UP PARAM F/U: HCPCS | Performed by: FAMILY MEDICINE

## 2020-10-13 PROCEDURE — 69210 REMOVE IMPACTED EAR WAX UNI: CPT | Performed by: FAMILY MEDICINE

## 2020-10-13 PROCEDURE — 90694 VACC AIIV4 NO PRSRV 0.5ML IM: CPT | Performed by: FAMILY MEDICINE

## 2020-10-13 ASSESSMENT — ENCOUNTER SYMPTOMS
BLOOD IN STOOL: 0
SHORTNESS OF BREATH: 0

## 2020-10-13 NOTE — PROGRESS NOTES
CC: preop exam    Subjective: Remi Ladd is a 65 y.o. male here for preoperative consultation for drug induced sleep evaluation and possible inspire implant on 10/26/20  Consulting physician: Dr Maritza Prajapati    Prior complications from surgery or anesthesia? no  Uncontrolled blood pressure? no  Recent illnesses: none    Able to complete activities such as climbing steps or mowing the lawn without chest pain or shortness of breath?  yes  Recent cardiac procedures such as stent placement or bypass surgery? no    No Known Allergies    Past Medical History:   Diagnosis Date    Arthritis     Atrial fibrillation ([address] 2007    S/P CARDIOVERSION, EF 49% on stress test 2009    Hyperlipidemia     Hypertension     SCC (squamous cell carcinoma)     Dr. Trinity Caicedo Sleep apnea     Systolic heart failure [facility])           Past Surgical History:   Procedure Laterality Date    ABLATION OF DYSRHYTHMIC FOCUS      Afib PVI etc / right sided flutter line    CARDIOVERSION  2005    COLONOSCOPY  [id number]    tics    TOTAL HIP ARTHROPLASTY Left 2013          Current Outpatient Medications on File Prior to Visit   Medication Sig Dispense Refill    levothyroxine (SYNTHROID) 75 MCG tablet TAKE 1 TABLET BY MOUTH DAILY 90 tablet 1    MULTAQ 400 MG TABS TAKE 1 TABLET BY MOUTH TWICE DAILY WITH MEALS 60 tablet 3    pravastatin (PRAVACHOL) 80 MG tablet TAKE 1 TABLET BY MOUTH DAILY 90 tablet 1    lisinopril (PRINIVIL;ZESTRIL) 10 MG tablet Take 0.5 tablets by mouth daily 90 tablet 3    metoprolol succinate (TOPROL XL) 50 MG extended release tablet TAKE 1& 1/2 TABLETS BY MOUTH EVERY  tablet 3    dorzolamide-timolol (COSOPT) 22.3-6.8 MG/ML ophthalmic solution INT 1 GTT INTO OU BID      latanoprost (XALATAN) 0.005 % ophthalmic solution INT 1 GTT IN OU QD IN THE ANDREA      omeprazole (PRILOSEC OTC) 20 MG tablet Take 20 mg by mouth daily      aspirin 81 MG chewable tablet Take 1 tablet by mouth daily 30 tablet 3    Multiple Vitamins-Minerals (MULTIVITAMIN-MINERALS) TABS tablet Take 1 tablet by mouth daily 30 tablet 0     No current facility-administered medications on file prior to visit. Social History     Socioeconomic History    Marital status:      Spouse name: None    Number of children: None    Years of education: None    Highest education level: None   Occupational History    None   Social Needs    Financial resource strain: None    Food insecurity     Worry: None     Inability: None    Transportation needs     Medical: None     Non-medical: None   Tobacco Use    Smoking status: Never Smoker    Smokeless tobacco: Never Used   Substance and Sexual Activity    Alcohol use: No     Alcohol/week: 0.0 standard drinks     Comment: social    Drug use: No    Sexual activity: None   Lifestyle    Physical activity     Days per week: None     Minutes per session: None    Stress: None   Relationships    Social connections     Talks on phone: None     Gets together: None     Attends [employer] service: None     Active member of club or organization: None     Attends meetings of clubs or organizations: None     Relationship status: None    Intimate partner violence     Fear of current or ex partner: None     Emotionally abused: None     Physically abused: None     Forced sexual activity: None   Other Topics Concern    None   Social History Narrative    None       Family History   Problem Relation Age of Onset    Cancer Mother         BONE    High Blood Pressure Mother     Heart Disease Father 79       Review of Systems   Constitutional: Negative for fever. Respiratory: Negative for shortness of breath. Cardiovascular: Negative for chest pain. Gastrointestinal: Negative for blood in stool. Genitourinary: Negative for hematuria.      All other systems reviewed and negative       Objective     /84   Pulse 56   Temp 96.9 °F (36.1 °C) (Temporal)   Wt 193 lb (87.5 kg)   BMI 28.50 kg/m² Physical Exam:  Constitutional: Pt appears well-developed and well-nourished   Ears, Nose, Mouth & Throat: external inspection of ears and nose show no scars, lesions or masses, Cerumen impaction on R obscuring R TM, TM normal on L  Eyes: Conjunctivae and pupils are normal in appearance   Neck: neck is supple. No thyromegaly present   Cardiovascular: Normal rate. No lower ext edema present  Respiratory: Effort normal and breath sounds normal. No respiratory distress. He has no wheezes. He has no rales. Gastrointestinal: Soft. There is no tenderness. No hernias  Musculoskeletal: Normal range of motion of extremities, normal gait  Skin: Skin is warm and dry   Psychiatric: judgment and insight appropriate for age, no agitation    Lab Results   Component Value Date    WBC 5.5 05/21/2020    HGB 11.6 (L) 05/21/2020    HCT 35.0 (L) 05/21/2020     05/21/2020    CHOL 194 12/11/2017    TRIG 93 12/11/2017    HDL 63 12/11/2017    LDLCALC 112 12/11/2017    ALT 16 05/21/2020    AST 18 05/21/2020     05/21/2020    K 4.2 05/21/2020     05/21/2020    CREATININE 1.0 05/21/2020    BUN 20 05/21/2020    CO2 25 05/21/2020    TSH 2.90 05/19/2020    PSA 2.53 12/13/2019    INR 0.94 05/21/2020    GLUCOSE 97 05/21/2020       No results found for this visit on 10/13/20. Revised [place] Cardiac RiskIndex  1. High risk surgical procedures (intraperitoneal, intrathoracic, suprainguinal vascular) no  2. History of ischemic heart disease (history of myocardial infarction,history of positive exercise test, current complaint of chest pain considered to be due to ischemia, use of nitrate therapy, EKG with pathological Q waves) no  3. History of congestiveheart failure (history of congestive heart failure, pulmonary edema, paroxysmal nocturnal dyspnea, bilateral rales or S3 gallop) yes  4:  History of cerebrovascular disease (history oftransient ischemic attack or stroke) no   5. Preoperative treatment with insulin - no  6. Preoperative serum creatinine >2.0 mg/dl - no    Each risk Factor is assigned one point. Total Points 1    Points Class Risk of Major cardiac event (includes myocardial infarction, pulmonary edema, ventricularfibrillation, primary cardiac arrest and complete heart block). 0  I 0.4%   1  II 0.9%   2  III 6.6%   3 or more  IV 11%       Assessment   Eliana Orellana was seen today for pre-op exam.  Diagnoses and all orders for this visit:    Pre-op exam  Mr. Jackeline Wray is at low risk for a med risk procedure. Pt cleared for surgery from my standpoint.  He is scheduled to see his cardiologist to obtain cardiac clearance  - Aspirin stop7 days preoperatively and restart postoperatively  - Levothyroxine continue in the perioperative period  - ß-blockers continue without interruption, one of the IV forms maybe substituted  Necessary medications can be given with a sip of water a few hours before surgery    Sleep apnea  drug induced sleep evaluation    Cerumen impaction  Curette was used by myself to remove cerumen from ear canal on both sides in office on today's visit    Need for prophylactic vaccination and inoculation against influenza  -     INFLUENZA, QUADV, ADJUVANTED, 65 YRS =, IM, PF, PREFILL SYR, 0.5ML (FLUAD)    Kimberly Lee MD  10/13/2020

## 2020-10-16 ENCOUNTER — TELEPHONE (OUTPATIENT)
Dept: CARDIOLOGY CLINIC | Age: 70
End: 2020-10-16

## 2020-10-20 ENCOUNTER — TELEPHONE (OUTPATIENT)
Dept: CARDIOLOGY CLINIC | Age: 70
End: 2020-10-20

## 2020-10-20 NOTE — TELEPHONE ENCOUNTER
AGK does pt need to get cardiac clearance for a drug induced sleep evaluation and possible inspire implant that is scheduled for 10/26/20 with DR. Mark Chaparro at [facility]? Pt's last OV 6/9/20 AG[facility]. [address] please advise.          TY

## 2020-10-20 NOTE — TELEPHONE ENCOUNTER
Pt told [facility] pre-op testing that he did not need a cardiac clearance for his up coming  Surgery for a drug induced sleep evaluation. Is this true? Please place in [facility] or fax to [phone] and call to advise if pt needs to be seen in our office.

## 2020-10-21 NOTE — TELEPHONE ENCOUNTER
Cardiac clearance letter is in epic for review, and has been faxed to [facility] at [phone]. Beatrice Harp       TY

## 2020-11-20 RX ORDER — LEVOTHYROXINE SODIUM 0.07 MG/1
75 TABLET ORAL DAILY
Qty: 90 TABLET | Refills: 1 | Status: SHIPPED | OUTPATIENT
Start: 2020-11-20 | End: 2021-10-22 | Stop reason: SDUPTHER

## 2020-11-20 NOTE — TELEPHONE ENCOUNTER
LOV: 6/24/2020    NOV: NONE     DOSE: 75 mcg     Frequency: Daily     Pharmacy as Pended:     Per LOV Note: Continue same dose     Per Last PHONE NOTE:         Lab Results   Component Value Date    TSH 2.90 05/19/2020    T4FREE 1.4 03/11/2020

## 2020-11-30 RX ORDER — DRONEDARONE 400 MG/1
TABLET, FILM COATED ORAL
Qty: 60 TABLET | Refills: 5 | Status: SHIPPED | OUTPATIENT
Start: 2020-11-30 | End: 2020-12-28 | Stop reason: SDUPTHER

## 2020-12-10 ENCOUNTER — OFFICE VISIT (OUTPATIENT)
Dept: CARDIOLOGY CLINIC | Age: 70
End: 2020-12-10
Payer: MEDICARE

## 2020-12-10 VITALS
HEART RATE: 60 BPM | BODY MASS INDEX: 28.51 KG/M2 | OXYGEN SATURATION: 99 % | HEIGHT: 69 IN | WEIGHT: 192.5 LBS | SYSTOLIC BLOOD PRESSURE: 100 MMHG | DIASTOLIC BLOOD PRESSURE: 70 MMHG

## 2020-12-10 PROCEDURE — G8484 FLU IMMUNIZE NO ADMIN: HCPCS | Performed by: INTERNAL MEDICINE

## 2020-12-10 PROCEDURE — G8427 DOCREV CUR MEDS BY ELIG CLIN: HCPCS | Performed by: INTERNAL MEDICINE

## 2020-12-10 PROCEDURE — 1123F ACP DISCUSS/DSCN MKR DOCD: CPT | Performed by: INTERNAL MEDICINE

## 2020-12-10 PROCEDURE — 3017F COLORECTAL CA SCREEN DOC REV: CPT | Performed by: INTERNAL MEDICINE

## 2020-12-10 PROCEDURE — G8417 CALC BMI ABV UP PARAM F/U: HCPCS | Performed by: INTERNAL MEDICINE

## 2020-12-10 PROCEDURE — 4040F PNEUMOC VAC/ADMIN/RCVD: CPT | Performed by: INTERNAL MEDICINE

## 2020-12-10 PROCEDURE — 1036F TOBACCO NON-USER: CPT | Performed by: INTERNAL MEDICINE

## 2020-12-10 PROCEDURE — 99214 OFFICE O/P EST MOD 30 MIN: CPT | Performed by: INTERNAL MEDICINE

## 2020-12-10 PROCEDURE — 93000 ELECTROCARDIOGRAM COMPLETE: CPT | Performed by: INTERNAL MEDICINE

## 2020-12-10 NOTE — PATIENT INSTRUCTIONS
RECOMMENDATIONS:  1. Continue taking dronedarone and metoprolol, declined refills today   2. Continue to be active  3.  Follow up with EP NP in 1 year

## 2020-12-10 NOTE — LETTER
[address]   Electrophysiology Note    Date: 12/10/20  Patient Name: Amanda Pugh  YOB: 1950     Chief Complaint: 6 Month Follow-Up and Atrial Fibrillation    Primary Care Physician: DARSHANA Cooper CNP      HISTORY OF PRESENT ILLNESS: Amanda Pugh is a 65 y.o. male who initially presented for follow up for paroxysmal atrial fibrillation. He has a history of GI bleed on Eliquis and has undergone cardioversion 6 times for AF at [facility]. Underwent Watchman device implantation on 12/18/2019. He underwent a cardioversion on 4/1/2020 for paroxsymal atrial fibrillation. Interval history since last office visit: His EKG today 12/10/20 demonstrates SR with HR 61 BPM. Today 12/10/20 he reports he is doing well from a cardiac standpoint. He reports he is taking his dronedarone and metoprolol as prescribed and tolerates them well. He reports he is active around his home and tolerates that well. Patient denies current edema, chest pain, sob, palpitations, dizziness or syncope. Past Medical History:   has a past medical history of Arthritis, Atrial fibrillation ([address] Hyperlipidemia, Hypertension, SCC (squamous cell carcinoma), Sleep apnea, and Systolic heart failure ([address] Past Surgical History:   has a past surgical history that includes Cardioversion (2005); Total hip arthroplasty (Left, 2013); Colonoscopy (19517645); and ablation of dysrhythmic focus. Social History:   reports that he has never smoked. He has never used smokeless tobacco. He reports that he does not drink alcohol or use drugs. Family History: family history includes Cancer in his mother; Heart Disease (age of onset: 79) in his father; High Blood Pressure in his mother. Allergies:  Patient has no known allergies. Home Medications:    Prior to Admission medications    Medication Sig Start Date End Date Taking?  Authorizing Provider MULTAQ 400 MG TABS TAKE 1 TABLET BY MOUTH TWICE DAILY WITH MEALS 11/30/20  Yes ADRIANA Carmichael MD   levothyroxine (SYNTHROID) 75 MCG tablet TAKE 1 TABLET BY MOUTH DAILY 11/20/20  Yes Torrie Mai MD   pravastatin (PRAVACHOL) 80 MG tablet TAKE 1 TABLET BY MOUTH DAILY 6/19/20  Yes DARSHANA Quijano CNP   lisinopril (PRINIVIL;ZESTRIL) 10 MG tablet Take 0.5 tablets by mouth daily 5/21/20  Yes DARSHANA Bartholomew CNP   metoprolol succinate (TOPROL XL) 50 MG extended release tablet TAKE 1& 1/2 TABLETS BY MOUTH EVERY DAY 5/21/20  Yes DARSHANA Bartholomew CNP   dorzolamide-timolol (COSOPT) 22.3-6.8 MG/ML ophthalmic solution INT 1 GTT INTO OU BID 4/14/20  Yes Historical Provider, MD   latanoprost (XALATAN) 0.005 % ophthalmic solution INT 1 GTT IN OU QD IN THE ANDREA 4/14/20  Yes Historical Provider, MD   omeprazole (PRILOSEC OTC) 20 MG tablet Take 20 mg by mouth daily   Yes Historical Provider, MD   aspirin 81 MG chewable tablet Take 1 tablet by mouth daily 11/20/19  Yes DARSHANA Toney CNP   Multiple Vitamins-Minerals (MULTIVITAMIN-MINERALS) TABS tablet Take 1 tablet by mouth daily 7/24/19  Yes DARSHANA Bartholomew CNP       REVIEW OF SYSTEMS:      All 14-point review of systems are completed and pertinent positives are mentioned in the history of present illness. Other systems are reviewed and are negative. Physical Examination:    /70   Pulse 60   Ht 5' 9\" (1.753 m)   Wt 192 lb 8 oz (87.3 kg)   SpO2 99%   BMI 28.43 kg/m²    Constitutional and General Appearance: alert, cooperative, no distress and appears stated age  HEENT: PERRL, no cervical lymphadenopathy. No masses palpable. Normal oral mucosa  Respiratory:  · Normal excursion and expansion without use of accessory muscles  · Resp Auscultation: Normal breath sounds without dullness or wheezing  Cardiovascular:  · The apical impulse is not displaced  · Heart tones are crisp and normal. irregular S1 and S2. Tachycardic. Abdomen:  · No masses or tenderness  · Bowel sounds present  Extremities:  ·  No Cyanosis or Clubbing  ·  Lower extremity edema: No  · Skin: Warm and dry  Neurological:  · Alert and oriented. · Moves all extremities well  · No abnormalities of mood, affect, memory, mentation, or behavior are noted    DATA:    EC/10/20: SR with HR 61 BPM    EC20: SR 63    ECG: 3/31/20: AF with RVR with     ECHO: 19: Summary   Ejection fraction is visually estimated to be 55-60 %. Mild mitral and tricuspid regurgitation. Watchman device is well seated within the left atrial appendage. No leak   around the device   Mild to moderate aortic regurgitation. [id number] Score for Atrial Fibrillation Stroke Risk   Risk   Factors  Component Value   C CHF No 0   H HTN Yes 1   A2 Age >= 76 No,  (71 y.o.) 0   D DM No 0   S2 Prior Stroke/TIA No 0   V Vascular Disease No 0   A Age 74-69 Yes,  (71 y.o.) 1   Sc Sex male 0    [id number]  Score  2   Score last updated 3/30/20 6:96 PM EDT    Click here for a link to the UpToDate guideline \"Atrial Fibrillation: Anticoagulation therapy to prevent embolization    Disclaimer: Risk Score calculation is dependent on accuracy of patient problem list and past encounter diagnosis. IMPRESSION:    1. Persistent atrial vnuidymeuohn-[id number] Score 2 (HTN and age)    Mr. Kathy Galicia is a pleasant 70-year-old male with a medical history significant for persistent atrial fibrillation status post ablation with Dr. Alvaro Vogel, hypertension, diabetes mellitus type 2, and nonischemic cardiomyopathy with recovered ejection fraction who presents from home with recent onset of atrial fibrillation with RVR. According to patient his symptoms started on  afternoon when he began to suffer from palpitations, malaise, lethargy, and fatigue. He presents now for evaluation.   We discussed rate control, rhythm control, AVN + pacemaker, and repeat atrial fibrillation ablation. We reviewed risks and benefits of each approach. We discussed side effects of class IC, class II, class III, and class IV antiarrhythmics. Patient would like to proceed with starting on dronedarone followed by cardioversion tomorrow. We will then schedule him for an atrial fibrillation ablation given that his heart rates are not well controlled and his blood pressures are soft and I am not sure if we will be able to control his heart rates with corey agents given his current hemodynamic status. All questions were answered. He is unable to afford his dronedarone we will have him start a load of amiodarone. 06/09/2020  Patient doing well. Able to afford drondarone. No more atrial fibrillation (symptomatic). No side effects from medications. Tolerating aspirin (has Watchman). No shortness of breath or palpitations. Discussed ablation. He is doing well and would like to continue current course. - Continue dronedarone. - Continue metoprolol.  - Continue aspirin.  - Follow up in 6 months or PRN. Will extend to yearly thereafter. 12/10/2020  Patient presents for follow-up. He is been doing well in general.  He said no more palpitations. We discussed ablation. At this point patient like to hold off. He may require cardioversions in the future and/or ablation.  - Continue dronedarone. - Continue metoprolol.  - Continue aspirin for Watchman.  - Follow up with EP NP in 1 year unless/until procedure/discussion required or PRN. 2.  Dizziness  Stable/resolved. - Plan as per above. RECOMMENDATIONS:  1. Continue taking dronedarone and metoprolol, declined refills today   2. Continue to be active  3. Follow up with EP NP in 1 year     QUALITY MEASURES  1. Tobacco Cessation Counseling: NA  2. Retake of BP if >140/90:   NA  3. Documentation to PCP/referring for new patient:  Sent to PCP at close of office visit  4.  CAD patient on anti-platelet: NA 5. CAD patient on STATIN therapy:  NA  6. Patient with CHF and aFib on anticoagulation:  Taken off of  All questions and concerns were addressed to the patient/family. Alternatives to my treatment were discussed. This note was scribed in the presence of Booker Arroyo MD by Emanuel Foster. Barbara Che RN. Dr. Phoenix Grace MD  Electrophysiology  [facility]. [address]. [address]. [place], [address]  Phone: [phone]  Fax: [phone]   12/10/2020     NOTE: This report was transcribed using voice recognition software. Every effort was made to ensure accuracy, however, inadvertent computerized transcription errors may be present. The scribe's documentation has been prepared under my direction and personally reviewed by me in its entirety. I confirm that the note above accurately reflects all work, physical examination, the discussion of treatments and procedures, and medical decision making performed by me. Aleksandr Vasquez MD personally performed the services described in this documentation as scribed by nurse in my presence, and is both accurate and complete.     Electronically signed by Cindy Dawkins MD on 12/10/2020 at 4:00 PM

## 2020-12-16 RX ORDER — PRAVASTATIN SODIUM 80 MG/1
80 TABLET ORAL DAILY
Qty: 90 TABLET | Refills: 1 | Status: SHIPPED | OUTPATIENT
Start: 2020-12-16 | End: 2021-06-30

## 2020-12-16 NOTE — TELEPHONE ENCOUNTER
Medication Refill    Medication needing refilled:  pravastatin (PRAVACHOL) 80 MG tablet       Dosage of the medication:    How are you taking this medication (QD, BID, TID, QID, PRN):  TAKE 1 TABLET BY MOUTH DAILY  30 or 90 day supply called in:  80  Which Pharmacy are we sending the medication to?:  [facility] in [address] [place] [address]

## 2021-01-20 ENCOUNTER — TELEPHONE (OUTPATIENT)
Dept: CARDIOLOGY CLINIC | Age: 71
End: 2021-01-20

## 2021-01-20 NOTE — TELEPHONE ENCOUNTER
I called patient. He is in ER. He will call with questions or concerns. Please let me know and I'll get back with you.

## 2021-03-19 ENCOUNTER — OFFICE VISIT (OUTPATIENT)
Dept: CARDIOLOGY CLINIC | Age: 71
End: 2021-03-19
Payer: MEDICARE

## 2021-03-19 VITALS
HEART RATE: 62 BPM | WEIGHT: 194 LBS | DIASTOLIC BLOOD PRESSURE: 82 MMHG | SYSTOLIC BLOOD PRESSURE: 132 MMHG | HEIGHT: 69 IN | BODY MASS INDEX: 28.73 KG/M2

## 2021-03-19 DIAGNOSIS — I10 ESSENTIAL HYPERTENSION: ICD-10-CM

## 2021-03-19 DIAGNOSIS — I48.0 PAROXYSMAL ATRIAL FIBRILLATION (HCC): Primary | ICD-10-CM

## 2021-03-19 PROBLEM — N17.9 AKI (ACUTE KIDNEY INJURY) (HCC): Status: RESOLVED | Noted: 2020-05-19 | Resolved: 2021-03-19

## 2021-03-19 PROBLEM — I50.22 CHRONIC SYSTOLIC HEART FAILURE (HCC): Status: RESOLVED | Noted: 2019-09-12 | Resolved: 2021-03-19

## 2021-03-19 PROBLEM — I42.9 CARDIOMYOPATHY (HCC): Status: RESOLVED | Noted: 2019-08-08 | Resolved: 2021-03-19

## 2021-03-19 PROCEDURE — 99214 OFFICE O/P EST MOD 30 MIN: CPT | Performed by: NURSE PRACTITIONER

## 2021-03-19 PROCEDURE — 1123F ACP DISCUSS/DSCN MKR DOCD: CPT | Performed by: NURSE PRACTITIONER

## 2021-03-19 PROCEDURE — G8417 CALC BMI ABV UP PARAM F/U: HCPCS | Performed by: NURSE PRACTITIONER

## 2021-03-19 PROCEDURE — 93000 ELECTROCARDIOGRAM COMPLETE: CPT | Performed by: NURSE PRACTITIONER

## 2021-03-19 PROCEDURE — G8427 DOCREV CUR MEDS BY ELIG CLIN: HCPCS | Performed by: NURSE PRACTITIONER

## 2021-03-19 PROCEDURE — G8484 FLU IMMUNIZE NO ADMIN: HCPCS | Performed by: NURSE PRACTITIONER

## 2021-03-19 PROCEDURE — 4040F PNEUMOC VAC/ADMIN/RCVD: CPT | Performed by: NURSE PRACTITIONER

## 2021-03-19 PROCEDURE — 3017F COLORECTAL CA SCREEN DOC REV: CPT | Performed by: NURSE PRACTITIONER

## 2021-03-19 PROCEDURE — 1036F TOBACCO NON-USER: CPT | Performed by: NURSE PRACTITIONER

## 2021-03-19 RX ORDER — LORATADINE 10 MG/1
10 CAPSULE, LIQUID FILLED ORAL DAILY
COMMUNITY

## 2021-03-19 RX ORDER — SOTALOL HYDROCHLORIDE 80 MG/1
80 TABLET ORAL 2 TIMES DAILY
Status: ON HOLD | COMMUNITY
End: 2021-10-14 | Stop reason: HOSPADM

## 2021-03-19 RX ORDER — ASPIRIN 325 MG
325 TABLET, DELAYED RELEASE (ENTERIC COATED) ORAL DAILY
Qty: 30 TABLET | Refills: 3 | COMMUNITY
Start: 2021-03-19 | End: 2021-05-18

## 2021-03-19 NOTE — LETTER
[facility]  Phone: [phone]  Fax: [phone]     Ely Bauer, APRN - CNP     3/19/2021     Faith Jefferson MD   No address on file     Patient: Adolfo Mcdaniel   MR Number: <[id number]>   YOB: 1950   Date of Visit: 3/19/2021     Dear Dr. Faith Jefferson     Today I saw our mutual patient named above. Below are the relevant portions of my assessment and plan of care. If you have questions, please do not hesitate to call me. I look forward to following Selma Costello along with you. [address]   Electrophysiology Outpatient Note              Date:  March 19, 2021  Patient name: Adolfo Mcdaniel  YOB: 1950    Primary Care physician: Faith Jefferson MD    HISTORY OF PRESENT ILLNESS: The patient is a 66 y.o.  male with a history of afib, AT, HTN, NICM, AI, GI bleed, BASSAM, and hypothyroidism. He had multiple CV then extensive RFCA for PAF and CTI 7/2019. In 11/2019 he had Watchman implanted. LUIZA 12/2019 showed an EF of 55-60%. Amiodarone was stopped 12/2019 due to hypothyroidism. Required CV for AF 4/2020. Was admitted in 5/2020 with symptomatic AT, was started on Multaq, and spontaneously converted to sinus. Most recent echo in 1/2021 showed an EF of 59-20% and diastolic dysfunction. He was admitted in 2/2021 in [place] with afib, was switched to sotalol, had a CV. Today he is being seen for afib. EKG shows SR with a HR of 61. Patient is feeling well and denies chest pain, palpitations, shortness of breath, and dizziness. Past Medical History:   has a past medical history of Arthritis, Atrial fibrillation ([address] Hyperlipidemia, Hypertension, SCC (squamous cell carcinoma), Sleep apnea, and Systolic heart failure ([address] Past Surgical History:   has a past surgical history that includes Cardioversion (2005); Total hip arthroplasty (Left, 2013); Colonoscopy ([id number]); and ablation of dysrhythmic focus.      Home Negative. Musculoskeletal: Negative. Skin: Negative. Neurological: Negative. Hematological: Negative. Psychiatric/Behavioral: Negative. PHYSICAL EXAM:    Vital signs:    /82   Pulse 62   Ht 5' 9\" (1.753 m)   Wt 194 lb (88 kg)   BMI 28.65 kg/m²      Constitutional and general appearance: alert, cooperative, no distress and appears stated age  [de-identified]: PERRL, no cervical lymphadenopathy. No masses palpable. Normal oral mucosa  Respiratory:  · Normal excursion and expansion without use of accessory muscles  · Resp auscultation: Normal breath sounds without wheezing, rhonchi, and rales  Cardiovascular:  · The apical impulse is not displaced  · Heart tones are crisp and normal. regular S1 and S2.  · Jugular venous pulsation Normal  · The carotid upstroke is normal in amplitude and contour without delay or bruit  · Peripheral pulses are symmetrical and full   Abdomen:  · No masses or tenderness  · Bowel sounds present  Extremities:  ·  No cyanosis or clubbing  ·  No lower extremity edema  ·  Skin: warm and dry  Neurological:  · Alert and oriented  · Moves all extremities well  · No abnormalities of mood, affect, memory, mentation, or behavior are noted    DATA:    ECG 3/19/2021:  SR HR 61    Echo 1/22/2021 [facility]):    A complete echo was performed using color flow Doppler, spectral Doppler, M-mode and echo contrast.    Lumason contrast was used during the study.   Left ventricle cavity appears normal.    Left ventricular wall thickness is normal.    The left ventricular EF by visual approximation is 55-60%. Normal left ventricular systolic function.   Left ventricular wall motion is within normal limits.   There is grade III left ventricular diastolic dysfunction and elevated   left atrial pressure.   Left atrium cavity is mildly dilated. Harika Bodily is mild aortic valve regurgitation.   There is mild mitral valve regurgitation.   There is mild pulmonary hypertension.  The estimated right ventricular systolic pressure is 80.5 mmHg.   No previous echocardiogram available to compare. LUIZA 12/18/2019:  Ejection fraction is visually estimated to be 55-60 %. Mild mitral and tricuspid regurgitation. Watchman device is well seated within the left atrial appendage. No leak around the device  Mild to moderate aortic regurgitation. All labs and testing reviewed. CARDIOLOGY LABS:   CBC: No results for input(s): WBC, HGB, HCT, PLT in the last 72 hours. BMP: No results for input(s): NA, K, CO2, BUN, CREATININE, LABGLOM, GLUCOSE in the last 72 hours. PT/INR: No results for input(s): PROTIME, INR in the last 72 hours. APTT:No results for input(s): APTT in the last 72 hours. FASTING LIPID PANEL:  Lab Results   Component Value Date    HDL 63 12/11/2017    HDL 61 10/21/2011    LDLCALC 112 12/11/2017    TRIG 93 12/11/2017     LIVER PROFILE:No results for input(s): AST, ALT, ALB in the last 72 hours. Assessment:   1. Paroxysmal atrial fibrillation/AT: stable              -Multaq started 3/2020 (acquired hypothyroidism on amiodarone)   -Multaq was switched to sotalol 2/2021 due to ineffectiveness               -s/p multiple CV then extensive RFCA for PAF and CTI 7/2019               -s/p CV 4/2020 and 2/2021              -s/p Watchman implant 11/2019  2. HTN: controlled   3. Nonischemic cardiomyopathy: resolved   4. AI: mild on echo 1/2021  5. History of GI bleed  6. BASSAM: uses CPAP  7. Acquired hypothyroidism: on Synthroid               -amiodarone stopped 12/2019    Plan:   1. Continue sotalol and lisinopril  2. Increase ASA to 325mg daily   3. Consider repeat ablation if sotalol is not effective  4.  Follow up in 3 months to Dr. Bhavna Fernandez to determine if repeat ablation is indicated     MDM: moderate     Raymundo Mar [address]  [phone]            Sincerely,      Raymundo Mar, APRN - CNP

## 2021-03-19 NOTE — PROGRESS NOTES
[address]   Electrophysiology Outpatient Note              Date:  March 19, 2021  Patient name: Randi Saeed  YOB: 1950    Primary Care physician: Alicia Ram MD    HISTORY OF PRESENT ILLNESS: The patient is a 66 y.o.  male with a history of afib, AT, HTN, NICM, AI, GI bleed, BASSAM, and hypothyroidism. He had multiple CV then extensive RFCA for PAF and CTI 7/2019. In 11/2019 he had Watchman implanted. LUIZA 12/2019 showed an EF of 55-60%. Amiodarone was stopped 12/2019 due to hypothyroidism. Required CV for AF 4/2020. Was admitted in 5/2020 with symptomatic AT, was started on Multaq, and spontaneously converted to sinus. Most recent echo in 1/2021 showed an EF of 80-81% and diastolic dysfunction. He was admitted in 2/2021 in [place] with afib, was switched to sotalol, had a CV. Today he is being seen for afib. EKG shows SR with a HR of 61. Patient is feeling well and denies chest pain, palpitations, shortness of breath, and dizziness. Past Medical History:   has a past medical history of Arthritis, Atrial fibrillation ([address] Hyperlipidemia, Hypertension, SCC (squamous cell carcinoma), Sleep apnea, and Systolic heart failure ([address] Past Surgical History:   has a past surgical history that includes Cardioversion (2005); Total hip arthroplasty (Left, 2013); Colonoscopy (60402952); and ablation of dysrhythmic focus. Home Medications:    Prior to Admission medications    Medication Sig Start Date End Date Taking?  Authorizing Provider   sotalol (BETAPACE) 80 MG tablet Take 80 mg by mouth 2 times daily   Yes Historical Provider, MD   loratadine (CLARITIN) 10 MG capsule Take 10 mg by mouth daily   Yes Historical Provider, MD   pravastatin (PRAVACHOL) 80 MG tablet Take 1 tablet by mouth daily 12/16/20  Yes DARSHANA Shaw - CNP   levothyroxine (SYNTHROID) 75 MCG tablet TAKE 1 TABLET BY MOUTH DAILY 11/20/20  Yes Edouard Garcia MD   lisinopril (PRINIVIL;ZESTRIL) 10 MG tablet Take 0.5 tablets by mouth daily 5/21/20  Yes DARSHANA Daniel CNP   dorzolamide-timolol (COSOPT) 22.3-6.8 MG/ML ophthalmic solution INT 1 GTT INTO OU BID 4/14/20  Yes Historical Provider, MD   latanoprost (XALATAN) 0.005 % ophthalmic solution INT 1 GTT IN OU QD IN THE ANDREA 4/14/20  Yes Historical Provider, MD   omeprazole (PRILOSEC OTC) 20 MG tablet Take 20 mg by mouth daily   Yes Historical Provider, MD   aspirin 81 MG chewable tablet Take 1 tablet by mouth daily 11/20/19  Yes DARSHANA Kaur CNP   Multiple Vitamins-Minerals (MULTIVITAMIN-MINERALS) TABS tablet Take 1 tablet by mouth daily 7/24/19  Yes DARSHANA Daniel CNP   metoprolol succinate (TOPROL XL) 50 MG extended release tablet TAKE 1& 1/2 TABLETS BY MOUTH EVERY DAY  Patient not taking: Reported on 3/19/2021 5/21/20   DARSHANA Daniel CNP       Allergies:  Patient has no known allergies. Social History:   reports that he has never smoked. He has never used smokeless tobacco. He reports that he does not drink alcohol or use drugs. Family History: family history includes Cancer in his mother; Heart Disease (age of onset: 79) in his father; High Blood Pressure in his mother. Review of Systems   Constitutional: Negative. HENT: Negative. Eyes: Negative. Respiratory: Negative. Cardiovascular: see HPI  Gastrointestinal: Negative. Genitourinary: Negative. Musculoskeletal: Negative. Skin: Negative. Neurological: Negative. Hematological: Negative. Psychiatric/Behavioral: Negative. PHYSICAL EXAM:    Vital signs:    /82   Pulse 62   Ht 5' 9\" (1.753 m)   Wt 194 lb (88 kg)   BMI 28.65 kg/m²      Constitutional and general appearance: alert, cooperative, no distress and appears stated age  [de-identified]: PERRL, no cervical lymphadenopathy. No masses palpable.  Normal oral mucosa  Respiratory:  · Normal excursion and expansion without use of accessory muscles  · Resp auscultation: Normal breath sounds without wheezing, rhonchi, and rales  Cardiovascular:  · The apical impulse is not displaced  · Heart tones are crisp and normal. regular S1 and S2.  · Jugular venous pulsation Normal  · The carotid upstroke is normal in amplitude and contour without delay or bruit  · Peripheral pulses are symmetrical and full   Abdomen:  · No masses or tenderness  · Bowel sounds present  Extremities:  ·  No cyanosis or clubbing  ·  No lower extremity edema  ·  Skin: warm and dry  Neurological:  · Alert and oriented  · Moves all extremities well  · No abnormalities of mood, affect, memory, mentation, or behavior are noted    DATA:    ECG 3/19/2021:  SR HR 61    Echo 1/22/2021 [facility]):    A complete echo was performed using color flow Doppler, spectral Doppler, M-mode and echo contrast.    Lumason contrast was used during the study.   Left ventricle cavity appears normal.    Left ventricular wall thickness is normal.    The left ventricular EF by visual approximation is 55-60%. Normal left ventricular systolic function.   Left ventricular wall motion is within normal limits.   There is grade III left ventricular diastolic dysfunction and elevated   left atrial pressure.   Left atrium cavity is mildly dilated. Raman Reek is mild aortic valve regurgitation.   There is mild mitral valve regurgitation.   There is mild pulmonary hypertension. The estimated right ventricular systolic pressure is 12.0 mmHg.   No previous echocardiogram available to compare. LUIZA 12/18/2019:  Ejection fraction is visually estimated to be 55-60 %. Mild mitral and tricuspid regurgitation. Watchman device is well seated within the left atrial appendage. No leak around the device  Mild to moderate aortic regurgitation. All labs and testing reviewed. CARDIOLOGY LABS:   CBC: No results for input(s): WBC, HGB, HCT, PLT in the last 72 hours.   BMP: No results for input(s): NA, K, CO2, BUN, CREATININE, LABGLOM, GLUCOSE in the last 72 hours. PT/INR: No results for input(s): PROTIME, INR in the last 72 hours. APTT:No results for input(s): APTT in the last 72 hours. FASTING LIPID PANEL:  Lab Results   Component Value Date    HDL 63 12/11/2017    HDL 61 10/21/2011    LDLCALC 112 12/11/2017    TRIG 93 12/11/2017     LIVER PROFILE:No results for input(s): AST, ALT, ALB in the last 72 hours. Assessment:   1. Paroxysmal atrial fibrillation/AT: stable              -Multaq started 3/2020 (acquired hypothyroidism on amiodarone)   -Multaq was switched to sotalol 2/2021 due to ineffectiveness               -s/p multiple CV then extensive RFCA for PAF and CTI 7/2019               -s/p CV 4/2020 and 2/2021              -s/p Watchman implant 11/2019  2. HTN: controlled   3. Nonischemic cardiomyopathy: resolved   4. AI: mild on echo 1/2021  5. History of GI bleed  6. BASSAM: uses CPAP  7. Acquired hypothyroidism: on Synthroid               -amiodarone stopped 12/2019    Plan:   1. Continue sotalol and lisinopril  2. Increase ASA to 325mg daily   3. Consider repeat ablation if sotalol is not effective  4.  Follow up in 3 months to Dr. Posey Cooks to determine if repeat ablation is indicated     MDM: moderate     Alan Sexton, [address]  [phone]

## 2021-04-20 ENCOUNTER — TELEPHONE (OUTPATIENT)
Dept: CARDIOLOGY CLINIC | Age: 71
End: 2021-04-20

## 2021-04-20 NOTE — TELEPHONE ENCOUNTER
I called patient and left VM. Happy to see him in clinic however I think would be reasonable to plan for cardioversion and schedule for ablation.

## 2021-04-20 NOTE — TELEPHONE ENCOUNTER
[address] [facility],[address] please advise. Pt can be reached at [phone].       TY

## 2021-04-20 NOTE — TELEPHONE ENCOUNTER
Patient called stating he has been having frequent episodes of Afib and would like to discuss havng an ablation with agk.  Patient states he is currently in Afib,

## 2021-04-20 NOTE — TELEPHONE ENCOUNTER
Spoke with patient. Patient is scheduled with Dr. Fior Chand for Cardioversion on 4/22/21 at [facility], arrival time of 11:30am to the Cath Lab. Please have patient arrive to the main entrance of [facility] and check in with the registration desk. Please call patient regarding medication instructions. Remind patient to be NPO after midnight (8 hours prior). Do not apply lotions/creams on skin the day of procedure. COVID testing - not needed.

## 2021-04-22 NOTE — TELEPHONE ENCOUNTER
Patient called in to cancel CV for today. States he went back into normal rhythm last night. Cath Lab notified.

## 2021-05-18 ENCOUNTER — OFFICE VISIT (OUTPATIENT)
Dept: FAMILY MEDICINE CLINIC | Age: 71
End: 2021-05-18
Payer: MEDICARE

## 2021-05-18 VITALS
HEIGHT: 69 IN | TEMPERATURE: 97.6 F | BODY MASS INDEX: 29.06 KG/M2 | HEART RATE: 60 BPM | DIASTOLIC BLOOD PRESSURE: 68 MMHG | WEIGHT: 196.2 LBS | SYSTOLIC BLOOD PRESSURE: 116 MMHG | OXYGEN SATURATION: 99 %

## 2021-05-18 DIAGNOSIS — Z01.818 PRE-OP EVALUATION: Primary | ICD-10-CM

## 2021-05-18 DIAGNOSIS — H26.9 CATARACT OF RIGHT EYE, UNSPECIFIED CATARACT TYPE: ICD-10-CM

## 2021-05-18 DIAGNOSIS — H40.9 GLAUCOMA OF RIGHT EYE, UNSPECIFIED GLAUCOMA TYPE: ICD-10-CM

## 2021-05-18 PROBLEM — Z79.01 CHRONIC ANTICOAGULATION: Status: ACTIVE | Noted: 2018-05-02

## 2021-05-18 PROBLEM — R07.89 ATYPICAL CHEST PAIN: Status: ACTIVE | Noted: 2021-01-20

## 2021-05-18 PROBLEM — K92.2 GI BLEED: Status: ACTIVE | Noted: 2019-06-07

## 2021-05-18 PROBLEM — K21.9 GASTROESOPHAGEAL REFLUX DISEASE WITHOUT ESOPHAGITIS: Status: ACTIVE | Noted: 2021-01-20

## 2021-05-18 PROBLEM — D50.0 ANEMIA DUE TO CHRONIC BLOOD LOSS: Status: ACTIVE | Noted: 2021-05-18

## 2021-05-18 PROBLEM — Z96.89 S/P PLACEMENT OF VNS (VAGUS NERVE STIMULATION) DEVICE: Status: ACTIVE | Noted: 2021-04-29

## 2021-05-18 PROBLEM — E78.00 ELEVATED SERUM CHOLESTEROL: Status: ACTIVE | Noted: 2021-05-18

## 2021-05-18 PROBLEM — Z51.81 ENCOUNTER FOR MONITORING SOTALOL THERAPY: Status: ACTIVE | Noted: 2018-05-02

## 2021-05-18 PROBLEM — M15.9 GENERALIZED OSTEOARTHRITIS: Status: ACTIVE | Noted: 2019-04-19

## 2021-05-18 PROBLEM — K90.9 INTESTINAL MALABSORPTION: Status: ACTIVE | Noted: 2021-05-18

## 2021-05-18 PROBLEM — D47.3 ESSENTIAL THROMBOCYTOSIS (HCC): Status: ACTIVE | Noted: 2019-04-19

## 2021-05-18 PROBLEM — E78.9 ELEVATED SERUM CHOLESTEROL: Status: ACTIVE | Noted: 2021-05-18

## 2021-05-18 PROBLEM — I10 BENIGN ESSENTIAL HYPERTENSION: Status: ACTIVE | Noted: 2021-01-20

## 2021-05-18 PROBLEM — Z79.899 ENCOUNTER FOR MONITORING SOTALOL THERAPY: Status: ACTIVE | Noted: 2018-05-02

## 2021-05-18 PROBLEM — E66.3 OVERWEIGHT WITH BODY MASS INDEX (BMI) 25.0-29.9: Status: ACTIVE | Noted: 2020-03-11

## 2021-05-18 PROBLEM — J31.0 RHINITIS, CHRONIC: Status: ACTIVE | Noted: 2021-04-29

## 2021-05-18 PROBLEM — Z87.39 HISTORY OF SERONEGATIVE INFLAMMATORY ARTHRITIS: Status: ACTIVE | Noted: 2019-04-19

## 2021-05-18 PROCEDURE — G8427 DOCREV CUR MEDS BY ELIG CLIN: HCPCS | Performed by: NURSE PRACTITIONER

## 2021-05-18 PROCEDURE — 3017F COLORECTAL CA SCREEN DOC REV: CPT | Performed by: NURSE PRACTITIONER

## 2021-05-18 PROCEDURE — 4040F PNEUMOC VAC/ADMIN/RCVD: CPT | Performed by: NURSE PRACTITIONER

## 2021-05-18 PROCEDURE — G8417 CALC BMI ABV UP PARAM F/U: HCPCS | Performed by: NURSE PRACTITIONER

## 2021-05-18 PROCEDURE — 99214 OFFICE O/P EST MOD 30 MIN: CPT | Performed by: NURSE PRACTITIONER

## 2021-05-18 PROCEDURE — 1036F TOBACCO NON-USER: CPT | Performed by: NURSE PRACTITIONER

## 2021-05-18 PROCEDURE — 1123F ACP DISCUSS/DSCN MKR DOCD: CPT | Performed by: NURSE PRACTITIONER

## 2021-05-18 RX ORDER — PREDNISOLONE ACETATE 10 MG/ML
SUSPENSION/ DROPS OPHTHALMIC
COMMUNITY
Start: 2021-04-01 | End: 2021-05-18

## 2021-05-18 RX ORDER — CEPHALEXIN 250 MG/1
CAPSULE ORAL
COMMUNITY
Start: 2021-03-29 | End: 2021-05-18

## 2021-05-18 RX ORDER — MOXIFLOXACIN 5 MG/ML
SOLUTION/ DROPS OPHTHALMIC
COMMUNITY
Start: 2021-04-01

## 2021-05-18 RX ORDER — HYDROCODONE BITARTRATE AND ACETAMINOPHEN 5; 325 MG/1; MG/1
TABLET ORAL
COMMUNITY
Start: 2021-03-29 | End: 2021-05-18

## 2021-05-18 RX ORDER — DORZOLAMIDE HCL 20 MG/ML
1 SOLUTION/ DROPS OPHTHALMIC 2 TIMES DAILY
COMMUNITY
End: 2021-05-18

## 2021-05-18 RX ORDER — ASPIRIN 325 MG
TABLET ORAL
COMMUNITY
Start: 2021-03-29 | End: 2021-05-18

## 2021-05-18 ASSESSMENT — PATIENT HEALTH QUESTIONNAIRE - PHQ9
SUM OF ALL RESPONSES TO PHQ QUESTIONS 1-9: 0
SUM OF ALL RESPONSES TO PHQ QUESTIONS 1-9: 0
2. FEELING DOWN, DEPRESSED OR HOPELESS: 0
SUM OF ALL RESPONSES TO PHQ9 QUESTIONS 1 & 2: 0

## 2021-05-18 NOTE — PROGRESS NOTES
for this visit. Allergies:  Patient has no known allergies. History of allergic reaction to anesthesia:  No    Planned Anesthesia: MAC  Bleeding risk: None  Personal of FH of DVT/PE: No  Patient objection to blood products: No       Social History     Tobacco Use   Smoking Status Never Smoker   Smokeless Tobacco Never Used     The patient states he drinks a few beers  per week. Family History   Problem Relation Age of Onset    Cancer Mother         BONE    High Blood Pressure Mother     Heart Disease Father 79       REVIEW OF SYSTEMS:      Review of Systems    PHYSICAL EXAM:      /68   Pulse 60   Temp 97.6 °F (36.4 °C)   Ht 5' 9\" (1.753 m)   Wt 196 lb 3.2 oz (89 kg)   SpO2 99%   BMI 28.97 kg/m²     CONSTITUTIONAL:  awake, alert, cooperative, no apparent distress, and appears stated age    Eyes:  Lids and lashes normal, pupils equal, round and reactive to light, extra ocular muscles intact, sclera clear, conjunctiva normal    Head/ENT:  Normocephalic, without obvious abnormality, atramatic, external ears without lesions, oral pharynx with moist mucus membranes, tonsils without erythema or exudates, gums normal and good dentition. Neck:  Supple, symmetrical, trachea midline, no adenopathy, thyroid symmetric,not enlarged and no tenderness, skin normal, No carotid bruit    Heart:  Normal apical impulse, regular rate and rhythm, normal S1 and S2, no S3or S4, and no murmur noted    Lungs:  No increased work of breathing, good air exchange, clear to auscultation bilaterally, no crackles or wheezing    Abdomen:  No scars, normal bowel sounds, soft, non-distended, non-tender, no masses palpated, no hepatosplenomegally    Extremities:  No clubbing, cyanosis, or edema    NEUROLOGIC:  Awake, alert,oriented to name, place and time. Cranial nerves II-XII are grossly intact. Motor is 5 out of 5 bilaterally.         DATA:  EKG:  Date:  3-19=21  I have reviewed EKG with the following

## 2021-06-01 ENCOUNTER — TELEPHONE (OUTPATIENT)
Dept: CARDIOLOGY CLINIC | Age: 71
End: 2021-06-01

## 2021-06-01 NOTE — TELEPHONE ENCOUNTER
[facility] OOT. [address] [facility] [address] please advise. Pt states he is in  AFIb, and needs a CV. Pt is having symptoms of palps, dizziness (almost passing out), fatigue. Pt's  current /76, . Pt's last OV 3/19/21. Pt is currently taking Sotalol 80 mg Bid, Lisinopril 5 mg  Daily, and  mg daily. Pt has a f/u 6/22/21 AGK. [address] [facility],[address] please advise. Pt can be reached at [phone].       TY

## 2021-06-02 NOTE — TELEPHONE ENCOUNTER
Katelin please advise. Per [address] pt needs to scheduled a CV due to pt being back in AFIB. [address] said he can be scheduled  for either Thursday 6/3/21 or Friday 6/4/21. Pt can be reached at [phone].       TY

## 2021-06-02 NOTE — TELEPHONE ENCOUNTER
Alternative medication would be reasonable but can't increase on his own. If he would like to change medications then lets set up with EPNP next week. Thanks.

## 2021-06-02 NOTE — TELEPHONE ENCOUNTER
Spoke with patient. He is back in rhythm. Asking if maybe he should up the dosage of sotalol or change medication? He is taking 80. He breaks them in half and takes 120 and gets back in rhythm. Please advise.

## 2021-06-03 ENCOUNTER — OFFICE VISIT (OUTPATIENT)
Dept: CARDIOLOGY CLINIC | Age: 71
End: 2021-06-03
Payer: MEDICARE

## 2021-06-03 VITALS
OXYGEN SATURATION: 99 % | SYSTOLIC BLOOD PRESSURE: 102 MMHG | BODY MASS INDEX: 28.44 KG/M2 | WEIGHT: 192 LBS | DIASTOLIC BLOOD PRESSURE: 62 MMHG | HEIGHT: 69 IN | HEART RATE: 60 BPM

## 2021-06-03 DIAGNOSIS — I48.0 PAROXYSMAL ATRIAL FIBRILLATION (HCC): Primary | ICD-10-CM

## 2021-06-03 PROCEDURE — 93000 ELECTROCARDIOGRAM COMPLETE: CPT | Performed by: NURSE PRACTITIONER

## 2021-06-03 PROCEDURE — 99215 OFFICE O/P EST HI 40 MIN: CPT | Performed by: NURSE PRACTITIONER

## 2021-06-03 NOTE — PATIENT INSTRUCTIONS
Continue current medications     Office to call to schedule for second AF ablation     Will discuss starting Tikosyn at this time with Dr. Shruthi Gayle     We will arrange follow up

## 2021-06-03 NOTE — PROGRESS NOTES
[address]   Electrophysiology Outpatient Note              Date:  Jessie 3, 2021  Patient name: Sidra Corbin  YOB: 1950    Primary Care physician: DARSHANA Cassidy CNP    HISTORY OF PRESENT ILLNESS: The patient is a 66 y.o.  male with a history of AF ,HTN, NICM, AI, GIB, BASSAM and hypothyroidism. He has had multiple CV then extensive RFCA for PAF and CTI 7/2019. In 11/2019, he had underwent Watchman device implantation. In 12/2019, LUIZA showed an EF 55-60%. In 12/2019, amiodarone was stopped due to hypothyroidism. In 4/2020, required DCCV for AF. In 5/2020, was admitted for symptomatic AT. He was started on Multaq and converted to sinus. In 1/2021, he was in [place] and had an echo that showed EF 90-21% and diastolic dysfunction. In 2/2021, he was admitted in [place] for AF, was switched to Sotalol and had a DCCV. Today he is being seen for AF. EKG shows SR with a HR of 61. Patient complains of three episodes since last CV (2/2021) of atrial fibrillation. He experiences some dizziness and palpitations with this. Episodes typically last about 48 hours. He reports taking extra Sotalol for this. Currently, he denies chest pain, palpitations, shortness of breath, and dizziness. Past Medical History:   has a past medical history of Arthritis, Atrial fibrillation ([address] Hyperlipidemia, Hypertension, SCC (squamous cell carcinoma), Sleep apnea, and Systolic heart failure ([address] Past Surgical History:   has a past surgical history that includes Cardioversion (2005); Total hip arthroplasty (Left, 2013); Colonoscopy ([id number]); and ablation of dysrhythmic focus. Home Medications:    Prior to Admission medications    Medication Sig Start Date End Date Taking?  Authorizing Provider   moxifloxacin (VIGAMOX) 0.5 % ophthalmic solution INSTILL 1 DROP IN LEFT EYE FOUR TIMES DAILY AFTER SURGERY 4/1/21  Yes Historical Provider, MD   lisinopril (PRINIVIL;ZESTRIL) 5 MG tablet Take 1 tablet by mouth daily 3/25/21  Yes DARSHANA Segura CNP   sotalol (BETAPACE) 80 MG tablet Take 80 mg by mouth 2 times daily   Yes Historical Provider, MD   loratadine (CLARITIN) 10 MG capsule Take 10 mg by mouth daily   Yes Historical Provider, MD   pravastatin (PRAVACHOL) 80 MG tablet Take 1 tablet by mouth daily 12/16/20  Yes DARSHANA Garcia CNP   levothyroxine (SYNTHROID) 75 MCG tablet TAKE 1 TABLET BY MOUTH DAILY 11/20/20  Yes Yanna Adair MD   dorzolamide-timolol (COSOPT) 22.3-6.8 MG/ML ophthalmic solution INT 1 GTT INTO OU BID 4/14/20  Yes Historical Provider, MD   latanoprost (XALATAN) 0.005 % ophthalmic solution INT 1 GTT IN OU QD IN THE ANDREA 4/14/20  Yes Historical Provider, MD   omeprazole (PRILOSEC OTC) 20 MG tablet Take 20 mg by mouth daily   Yes Historical Provider, MD   Multiple Vitamins-Minerals (MULTIVITAMIN-MINERALS) TABS tablet Take 1 tablet by mouth daily 7/24/19  Yes DARSHANA Segura CNP       Allergies:  Patient has no known allergies. Social History:   reports that he has never smoked. He has never used smokeless tobacco. He reports that he does not drink alcohol and does not use drugs. Family History: family history includes Cancer in his mother; Heart Disease (age of onset: 79) in his father; High Blood Pressure in his mother. All 14 point review of systems are completed and pertinent positives are mentioned in the history of present illness. Other systems are reviewed and are negative. PHYSICAL EXAM:    Vital signs:    /62   Pulse 60   Ht 5' 9\" (1.753 m)   Wt 192 lb (87.1 kg)   SpO2 99%   BMI 28.35 kg/m²      Constitutional and general appearance: alert, cooperative, no distress and appears stated age  [de-identified]: PERRL, no cervical lymphadenopathy. No masses palpable.  Normal oral mucosa  Respiratory:  · Normal excursion and expansion without use of accessory muscles  · Resp auscultation: Normal breath sounds without wheezing, rhonchi, and rales  Cardiovascular:  · The apical impulse is not displaced  · Heart tones are crisp and normal. regular S1 and S2.  · Jugular venous pulsation Normal  · The carotid upstroke is normal in amplitude and contour without delay or bruit  · Peripheral pulses are symmetrical and full   Abdomen:  · No masses or tenderness  · Bowel sounds present  Extremities:  ·  No cyanosis or clubbing  ·  No lower extremity edema  ·  Skin: warm and dry  Neurological:  · Alert and oriented  · Moves all extremities well  · No abnormalities of mood, affect, memory, mentation, or behavior are noted    DATA:    ECG 6 /2/2021:  SR 61 bpm        Echo 1/22/2021 [facility]    A complete echo was performed using color flow Doppler, spectral Doppler, M-mode and echo contrast.    Lumason contrast was used during the study.   Left ventricle cavity appears normal.    Left ventricular wall thickness is normal.    The left ventricular EF by visual approximation is 55-60%. Normal left ventricular systolic function.   Left ventricular wall motion is within normal limits.   There is grade III left ventricular diastolic dysfunction and elevated   left atrial pressure.   Left atrium cavity is mildly dilated. [place] is mild aortic valve regurgitation.   There is mild mitral valve regurgitation.   There is mild pulmonary hypertension. The estimated right ventricular systolic pressure is 21.3 mmHg.   No previous echocardiogram available to compare.     LUIZA 12/18/2019:  Ejection fraction is visually estimated to be 55-60 %. Mild mitral and tricuspid regurgitation. Watchman device is well seated within the left atrial appendage. No leak around the device  Mild to moderate aortic regurgitation.       All labs and testing reviewed. CARDIOLOGY LABS:   CBC: No results for input(s): WBC, HGB, HCT, PLT in the last 72 hours. BMP: No results for input(s): NA, K, CO2, BUN, CREATININE, LABGLOM, GLUCOSE in the last 72 hours.   PT/INR: No results for input(s): PROTIME, INR in the last 72 hours. APTT:No results for input(s): APTT in the last 72 hours. FASTING LIPID PANEL:  Lab Results   Component Value Date    HDL 63 12/11/2017    HDL 61 10/21/2011    LDLCALC 112 12/11/2017    TRIG 93 12/11/2017     LIVER PROFILE:No results for input(s): AST, ALT, ALB in the last 72 hours. IMPRESSION:    Assessment:   Symptomatic paroxsymal atrial fibrillation/AT: stable   -s/p multiple CV, most recent 2/2021   -s/p Watchman 11/2019   -s/p RFCA for PAF and CTI 7/2019   -failed amiodarone due to acquired hypothyroidism   -Multaq switched to sotalol due to ineffectiveness   -occasional AF on sotalol  HTN: controlled   NICM: resolved  AI: mild on echo 2/2021 [facility])  History of GIB  BASSAM: compliant with CPAP  Hypothyroid (amiodarone)        Plan:   Continue ASA (s/p Watchman), Sotalol and lisinopril  Instructed not take additional Sotalol   Office to contact to schedule repeat AF ablation with Dr. Yobany Worley and possible Tikosyn loading afterwards. Will need to stop sotalol 3 days prior to [facility] admission. Medications and plan discussed with Dr. Yobany Worley. We will arrange for follow up after procedure or sooner if needed.     PETR Lopez, APRN-CNP  [facility]  [phone]

## 2021-06-16 NOTE — PROGRESS NOTES
[facility]   Electrophysiology Note    Date: 6/22/21  Patient Name: Mae Burden  YOB: 1950     Chief Complaint: Follow-up (2 months. No current cardiac symptoms or conerns to report at this time.), Atrial Fibrillation, and Other Damir Mcgrath)    Primary Care Physician: DARSHANA Mehta - ZORAN      HISTORY OF PRESENT ILLNESS: Mae Burden is a 66 y.o. male who initially presented for follow up for paroxysmal atrial fibrillation. He has a history of GI bleed on Eliquis and has undergone cardioversion 6 times for AF at [facility]. Underwent Watchman device implantation on 12/18/2019. He underwent a cardioversion on 4/1/2020 for paroxsymal atrial fibrillation. His EKG on 12/10/20 demonstrated SR with HR 61 BPM. On 12/10/20 he reported he is doing well from a cardiac standpoint. He reported he is taking his dronedarone and metoprolol as prescribed and tolerates them well. He reported that he is active around his home and tolerates that well. Interval History since last office visit: In February, 2021, while patient was in [place], he went to ED there for AF with RVR. He underwent a CV while in [place] and he was switched from Multaq to sotalol. Today, 6/22/2021, ECG demonstrates SB (57). He reports that he is doing well today. He reports that he has been in and out of AF since January and that he experiences near syncope and dizziness when he is in AF. He reports that he is taking his medications as prescribed and tolerates them well. Patient denies current edema, chest pain, sob, palpitations, dizziness or syncope. Past Medical History:   has a past medical history of Arthritis, Atrial fibrillation ([address] Hyperlipidemia, Hypertension, SCC (squamous cell carcinoma), Sleep apnea, and Systolic heart failure ([address] Past Surgical History:   has a past surgical history that includes Cardioversion (2005); Total hip arthroplasty (Left, 2013);  Colonoscopy (22636800); and ablation of dysrhythmic focus. Social History:   reports that he has never smoked. He has never used smokeless tobacco. He reports that he does not drink alcohol and does not use drugs. Family History: family history includes Cancer in his mother; Heart Disease (age of onset: 79) in his father; High Blood Pressure in his mother. Allergies:  Patient has no known allergies. Home Medications:    Prior to Admission medications    Medication Sig Start Date End Date Taking? Authorizing Provider   moxifloxacin (VIGAMOX) 0.5 % ophthalmic solution INSTILL 1 DROP IN LEFT EYE FOUR TIMES DAILY AFTER SURGERY 4/1/21  Yes Historical Provider, MD   lisinopril (PRINIVIL;ZESTRIL) 5 MG tablet Take 1 tablet by mouth daily 3/25/21  Yes DARSHANA hCen CNP   sotalol (BETAPACE) 80 MG tablet Take 80 mg by mouth 2 times daily   Yes Historical Provider, MD   loratadine (CLARITIN) 10 MG capsule Take 10 mg by mouth daily   Yes Historical Provider, MD   pravastatin (PRAVACHOL) 80 MG tablet Take 1 tablet by mouth daily 12/16/20  Yes DARSHANA Patel CNP   levothyroxine (SYNTHROID) 75 MCG tablet TAKE 1 TABLET BY MOUTH DAILY 11/20/20  Yes Samantha Batlazar MD   dorzolamide-timolol (COSOPT) 22.3-6.8 MG/ML ophthalmic solution INT 1 GTT INTO OU BID 4/14/20  Yes Historical Provider, MD   latanoprost (XALATAN) 0.005 % ophthalmic solution INT 1 GTT IN OU QD IN THE ANDREA 4/14/20  Yes Historical Provider, MD   omeprazole (PRILOSEC OTC) 20 MG tablet Take 20 mg by mouth daily   Yes Historical Provider, MD   Multiple Vitamins-Minerals (MULTIVITAMIN-MINERALS) TABS tablet Take 1 tablet by mouth daily 7/24/19  Yes DARSHANA Chen CNP       REVIEW OF SYSTEMS:      All 14-point review of systems are completed and pertinent positives are mentioned in the history of present illness. Other systems are reviewed and are negative.      Physical Examination:    /70   Pulse 55   Ht 5' 9\" (1.753 m)   Wt 193 lb 8 oz (87.8 kg)   SpO2 99% BMI 28.57 kg/m²    Constitutional and General Appearance: alert, cooperative, no distress and appears stated age  [de-identified]: PERRL, no cervical lymphadenopathy. No masses palpable. Normal oral mucosa  Respiratory:  · Normal excursion and expansion without use of accessory muscles  · Resp Auscultation: Normal breath sounds without dullness or wheezing  Cardiovascular:  · The apical impulse is not displaced  · RRR S1S2 w/o M/G/R. Abdomen:  · No masses or tenderness  · Bowel sounds present  Extremities:  ·  No Cyanosis or Clubbing  ·  Lower extremity edema: No  · Skin: Warm and dry  Neurological:  · Alert and oriented. · Moves all extremities well  · No abnormalities of mood, affect, memory, mentation, or behavior are noted    DATA:    ECG 6/22/21: Personally reviewed. ECHO: 1/22/21   A complete echo was performed using color flow Doppler, spectral   Doppler, M-mode and echo contrast.     Lumason contrast was used during the study.   Left ventricle cavity appears normal.     Left ventricular wall thickness is normal.     The left ventricular EF by visual approximation is 55-60%. Normal left   ventricular systolic function.   Left ventricular wall motion is within normal limits.   There is grade III left ventricular diastolic dysfunction and elevated   left atrial pressure.   Left atrium cavity is mildly dilated.   There is mild aortic valve regurgitation.   There is mild mitral valve regurgitation.   There is mild pulmonary hypertension. The estimated right ventricular   systolic pressure is 14.9 mmHg.   No previous echocardiogram available to compare.      [id number] Score for Atrial Fibrillation Stroke Risk   Risk   Factors  Component Value   C CHF No 0   H HTN Yes 1   A2 Age >= 76 No,  (76 y.o.) 0   D DM No 0   S2 Prior Stroke/TIA No 0   V Vascular Disease No 0   A Age 74-69 Yes,  (76 y.o.) 1   Sc Sex male 0    [id number]  Score  2   Score last updated 3/30/20 3:36 PM EDT    Click or PRN. Will extend to yearly thereafter. 12/10/2020  Patient presents for follow-up. He is been doing well in general.  He said no more palpitations. We discussed ablation. At this point patient like to hold off. He may require cardioversions in the future and/or ablation.  - Continue dronedarone. - Continue metoprolol.  - Continue aspirin for Watchman.  - Follow up with EP NP in 1 year unless/until procedure/discussion required or PRN.    6/22/2021  Patient presents for follow-up. He had episode of atrial fibrillation with RVR while in [place]. He was transitioned from dronedarone to sotalol. We discussed options including ablation, and alternative antiarrhythmic as he continues to have some breakthrough atrial fibrillation that is symptomatic with dizziness. Patient would like to move forward with ablation. We will hold his sotalol 3 days prior to admission and admit him after his ablation for dofetilide. - Schedule atrial fibrillation ablation with LUIZA (Watchman). - Switch from sotalol to dofetilide post ablation. 2.  Dizziness  Stable/resolved. 06/22/2021  - Plan as per above. RECOMMENDATIONS:  1. Discussed risks and benefits of AF ablation. Patient would like to defer until later this year. 2. Discussed risks and benefits of starting Tikosyn (requires 3 day hospital stay). Patient would like to defer until later this year. 3. Continue baby asa (s/p watchman)  4. Follow up after AF ablation and Tikosyn. QUALITY MEASURES  1. Tobacco Cessation Counseling: NA  2. Retake of BP if >140/90:   NA  3. Documentation to PCP/referring for new patient:  Sent to PCP at close of office visit  4. CAD patient on anti-platelet: NA  5. CAD patient on STATIN therapy:  NA  6. Patient with CHF and aFib on anticoagulation:  Taken off of  All questions and concerns were addressed to the patient/family. Alternatives to my treatment were discussed.     Dr. Quinton Springer MD  Electrophysiology  [facility] [address]. [address] [facility]. [address] [facility], [address]  Phone: [phone]  Fax: [phone]   6/22/2021     NOTE: This report was transcribed using voice recognition software. Every effort was made to ensure accuracy, however, inadvertent computerized transcription errors may be present. Brandy Chen RN, am scribing for and in the presence of Dr. Doris Mckinley. 06/22/21 9:00 AM   Eli Tellez RN    The scribe's documentation has been prepared under my direction and personally reviewed by me in its entirety. I confirm that the note above accurately reflects all work, physical examination, the discussion of treatments and procedures, and medical decision making performed by me. Nehemiah Bourgeois MD personally performed the services described in this documentation as scribed by nurse in my presence, and is both accurate and complete.     Electronically signed by Shama Reed MD on 6/22/2021 at 9:26 AM No

## 2021-06-22 ENCOUNTER — OFFICE VISIT (OUTPATIENT)
Dept: CARDIOLOGY CLINIC | Age: 71
End: 2021-06-22
Payer: MEDICARE

## 2021-06-22 ENCOUNTER — TELEPHONE (OUTPATIENT)
Dept: CARDIOLOGY CLINIC | Age: 71
End: 2021-06-22

## 2021-06-22 VITALS
WEIGHT: 193.5 LBS | DIASTOLIC BLOOD PRESSURE: 70 MMHG | OXYGEN SATURATION: 99 % | BODY MASS INDEX: 28.66 KG/M2 | HEIGHT: 69 IN | HEART RATE: 55 BPM | SYSTOLIC BLOOD PRESSURE: 130 MMHG

## 2021-06-22 DIAGNOSIS — I48.0 PAROXYSMAL ATRIAL FIBRILLATION (HCC): Primary | ICD-10-CM

## 2021-06-22 PROCEDURE — 4040F PNEUMOC VAC/ADMIN/RCVD: CPT | Performed by: INTERNAL MEDICINE

## 2021-06-22 PROCEDURE — 1036F TOBACCO NON-USER: CPT | Performed by: INTERNAL MEDICINE

## 2021-06-22 PROCEDURE — G8427 DOCREV CUR MEDS BY ELIG CLIN: HCPCS | Performed by: INTERNAL MEDICINE

## 2021-06-22 PROCEDURE — 1123F ACP DISCUSS/DSCN MKR DOCD: CPT | Performed by: INTERNAL MEDICINE

## 2021-06-22 PROCEDURE — 99213 OFFICE O/P EST LOW 20 MIN: CPT | Performed by: INTERNAL MEDICINE

## 2021-06-22 PROCEDURE — 93000 ELECTROCARDIOGRAM COMPLETE: CPT | Performed by: INTERNAL MEDICINE

## 2021-06-22 PROCEDURE — G8417 CALC BMI ABV UP PARAM F/U: HCPCS | Performed by: INTERNAL MEDICINE

## 2021-06-22 PROCEDURE — 3017F COLORECTAL CA SCREEN DOC REV: CPT | Performed by: INTERNAL MEDICINE

## 2021-06-22 NOTE — TELEPHONE ENCOUNTER
Patient seen in office 6/22/21. AF ablation with dofetilide admission discussed and agreed upon by [id number] and patient. Medications not discussed. Patient will need covid testing prior. Thank you!

## 2021-06-22 NOTE — LETTER
[facility]   Electrophysiology Note    Date: 6/22/21  Patient Name: Anthony Pugh  YOB: 1950     Chief Complaint: Follow-up (2 months. No current cardiac symptoms or conerns to report at this time.), Atrial Fibrillation, and Other Dicky Shark)    Primary Care Physician: DARSHANA Rivers - CNP      HISTORY OF PRESENT ILLNESS: Anthony Pugh is a 66 y.o. male who initially presented for follow up for paroxysmal atrial fibrillation. He has a history of GI bleed on Eliquis and has undergone cardioversion 6 times for AF at [facility]. Underwent Watchman device implantation on 12/18/2019. He underwent a cardioversion on 4/1/2020 for paroxsymal atrial fibrillation. His EKG on 12/10/20 demonstrated SR with HR 61 BPM. On 12/10/20 he reported he is doing well from a cardiac standpoint. He reported he is taking his dronedarone and metoprolol as prescribed and tolerates them well. He reported that he is active around his home and tolerates that well. Interval History since last office visit: In February, 2021, while patient was in [place], he went to ED there for AF with RVR. He underwent a CV while in [place] and he was switched from Multaq to sotalol. Today, 6/22/2021, ECG demonstrates SB (57). He reports that he is doing well today. He reports that he has been in and out of AF since January and that he experiences near syncope and dizziness when he is in AF. He reports that he is taking his medications as prescribed and tolerates them well. Patient denies current edema, chest pain, sob, palpitations, dizziness or syncope. Past Medical History:   has a past medical history of Arthritis, Atrial fibrillation ([address] Hyperlipidemia, Hypertension, SCC (squamous cell carcinoma), Sleep apnea, and Systolic heart failure ([address] Past Surgical History:   has a past surgical history that includes Cardioversion (2005); Total hip arthroplasty (Left, 2013);  Colonoscopy ([id number]); and ablation of dysrhythmic focus. Social History:   reports that he has never smoked. He has never used smokeless tobacco. He reports that he does not drink alcohol and does not use drugs. Family History: family history includes Cancer in his mother; Heart Disease (age of onset: 79) in his father; High Blood Pressure in his mother. Allergies:  Patient has no known allergies. Home Medications:    Prior to Admission medications    Medication Sig Start Date End Date Taking? Authorizing Provider   moxifloxacin (VIGAMOX) 0.5 % ophthalmic solution INSTILL 1 DROP IN LEFT EYE FOUR TIMES DAILY AFTER SURGERY 4/1/21  Yes Historical Provider, MD   lisinopril (PRINIVIL;ZESTRIL) 5 MG tablet Take 1 tablet by mouth daily 3/25/21  Yes DARSHANA Caldwell CNP   sotalol (BETAPACE) 80 MG tablet Take 80 mg by mouth 2 times daily   Yes Historical Provider, MD   loratadine (CLARITIN) 10 MG capsule Take 10 mg by mouth daily   Yes Historical Provider, MD   pravastatin (PRAVACHOL) 80 MG tablet Take 1 tablet by mouth daily 12/16/20  Yes DARSHANA Tamayo CNP   levothyroxine (SYNTHROID) 75 MCG tablet TAKE 1 TABLET BY MOUTH DAILY 11/20/20  Yes Chely Anderson MD   dorzolamide-timolol (COSOPT) 22.3-6.8 MG/ML ophthalmic solution INT 1 GTT INTO OU BID 4/14/20  Yes Historical Provider, MD   latanoprost (XALATAN) 0.005 % ophthalmic solution INT 1 GTT IN OU QD IN THE ANDREA 4/14/20  Yes Historical Provider, MD   omeprazole (PRILOSEC OTC) 20 MG tablet Take 20 mg by mouth daily   Yes Historical Provider, MD   Multiple Vitamins-Minerals (MULTIVITAMIN-MINERALS) TABS tablet Take 1 tablet by mouth daily 7/24/19  Yes DARSHANA Caldwell CNP       REVIEW OF SYSTEMS:      All 14-point review of systems are completed and pertinent positives are mentioned in the history of present illness. Other systems are reviewed and are negative.      Physical Examination:    /70   Pulse 55   Ht 5' 9\" (1.753 m)   Wt 193 lb 8 oz (87.8 kg) SpO2 99%   BMI 28.57 kg/m²    Constitutional and General Appearance: alert, cooperative, no distress and appears stated age  [de-identified]: PERRL, no cervical lymphadenopathy. No masses palpable. Normal oral mucosa  Respiratory:  · Normal excursion and expansion without use of accessory muscles  · Resp Auscultation: Normal breath sounds without dullness or wheezing  Cardiovascular:  · The apical impulse is not displaced  · RRR S1S2 w/o M/G/R. Abdomen:  · No masses or tenderness  · Bowel sounds present  Extremities:  ·  No Cyanosis or Clubbing  ·  Lower extremity edema: No  · Skin: Warm and dry  Neurological:  · Alert and oriented. · Moves all extremities well  · No abnormalities of mood, affect, memory, mentation, or behavior are noted    DATA:    ECG 6/22/21: Personally reviewed. ECHO: 1/22/21   A complete echo was performed using color flow Doppler, spectral   Doppler, M-mode and echo contrast.     Lumason contrast was used during the study.   Left ventricle cavity appears normal.     Left ventricular wall thickness is normal.     The left ventricular EF by visual approximation is 55-60%. Normal left   ventricular systolic function.   Left ventricular wall motion is within normal limits.   There is grade III left ventricular diastolic dysfunction and elevated   left atrial pressure.   Left atrium cavity is mildly dilated.   There is mild aortic valve regurgitation.   There is mild mitral valve regurgitation.   There is mild pulmonary hypertension. The estimated right ventricular   systolic pressure is 28.5 mmHg.   No previous echocardiogram available to compare.      [id number] Score for Atrial Fibrillation Stroke Risk   Risk   Factors  Component Value   C CHF No 0   H HTN Yes 1   A2 Age >= 76 No,  (76 y.o.) 0   D DM No 0   S2 Prior Stroke/TIA No 0   V Vascular Disease No 0   A Age 74-69 Yes,  (76 y.o.) 1   Sc Sex male 0    [id number]  Score  2   Score last updated 3/30/20 4:16 PM EDT Click here for a link to the UpToDate guideline \"Atrial Fibrillation: Anticoagulation therapy to prevent embolization    Disclaimer: Risk Score calculation is dependent on accuracy of patient problem list and past encounter diagnosis. IMPRESSION:    1. Persistent atrial [id number] Score 2 (HTN and age)  05/19/20220  Mr. Kobe Stoner is a pleasant 68-year-old male with a medical history significant for persistent atrial fibrillation status post ablation with Dr. Qing Rodriguez, hypertension, diabetes mellitus type 2, and nonischemic cardiomyopathy with recovered ejection fraction who presents from home with recent onset of atrial fibrillation with RVR. According to patient his symptoms started on Sunday afternoon when he began to suffer from palpitations, malaise, lethargy, and fatigue. He presents now for evaluation. We discussed rate control, rhythm control, AVN + pacemaker, and repeat atrial fibrillation ablation. We reviewed risks and benefits of each approach. We discussed side effects of class IC, class II, class III, and class IV antiarrhythmics. Patient would like to proceed with starting on dronedarone followed by cardioversion tomorrow. We will then schedule him for an atrial fibrillation ablation given that his heart rates are not well controlled and his blood pressures are soft and I am not sure if we will be able to control his heart rates with corey agents given his current hemodynamic status. All questions were answered. He is unable to afford his dronedarone we will have him start a load of amiodarone. 06/09/2020  Patient doing well. Able to afford drondarone. No more atrial fibrillation (symptomatic). No side effects from medications. Tolerating aspirin (has Watchman). No shortness of breath or palpitations. Discussed ablation. He is doing well and would like to continue current course. - Continue dronedarone.   - Continue metoprolol.  - Continue aspirin.  - Follow up in [facility]. [address] [place] [address]. [address]. [place]  Phone: [phone]  Fax: [phone]   6/22/2021     NOTE: This report was transcribed using voice recognition software. Every effort was made to ensure accuracy, however, inadvertent computerized transcription errors may be present. Pravin Mayo RN, am scribing for and in the presence of Dr. Barry Mackenzie. 06/22/21 9:00 AM   Matthew Webster RN    The scribe's documentation has been prepared under my direction and personally reviewed by me in its entirety. I confirm that the note above accurately reflects all work, physical examination, the discussion of treatments and procedures, and medical decision making performed by me. Radhames Ospina MD personally performed the services described in this documentation as scribed by nurse in my presence, and is both accurate and complete.     Electronically signed by Kayla Wise MD on 6/22/2021 at 9:26 AM

## 2021-06-22 NOTE — PATIENT INSTRUCTIONS
RECOMMENDATIONS:  1. Discussed risks and benefits of AF ablation. Patient would like to defer until later this year. 2. Discussed risks and benefits of starting Tikosyn (requires 3 day hospital stay). Patient would like to defer until later this year. 3. Continue baby asa (s/p watchman)  4. Follow up after AF ablation and Tikosyn. Patient Education        Electrophysiology Study and Catheter Ablation: Before Your Procedure  What is an electrophysiology study and catheter ablation? An electrophysiology study is a test to see if there is a problem with your heart rhythm and to find out how to fix it. It is also called an EP study. A catheter ablation procedure is sometimes done at the same time. This procedure destroys (ablates) small areas of your heart that are causing your heart rhythm problem. The doctor puts plastic tubes called catheters into blood vessels in your groin, arm, or neck. He or she then uses an X-ray machine to guide long wires through the tubes to your heart. The doctor can use these wires to record your heart's electrical signals. If the doctor thinks your problem can be fixed with ablation, he or she can use the wires to destroy a small part of your heart tissue. This is most often done with radio waves. You will probably be awake during the procedure. But you might be asleep. The doctor will give you medicines to help you feel relaxed and to numb the areas where the catheters go in. An EP study and ablation can take 2 to 6 hours. In rare cases, it can take longer. If you have an EP study only and you don't need more treatment, you may go home the same day. But if you also have ablation, you may stay overnight in the hospital. How long you stay in the hospital depends on the type of ablation you have. Do not exercise hard or lift anything heavy for a week. You may be able to go back to work and to your normal routine in 1 or 2 days.   Follow-up care is a key part of your treatment and safety. Be sure to make and go to all appointments, and call your doctor if you are having problems. It's also a good idea to know your test results and keep a list of the medicines you take. How do you prepare for the procedure? Procedures can be stressful. This information will help you understand what you can expect. And it will help you safely prepare for your procedure. Preparing for the procedure    · Be sure you have someone to take you home. Anesthesia and pain medicine will make it unsafe for you to drive or get home on your own.     · Understand exactly what procedure is planned, along with the risks, benefits, and other options.     · Tell your doctor ALL the medicines, vitamins, supplements, and herbal remedies you take. Some may increase the risk of problems during your procedure. Your doctor will tell you if you should stop taking any of them before the procedure and how soon to do it.     · If you take aspirin or some other blood thinner, ask your doctor if you should stop taking it before your procedure. Make sure that you understand exactly what your doctor wants you to do. These medicines increase the risk of bleeding.     · Make sure your doctor and the hospital have a copy of your advance directive. If you don't have one, you may want to prepare one. It lets others know your health care wishes. It's a good thing to have before any type of surgery or procedure. What happens on the day of the procedure? · Follow the instructions exactly about when to stop eating and drinking. If you don't, your procedure may be canceled. If your doctor told you to take your medicines on the day of the procedure, take them with only a sip of water.     · Take a bath or shower before you come in for your procedure. Do not apply lotions, perfumes, deodorants, or nail polish.     · Take off all jewelry and piercings. And take out contact lenses, if you wear them.    At the hospital or surgery center   · Bring a picture ID.     · You will be kept comfortable and safe by your anesthesia provider. The anesthesia may make you sleep. Or it may just numb the area being worked on.     · This procedure can take 2 to 6 hours. In rare cases, it can take longer.     · After the procedure, pressure will be applied to the area where the catheter was put in your blood vessel. Then the area may be covered with a bandage or a compression device. This will prevent bleeding.     · Nurses will check your heart rate and blood pressure. The nurse will also check the catheter site for bleeding.     · If the catheter was put in your groin, you will need to lie still and keep your leg straight for several hours. The nurse may put a weighted bag on your leg to keep it still.     · If the catheter was put in your arm, you may be able to sit up and get out of bed right away. But you will need to keep your arm still for at least 1 hour.     · You may have a bruise or a small lump where the catheter was put in your blood vessel. This is normal and will go away. When should you call your doctor? · You have questions or concerns.     · You don't understand how to prepare for your procedure.     · You become ill before the procedure (such as fever, flu, or a cold).     · You need to reschedule or have changed your mind about having the procedure. Where can you learn more? Go to [email or web address] [email or web address].[email or web address] org and sign in to your TeamSupport account. Enter K535 in the Impraise box to learn more about \"Electrophysiology Study and Catheter Ablation: Before Your Procedure. \"     If you do not have an account, please click on the \"Sign Up Now\" link. Current as of: August 31, 2020               Content Version: 12.9  © 7198-8328 [employer]. Care instructions adapted under license by [facility] ([facility]).  If you have questions about a medical condition or this instruction, always ask your healthcare professional. [address] any warranty or liability for your use of this information. Patient Education        Electrophysiology Study and Catheter Ablation: What to Expect at [address] [place] [address] Drive had an electrophysiology study for a problem with your heartbeat. You may also have had a catheter ablation to try to correct the problem. You may have swelling, bruising, or a small lump around the site where the catheters went into your body. These should go away in 3 to 4 weeks. Do not exercise hard or lift anything heavy for a week. You may be able to go back to work and to your normal routine in 1 or 2 days. This care sheet gives you a general idea about how long it will take for you to recover. But each person recovers at a different pace. Follow the steps below to get better as quickly as possible. How can you care for yourself at home? Activity    · For 1 week, do not lift anything that would make you strain. This may include heavy grocery bags and milk containers, a heavy briefcase or backpack, cat litter or dog food bags, a vacuum , or a child.     · For 1 week, do not exercise hard or do any activity that could strain your blood vessels or the site where the catheters went into your body.     · Ask your doctor when it is okay to have sex. Diet    · You can eat your normal diet. If your stomach is upset, try bland, low-fat foods like plain rice, broiled chicken, toast, and yogurt.     · Drink plenty of fluids (unless your doctor tells you not to). Medicines    · Your doctor will tell you if and when you can restart your medicines. He or she will also give you instructions about taking any new medicines.     · If you take aspirin or some other blood thinner, ask your doctor if and when to start taking it again. Make sure that you understand exactly what your doctor wants you to do.     · Ask your doctor if you can take acetaminophen (Tylenol) for pain.  Do not take aspirin for 3 days, unless your doctor says it is okay.     · Check with your doctor before you take aspirin or anti-inflammatory medicines to reduce pain and swelling. These include ibuprofen (Advil, Motrin) and naproxen (Aleve).   · Make sure you know which heart medicines to continue and which ones to stop. Ask your doctor if you aren't sure. Catheter site care    · You can remove your bandages the day after the procedure.     · You may shower 24 to 48 hours after the procedure, if your doctor okays it. Pat the incision dry.     · Do not soak the catheter site until it is healed. Don't take a bath for 1 week, or until your doctor tells you it is okay.     · Watch for bleeding from the site. A small amount of blood (up to the size of a quarter) on the bandage can be normal.     · If you are bleeding, lie down and press on the area for 15 minutes to try to make it stop. If the bleeding does not stop, call your doctor or seek immediate medical care. Follow-up care is a key part of your treatment and safety. Be sure to make and go to all appointments, and call your doctor if you are having problems. It's also a good idea to know your test results and keep a list of the medicines you take. When should you call for help? Call 911  anytime you think you may need emergency care. For example, call if:    · You passed out (lost consciousness).     · You have symptoms of a heart attack. These may include:  ? Chest pain or pressure, or a strange feeling in the chest.  ? Sweating. ? Shortness of breath. ? Nausea or vomiting. ? Pain, pressure, or a strange feeling in the back, neck, jaw, or upper belly, or in one or both shoulders or arms. ? Lightheadedness or sudden weakness. ? A fast or irregular heartbeat. After you call 911, the  may tell you to chew 1 adult-strength or 2 to 4 low-dose aspirin. Wait for an ambulance. Do not try to drive yourself.     · You have symptoms of a stroke.  These may include:  ? Sudden numbness, tingling, weakness, or loss of movement in your face, arm, or leg, especially on only one side of your body. ? Sudden vision changes. ? Sudden trouble speaking. ? Sudden confusion or trouble understanding simple statements. ? Sudden problems with walking or balance. ? A sudden, severe headache that is different from past headaches. Call your doctor now or seek immediate medical care if:    · You are bleeding from the area where the catheter was put in your blood vessel.     · You have a fast-growing, painful lump at the catheter site.     · You have signs of infection, such as:  ? Increased pain, swelling, warmth, or redness. ? Red streaks leading from the catheter site. ? Pus draining from the catheter site. ? A fever.     · Your leg, arm, or hand is painful, looks blue, or feels cold, numb, or tingly. Watch closely for any changes in your health, and be sure to contact your doctor if you have any problems. Where can you learn more? Go to https://LOG607.Glanse. org and sign in to your Upplication account. Enter [phone] in the [facility] box to learn more about \"Electrophysiology Study and Catheter Ablation: What to Expect at Home. \"     If you do not have an account, please click on the \"Sign Up Now\" link. Current as of: August 31, 2020               Content Version: 12.9  © 7103-3789 [employer]ice. Care instructions adapted under license by [facility] If you have questions about a medical condition or this instruction, always ask your healthcare professional. [place] [address] any warranty or liability for your use of this information. Patient Education          Patient Education        Electrophysiology Study and Catheter Ablation: Before Your Procedure  What is an electrophysiology study and catheter ablation?      An electrophysiology study is a test to see if there is a problem with your heart rhythm and to find out how to fix it. It is also called an EP study. A catheter ablation procedure is sometimes done at the same time. This procedure destroys (ablates) small areas of your heart that are causing your heart rhythm problem. The doctor puts plastic tubes called catheters into blood vessels in your groin, arm, or neck. He or she then uses an X-ray machine to guide long wires through the tubes to your heart. The doctor can use these wires to record your heart's electrical signals. If the doctor thinks your problem can be fixed with ablation, he or she can use the wires to destroy a small part of your heart tissue. This is most often done with radio waves. You will probably be awake during the procedure. But you might be asleep. The doctor will give you medicines to help you feel relaxed and to numb the areas where the catheters go in. An EP study and ablation can take 2 to 6 hours. In rare cases, it can take longer. If you have an EP study only and you don't need more treatment, you may go home the same day. But if you also have ablation, you may stay overnight in the hospital. How long you stay in the hospital depends on the type of ablation you have. Do not exercise hard or lift anything heavy for a week. You may be able to go back to work and to your normal routine in 1 or 2 days. Follow-up care is a key part of your treatment and safety. Be sure to make and go to all appointments, and call your doctor if you are having problems. It's also a good idea to know your test results and keep a list of the medicines you take. How do you prepare for the procedure? Procedures can be stressful. This information will help you understand what you can expect. And it will help you safely prepare for your procedure. Preparing for the procedure    · Be sure you have someone to take you home.  Anesthesia and pain medicine will make it unsafe for you to drive or get home on your own.     · Understand exactly what procedure catheter was put in your groin, you will need to lie still and keep your leg straight for several hours. The nurse may put a weighted bag on your leg to keep it still.     · If the catheter was put in your arm, you may be able to sit up and get out of bed right away. But you will need to keep your arm still for at least 1 hour.     · You may have a bruise or a small lump where the catheter was put in your blood vessel. This is normal and will go away. When should you call your doctor? · You have questions or concerns.     · You don't understand how to prepare for your procedure.     · You become ill before the procedure (such as fever, flu, or a cold).     · You need to reschedule or have changed your mind about having the procedure. Where can you learn more? Go to [email or web address] [email or web address].Transparent [email or web address]. org and sign in to your [employer] account. Enter Y156 in the [employer] box to learn more about \"Electrophysiology Study and Catheter Ablation: Before Your Procedure. \"     If you do not have an account, please click on the \"Sign Up Now\" link. Current as of: August 31, 2020               Content Version: 12.9  © 1792-7373 [facility]. Care instructions adapted under license by [facility] ([facility]). If you have questions about a medical condition or this instruction, always ask your healthcare professional. [place] [address] any warranty or liability for your use of this information. Patient Education        Electrophysiology Study and Catheter Ablation: What to Expect at [address] [place] [address] Drive had an electrophysiology study for a problem with your heartbeat. You may also have had a catheter ablation to try to correct the problem. You may have swelling, bruising, or a small lump around the site where the catheters went into your body. These should go away in 3 to 4 weeks. Do not exercise hard or lift anything heavy for a week.  You may be able to go back to work and to your normal routine in 1 or 2 days. This care sheet gives you a general idea about how long it will take for you to recover. But each person recovers at a different pace. Follow the steps below to get better as quickly as possible. How can you care for yourself at home? Activity    · For 1 week, do not lift anything that would make you strain. This may include heavy grocery bags and milk containers, a heavy briefcase or backpack, cat litter or dog food bags, a vacuum , or a child.     · For 1 week, do not exercise hard or do any activity that could strain your blood vessels or the site where the catheters went into your body.     · Ask your doctor when it is okay to have sex. Diet    · You can eat your normal diet. If your stomach is upset, try bland, low-fat foods like plain rice, broiled chicken, toast, and yogurt.     · Drink plenty of fluids (unless your doctor tells you not to). Medicines    · Your doctor will tell you if and when you can restart your medicines. He or she will also give you instructions about taking any new medicines.     · If you take aspirin or some other blood thinner, ask your doctor if and when to start taking it again. Make sure that you understand exactly what your doctor wants you to do.     · Ask your doctor if you can take acetaminophen (Tylenol) for pain. Do not take aspirin for 3 days, unless your doctor says it is okay.     · Check with your doctor before you take aspirin or anti-inflammatory medicines to reduce pain and swelling. These include ibuprofen (Advil, Motrin) and naproxen (Aleve).   · Make sure you know which heart medicines to continue and which ones to stop. Ask your doctor if you aren't sure. Catheter site care    · You can remove your bandages the day after the procedure.     · You may shower 24 to 48 hours after the procedure, if your doctor okays it. Pat the incision dry.     · Do not soak the catheter site until it is healed.  Don't take a bath for 1 week, or until your doctor tells you it is okay.     · Watch for bleeding from the site. A small amount of blood (up to the size of a quarter) on the bandage can be normal.     · If you are bleeding, lie down and press on the area for 15 minutes to try to make it stop. If the bleeding does not stop, call your doctor or seek immediate medical care. Follow-up care is a key part of your treatment and safety. Be sure to make and go to all appointments, and call your doctor if you are having problems. It's also a good idea to know your test results and keep a list of the medicines you take. When should you call for help? Call 911  anytime you think you may need emergency care. For example, call if:    · You passed out (lost consciousness).     · You have symptoms of a heart attack. These may include:  ? Chest pain or pressure, or a strange feeling in the chest.  ? Sweating. ? Shortness of breath. ? Nausea or vomiting. ? Pain, pressure, or a strange feeling in the back, neck, jaw, or upper belly, or in one or both shoulders or arms. ? Lightheadedness or sudden weakness. ? A fast or irregular heartbeat. After you call 911, the  may tell you to chew 1 adult-strength or 2 to 4 low-dose aspirin. Wait for an ambulance. Do not try to drive yourself.     · You have symptoms of a stroke. These may include:  ? Sudden numbness, tingling, weakness, or loss of movement in your face, arm, or leg, especially on only one side of your body. ? Sudden vision changes. ? Sudden trouble speaking. ? Sudden confusion or trouble understanding simple statements. ? Sudden problems with walking or balance. ? A sudden, severe headache that is different from past headaches.    Call your doctor now or seek immediate medical care if:    · You are bleeding from the area where the catheter was put in your blood vessel.     · You have a fast-growing, painful lump at the catheter site.     · You have signs of infection, atrium (the upper chambers of the heart that allow blood to flow into the heart). Dofetilide is used in people with atrial fibrillation or atrial flutter. Dofetilide may also be used for purposes not listed in this medication guide. What should I discuss with my health care provider before taking dofetilide? You should not take dofetilide if you are allergic to it, or if you have:  · severe kidney disease (or if you are on dialysis); or  · a history of Long QT syndrome. Some medicines can cause unwanted or dangerous effects when used with dofetilide, and should not be used at the same time. Your doctor may need to change your treatment plan if you use any of the following drugs:  · cimetidine;  · dolutegravir;  · ketoconazole;  · megestrol;  · prochlorperazine;  · trimethoprim (Proloprim, Trimpex, Bactrim, Septra);  · verapamil; or  · a diuretic (water pill) that contains hydrochlorothiazide (HCTZ), such as Accuretic, Aldactazide, Atacand HCT, Benicar HCT, Diovan HCT, Dyazide, Exforge HCT, Hyzaar, Lopressor HCT, Maxzide, Micardis HCT, Monopril HCT, Prinzide, Tekturna HCT, Vaseretic, and others. To make sure dofetilide is safe for you, tell your doctor if you have:  · heart disease, high blood pressure;  · liver or kidney disease;  · depression, mental illness;  · asthma or allergies;  · any active infection;  · skin problems; or  · an electrolyte imbalance (such as low levels of potassium or magnesium in your blood). It is not known whether this medicine will harm an unborn baby. Tell your doctor if you are pregnant or plan to become pregnant while using this medicine. It is not known whether dofetilide passes into breast milk or if it could harm a nursing baby. You should not breast-feed while you are using dofetilide. How should I take dofetilide? Dofetilide is available only from a hospital or specialty pharmacy.   You will need to spend at least 3 days in a hospital setting when you first start taking dofetilide. This is so your heart rhythm and kidney function can be monitored in case the medicine causes serious side effects. Follow all directions on your prescription label. Do not take this medicine in larger or smaller amounts or for longer than recommended. You may take dofetilide with or without food. You should not skip doses or stop using dofetilide suddenly. Stopping suddenly may make your condition worse. Follow your doctor's instructions about tapering your dose. Tell your doctor if you have a prolonged illness that causes severe diarrhea, vomiting, or heavy sweating. These conditions can cause an electrolyte imbalance, making it dangerous for you to use dofetilide. Your blood pressure will need to be checked often. Your kidney function may also need to be checked with frequent blood tests. Store at room temperature away from moisture and heat. What happens if I miss a dose? Skip the missed dose and take your next dose at the usual time to stay on schedule. Do not take extra medicine to make up the missed dose. What happens if I overdose? Seek emergency medical attention or call the Poison Help line at 1-873.904.7527. What should I avoid while taking dofetilide? Grapefruit and grapefruit juice may interact with dofetilide and lead to unwanted side effects. Discuss the use of grapefruit products with your doctor. What are the possible side effects of dofetilide? Get emergency medical help if you have any of these signs of an allergic reaction: hives; difficult breathing; swelling of your face, lips, tongue, or throat. Call your doctor at once if you have:  · headache with chest pain and severe dizziness, fainting, fast or pounding heartbeats;  · loss of appetite, vomiting or severe diarrhea; or  · low magnesium or potassium --confusion, uneven heart rate, increased thirst or urination, sweating, jerking muscle movements, leg discomfort, muscle weakness or limp feeling.   Common side effects may include:  · mild headache;  · mild dizziness; or  · cold symptoms such as stuffy nose, sneezing, sore throat. This is not a complete list of side effects and others may occur. Call your doctor for medical advice about side effects. You may report side effects to FDA at 5-348-RXS-0187. What other drugs will affect dofetilide? Other drugs may interact with dofetilide, including prescription and over-the-counter medicines, vitamins, and herbal products. Tell each of your health care providers about all medicines you use now and any medicine you start or stop using. Where can I get more information? Your doctor or pharmacist can provide more information about dofetilide. Remember, keep this and all other medicines out of the reach of children, never share your medicines with others, and use this medication only for the indication prescribed. Every effort has been made to ensure that the information provided by [facility] is accurate, up-to-date, and complete, but no guarantee is made to that effect. Drug information contained herein may be time sensitive. [employer] information has been compiled for use by healthcare practitioners and consumers in the [place] and therefore [employer] does not warrant that uses outside of the [place] are appropriate, unless specifically indicated otherwise. [employer]'s drug information does not endorse drugs, diagnose patients or recommend therapy. [employer] drug information is an informational resource designed to assist licensed healthcare practitioners in caring for their patients and/or to serve consumers viewing this service as a supplement to, and not a substitute for, the expertise, skill, knowledge and judgment of healthcare practitioners. The absence of a warning for a given drug or drug combination in no way should be construed to indicate that the drug or drug combination is safe, effective or appropriate for any given patient.  [employer] does not assume any responsibility for any aspect of healthcare administered with the aid of information [facility] provides. The information contained herein is not intended to cover all possible uses, directions, precautions, warnings, drug interactions, allergic reactions, or adverse effects. If you have questions about the drugs you are taking, check with your doctor, nurse or pharmacist.  Copyright 9588-2786 [address]: 4.01. Revision date: 4/13/2016. Care instructions adapted under license by [facility] ([facility]). If you have questions about a medical condition or this instruction, always ask your healthcare professional. [place] [address] any warranty or liability for your use of this information.

## 2021-06-30 RX ORDER — PRAVASTATIN SODIUM 80 MG/1
80 TABLET ORAL DAILY
Qty: 90 TABLET | Refills: 3 | Status: SHIPPED | OUTPATIENT
Start: 2021-06-30 | End: 2022-06-24

## 2021-07-21 ENCOUNTER — VIRTUAL VISIT (OUTPATIENT)
Dept: ENDOCRINOLOGY | Age: 71
End: 2021-07-21

## 2021-07-21 DIAGNOSIS — R73.03 PRE-DIABETES: ICD-10-CM

## 2021-07-21 DIAGNOSIS — E03.2 HYPOTHYROIDISM DUE TO MEDICATION: Primary | ICD-10-CM

## 2021-07-21 PROBLEM — E03.4 HYPOTHYROIDISM DUE TO ACQUIRED ATROPHY OF THYROID: Status: RESOLVED | Noted: 2020-05-19 | Resolved: 2021-07-21

## 2021-07-21 PROCEDURE — 4040F PNEUMOC VAC/ADMIN/RCVD: CPT | Performed by: NURSE PRACTITIONER

## 2021-07-21 PROCEDURE — G8427 DOCREV CUR MEDS BY ELIG CLIN: HCPCS | Performed by: NURSE PRACTITIONER

## 2021-07-21 PROCEDURE — 3017F COLORECTAL CA SCREEN DOC REV: CPT | Performed by: NURSE PRACTITIONER

## 2021-07-21 RX ORDER — ASPIRIN 325 MG
TABLET ORAL
COMMUNITY

## 2021-07-22 NOTE — TELEPHONE ENCOUNTER
Spoke with patient. He states he would like to hold off on scheduling the ablation because he is feeling pretty good overall. But he is interested in doing the admission for dofetilide. Please advise if ok to schedule and send to clinical staff to notify patient.

## 2021-07-22 NOTE — TELEPHONE ENCOUNTER
[address] [facility] [place],[address] please advise. Pt can be reached at [phone].       TY

## 2021-07-27 NOTE — TELEPHONE ENCOUNTER
Scheduled for 8/2/2021 @ 9AM.     Spoke with patient- he said he would prefer to wait and let us know when he would like to schedule. Informed him we can usually always get them scheduled within the next week. V/u. Called transfer center and had future admit cancelled.

## 2021-08-04 DIAGNOSIS — E03.2 HYPOTHYROIDISM DUE TO MEDICATION: ICD-10-CM

## 2021-08-04 LAB
T4 FREE: 1.3 NG/DL (ref 0.9–1.8)
TSH SERPL DL<=0.05 MIU/L-ACNC: 1.01 UIU/ML (ref 0.27–4.2)

## 2021-10-06 ENCOUNTER — TELEPHONE (OUTPATIENT)
Dept: CARDIOLOGY CLINIC | Age: 71
End: 2021-10-06

## 2021-10-07 NOTE — TELEPHONE ENCOUNTER
Called patient regarding Tikosyn admission. He is agreeable to Monday 10/11. Advised patient to STOP SOTALOL starting Friday 10/8, v/u.       Spoke with transfer center.  Patient scheduled for Tikosyn admission 10/11 at [facility].       EP team- [facility]

## 2021-10-07 NOTE — TELEPHONE ENCOUNTER
Pts wife called to check on message above. Pt is ready to schedule Tikosyn dosing.  Pt can be reached @ [phone]

## 2021-10-11 ENCOUNTER — TELEPHONE (OUTPATIENT)
Dept: CARDIOLOGY CLINIC | Age: 71
End: 2021-10-11

## 2021-10-11 ENCOUNTER — HOSPITAL ENCOUNTER (INPATIENT)
Age: 71
LOS: 3 days | Discharge: HOME | DRG: 309 | End: 2021-10-14
Attending: EMERGENCY MEDICINE | Admitting: HOSPITALIST
Payer: MEDICARE

## 2021-10-11 ENCOUNTER — APPOINTMENT (OUTPATIENT)
Dept: GENERAL RADIOLOGY | Age: 71
DRG: 309 | End: 2021-10-11
Payer: MEDICARE

## 2021-10-11 DIAGNOSIS — I48.91 ATRIAL FIBRILLATION WITH RVR (HCC): Primary | ICD-10-CM

## 2021-10-11 LAB
A/G RATIO: 1.3 (ref 1.1–2.2)
ALBUMIN SERPL-MCNC: 3.9 G/DL (ref 3.4–5)
ALP BLD-CCNC: 108 U/L (ref 40–129)
ALT SERPL-CCNC: 24 U/L (ref 10–40)
ANION GAP SERPL CALCULATED.3IONS-SCNC: 11 MMOL/L (ref 3–16)
AST SERPL-CCNC: 20 U/L (ref 15–37)
BASOPHILS ABSOLUTE: 0.1 K/UL (ref 0–0.2)
BASOPHILS RELATIVE PERCENT: 0.6 %
BILIRUB SERPL-MCNC: 0.3 MG/DL (ref 0–1)
BUN BLDV-MCNC: 12 MG/DL (ref 7–20)
CALCIUM SERPL-MCNC: 9.6 MG/DL (ref 8.3–10.6)
CHLORIDE BLD-SCNC: 102 MMOL/L (ref 99–110)
CO2: 25 MMOL/L (ref 21–32)
CREAT SERPL-MCNC: 0.9 MG/DL (ref 0.8–1.3)
EKG ATRIAL RATE: 340 BPM
EKG DIAGNOSIS: NORMAL
EKG Q-T INTERVAL: 320 MS
EKG QRS DURATION: 78 MS
EKG QTC CALCULATION (BAZETT): 463 MS
EKG R AXIS: 1 DEGREES
EKG T AXIS: 59 DEGREES
EKG VENTRICULAR RATE: 126 BPM
EOSINOPHILS ABSOLUTE: 0.2 K/UL (ref 0–0.6)
EOSINOPHILS RELATIVE PERCENT: 2.9 %
GFR AFRICAN AMERICAN: >60
GFR NON-AFRICAN AMERICAN: >60
GLOBULIN: 2.9 G/DL
GLUCOSE BLD-MCNC: 104 MG/DL (ref 70–99)
HCT VFR BLD CALC: 39 % (ref 40.5–52.5)
HEMOGLOBIN: 13 G/DL (ref 13.5–17.5)
LYMPHOCYTES ABSOLUTE: 1.3 K/UL (ref 1–5.1)
LYMPHOCYTES RELATIVE PERCENT: 16.8 %
MCH RBC QN AUTO: 29.6 PG (ref 26–34)
MCHC RBC AUTO-ENTMCNC: 33.4 G/DL (ref 31–36)
MCV RBC AUTO: 88.6 FL (ref 80–100)
MONOCYTES ABSOLUTE: 1.1 K/UL (ref 0–1.3)
MONOCYTES RELATIVE PERCENT: 14 %
NEUTROPHILS ABSOLUTE: 5.2 K/UL (ref 1.7–7.7)
NEUTROPHILS RELATIVE PERCENT: 65.7 %
PDW BLD-RTO: 14 % (ref 12.4–15.4)
PLATELET # BLD: 319 K/UL (ref 135–450)
PMV BLD AUTO: 8.6 FL (ref 5–10.5)
POTASSIUM REFLEX MAGNESIUM: 4.2 MMOL/L (ref 3.5–5.1)
RBC # BLD: 4.4 M/UL (ref 4.2–5.9)
SODIUM BLD-SCNC: 138 MMOL/L (ref 136–145)
TOTAL PROTEIN: 6.8 G/DL (ref 6.4–8.2)
TROPONIN: <0.01 NG/ML
WBC # BLD: 7.9 K/UL (ref 4–11)

## 2021-10-11 PROCEDURE — 96366 THER/PROPH/DIAG IV INF ADDON: CPT

## 2021-10-11 PROCEDURE — 84439 ASSAY OF FREE THYROXINE: CPT

## 2021-10-11 PROCEDURE — 6360000002 HC RX W HCPCS: Performed by: INTERNAL MEDICINE

## 2021-10-11 PROCEDURE — 96365 THER/PROPH/DIAG IV INF INIT: CPT

## 2021-10-11 PROCEDURE — 2500000003 HC RX 250 WO HCPCS: Performed by: EMERGENCY MEDICINE

## 2021-10-11 PROCEDURE — 85025 COMPLETE CBC W/AUTO DIFF WBC: CPT

## 2021-10-11 PROCEDURE — 93010 ELECTROCARDIOGRAM REPORT: CPT | Performed by: INTERNAL MEDICINE

## 2021-10-11 PROCEDURE — 99284 EMERGENCY DEPT VISIT MOD MDM: CPT

## 2021-10-11 PROCEDURE — 2060000000 HC ICU INTERMEDIATE R&B

## 2021-10-11 PROCEDURE — 71045 X-RAY EXAM CHEST 1 VIEW: CPT

## 2021-10-11 PROCEDURE — 2580000003 HC RX 258: Performed by: EMERGENCY MEDICINE

## 2021-10-11 PROCEDURE — 93005 ELECTROCARDIOGRAM TRACING: CPT | Performed by: EMERGENCY MEDICINE

## 2021-10-11 PROCEDURE — 6360000002 HC RX W HCPCS: Performed by: HOSPITALIST

## 2021-10-11 PROCEDURE — 93005 ELECTROCARDIOGRAM TRACING: CPT | Performed by: INTERNAL MEDICINE

## 2021-10-11 PROCEDURE — 6370000000 HC RX 637 (ALT 250 FOR IP): Performed by: INTERNAL MEDICINE

## 2021-10-11 PROCEDURE — 99223 1ST HOSP IP/OBS HIGH 75: CPT | Performed by: INTERNAL MEDICINE

## 2021-10-11 PROCEDURE — 6370000000 HC RX 637 (ALT 250 FOR IP): Performed by: HOSPITALIST

## 2021-10-11 PROCEDURE — 96375 TX/PRO/DX INJ NEW DRUG ADDON: CPT

## 2021-10-11 PROCEDURE — 2580000003 HC RX 258: Performed by: HOSPITALIST

## 2021-10-11 PROCEDURE — 84443 ASSAY THYROID STIM HORMONE: CPT

## 2021-10-11 PROCEDURE — 80053 COMPREHEN METABOLIC PANEL: CPT

## 2021-10-11 PROCEDURE — 84484 ASSAY OF TROPONIN QUANT: CPT

## 2021-10-11 RX ORDER — PANTOPRAZOLE SODIUM 40 MG/1
40 TABLET, DELAYED RELEASE ORAL
Status: DISCONTINUED | OUTPATIENT
Start: 2021-10-12 | End: 2021-10-14 | Stop reason: HOSPADM

## 2021-10-11 RX ORDER — MAGNESIUM SULFATE IN WATER 40 MG/ML
2000 INJECTION, SOLUTION INTRAVENOUS ONCE
Status: COMPLETED | OUTPATIENT
Start: 2021-10-11 | End: 2021-10-11

## 2021-10-11 RX ORDER — SODIUM CHLORIDE 0.9 % (FLUSH) 0.9 %
5-40 SYRINGE (ML) INJECTION PRN
Status: DISCONTINUED | OUTPATIENT
Start: 2021-10-11 | End: 2021-10-14 | Stop reason: HOSPADM

## 2021-10-11 RX ORDER — LEVOTHYROXINE SODIUM 0.03 MG/1
75 TABLET ORAL DAILY
Status: DISCONTINUED | OUTPATIENT
Start: 2021-10-11 | End: 2021-10-14 | Stop reason: HOSPADM

## 2021-10-11 RX ORDER — DILTIAZEM HYDROCHLORIDE 120 MG/1
120 CAPSULE, COATED, EXTENDED RELEASE ORAL DAILY
Status: DISCONTINUED | OUTPATIENT
Start: 2021-10-11 | End: 2021-10-14 | Stop reason: HOSPADM

## 2021-10-11 RX ORDER — PRAVASTATIN SODIUM 80 MG/1
80 TABLET ORAL DAILY
Status: DISCONTINUED | OUTPATIENT
Start: 2021-10-11 | End: 2021-10-14 | Stop reason: HOSPADM

## 2021-10-11 RX ORDER — CETIRIZINE HYDROCHLORIDE 10 MG/1
5 TABLET ORAL DAILY
Status: DISCONTINUED | OUTPATIENT
Start: 2021-10-11 | End: 2021-10-14 | Stop reason: HOSPADM

## 2021-10-11 RX ORDER — LISINOPRIL 10 MG/1
5 TABLET ORAL DAILY
Status: DISCONTINUED | OUTPATIENT
Start: 2021-10-12 | End: 2021-10-14 | Stop reason: HOSPADM

## 2021-10-11 RX ORDER — SODIUM CHLORIDE 0.9 % (FLUSH) 0.9 %
5-40 SYRINGE (ML) INJECTION EVERY 12 HOURS SCHEDULED
Status: DISCONTINUED | OUTPATIENT
Start: 2021-10-11 | End: 2021-10-14 | Stop reason: HOSPADM

## 2021-10-11 RX ORDER — DILTIAZEM HYDROCHLORIDE 5 MG/ML
10 INJECTION INTRAVENOUS ONCE
Status: COMPLETED | OUTPATIENT
Start: 2021-10-11 | End: 2021-10-11

## 2021-10-11 RX ORDER — DOFETILIDE 0.25 MG/1
500 CAPSULE ORAL EVERY 12 HOURS SCHEDULED
Status: DISCONTINUED | OUTPATIENT
Start: 2021-10-11 | End: 2021-10-14 | Stop reason: HOSPADM

## 2021-10-11 RX ORDER — DORZOLAMIDE HYDROCHLORIDE AND TIMOLOL MALEATE 20; 5 MG/ML; MG/ML
1 SOLUTION/ DROPS OPHTHALMIC 2 TIMES DAILY
Status: DISCONTINUED | OUTPATIENT
Start: 2021-10-11 | End: 2021-10-14 | Stop reason: HOSPADM

## 2021-10-11 RX ORDER — M-VIT,TX,IRON,MINS/CALC/FOLIC 27MG-0.4MG
1 TABLET ORAL DAILY
Status: DISCONTINUED | OUTPATIENT
Start: 2021-10-12 | End: 2021-10-14 | Stop reason: HOSPADM

## 2021-10-11 RX ORDER — LATANOPROST 50 UG/ML
1 SOLUTION/ DROPS OPHTHALMIC DAILY
Status: DISCONTINUED | OUTPATIENT
Start: 2021-10-11 | End: 2021-10-14 | Stop reason: HOSPADM

## 2021-10-11 RX ORDER — SODIUM CHLORIDE 9 MG/ML
25 INJECTION, SOLUTION INTRAVENOUS PRN
Status: DISCONTINUED | OUTPATIENT
Start: 2021-10-11 | End: 2021-10-14 | Stop reason: HOSPADM

## 2021-10-11 RX ADMIN — ENOXAPARIN SODIUM 40 MG: 40 INJECTION SUBCUTANEOUS at 21:16

## 2021-10-11 RX ADMIN — PRAVASTATIN SODIUM 80 MG: 80 TABLET ORAL at 20:09

## 2021-10-11 RX ADMIN — DOFETILIDE 500 MCG: 0.25 CAPSULE ORAL at 21:16

## 2021-10-11 RX ADMIN — DILTIAZEM HYDROCHLORIDE 120 MG: 120 CAPSULE, COATED, EXTENDED RELEASE ORAL at 17:42

## 2021-10-11 RX ADMIN — DILTIAZEM HYDROCHLORIDE 5 MG/HR: 5 INJECTION INTRAVENOUS at 14:14

## 2021-10-11 RX ADMIN — DILTIAZEM HYDROCHLORIDE 10 MG: 5 INJECTION INTRAVENOUS at 14:04

## 2021-10-11 RX ADMIN — MAGNESIUM SULFATE HEPTAHYDRATE 2000 MG: 40 INJECTION, SOLUTION INTRAVENOUS at 17:45

## 2021-10-11 RX ADMIN — SODIUM CHLORIDE, PRESERVATIVE FREE 10 ML: 5 INJECTION INTRAVENOUS at 21:18

## 2021-10-11 ASSESSMENT — PAIN DESCRIPTION - PAIN TYPE: TYPE: ACUTE PAIN

## 2021-10-11 ASSESSMENT — PAIN SCALES - GENERAL: PAINLEVEL_OUTOF10: 2

## 2021-10-11 ASSESSMENT — PAIN DESCRIPTION - LOCATION: LOCATION: CHEST

## 2021-10-11 NOTE — ED NOTES
Spoke to cardiologist on call. Cardiologist stated to wean patient down on diltiazem drip since starting oral diltiazem.      Kishore Goldstein, RN  10/11/21 8556

## 2021-10-11 NOTE — ED PROVIDER NOTES
[facility]      CHIEF COMPLAINT  Chest Pain (states CP over past week and states has been in afib which is not normally in )      HISTORY OF PRESENT ILLNESS  Carol Rockwell is a 66 y.o. male with a history of A. fib and hypertension who was on sotalol and full dose aspirin who also has a watchman device in presents with recurrent A. fib. He states he been doing well for quite some time but has been in A. fib for 8 days now. He called his electrophysiologist, Dr. Araceli Rachel. He was told this past Friday to stop his sotalol and they would see him today. He states he tried to call to get an appointment but could not be seen. He was told to come to the ER instead. He states he is feeling fatigue and lightheadedness. He feels chest tightness and palpitations. He denies shortness of breath. No other complaints, modifying factors or associated symptoms. I have reviewed the following from the nursing documentation.     Past Medical History:   Diagnosis Date    Arthritis     Atrial fibrillation ([address] 2007    S/P CARDIOVERSION, EF 49% on stress test 2009    Hyperlipidemia     Hypertension     SCC (squamous cell carcinoma)     Dr. Lanette Fournier Sleep apnea     Systolic heart failure [facility])      Past Surgical History:   Procedure Laterality Date    ABLATION OF DYSRHYTHMIC FOCUS      Afib PVI etc / right sided flutter line    CARDIOVERSION  2005    COLONOSCOPY  [id number]    tics    TOTAL HIP ARTHROPLASTY Left 2013     Family History   Problem Relation Age of Onset    Cancer Mother         BONE    High Blood Pressure Mother     Heart Disease Father 79     Social History     Socioeconomic History    Marital status:      Spouse name: Not on file    Number of children: Not on file    Years of education: Not on file    Highest education level: Not on file   Occupational History    Not on file   Tobacco Use    Smoking status: Never Smoker    Smokeless tobacco: Never Used   Vaping Use    Vaping Use: Never used   Substance and Sexual Activity    Alcohol use: No     Alcohol/week: 0.0 standard drinks     Comment: social    Drug use: No    Sexual activity: Not on file   Other Topics Concern    Not on file   Social History Narrative    Not on file     Social Determinants of Health     Financial Resource Strain:     Difficulty of Paying Living Expenses:    Food Insecurity:     Worried About Running Out of Food in the Last Year:     [address] in the Last Year:    Transportation Needs:     Lack of Transportation (Medical):      Lack of Transportation (Non-Medical):    Physical Activity:     Days of Exercise per Week:     Minutes of Exercise per Session:    Stress:     Feeling of Stress :    Social Connections:     Frequency of Communication with Friends and Family:     Frequency of Social Gatherings with Friends and Family:     Attends Faith Services:     Active Member of Clubs or Organizations:     Attends Club or Organization Meetings:     Marital Status:    Intimate Partner Violence:     Fear of Current or Ex-Partner:     Emotionally Abused:     Physically Abused:     Sexually Abused:      Current Facility-Administered Medications   Medication Dose Route Frequency Provider Last Rate Last Admin    dilTIAZem injection 10 mg  10 mg IntraVENous Once Rubi Barraza MD        Followed by   Eliana Sequeira dilTIAZem 125 mg in dextrose 5 % 125 mL infusion  5-15 mg/hr IntraVENous Continuous Rubi Barraza MD         Current Outpatient Medications   Medication Sig Dispense Refill    aspirin (COLLEEN ASPIRIN) 325 MG tablet Take by mouth      pravastatin (PRAVACHOL) 80 MG tablet TAKE 1 TABLET BY MOUTH DAILY 90 tablet 3    moxifloxacin (VIGAMOX) 0.5 % ophthalmic solution INSTILL 1 DROP IN LEFT EYE FOUR TIMES DAILY AFTER SURGERY      lisinopril (PRINIVIL;ZESTRIL) 5 MG tablet Take 1 tablet by mouth daily 90 tablet 3    sotalol (BETAPACE) 80 MG tablet Take 80 mg by mouth 2 times daily      loratadine (CLARITIN) 10 MG capsule Take 10 mg by mouth daily      levothyroxine (SYNTHROID) 75 MCG tablet TAKE 1 TABLET BY MOUTH DAILY 90 tablet 1    dorzolamide-timolol (COSOPT) 22.3-6.8 MG/ML ophthalmic solution INT 1 GTT INTO OU BID      latanoprost (XALATAN) 0.005 % ophthalmic solution INT 1 GTT IN OU QD IN THE ANDREA      omeprazole (PRILOSEC OTC) 20 MG tablet Take 20 mg by mouth daily      Multiple Vitamins-Minerals (MULTIVITAMIN-MINERALS) TABS tablet Take 1 tablet by mouth daily 30 tablet 0     No Known Allergies    REVIEW OF SYSTEMS  10 systems reviewed, pertinent positives per HPI otherwise noted to be negative. PHYSICAL EXAM  BP (!) 148/117   Pulse 160   Temp 98.4 °F (36.9 °C) (Oral)   Resp 22   Wt 193 lb (87.5 kg)   SpO2 98%   BMI 28.50 kg/m²   GENERAL APPEARANCE: Awake and alert. Cooperative. No acute distress. HEAD: Normocephalic. Atraumatic. EYES: EOM's grossly intact. ENT: Mucous membranes are moist.   NECK: Supple, trachea midline. HEART: Tachycardic, irregular rhythm. LUNGS: Respirations unlabored. CTAB. Good air exchange. No wheezes, rales, or rhonchi. Speaking comfortably in full sentences. ABDOMEN: Soft. Non-distended. Non-tender. No guarding or rebound. EXTREMITIES: No peripheral edema. MAEE. No acute deformities. SKIN: Warm, dry and intact. No acute rashes. NEUROLOGICAL: Alert and oriented X 3. CN II-XII grossly intact. Strength 5/5, sensation intact. Normal coordination. PSYCHIATRIC: Normal mood and affect. LABS  I have reviewed all labs for this visit.    Results for orders placed or performed during the hospital encounter of 10/11/21   CBC Auto Differential   Result Value Ref Range    WBC 7.9 4.0 - 11.0 K/uL    RBC 4.40 4.20 - 5.90 M/uL    Hemoglobin 13.0 (L) 13.5 - 17.5 g/dL    Hematocrit 39.0 (L) 40.5 - 52.5 %    MCV 88.6 80.0 - 100.0 fL    MCH 29.6 26.0 - 34.0 pg    MCHC 33.4 31.0 - 36.0 g/dL    RDW 14.0 12.4 - 15.4 %    Platelets 302 464 - 450 K/uL    MPV 8.6 5.0 - 10.5 fL    Neutrophils % 65.7 %    Lymphocytes % 16.8 %    Monocytes % 14.0 %    Eosinophils % 2.9 %    Basophils % 0.6 %    Neutrophils Absolute 5.2 1.7 - 7.7 K/uL    Lymphocytes Absolute 1.3 1.0 - 5.1 K/uL    Monocytes Absolute 1.1 0.0 - 1.3 K/uL    Eosinophils Absolute 0.2 0.0 - 0.6 K/uL    Basophils Absolute 0.1 0.0 - 0.2 K/uL   Comprehensive Metabolic Panel w/ Reflex to MG   Result Value Ref Range    Sodium 138 136 - 145 mmol/L    Potassium reflex Magnesium 4.2 3.5 - 5.1 mmol/L    Chloride 102 99 - 110 mmol/L    CO2 25 21 - 32 mmol/L    Anion Gap 11 3 - 16    Glucose 104 (H) 70 - 99 mg/dL    BUN 12 7 - 20 mg/dL    CREATININE 0.9 0.8 - 1.3 mg/dL    GFR Non-African American >60 >60    GFR African American >60 >60    Calcium 9.6 8.3 - 10.6 mg/dL    Total Protein 6.8 6.4 - 8.2 g/dL    Albumin 3.9 3.4 - 5.0 g/dL    Albumin/Globulin Ratio 1.3 1.1 - 2.2    Total Bilirubin 0.3 0.0 - 1.0 mg/dL    Alkaline Phosphatase 108 40 - 129 U/L    ALT 24 10 - 40 U/L    AST 20 15 - 37 U/L    Globulin 2.9 Not Established g/dL   Troponin   Result Value Ref Range    Troponin <0.01 <0.01 ng/mL   EKG 12 Lead   Result Value Ref Range    Ventricular Rate 126 BPM    Atrial Rate 340 BPM    QRS Duration 78 ms    Q-T Interval 320 ms    QTc Calculation (Bazett) 463 ms    R Axis 1 degrees    T Axis 59 degrees    Diagnosis       Atrial fibrillation with rapid ventricular responseAbnormal ECGWhen compared with ECG of 21-MAY-2020 09:16,Atrial fibrillation has replaced Sinus rhythmVent. rate has increased BY  63 BPMNonspecific T wave abnormality now evident in Lateral leads       EKG  The Ekg interpreted by myself  atrial fibrillation with a rate of 126  Axis is   Normal  QTc is  normal  Intervals and Durations are unremarkable. No specific ST-T wave changes appreciated. No evidence of acute ischemia. A. fib has replaced sinus bradycardia when compared to the EKG from June 22, 2021. Cardiac Monitoring: The cardiac monitor revealed atrial fibrillation with RVR m as interpreted by me. The cardiac monitor was ordered secondary to the patient's complaint of palpitations and to monitor the patient for dysrhythmia. RADIOLOGY  X-RAYS:  I have reviewed radiologic plain film image(s). ALL OTHER NON-PLAIN FILM IMAGES SUCH AS CT, ULTRASOUND AND MRI HAVE BEEN READ BY THE RADIOLOGIST. XR CHEST PORTABLE   Final Result   1. No active pulmonary disease. Rechecks: Physical assessment performed. On reevaluation he is still in A. fib but his rate is controlled now in the 80s to 90s. Critical Care:I personally spent a total of 50 minutes of critical care time in obtaining history, performing a physical exam, bedside monitoring of interventions, collecting and interpreting tests and discussion with consultants but excluding time spent performing procedures, treating other patients and teaching time. ED COURSE/MDM  Patient seen and evaluated. Here the patient is afebrile and tachycardic, noted to be in A. fib. Heart rate anywhere from 120-160. Other vitals normal.  EKG shows A. fib with RVR but no ischemic changes. Troponin is negative, lab work is reassuring. Chest x-ray is normal.  I have ordered a Cardizem bolus and drip and will admit the patient to the hospitalist.  Old records reviewed. Labs and imaging reviewed and results discussed with patient. Patient was reassessed as noted above . Plan of care discussed with patient. Patient in agreement with plan. CLINICAL IMPRESSION  1. Atrial fibrillation with RVR (HCC)        Blood pressure (!) 148/117, pulse 160, temperature 98.4 °F (36.9 °C), temperature source Oral, resp. rate 22, weight 193 lb (87.5 kg), SpO2 98 %. [address] was admitted in stable condition.     (Please note this note was completed with a voice recognition program.  Efforts were made to edit the dictations but occasionally words are mis-transcribed.)        Antony Pompa MD  10/12/21 9114

## 2021-10-11 NOTE — TELEPHONE ENCOUNTER
10/11-Pt contacted [facility] stated he is still very weak & is having SOB. BP this AM was 117/71 . Per pt BP now @ 11:40 am is 116/75     RN suggested ER visit. Please contact & advise.

## 2021-10-11 NOTE — CONSULTS
Electrophysiology Consultation   Date: 10/11/2021  Admit Date:  10/11/2021  Reason for Consultation: Symptomatic atrial fibrillation  Consult Requesting Physician: Jhonny Edwards MD     Chief Complaint   Patient presents with    Chest Pain     states CP over past week and states has been in afib which is not normally in      HPI:   Mr. Marcella Sotelo is a pleasant 66-year-old male with a medical history significant for paroxysmal atrial fibrillation status post ablation with Dr. Joni Sandoval and Margarito, hypertension, diabetes mellitus type 2, and nonischemic cardiomyopathy with recovered ejection fraction who presents from home with symptomatic atrial fibrillation. According to patient he was doing well until approximately 10 days ago when he began to suffer from palpitations and shortness of breath especially when he bends over. He was scheduled for dofetilide admission however there were no beds and so patient presented to the ER for evaluation. Patient denies fevers, chest pain, orthopnea, PND, lower extremity edema, abdominal swelling, chills, visual changes, headaches, sore throat, cough, abdominal pain, nausea, vomiting, bleeding, bruising, dysuria, muscle/joint pain, confusion, depression, anxiety, skin lesions, etc.    Emergency Room/Hospital Course:  Patient was evaluated in the ER. He was found to be in atrial fibrillation with RVR. He was started on a diltiazem drip and his heart rates improved. His CMP was reassuring. His CBC was reassuring. Electrophysiology was consulted to evaluate nithin.     Current rhythm: Atrial fibrillation  Known history of atrial fibrillation: yes - post ablation  Valvular disease: No  Associated symptoms: palpitations and shortness of breath  Heart rate is  controlled  Previous cardioversion and/or ablation: yes - post cardioversion  History of CAD: No  History of sleep apnea: No  History of ETOH abuse/drug abuse: No  History of thyroid disease: No  Elevated BNP: unknown  Left atrial size is unknown    Past Medical History:   Diagnosis Date    Arthritis     Atrial fibrillation ([address] 2007    S/P CARDIOVERSION, EF 49% on stress test 2009    Hyperlipidemia     Hypertension     SCC (squamous cell carcinoma)     Dr. Edy Rodas Sleep apnea     Systolic heart failure [facility])         Past Surgical History:   Procedure Laterality Date    ABLATION OF DYSRHYTHMIC FOCUS      Afib PVI etc / right sided flutter line    CARDIOVERSION  2005    COLONOSCOPY  [id number]    tics    TOTAL HIP ARTHROPLASTY Left 2013       No Known Allergies    Social History:  Reviewed. reports that he has never smoked. He has never used smokeless tobacco. He reports that he does not drink alcohol and does not use drugs. Family History:  Reviewed. family history includes Cancer in his mother; Heart Disease (age of onset: 79) in his father; High Blood Pressure in his mother. No premature CAD. Review of System:  All other systems reviewed except for that noted above. Pertinent negatives and positives are:     · General: negative for fever, chills   · Ophthalmic ROS: negative for - eye pain or loss of vision  · ENT ROS: negative for - headaches, sore throat   · Respiratory: negative for - cough, sputum  · Cardiovascular: Reviewed in HPI  · Gastrointestinal: negative for - abdominal pain, diarrhea, N/V  · Hematology: negative for - bleeding, blood clots, bruising or jaundice  · Genito-Urinary:  negative for - Dysuria or incontinence  · Musculoskeletal: negative for - Joint swelling, muscle pain  · Neurological: negative for - confusion, dizziness, headaches   · Psychiatric: No anxiety, no depression. · Dermatological: negative for - rash    Physical Examination:  Vitals:    10/11/21 1526   BP: (!) 142/102   Pulse: 84   Resp: 17   Temp:    SpO2: 98%      No intake or output data in the 24 hours ending 10/11/21 1547  No intake/output data recorded.    Wt Readings from Last 3 Encounters:   10/11/21 independently reviewed the ECG and telemetry. Scheduled Meds:  Continuous Infusions:   dilTIAZem 5 mg/hr (10/11/21 1414)     PRN Meds:.      Assessment:   Patient Active Problem List    Diagnosis Date Noted    Atrial fibrillation ([address] 10/11/2021    Hypothyroidism due to medication 07/21/2021    Anemia due to chronic blood loss 05/18/2021    Elevated serum cholesterol 05/18/2021    Intestinal malabsorption 05/18/2021    Rhinitis, chronic 04/29/2021    S/P placement of VNS (vagus nerve stimulation) device 04/29/2021    Atypical chest pain 01/20/2021    Gastroesophageal reflux disease without esophagitis 01/20/2021    Benign essential hypertension 01/20/2021    Coronary artery calcification seen on CT scan 05/19/2020    HH (hiatus hernia) 05/19/2020    Prostate enlargement 05/19/2020    Splenic artery aneurysm ([address] 05/19/2020    Calculus of gallbladder without cholecystitis without obstruction 05/19/2020    Diverticulosis of colon without diverticulitis 05/19/2020    Periodic limb movement sleep disorder 05/19/2020    Pulmonary infiltrate 05/19/2020    Overweight with body mass index (BMI) 25.0-29.9 03/11/2020    Hypercalcemia 02/13/2020    Paroxysmal atrial fibrillation ([address] 11/19/2019    Presence of Watchman left atrial appendage closure device 11/18/2019    Anemia 09/12/2019    Moderate obstructive sleep apnea 09/12/2019    GI bleed 06/07/2019    Essential thrombocytosis ([address] 04/19/2019    Generalized osteoarthritis 04/19/2019    History of seronegative inflammatory arthritis 04/19/2019    Pre-diabetes 11/26/2018    History of joint swelling 10/04/2018    Chronic anticoagulation 05/02/2018    Encounter for monitoring sotalol therapy 05/02/2018    Bilateral shoulder tendinopathy 03/14/2016    Primary osteoarthritis of both shoulders 03/14/2016    BPH (benign prostatic hyperplasia) 07/10/2015    Coronary atherosclerosis 07/10/2015    ED (erectile dysfunction) 07/10/2015    Status post left hip replacement 07/11/2013    Osteoarthritis of left hip 02/18/2013    Squamous cell carcinoma of skin 11/07/2011    HTN (hypertension) 10/13/2010    Hyperlipidemia 08/17/2010      There are no active hospital problems to display for this patient. Mr. Loc Corrales is a pleasant 53-year-old male with a medical history significant for paroxysmal atrial fibrillation status post ablation with Dr. Theodore Alcala, hypertension, diabetes mellitus type 2, and nonischemic cardiomyopathy with recovered ejection fraction who presents from home with symptomatic atrial fibrillation. Problem List:  1. Symptomatic paroxysmal atrial fibrillation. 2. Hypertension. Assessment and Plan:  1. Symptomatic paroxysmal atrial fibrillation ([id number] score 2, post Watchman). Mr. Loc Corrales is a pleasant 53-year-old male with a medical history significant for paroxysmal atrial fibrillation status post ablation with Dr. Theodore Alcala, hypertension, diabetes mellitus type 2, and nonischemic cardiomyopathy with recovered ejection fraction who presents from home with symptomatic atrial fibrillation. Plan was for dofetilide admission however presented with symptomatic atrial fibrillation. Plan to start dofetilide 500 mcg BID and continue protocol.  - Start on dofetilide with Q12H EKGs. Hold sotalol.   - Goal Mg > 2 (gave some magnesium). - Goal K > 4.   - Start diltiazem. - Continue aspirin.  - We will continue to follow along with you. 2. Hypertension. Stable. - Plan as per above. Thank you for allowing me to participate in the care of Jamia Awad . If you have any questions/comments, please do not hesitate to contact us.     Miracle Perkins MD  Cardiac Electrophysiology  [facility]  [phone] [facility]

## 2021-10-12 LAB
ANION GAP SERPL CALCULATED.3IONS-SCNC: 11 MMOL/L (ref 3–16)
BUN BLDV-MCNC: 11 MG/DL (ref 7–20)
CALCIUM SERPL-MCNC: 9.5 MG/DL (ref 8.3–10.6)
CHLORIDE BLD-SCNC: 102 MMOL/L (ref 99–110)
CO2: 25 MMOL/L (ref 21–32)
CREAT SERPL-MCNC: 0.9 MG/DL (ref 0.8–1.3)
EKG ATRIAL RATE: 70 BPM
EKG ATRIAL RATE: 73 BPM
EKG ATRIAL RATE: 87 BPM
EKG DIAGNOSIS: NORMAL
EKG P AXIS: 34 DEGREES
EKG P AXIS: 39 DEGREES
EKG P-R INTERVAL: 200 MS
EKG P-R INTERVAL: 204 MS
EKG Q-T INTERVAL: 354 MS
EKG Q-T INTERVAL: 444 MS
EKG Q-T INTERVAL: 446 MS
EKG QRS DURATION: 66 MS
EKG QRS DURATION: 72 MS
EKG QRS DURATION: 76 MS
EKG QTC CALCULATION (BAZETT): 461 MS
EKG QTC CALCULATION (BAZETT): 481 MS
EKG QTC CALCULATION (BAZETT): 489 MS
EKG R AXIS: 11 DEGREES
EKG R AXIS: 2 DEGREES
EKG R AXIS: 5 DEGREES
EKG T AXIS: 33 DEGREES
EKG T AXIS: 42 DEGREES
EKG T AXIS: 57 DEGREES
EKG VENTRICULAR RATE: 102 BPM
EKG VENTRICULAR RATE: 70 BPM
EKG VENTRICULAR RATE: 73 BPM
GFR AFRICAN AMERICAN: >60
GFR NON-AFRICAN AMERICAN: >60
GLUCOSE BLD-MCNC: 103 MG/DL (ref 70–99)
INR BLD: 1.01 (ref 0.88–1.12)
MAGNESIUM: 2 MG/DL (ref 1.8–2.4)
POTASSIUM SERPL-SCNC: 4 MMOL/L (ref 3.5–5.1)
PROTHROMBIN TIME: 11.4 SEC (ref 9.9–12.7)
SODIUM BLD-SCNC: 138 MMOL/L (ref 136–145)
T4 FREE: 1.2 NG/DL (ref 0.9–1.8)
TSH SERPL DL<=0.05 MIU/L-ACNC: 1.95 UIU/ML (ref 0.27–4.2)

## 2021-10-12 PROCEDURE — 36415 COLL VENOUS BLD VENIPUNCTURE: CPT

## 2021-10-12 PROCEDURE — 6370000000 HC RX 637 (ALT 250 FOR IP): Performed by: HOSPITALIST

## 2021-10-12 PROCEDURE — 6370000000 HC RX 637 (ALT 250 FOR IP): Performed by: NURSE PRACTITIONER

## 2021-10-12 PROCEDURE — 6360000002 HC RX W HCPCS: Performed by: HOSPITALIST

## 2021-10-12 PROCEDURE — 99233 SBSQ HOSP IP/OBS HIGH 50: CPT | Performed by: NURSE PRACTITIONER

## 2021-10-12 PROCEDURE — 83735 ASSAY OF MAGNESIUM: CPT

## 2021-10-12 PROCEDURE — 2580000003 HC RX 258: Performed by: HOSPITALIST

## 2021-10-12 PROCEDURE — 93010 ELECTROCARDIOGRAM REPORT: CPT | Performed by: INTERNAL MEDICINE

## 2021-10-12 PROCEDURE — 85610 PROTHROMBIN TIME: CPT

## 2021-10-12 PROCEDURE — 80048 BASIC METABOLIC PNL TOTAL CA: CPT

## 2021-10-12 PROCEDURE — 2060000000 HC ICU INTERMEDIATE R&B

## 2021-10-12 PROCEDURE — 93005 ELECTROCARDIOGRAM TRACING: CPT | Performed by: HOSPITALIST

## 2021-10-12 RX ORDER — POTASSIUM CHLORIDE 750 MG/1
10 TABLET, EXTENDED RELEASE ORAL ONCE
Status: COMPLETED | OUTPATIENT
Start: 2021-10-12 | End: 2021-10-12

## 2021-10-12 RX ADMIN — Medication 1 TABLET: at 08:28

## 2021-10-12 RX ADMIN — DOFETILIDE 500 MCG: 0.25 CAPSULE ORAL at 08:36

## 2021-10-12 RX ADMIN — CETIRIZINE HYDROCHLORIDE 5 MG: 10 TABLET, FILM COATED ORAL at 08:28

## 2021-10-12 RX ADMIN — POTASSIUM CHLORIDE 10 MEQ: 750 TABLET, EXTENDED RELEASE ORAL at 12:14

## 2021-10-12 RX ADMIN — ENOXAPARIN SODIUM 40 MG: 40 INJECTION SUBCUTANEOUS at 08:27

## 2021-10-12 RX ADMIN — DORZOLAMIDE HYDROCHLORIDE AND TIMOLOL MALEATE 1 DROP: 20; 5 SOLUTION/ DROPS OPHTHALMIC at 08:29

## 2021-10-12 RX ADMIN — LEVOTHYROXINE SODIUM 75 MCG: 0.03 TABLET ORAL at 08:28

## 2021-10-12 RX ADMIN — PANTOPRAZOLE SODIUM 40 MG: 40 TABLET, DELAYED RELEASE ORAL at 06:57

## 2021-10-12 RX ADMIN — LISINOPRIL 5 MG: 10 TABLET ORAL at 08:28

## 2021-10-12 RX ADMIN — PANTOPRAZOLE SODIUM 40 MG: 40 TABLET, DELAYED RELEASE ORAL at 21:54

## 2021-10-12 RX ADMIN — DOFETILIDE 500 MCG: 0.25 CAPSULE ORAL at 21:54

## 2021-10-12 RX ADMIN — SODIUM CHLORIDE, PRESERVATIVE FREE 10 ML: 5 INJECTION INTRAVENOUS at 21:54

## 2021-10-12 RX ADMIN — DILTIAZEM HYDROCHLORIDE 120 MG: 120 CAPSULE, COATED, EXTENDED RELEASE ORAL at 08:28

## 2021-10-12 RX ADMIN — ASPIRIN 325 MG: 325 TABLET, COATED ORAL at 08:29

## 2021-10-12 ASSESSMENT — PAIN SCALES - GENERAL
PAINLEVEL_OUTOF10: 0

## 2021-10-12 NOTE — CARE COORDINATION
Spoke with patient at bedside. He lives in a house with his wife. He is independent at home. He has insurance and a PCP. He denies any DME or services at home. Likely no needs from CM. Please notify CM should any needs arise.

## 2021-10-12 NOTE — PROGRESS NOTES
Chart reviewed. Pt admitted with symptomatic A fib with RVR. S/p multiple cardioversions, ablation in past. Cardio consulted with tikosyn initiated on 10/11/2021. Pt also on cardizem - differ to cardio if to d/c. Continue mgt per orders. Discussed with pt and RN.

## 2021-10-12 NOTE — H&P
HOSPITALISTS HISTORY AND PHYSICAL    10/12/2021 12:23 AM    Patient Information:  Mary Ayala is a 66 y.o. male [id number]  PCP:  DARSHANA Zapata CNP (Tel: [phone] )    Chief complaint:    Chief Complaint   Patient presents with    Chest Pain     states CP over past week and states has been in afib which is not normally in         History of Present Illness: Bryan Lieberman is a 66 y.o. male who presented to the ED to be evaluated for intermittent chest pain and palpitations ongoing for 1 week PTA. The patient has a longstanding history of PAF/RVR which has been treated with various modalities over the years. Patient has undergone more than 7 elective cardioversions, none of which maintained regular automaticity. He had a watchman device implanted in 2019, therefore is no longer on chronic anticoagulation therapy. Patient has also been switched from various antiarrhythmic medications including metoprolol, dronedarone, Multaq, and is currently on sotalol. Patient was instructed 3 days PTA to discontinue his sotalol with plans to initiate Tikosyn therapy. Upon arrival to the ED EKG was obtained revealing A. fib RVR with ventricular rate of 126 bpm.  Patient was initiated on IV Cardizem bolus and drip by ED attending. Cardiology consult was placed with recommendations to initiate Tikosyn therapy twice daily. Hospitalist team consulted to admit this individual to PCU during 3-day initiation period of this antiarrhythmic. History obtained from patient and review of [facility] chart    Old medical records show that patient's most recent echo was performed on 12/18/2019 with the following results:   Summary   Ejection fraction is visually estimated to be 55-60 %. Mild mitral and tricuspid regurgitation. Watchman device is well seated within the left atrial appendage.  No leak   around the device   Mild to moderate aortic regurgitation. REVIEW OF SYSTEMS:   Constitutional: Negative for fever,chills, and generalized weakness  ENT: Negative for headache, rhinorrhea, and sore throat. Respiratory: Negative for shortness of breath, wheezing, and cough  Cardiovascular: Positive for chest pain and palpitations; denies peripheral edema, orthopnea or PND  Gastrointestinal: Negative for N/V/D and abdominal pain; no hematemesis, hematochezia, or melena; no anorexia  Genitourinary: Negative for dysuria, frequency, retention; no incontinence  Hematologic/Lymphatic: Negative for bleeding tendency/excessive bruising  Musculoskeletal: Negative for myalgias and arthalgias; able to ambulate without difficulty  Neurologic: Negative for LOC, seizure activity, paresthesias, dysarthria, vertigo, and gait disturbance  Skin: Negative for itching,rash, decubitus  Psychiatric: Negative for depression,anxiety, and agitation; no hallucinations; denies SI/HI  Endocrine: Negative for polyuria/polydipsia/polyphagia; no heat/cold intolerance    Past Medical History:   has a past medical history of Arthritis, Atrial fibrillation ([facility] [address] Hyperlipidemia, Hypertension, SCC (squamous cell carcinoma), Sleep apnea, and Systolic heart failure ([facility] [address] Past Surgical History:   has a past surgical history that includes Cardioversion (2005); Total hip arthroplasty (Left, 2013); Colonoscopy ([id number]); and ablation of dysrhythmic focus. Medications:  No current facility-administered medications on file prior to encounter.      Current Outpatient Medications on File Prior to Encounter   Medication Sig Dispense Refill    aspirin (COLLEEN ASPIRIN) 325 MG tablet Take by mouth      pravastatin (PRAVACHOL) 80 MG tablet TAKE 1 TABLET BY MOUTH DAILY 90 tablet 3    moxifloxacin (VIGAMOX) 0.5 % ophthalmic solution INSTILL 1 DROP IN LEFT EYE FOUR TIMES DAILY AFTER SURGERY      lisinopril (PRINIVIL;ZESTRIL) 5 MG tablet Take 1 tablet by mouth daily 90 tablet 3    sotalol (BETAPACE) 80 MG tablet Take 80 mg by mouth 2 times daily      loratadine (CLARITIN) 10 MG capsule Take 10 mg by mouth daily      levothyroxine (SYNTHROID) 75 MCG tablet TAKE 1 TABLET BY MOUTH DAILY 90 tablet 1    dorzolamide-timolol (COSOPT) 22.3-6.8 MG/ML ophthalmic solution INT 1 GTT INTO OU BID      latanoprost (XALATAN) 0.005 % ophthalmic solution INT 1 GTT IN OU QD IN THE ANDREA      omeprazole (PRILOSEC OTC) 20 MG tablet Take 20 mg by mouth daily      Multiple Vitamins-Minerals (MULTIVITAMIN-MINERALS) TABS tablet Take 1 tablet by mouth daily 30 tablet 0       Allergies:  No Known Allergies     Social History:   reports that he has never smoked. He has never used smokeless tobacco. He reports that he does not drink alcohol and does not use drugs. Family History:  family history includes Cancer in his mother; Heart Disease (age of onset: 79) in his father; High Blood Pressure in his mother.      Physical Exam:  BP (!) 125/93   Pulse 67   Temp 98.4 °F (36.9 °C) (Oral)   Resp 17   Wt 193 lb (87.5 kg)   SpO2 93%   BMI 28.50 kg/m²     General appearance: WDWN elderly male resting comfortably in bed, NAD  Eyes: Sclera clear without conjunctival injection; PERRLA; EOMI  ENT: Mucous membranes moist without thrush; normal dentition  Neck: Supple without meningismus; no goiter; no carotid bruit bilaterally  Cardiovascular: Irregularly irregular tachyarrhythmia; normal S1-S2 with no murmurs; no peripheral edema; no JVD  Respiratory: No tachypnea; CTAB with adequate air exchange, no wheeze, rhonchi or rales; I:E intact  Gastrointestinal: Abdomen soft, non-tender, not distended; bowel sounds normal; no masses/organomegaly appreciated  Musculoskeletal: FROM spine and extremities x4; no gross deformity  Neurology: A&O x3; cranial nerves 2-12 grossly intact; motor 5/5  BUE/BLE; no seizure activity; finger-to-nose/heel-to-shin intact; no pronator drift  Psychiatry: Well-groomed with good eye contact; appropriate affect; no visual/auditory hallucination  Skin: Warm, dry, normal turgor, no rash  PV: 2/4 radial and dorsalis pedis bilaterally; brisk capillary refill    Labs:  CBC:   Lab Results   Component Value Date    WBC 7.9 10/11/2021    RBC 4.40 10/11/2021    HGB 13.0 10/11/2021    HCT 39.0 10/11/2021    MCV 88.6 10/11/2021    MCH 29.6 10/11/2021    MCHC 33.4 10/11/2021    RDW 14.0 10/11/2021     10/11/2021    MPV 8.6 10/11/2021     BMP:    Lab Results   Component Value Date     10/11/2021    K 4.2 10/11/2021     10/11/2021    CO2 25 10/11/2021    BUN 12 10/11/2021    CREATININE 0.9 10/11/2021    CALCIUM 9.6 10/11/2021    GFRAA >60 10/11/2021    GFRAA >60 06/14/2013    LABGLOM >60 10/11/2021    GLUCOSE 104 10/11/2021     XR CHEST PORTABLE   Final Result   1. No active pulmonary disease. EKG: Ventricular Rate 126 BPM QTc Calculation (Bazett) 463 ms   Atrial Rate 340 BPM R Axis 1 degrees   QRS Duration 78 ms T Axis 59 degrees   Q-T Interval 320 ms Diagnosis Atrial fibrillation with rapid ventricular responseNonspecific ST abnormalityAbnormal        I visualized CXR images and EKG strips personally and agree with documented interpretation    Discussed case  with ED provider    Problem List  Principal Problem:    Atrial fibrillation with RVR ([facility])  Active Problems:    HTN (hypertension)    Pre-diabetes    Moderate obstructive sleep apnea    Presence of Watchman left atrial appendage closure device  Resolved Problems:    * No resolved hospital problems.  *        Assessment/Plan:     A. fib RVR  -Admit to medical floor for continuous telemetry and pulse oximetry monitoring  -Patient initiated on IV Cardizem bolus and drip in ED, then converted to oral Cardizem overnight  -Electrolytes, thyroid studies ordered to assess for secondary causes of this tachyarrhythmia  -Watchman device in place therefore patient no longer requiring chronic anticoagulation   -Patient

## 2021-10-12 NOTE — PROGRESS NOTES
[facility]     Electrophysiology                                     Progress Note    Admission date:  10/11/2021    Reason for follow up visit: AF    HPI/CC: Farzaneh Hennessy was admitted on 10/11/2021 with symptomatic AF. Tikosyn was started. On 10/12/2021, he converted to NSR around 0330. Rhythm has been SR. Subjective: Denies chest pain, palpitations, shortness of breath, and dizziness. Vitals:  Blood pressure 138/86, pulse 74, temperature 97.4 °F (36.3 °C), temperature source Oral, resp. rate 20, weight 193 lb (87.5 kg), SpO2 96 %.   Temp  Av.6 °F (36.4 °C)  Min: 97.2 °F (36.2 °C)  Max: 98.4 °F (36.9 °C)  Pulse  Av.7  Min: 64  Max: 160  BP  Min: 97/79  Max: 154/104  SpO2  Av.7 %  Min: 93 %  Max: 100 %    24 hour I/O    Intake/Output Summary (Last 24 hours) at 10/12/2021 1157  Last data filed at 10/12/2021 9788  Gross per 24 hour   Intake 46.09 ml   Output 800 ml   Net -753.91 ml     Current Facility-Administered Medications   Medication Dose Route Frequency Provider Last Rate Last Admin    influenza quadrivalent split vaccine (FLUZONE;FLUARIX;FLULAVAL;AFLURIA) injection 0.5 mL  0.5 mL IntraMUSCular Prior to discharge Eugenio Agustin MD        potassium chloride (KLOR-CON M) extended release tablet 10 mEq  10 mEq Oral Once DARSHANA Alegria - CNP        dilTIAZem 125 mg in dextrose 5 % 125 mL infusion  5-15 mg/hr IntraVENous Continuous Eugenio Agustin MD 2.5 mL/hr at 10/12/21 0345 2.5 mg/hr at 10/12/21 0345    dofetilide (TIKOSYN) capsule 500 mcg  500 mcg Oral 2 times per day Eugenio Agustin MD   500 mcg at 10/12/21 0836    dilTIAZem (CARDIZEM CD) extended release capsule 120 mg  120 mg Oral Daily Eugenio Agustin MD   120 mg at 10/12/21 4175    aspirin EC tablet 325 mg  325 mg Oral Daily Eugenio Agustin MD   325 mg at 10/12/21 0829    dorzolamide-timolol (COSOPT) 22.3-6.8 MG/ML ophthalmic solution 1 drop  1 drop Both Eyes BID Eugenio Agustin MD   1 drop at 10/12/21 0829    latanoprost (XALATAN) 0.005 % ophthalmic solution 1 drop  1 drop Both Eyes Daily Hoa Brice MD        levothyroxine (SYNTHROID) tablet 75 mcg  75 mcg Oral Daily Hoa Brice MD   75 mcg at 10/12/21 0828    lisinopril (PRINIVIL;ZESTRIL) tablet 5 mg  5 mg Oral Daily Hoa Brice MD   5 mg at 10/12/21 4555    cetirizine (ZYRTEC) tablet 5 mg  5 mg Oral Daily Hoa Brice MD   5 mg at 10/12/21 0731    therapeutic multivitamin-minerals 1 tablet  1 tablet Oral Daily Hoa Brice MD   1 tablet at 10/12/21 0828    pantoprazole (PROTONIX) tablet 40 mg  40 mg Oral QAM AC Hoa Brice MD   40 mg at 10/12/21 0657    pravastatin (PRAVACHOL) tablet 80 mg  80 mg Oral Daily Hoa Brice MD   80 mg at 10/11/21 2009    sodium chloride flush 0.9 % injection 5-40 mL  5-40 mL IntraVENous 2 times per day Hoa Brice MD   10 mL at 10/11/21 2118    sodium chloride flush 0.9 % injection 5-40 mL  5-40 mL IntraVENous PRN Hoa Brice MD        0.9 % sodium chloride infusion  25 mL IntraVENous PRN Hoa Brice MD        enoxaparin (LOVENOX) injection 40 mg  40 mg SubCUTAneous Daily Hoa Brice MD   40 mg at 10/12/21 0827       Objective:     Telemetry monitor: SR    Physical Exam:  Constitutional and general appearance: alert, cooperative, no distress and appears stated age  HEENT: PERRL, no cervical lymphadenopathy. No masses palpable.  Normal oral mucosa  Respiratory:  · Normal excursion and expansion without use of accessory muscles  · Resp auscultation: Normal breath sounds without wheezing, rhonchi, and rales  Cardiovascular:  · The apical impulse is not displaced  · Heart tones are crisp and normal. regular S1 and S2.  · Jugular venous pulsation Normal  · The carotid upstroke is normal in amplitude and contour without delay or bruit  · Peripheral pulses are symmetrical and full   Abdomen:  · No masses or tenderness  · Bowel sounds present  Extremities:  ·  No cyanosis or clubbing  ·  No lower extremity edema  ·  Skin: warm and dry  Neurological:  · Alert and oriented  · Moves all extremities well  · No abnormalities of mood, affect, memory, mentation, or behavior are noted    Data   Echo 1/22/2021 [facility]):    A complete echo was performed using color flow Doppler, spectral Doppler, M-mode and echo contrast.    Lumason contrast was used during the study.   Left ventricle cavity appears normal.    Left ventricular wall thickness is normal.    The left ventricular EF by visual approximation is 55-60%. Normal left ventricular systolic function.   Left ventricular wall motion is within normal limits.   There is grade III left ventricular diastolic dysfunction and elevated   left atrial pressure.   Left atrium cavity is mildly dilated. Rilla Justin is mild aortic valve regurgitation.   There is mild mitral valve regurgitation.   There is mild pulmonary hypertension. The estimated right ventricular systolic pressure is 80.1 mmHg.   No previous echocardiogram available to compare. Echo 11/2019:  Conclusions      Summary   This is a limited echo to assess for pericardial effusion following Watchman   Device implantation. Overall left ventricular function is normal. Ejection fraction is visually   estimated to be 55-60%. No significant pericardial effusion. LUIZA 12/2019:   Conclusions      Summary   Ejection fraction is visually estimated to be 55-60 %. Mild mitral and tricuspid regurgitation. Watchman device is well seated within the left atrial appendage. No leak   around the device   Mild to moderate aortic regurgitation. All labs and testing reviewed.   Lab Review     Renal Profile:   Lab Results   Component Value Date    CREATININE 0.9 10/12/2021    BUN 11 10/12/2021     10/12/2021    K 4.0 10/12/2021    K 4.2 10/11/2021     10/12/2021    CO2 25 10/12/2021     CBC:    Lab Results   Component Value Date    WBC 7.9 10/11/2021

## 2021-10-13 LAB
ANION GAP SERPL CALCULATED.3IONS-SCNC: 9 MMOL/L (ref 3–16)
BUN BLDV-MCNC: 16 MG/DL (ref 7–20)
CALCIUM SERPL-MCNC: 9.3 MG/DL (ref 8.3–10.6)
CHLORIDE BLD-SCNC: 104 MMOL/L (ref 99–110)
CO2: 23 MMOL/L (ref 21–32)
CREAT SERPL-MCNC: 0.8 MG/DL (ref 0.8–1.3)
EKG ATRIAL RATE: 70 BPM
EKG ATRIAL RATE: 72 BPM
EKG DIAGNOSIS: NORMAL
EKG DIAGNOSIS: NORMAL
EKG P AXIS: 51 DEGREES
EKG P AXIS: 83 DEGREES
EKG P-R INTERVAL: 198 MS
EKG P-R INTERVAL: 200 MS
EKG Q-T INTERVAL: 448 MS
EKG Q-T INTERVAL: 452 MS
EKG QRS DURATION: 76 MS
EKG QRS DURATION: 76 MS
EKG QTC CALCULATION (BAZETT): 483 MS
EKG QTC CALCULATION (BAZETT): 494 MS
EKG R AXIS: 16 DEGREES
EKG R AXIS: 23 DEGREES
EKG T AXIS: 52 DEGREES
EKG T AXIS: 60 DEGREES
EKG VENTRICULAR RATE: 70 BPM
EKG VENTRICULAR RATE: 72 BPM
GFR AFRICAN AMERICAN: >60
GFR NON-AFRICAN AMERICAN: >60
GLUCOSE BLD-MCNC: 100 MG/DL (ref 70–99)
MAGNESIUM: 1.9 MG/DL (ref 1.8–2.4)
POTASSIUM SERPL-SCNC: 4 MMOL/L (ref 3.5–5.1)
SODIUM BLD-SCNC: 136 MMOL/L (ref 136–145)

## 2021-10-13 PROCEDURE — 2060000000 HC ICU INTERMEDIATE R&B

## 2021-10-13 PROCEDURE — 2580000003 HC RX 258: Performed by: HOSPITALIST

## 2021-10-13 PROCEDURE — 93005 ELECTROCARDIOGRAM TRACING: CPT | Performed by: INTERNAL MEDICINE

## 2021-10-13 PROCEDURE — 93010 ELECTROCARDIOGRAM REPORT: CPT | Performed by: INTERNAL MEDICINE

## 2021-10-13 PROCEDURE — 83735 ASSAY OF MAGNESIUM: CPT

## 2021-10-13 PROCEDURE — 6360000002 HC RX W HCPCS: Performed by: HOSPITALIST

## 2021-10-13 PROCEDURE — 99233 SBSQ HOSP IP/OBS HIGH 50: CPT | Performed by: NURSE PRACTITIONER

## 2021-10-13 PROCEDURE — 80048 BASIC METABOLIC PNL TOTAL CA: CPT

## 2021-10-13 PROCEDURE — 6370000000 HC RX 637 (ALT 250 FOR IP): Performed by: HOSPITALIST

## 2021-10-13 PROCEDURE — 6360000002 HC RX W HCPCS: Performed by: NURSE PRACTITIONER

## 2021-10-13 PROCEDURE — 36415 COLL VENOUS BLD VENIPUNCTURE: CPT

## 2021-10-13 RX ORDER — MAGNESIUM SULFATE 1 G/100ML
1000 INJECTION INTRAVENOUS ONCE
Status: COMPLETED | OUTPATIENT
Start: 2021-10-13 | End: 2021-10-13

## 2021-10-13 RX ADMIN — PRAVASTATIN SODIUM 80 MG: 80 TABLET ORAL at 08:37

## 2021-10-13 RX ADMIN — SODIUM CHLORIDE, PRESERVATIVE FREE 10 ML: 5 INJECTION INTRAVENOUS at 08:43

## 2021-10-13 RX ADMIN — DOFETILIDE 500 MCG: 0.25 CAPSULE ORAL at 21:02

## 2021-10-13 RX ADMIN — LEVOTHYROXINE SODIUM 75 MCG: 0.03 TABLET ORAL at 08:37

## 2021-10-13 RX ADMIN — LISINOPRIL 5 MG: 10 TABLET ORAL at 08:37

## 2021-10-13 RX ADMIN — MAGNESIUM SULFATE HEPTAHYDRATE 1000 MG: 1 INJECTION, SOLUTION INTRAVENOUS at 08:45

## 2021-10-13 RX ADMIN — PANTOPRAZOLE SODIUM 40 MG: 40 TABLET, DELAYED RELEASE ORAL at 06:41

## 2021-10-13 RX ADMIN — DORZOLAMIDE HYDROCHLORIDE AND TIMOLOL MALEATE 1 DROP: 20; 5 SOLUTION/ DROPS OPHTHALMIC at 09:40

## 2021-10-13 RX ADMIN — ENOXAPARIN SODIUM 40 MG: 40 INJECTION SUBCUTANEOUS at 08:41

## 2021-10-13 RX ADMIN — DOFETILIDE 500 MCG: 0.25 CAPSULE ORAL at 09:39

## 2021-10-13 RX ADMIN — CETIRIZINE HYDROCHLORIDE 5 MG: 10 TABLET, FILM COATED ORAL at 08:36

## 2021-10-13 RX ADMIN — DORZOLAMIDE HYDROCHLORIDE AND TIMOLOL MALEATE 1 DROP: 20; 5 SOLUTION/ DROPS OPHTHALMIC at 21:03

## 2021-10-13 RX ADMIN — ASPIRIN 325 MG: 325 TABLET, COATED ORAL at 08:37

## 2021-10-13 RX ADMIN — Medication 1 TABLET: at 08:36

## 2021-10-13 RX ADMIN — DILTIAZEM HYDROCHLORIDE 120 MG: 120 CAPSULE, COATED, EXTENDED RELEASE ORAL at 08:37

## 2021-10-13 RX ADMIN — SODIUM CHLORIDE, PRESERVATIVE FREE 10 ML: 5 INJECTION INTRAVENOUS at 21:05

## 2021-10-13 ASSESSMENT — PAIN SCALES - GENERAL
PAINLEVEL_OUTOF10: 0

## 2021-10-13 NOTE — PROGRESS NOTES
Hospitalist Progress Note      PCP: Wu Ding, APRN - CNP    Date of Admission: 10/11/2021    Chief Complaint: Chest Pain     Hospital Course: Reviewed H&P     Subjective: Patient currently resting in bed comfortably. Noted flipped to sinus rhythm after first dose of Tikosyn on monitor. D/w EP -plan to continue Tikosyn and  telemetry monitor; plan for getting repeat EKG tomorrow and likely discharge after. No acute events reported overnight. Medications:  Reviewed    Infusion Medications    sodium chloride       Scheduled Medications    influenza virus vaccine  0.5 mL IntraMUSCular Prior to discharge    dofetilide  500 mcg Oral 2 times per day    dilTIAZem  120 mg Oral Daily    aspirin  325 mg Oral Daily    dorzolamide-timolol  1 drop Both Eyes BID    latanoprost  1 drop Both Eyes Daily    levothyroxine  75 mcg Oral Daily    lisinopril  5 mg Oral Daily    cetirizine  5 mg Oral Daily    therapeutic multivitamin-minerals  1 tablet Oral Daily    pantoprazole  40 mg Oral QAM AC    pravastatin  80 mg Oral Daily    sodium chloride flush  5-40 mL IntraVENous 2 times per day    enoxaparin  40 mg SubCUTAneous Daily     PRN Meds: sodium chloride flush, sodium chloride      Intake/Output Summary (Last 24 hours) at 10/13/2021 1234  Last data filed at 10/13/2021 0703  Gross per 24 hour   Intake 610 ml   Output 175 ml   Net 435 ml       Physical Exam Performed:  /86   Pulse 73   Temp 97.4 °F (36.3 °C) (Oral)   Resp 18   Wt 191 lb 8 oz (86.9 kg)   SpO2 97%   BMI 28.28 kg/m²     General appearance: WDWN elderly male resting comfortably in bed, NAD  HEENT: Pupils equal, round, and reactive to light. Conjunctivae/corneas clear. Neck: Supple, with full range of motion. No jugular venous distention. Trachea midline. Respiratory:  Normal respiratory effort. Clear to auscultation, bilaterally without Rales/Wheezes/Rhonchi.   Cardiovascular: Regular rate and rhythm with normal S1/S2 without murmurs, rubs or gallops. Abdomen: Soft, non-tender, non-distended with normal bowel sounds. Musculoskeletal: No clubbing, cyanosis or edema bilaterally. Full range of motion without deformity. Skin: Skin color, texture, turgor normal.  No rashes or lesions. Neurologic:  Neurovascularly intact without any focal sensory/motor deficits. Cranial nerves: II-XII intact, grossly non-focal.  Psychiatric: Alert and oriented, thought content appropriate, normal insight  Capillary Refill: Brisk,< 3 seconds   Peripheral Pulses: +2 palpable, equal bilaterally       Labs:   Recent Labs     10/11/21  1306   WBC 7.9   HGB 13.0*   HCT 39.0*        Recent Labs     10/11/21  1306 10/12/21  0522 10/13/21  0515    138 136   K 4.2 4.0 4.0    102 104   CO2 25 25 23   BUN 12 11 16   CREATININE 0.9 0.9 0.8   CALCIUM 9.6 9.5 9.3     Recent Labs     10/11/21  1306   AST 20   ALT 24   BILITOT 0.3   ALKPHOS 108     Recent Labs     10/12/21  0523   INR 1.01     Recent Labs     10/11/21  1306   TROPONINI <0.01     Radiology:  XR CHEST PORTABLE   Final Result   1. No active pulmonary disease. Active Hospital Problems    Diagnosis Date Noted    Atrial fibrillation with RVR ([address] [I48.91] 10/11/2021    Presence of Watchman left atrial appendage closure device [Z95.818] 11/18/2019    Moderate obstructive sleep apnea [G47.33] 09/12/2019    Pre-diabetes [R73.03] 11/26/2018    HTN (hypertension) [I10] 10/13/2010     Assessment/Plan:  A. fib RVR POA -currently converted to normal sinus rhythm.  -Patient initiated on IV Cardizem bolus and drip in ED, then converted to oral Cardizem   -Electrolytes, thyroid studies ordered to assess for secondary causes of this tachyarrhythmia  -Watchman device in place therefore patient no longer requiring chronic anticoagulation   -Patient initiated on Tikosyn 500 mg twice daily by EP and admitted to PCU for 3-day admission .  DC'd cardizem   -Noted EP plans for obtaining EKG after fifth dose of Tikosyn and likely discharge thereafter.     History of prediabetes  -Patient is on no hypoglycemic agents PTA  -Carb restriction added to diet    DVT Prophylaxis: Lovenox  Diet: ADULT DIET; Regular; 4 carb choices (60 gm/meal); Low Fat/Low Chol/High Fiber/CHAN; No Caffeine  Code Status: Prior    PT/OT Eval Status: Not yet ordered     Dispo -likely tomorrow if okay with EP. The note was completed using Dragon -speech recognition software & EMR  . Every effort was made to ensure accuracy; however, inadvertent computerized transcription errors may be present.     Jordyn Willams MD

## 2021-10-13 NOTE — PROGRESS NOTES
[facility]     Electrophysiology                                     Progress Note    Admission date:  10/11/2021    Reason for follow up visit: AF    HPI/CC: Juan Morales was admitted on 10/11/2021 with symptomatic AF. Tikosyn was started. On 10/12/2021, he converted to NSR around 0330. Rhythm has been SR. Subjective: Denies chest pain, palpitations, shortness of breath, and dizziness. Vitals:  Blood pressure 128/75, pulse 72, temperature 97.7 °F (36.5 °C), temperature source Oral, resp. rate 18, weight 191 lb 8 oz (86.9 kg), SpO2 97 %.   Temp  Av.8 °F (36.6 °C)  Min: 97.6 °F (36.4 °C)  Max: 98.1 °F (36.7 °C)  Pulse  Av  Min: 66  Max: 75  BP  Min: 112/69  Max: 142/85  SpO2  Av %  Min: 93 %  Max: 98 %    24 hour I/O    Intake/Output Summary (Last 24 hours) at 10/13/2021 0951  Last data filed at 10/13/2021 0703  Gross per 24 hour   Intake 610 ml   Output 175 ml   Net 435 ml     Current Facility-Administered Medications   Medication Dose Route Frequency Provider Last Rate Last Admin    influenza quadrivalent split vaccine (FLUZONE;FLUARIX;FLULAVAL;AFLURIA) injection 0.5 mL  0.5 mL IntraMUSCular Prior to discharge Tonja Gomez MD        dofetilide (TIKOSYN) capsule 500 mcg  500 mcg Oral 2 times per day Tonja Gomez MD   500 mcg at 10/13/21 0274    dilTIAZem (CARDIZEM CD) extended release capsule 120 mg  120 mg Oral Daily Tonja Gomez MD   120 mg at 10/13/21 0837    aspirin EC tablet 325 mg  325 mg Oral Daily Tonja Gomez MD   325 mg at 10/13/21 0837    dorzolamide-timolol (COSOPT) 22.3-6.8 MG/ML ophthalmic solution 1 drop  1 drop Both Eyes BID Tonja Gomez MD   1 drop at 10/13/21 0940    latanoprost (XALATAN) 0.005 % ophthalmic solution 1 drop  1 drop Both Eyes Daily Tonja Gomez MD        levothyroxine (SYNTHROID) tablet 75 mcg  75 mcg Oral Daily Tonja Gomez MD   75 mcg at 10/13/21 0837    lisinopril (PRINIVIL;ZESTRIL) tablet 5 mg  5 mg using color flow Doppler, spectral Doppler, M-mode and echo contrast.    Lumason contrast was used during the study.   Left ventricle cavity appears normal.    Left ventricular wall thickness is normal.    The left ventricular EF by visual approximation is 55-60%. Normal left ventricular systolic function.   Left ventricular wall motion is within normal limits.   There is grade III left ventricular diastolic dysfunction and elevated   left atrial pressure.   Left atrium cavity is mildly dilated. Arlis Pluck is mild aortic valve regurgitation.   There is mild mitral valve regurgitation.   There is mild pulmonary hypertension. The estimated right ventricular systolic pressure is 98.3 mmHg.   No previous echocardiogram available to compare. Echo 11/2019:  Conclusions      Summary   This is a limited echo to assess for pericardial effusion following Watchman   Device implantation. Overall left ventricular function is normal. Ejection fraction is visually   estimated to be 55-60%. No significant pericardial effusion. LUIZA 12/2019:   Conclusions      Summary   Ejection fraction is visually estimated to be 55-60 %. Mild mitral and tricuspid regurgitation. Watchman device is well seated within the left atrial appendage. No leak   around the device   Mild to moderate aortic regurgitation. All labs and testing reviewed.   Lab Review     Renal Profile:   Lab Results   Component Value Date    CREATININE 0.8 10/13/2021    BUN 16 10/13/2021     10/13/2021    K 4.0 10/13/2021    K 4.2 10/11/2021     10/13/2021    CO2 23 10/13/2021     CBC:    Lab Results   Component Value Date    WBC 7.9 10/11/2021    RBC 4.40 10/11/2021    HGB 13.0 10/11/2021    HCT 39.0 10/11/2021    MCV 88.6 10/11/2021    RDW 14.0 10/11/2021     10/11/2021     BNP:  No results found for: BNP  Fasting Lipid Panel:    Lab Results   Component Value Date    CHOL 194 12/11/2017    HDL 63 12/11/2017    HDL 61

## 2021-10-14 VITALS
HEART RATE: 72 BPM | BODY MASS INDEX: 28.14 KG/M2 | DIASTOLIC BLOOD PRESSURE: 90 MMHG | WEIGHT: 190 LBS | TEMPERATURE: 97.5 F | SYSTOLIC BLOOD PRESSURE: 156 MMHG | OXYGEN SATURATION: 97 % | HEIGHT: 69 IN | RESPIRATION RATE: 16 BRPM

## 2021-10-14 LAB
ANION GAP SERPL CALCULATED.3IONS-SCNC: 9 MMOL/L (ref 3–16)
BUN BLDV-MCNC: 13 MG/DL (ref 7–20)
CALCIUM SERPL-MCNC: 9.9 MG/DL (ref 8.3–10.6)
CHLORIDE BLD-SCNC: 102 MMOL/L (ref 99–110)
CO2: 28 MMOL/L (ref 21–32)
CREAT SERPL-MCNC: 0.9 MG/DL (ref 0.8–1.3)
EKG ATRIAL RATE: 67 BPM
EKG DIAGNOSIS: NORMAL
EKG P AXIS: 71 DEGREES
EKG P-R INTERVAL: 206 MS
EKG Q-T INTERVAL: 468 MS
EKG QRS DURATION: 72 MS
EKG QTC CALCULATION (BAZETT): 494 MS
EKG R AXIS: 11 DEGREES
EKG T AXIS: 48 DEGREES
EKG VENTRICULAR RATE: 67 BPM
GFR AFRICAN AMERICAN: >60
GFR NON-AFRICAN AMERICAN: >60
GLUCOSE BLD-MCNC: 97 MG/DL (ref 70–99)
MAGNESIUM: 2 MG/DL (ref 1.8–2.4)
POTASSIUM SERPL-SCNC: 4.3 MMOL/L (ref 3.5–5.1)
SODIUM BLD-SCNC: 139 MMOL/L (ref 136–145)

## 2021-10-14 PROCEDURE — 2580000003 HC RX 258: Performed by: HOSPITALIST

## 2021-10-14 PROCEDURE — 6370000000 HC RX 637 (ALT 250 FOR IP): Performed by: HOSPITALIST

## 2021-10-14 PROCEDURE — 93010 ELECTROCARDIOGRAM REPORT: CPT | Performed by: INTERNAL MEDICINE

## 2021-10-14 PROCEDURE — 99232 SBSQ HOSP IP/OBS MODERATE 35: CPT | Performed by: NURSE PRACTITIONER

## 2021-10-14 PROCEDURE — 83735 ASSAY OF MAGNESIUM: CPT

## 2021-10-14 PROCEDURE — 36415 COLL VENOUS BLD VENIPUNCTURE: CPT

## 2021-10-14 PROCEDURE — 6360000002 HC RX W HCPCS: Performed by: HOSPITALIST

## 2021-10-14 PROCEDURE — 80048 BASIC METABOLIC PNL TOTAL CA: CPT

## 2021-10-14 RX ORDER — DILTIAZEM HYDROCHLORIDE 120 MG/1
120 CAPSULE, COATED, EXTENDED RELEASE ORAL DAILY
Qty: 30 CAPSULE | Refills: 3 | Status: SHIPPED | OUTPATIENT
Start: 2021-10-15 | End: 2022-09-09

## 2021-10-14 RX ORDER — DOFETILIDE 0.5 MG/1
500 CAPSULE ORAL EVERY 12 HOURS SCHEDULED
Qty: 60 CAPSULE | Refills: 3 | Status: SHIPPED | OUTPATIENT
Start: 2021-10-14 | End: 2021-11-12

## 2021-10-14 RX ADMIN — ASPIRIN 325 MG: 325 TABLET, COATED ORAL at 09:31

## 2021-10-14 RX ADMIN — DILTIAZEM HYDROCHLORIDE 120 MG: 120 CAPSULE, COATED, EXTENDED RELEASE ORAL at 09:31

## 2021-10-14 RX ADMIN — SODIUM CHLORIDE, PRESERVATIVE FREE 10 ML: 5 INJECTION INTRAVENOUS at 09:38

## 2021-10-14 RX ADMIN — CETIRIZINE HYDROCHLORIDE 5 MG: 10 TABLET, FILM COATED ORAL at 09:31

## 2021-10-14 RX ADMIN — DORZOLAMIDE HYDROCHLORIDE AND TIMOLOL MALEATE 1 DROP: 20; 5 SOLUTION/ DROPS OPHTHALMIC at 09:36

## 2021-10-14 RX ADMIN — Medication 1 TABLET: at 09:31

## 2021-10-14 RX ADMIN — DOFETILIDE 500 MCG: 0.25 CAPSULE ORAL at 09:31

## 2021-10-14 RX ADMIN — ENOXAPARIN SODIUM 40 MG: 40 INJECTION SUBCUTANEOUS at 09:32

## 2021-10-14 RX ADMIN — PANTOPRAZOLE SODIUM 40 MG: 40 TABLET, DELAYED RELEASE ORAL at 06:43

## 2021-10-14 RX ADMIN — LISINOPRIL 5 MG: 10 TABLET ORAL at 09:31

## 2021-10-14 RX ADMIN — LEVOTHYROXINE SODIUM 75 MCG: 0.03 TABLET ORAL at 09:31

## 2021-10-14 RX ADMIN — PRAVASTATIN SODIUM 80 MG: 80 TABLET ORAL at 09:30

## 2021-10-14 NOTE — PROGRESS NOTES
[facility]     Electrophysiology                                     Progress Note    Admission date:  10/11/2021    Reason for follow up visit: AF    HPI/CC: Shama Ross was admitted on 10/11/2021 with symptomatic AF. Tikosyn was started. On 10/12/2021, he converted to NSR around 0330. Rhythm has been SR. Subjective: Denies chest pain, palpitations, shortness of breath, and dizziness. Vitals:  Blood pressure 131/76, pulse 69, temperature 97.5 °F (36.4 °C), temperature source Oral, resp. rate 16, height 5' 9\" (1.753 m), weight 190 lb (86.2 kg), SpO2 98 %.   Temp  Av.5 °F (36.4 °C)  Min: 97.4 °F (36.3 °C)  Max: 97.8 °F (36.6 °C)  Pulse  Av.4  Min: 67  Max: 74  BP  Min: 118/78  Max: 136/86  SpO2  Av.4 %  Min: 95 %  Max: 98 %    24 hour I/O    Intake/Output Summary (Last 24 hours) at 10/14/2021 0841  Last data filed at 10/14/2021 0644  Gross per 24 hour   Intake 240 ml   Output 450 ml   Net -210 ml     Current Facility-Administered Medications   Medication Dose Route Frequency Provider Last Rate Last Admin    influenza quadrivalent split vaccine (FLUZONE;FLUARIX;FLULAVAL;AFLURIA) injection 0.5 mL  0.5 mL IntraMUSCular Prior to discharge Michael Styles MD        dofetilide (TIKOSYN) capsule 500 mcg  500 mcg Oral 2 times per day Michael Styles MD   500 mcg at 10/13/21 2102    dilTIAZem (CARDIZEM CD) extended release capsule 120 mg  120 mg Oral Daily Michael Styles MD   120 mg at 10/13/21 0837    aspirin EC tablet 325 mg  325 mg Oral Daily Michael Styles MD   325 mg at 10/13/21 0837    dorzolamide-timolol (COSOPT) 22.3-6.8 MG/ML ophthalmic solution 1 drop  1 drop Both Eyes BID Michael Styles MD   1 drop at 10/13/21 2103    latanoprost (XALATAN) 0.005 % ophthalmic solution 1 drop  1 drop Both Eyes Daily Michael Styles MD        levothyroxine (SYNTHROID) tablet 75 mcg  75 mcg Oral Daily Michael Styles MD   75 mcg at 10/13/21 0837    lisinopril [facility]):    A complete echo was performed using color flow Doppler, spectral Doppler, M-mode and echo contrast.    Lumason contrast was used during the study.   Left ventricle cavity appears normal.    Left ventricular wall thickness is normal.    The left ventricular EF by visual approximation is 55-60%. Normal left ventricular systolic function.   Left ventricular wall motion is within normal limits.   There is grade III left ventricular diastolic dysfunction and elevated   left atrial pressure.   Left atrium cavity is mildly dilated. Joss Bath is mild aortic valve regurgitation.   There is mild mitral valve regurgitation.   There is mild pulmonary hypertension. The estimated right ventricular systolic pressure is 56.6 mmHg.   No previous echocardiogram available to compare. Echo 11/2019:  Conclusions      Summary   This is a limited echo to assess for pericardial effusion following Watchman   Device implantation. Overall left ventricular function is normal. Ejection fraction is visually   estimated to be 55-60%. No significant pericardial effusion. LUIZA 12/2019:   Conclusions      Summary   Ejection fraction is visually estimated to be 55-60 %. Mild mitral and tricuspid regurgitation. Watchman device is well seated within the left atrial appendage. No leak   around the device   Mild to moderate aortic regurgitation. All labs and testing reviewed.   Lab Review     Renal Profile:   Lab Results   Component Value Date    CREATININE 0.9 10/14/2021    BUN 13 10/14/2021     10/14/2021    K 4.3 10/14/2021    K 4.2 10/11/2021     10/14/2021    CO2 28 10/14/2021     CBC:    Lab Results   Component Value Date    WBC 7.9 10/11/2021    RBC 4.40 10/11/2021    HGB 13.0 10/11/2021    HCT 39.0 10/11/2021    MCV 88.6 10/11/2021    RDW 14.0 10/11/2021     10/11/2021     BNP:  No results found for: BNP  Fasting Lipid Panel:    Lab Results   Component Value Date    CHOL 194 12/11/2017    HDL 63 12/11/2017    HDL 61 10/21/2011    TRIG 93 12/11/2017     Cardiac Enzymes:  CK/MbTroponin  Lab Results   Component Value Date    TROPONINI <0.01 10/11/2021     PT/ INR   Lab Results   Component Value Date    INR 1.01 10/12/2021    INR 0.94 05/21/2020    INR 0.90 05/20/2020    PROTIME 11.4 10/12/2021    PROTIME 10.9 05/21/2020    PROTIME 10.4 05/20/2020     PTT No results found for: PTT   Lab Results   Component Value Date    MG 2.00 10/14/2021      Lab Results   Component Value Date    TSH 1.95 10/11/2021       Assessment:  Symptomatic paroxsymal atrial arrhythmias (AF/AT): stable   -s/p DCCV 4/2020 and 2/2021   -s/p RFCA for PAF and CTI 7/2019    -s/p Watchman implant 11/2019   -amiodarone stopped due to hypothyroidism    -has failed Multaq and sotalol   NICM with recovered EF   Diastolic dysfunction  AI: mild on echo 1/2021   HTN: controlled   DM  History of GIB  Acquired hypothyroidism   -amiodarone stopped 12/2019    Plan:   Continue full dose ASA (Watchman), Cardizem, Tikosyn, and lisinopril  Continue Tikosyn (Rx sent to [facility] pharmacy)  Office to arrange for follow up  Okay for discharge from an EP standpoint    EKG and Tikosyn dosing reviewed with Dr. Natalie Moser, [address]  [phone]

## 2021-10-14 NOTE — DISCHARGE SUMMARY
Hospital Medicine Discharge Summary    Patient ID: Chary Schafer      Patient's PCP: DARSHANA Beth - CNP    Admit Date: 10/11/2021     Discharge Date:  10/14/2021    Admitting Physician: Tootie Mayes MD     Discharge Physician: Silvano Palm MD     Discharge Diagnoses: Active Hospital Problems    Diagnosis     Atrial fibrillation with RVR (HCC) [I48.91]     Presence of Watchman left atrial appendage closure device [Z95.818]     Moderate obstructive sleep apnea [G47.33]     Pre-diabetes [R73.03]     HTN (hypertension) [I10]        The patient was seen and examined on day of discharge and this discharge summary is in conjunction with any daily progress note from day of discharge. Hospital Course: Chary Schafer was admitted to [facility]  on 10/11/2021 with symptomatic AF. Tikosyn was started. On 10/12/2021, he converted to NSR around 0330. Rhythm has been SR. By Problem List   Symptomatic paroxsymal atrial arrhythmias (AF/AT) POA : stable at DC               -s/p DCCV 4/2020 and 2/2021              -s/p RFCA for PAF and CTI 7/2019                  -s/p Watchman implant 11/2019              -amiodarone stopped due to acquired hypothyroidism 12/2019               -has failed Multaq and sotalol in the past               - EP consulted to assist with Mx - started on Tikosyn on 10/11/21 - converted to NSR               - Continue full dose ASA (Watchman), Cardizem, Tikosyn, and lisinopril    NICM with recovered EF - Diastolic dysfunction - stable w/o exacerbation    Mild Aortic Insufficiency  on echo 1/2021  - stable     HTN: controlled     Physical Exam Performed:   BP (!) 156/90   Pulse 72   Temp 97.5 °F (36.4 °C) (Oral)   Resp 16   Ht 5' 9\" (1.753 m)   Wt 190 lb (86.2 kg)   SpO2 97%   BMI 28.06 kg/m²     General appearance: WDWN elderly male resting comfortably in bed, NAD  HEENT: Pupils equal, round, and reactive to light. Conjunctivae/corneas clear.   Neck: Supple, with full range of motion. No jugular venous distention. Trachea midline. Respiratory:  Normal respiratory effort. Clear to auscultation, bilaterally without Rales/Wheezes/Rhonchi. Cardiovascular: Regular rate and rhythm with normal S1/S2 without murmurs, rubs or gallops. Abdomen: Soft, non-tender, non-distended with normal bowel sounds. Musculoskeletal: No clubbing, cyanosis or edema bilaterally. Full range of motion without deformity. Skin: Skin color, texture, turgor normal.  No rashes or lesions. Neurologic:  Neurovascularly intact without any focal sensory/motor deficits. Cranial nerves: II-XII intact, grossly non-focal.  Psychiatric: Alert and oriented, thought content appropriate, normal insight  Capillary Refill: Brisk,< 3 seconds   Peripheral Pulses: +2 palpable, equal bilaterally     Labs: For convenience and continuity at follow-up the following most recent labs are provided:      CBC:    Lab Results   Component Value Date    WBC 7.9 10/11/2021    HGB 13.0 10/11/2021    HCT 39.0 10/11/2021     10/11/2021       Renal:    Lab Results   Component Value Date     10/14/2021    K 4.3 10/14/2021    K 4.2 10/11/2021     10/14/2021    CO2 28 10/14/2021    BUN 13 10/14/2021    CREATININE 0.9 10/14/2021    CALCIUM 9.9 10/14/2021    PHOS 3.1 02/24/2020         Significant Diagnostic Studies    Radiology:   XR CHEST PORTABLE   Final Result   1. No active pulmonary disease.             Consults:   IP CONSULT TO CARDIOLOGY  IP CONSULT TO HOSPITALIST    Disposition: Home    Condition at Discharge: Stable    Discharge Instructions/Follow-up: F/up with PCP and EP as instructed     Code Status:  Prior     Activity: activity as tolerated    Diet: cardiac diet      Discharge Medications:   Current Discharge Medication List           Details   dofetilide (TIKOSYN) 500 MCG capsule Take 1 capsule by mouth every 12 hours  Qty: 60 capsule, Refills: 3      dilTIAZem (CARDIZEM CD) 120 MG extended release capsule Take 1 capsule by mouth daily  Qty: 30 capsule, Refills: 3              Details   aspirin (COLLEEN ASPIRIN) 325 MG tablet Take by mouth      pravastatin (PRAVACHOL) 80 MG tablet TAKE 1 TABLET BY MOUTH DAILY  Qty: 90 tablet, Refills: 3      moxifloxacin (VIGAMOX) 0.5 % ophthalmic solution INSTILL 1 DROP IN LEFT EYE FOUR TIMES DAILY AFTER SURGERY      lisinopril (PRINIVIL;ZESTRIL) 5 MG tablet Take 1 tablet by mouth daily  Qty: 90 tablet, Refills: 3      loratadine (CLARITIN) 10 MG capsule Take 10 mg by mouth daily      levothyroxine (SYNTHROID) 75 MCG tablet TAKE 1 TABLET BY MOUTH DAILY  Qty: 90 tablet, Refills: 1    Associated Diagnoses: Hypothyroidism due to acquired atrophy of thyroid      dorzolamide-timolol (COSOPT) 22.3-6.8 MG/ML ophthalmic solution INT 1 GTT INTO OU BID      latanoprost (XALATAN) 0.005 % ophthalmic solution INT 1 GTT IN OU QD IN THE ANDREA      omeprazole (PRILOSEC OTC) 20 MG tablet Take 20 mg by mouth daily      Multiple Vitamins-Minerals (MULTIVITAMIN-MINERALS) TABS tablet Take 1 tablet by mouth daily  Qty: 30 tablet, Refills: 0             Time Spent on discharge is more than 30 minutes in the examination, evaluation, counseling and review of medications and discharge plan. Signed:    Moustapha Gamboa MD   10/14/2021      Thank you [facility] DARSHANA Swartz - ZORAN for the opportunity to be involved in this patient's care. If you have any questions or concerns please feel free to contact me at [phone].

## 2021-10-14 NOTE — PLAN OF CARE
Problem: Falls - Risk of:  Goal: Will remain free from falls  Description: Will remain free from falls  10/14/2021 1130 by Sachin Miranda RN  Outcome: Completed  10/13/2021 2324 by Dali Ortiz RN  Outcome: Ongoing  Goal: Absence of physical injury  Description: Absence of physical injury  Outcome: Completed

## 2021-10-15 ENCOUNTER — CARE COORDINATION (OUTPATIENT)
Dept: CASE MANAGEMENT | Age: 71
End: 2021-10-15

## 2021-10-15 NOTE — CARE COORDINATION
Stephenie [facility] Transitions Initial Follow Up Call    Call within 2 business days of discharge: Yes    Patient: Noah Addison Patient : 1950   MRN: [id number]  Reason for Admission: afib w/ RVR  Discharge Date: 10/14/21 RARS: Readmission Risk Score: 11      Last Discharge [facility]       Complaint Diagnosis Description Type Department Provider    10/11/21 Chest Pain Atrial fibrillation with RVR [facility]) ED to Hosp-Admission (Discharged) (ADMITTED) Suzette Parry MD; Harlan Luque ... Spoke with: bjorn    Facility: [facility]    Attempted to reach patient via phone for initial post hospital transition call. VM left stating purpose of call along with my contact information requesting a return call.       Care Transitions 24 Hour Call    Care Transitions Interventions         Follow Up  Future Appointments   Date Time Provider Stuart Aguilar   10/22/2021 11:20 AM DARSHANA Burnett - ZORAN Lindquist RN

## 2021-10-18 ENCOUNTER — CARE COORDINATION (OUTPATIENT)
Dept: CASE MANAGEMENT | Age: 71
End: 2021-10-18

## 2021-10-18 DIAGNOSIS — I48.91 ATRIAL FIBRILLATION WITH RVR (HCC): Primary | ICD-10-CM

## 2021-10-18 PROCEDURE — 1111F DSCHRG MED/CURRENT MED MERGE: CPT | Performed by: NURSE PRACTITIONER

## 2021-10-18 NOTE — CARE COORDINATION
Stephenie [facility] Transitions Initial Follow Up Call    Call within 2 business days of discharge: Yes    Patient: Christie Collins Patient : 1950   MRN: [id number]  Reason for Admission: atrial fib  Discharge Date: 10/14/21 RARS: Readmission Risk Score: 11      Last Discharge [facility]       Complaint Diagnosis Description Type Department Provider    10/11/21 Chest Pain Atrial fibrillation with RVR [facility]) ED to Hosp-Admission (Discharged) (ADMITTED) Khloe Muhammad MD; Asiya Reina ... Spoke with: [address] [place] [address]: [facility]    Non-face-to-face services provided:  Obtained and reviewed discharge summary and/or continuity of care documents     Transitions of Care Initial Call  Challenges to be reviewed by the provider   Additional needs identified to be addressed with provider: No  none             Method of communication with provider : phone      Advance Care Planning:   Does patient have an Advance Directive: reviewed and current, reviewed and needs to be updated, not on file; education provided, not on file, patient declined education, decision maker updated and referral to internal ACP facilitator. Was this a readmission? No  Patient stated reason for admission: afib  Patients top risk factors for readmission: ineffective coping and medical condition-afib w/ RVR    Care Transition Nurse (CTN) contacted the patient by telephone to perform post hospital discharge assessment. Verified name and  with patient as identifiers. Provided introduction to self, and explanation of the CTN role. CTN reviewed discharge instructions, medical action plan and red flags with patient who verbalized understanding. Patient given an opportunity to ask questions and does not have any further questions or concerns at this time. Were discharge instructions available to patient? Yes.  Reviewed appropriate site of care based on symptoms and resources available to patient including: PCP and Specialist. The patient agrees to contact the PCP office for questions related to their healthcare. Medication reconciliation was performed with patient, who verbalizes understanding of administration of home medications. Advised obtaining a 90-day supply of all daily and as-needed medications. Covid Risk Education     Educated patient about risk for severe COVID-19 due to risk factors according to CDC guidelines. CTN reviewed discharge instructions, medical action plan and red flag symptoms with the patient who verbalized understanding. Discussed COVID vaccination status: Yes. Education provided on COVID-19 vaccination as appropriate. Discussed exposure protocols and quarantine with CDC Guidelines. Patient was given an opportunity to verbalize any questions and concerns and agrees to contact CTN or health care provider for questions related to their healthcare. Patient answered call and verified . Patient pleasant and agreeable to transition call. Patient stated that he was not feeling well. Patient went back into atrial fib. Patient stated that he can \"feel it\". Denied cp, sob, or dizziness. Patient taking all medication as directed. CTN encouraged patient to reach out to Dr Sb Mohr and offered to make 3 way call. Patient stated that he had MD number and would contact if needed. Patient has follow up PCP visit noted in system  Reviewed and educated patient on any new and changed medications related to discharge diagnosis. CTN provided contact information. Plan for follow-up call in 5-7 days based on severity of symptoms and risk factors.         Care Transitions 24 Hour Call    Do you have any ongoing symptoms?: No  Do you have a copy of your discharge instructions?: Yes  Do you have all of your prescriptions and are they filled?: Yes  Have you been contacted by a [facility] Pharmacist?: No  Have you scheduled your follow up appointment?: Yes  How are you going to get to your appointment?: Car - family or friend to transport  Were you discharged with any Home Care or Post Acute Services: No  Care Transitions Interventions         Follow Up  Future Appointments   Date Time Provider Stuart Aguilar   10/22/2021 11:20 AM Tito, Kvng Ontiveros RN

## 2021-10-21 ENCOUNTER — TELEPHONE (OUTPATIENT)
Dept: CARDIOLOGY CLINIC | Age: 71
End: 2021-10-21

## 2021-10-21 NOTE — TELEPHONE ENCOUNTER
AGK is agreeable to plan. Katelin, patient would like to schedule ablation for AF. Medication instructions  HOLD multivitamin morning of procedure.

## 2021-10-22 ENCOUNTER — OFFICE VISIT (OUTPATIENT)
Dept: FAMILY MEDICINE CLINIC | Age: 71
End: 2021-10-22
Payer: MEDICARE

## 2021-10-22 VITALS
BODY MASS INDEX: 28.53 KG/M2 | HEART RATE: 109 BPM | SYSTOLIC BLOOD PRESSURE: 140 MMHG | WEIGHT: 192.6 LBS | OXYGEN SATURATION: 98 % | DIASTOLIC BLOOD PRESSURE: 80 MMHG | HEIGHT: 69 IN

## 2021-10-22 DIAGNOSIS — E03.4 HYPOTHYROIDISM DUE TO ACQUIRED ATROPHY OF THYROID: ICD-10-CM

## 2021-10-22 DIAGNOSIS — Z23 IMMUNIZATION DUE: ICD-10-CM

## 2021-10-22 DIAGNOSIS — I48.0 PAROXYSMAL ATRIAL FIBRILLATION (HCC): Primary | ICD-10-CM

## 2021-10-22 PROBLEM — H02.006: Status: ACTIVE | Noted: 2019-12-06

## 2021-10-22 PROBLEM — H26.9 BILATERAL CATARACTS: Status: ACTIVE | Noted: 2019-11-14

## 2021-10-22 PROBLEM — H02.003: Status: ACTIVE | Noted: 2019-12-06

## 2021-10-22 PROCEDURE — G8417 CALC BMI ABV UP PARAM F/U: HCPCS | Performed by: NURSE PRACTITIONER

## 2021-10-22 PROCEDURE — G0008 ADMIN INFLUENZA VIRUS VAC: HCPCS | Performed by: NURSE PRACTITIONER

## 2021-10-22 PROCEDURE — G8427 DOCREV CUR MEDS BY ELIG CLIN: HCPCS | Performed by: NURSE PRACTITIONER

## 2021-10-22 PROCEDURE — 90694 VACC AIIV4 NO PRSRV 0.5ML IM: CPT | Performed by: NURSE PRACTITIONER

## 2021-10-22 PROCEDURE — 99214 OFFICE O/P EST MOD 30 MIN: CPT | Performed by: NURSE PRACTITIONER

## 2021-10-22 PROCEDURE — 3017F COLORECTAL CA SCREEN DOC REV: CPT | Performed by: NURSE PRACTITIONER

## 2021-10-22 PROCEDURE — 1123F ACP DISCUSS/DSCN MKR DOCD: CPT | Performed by: NURSE PRACTITIONER

## 2021-10-22 PROCEDURE — 1111F DSCHRG MED/CURRENT MED MERGE: CPT | Performed by: NURSE PRACTITIONER

## 2021-10-22 PROCEDURE — 1036F TOBACCO NON-USER: CPT | Performed by: NURSE PRACTITIONER

## 2021-10-22 PROCEDURE — 4040F PNEUMOC VAC/ADMIN/RCVD: CPT | Performed by: NURSE PRACTITIONER

## 2021-10-22 PROCEDURE — G8484 FLU IMMUNIZE NO ADMIN: HCPCS | Performed by: NURSE PRACTITIONER

## 2021-10-22 RX ORDER — PREDNISOLONE ACETATE 10 MG/ML
SUSPENSION/ DROPS OPHTHALMIC
COMMUNITY
Start: 2021-10-15

## 2021-10-22 RX ORDER — LEVOTHYROXINE SODIUM 0.07 MG/1
75 TABLET ORAL DAILY
Qty: 90 TABLET | Refills: 0 | Status: SHIPPED | OUTPATIENT
Start: 2021-10-22 | End: 2021-12-13

## 2021-10-22 NOTE — TELEPHONE ENCOUNTER
[address] [facility],[address] please advise. Pt can be reached at [phone].       TY

## 2021-10-22 NOTE — TELEPHONE ENCOUNTER
t returned call. Message given. Pt wants to know is there anything he can in the mean time to help with A-fib SX. Please advise.

## 2021-10-22 NOTE — TELEPHONE ENCOUNTER
Spoke to pt. He said he is back in rhythm, and feeling better as of right now. His current BP is 132/80, HR 76. However, I did let him know that per [address] he may schedule a cardioversion this Monday, or anytime next week. Pt V/U and will keep us updated.      TY

## 2021-10-22 NOTE — PROGRESS NOTES
Patient: Star Johnson is a 66 y.o. male who presents today with the following Chief Complaint(s):  Chief Complaint   Patient presents with    Follow-Up from Hospital         hospitals -this is a 64-year-old male following up after hospitalization for A. Fib. He is still in A. fib today and not feeling well I instructed him he must see cardiology but if he is having any more complaints he needs to go to the ER again. He states that he has not been on his thyroid medication for approximately a week because he ran out so I will be refilling this. His last labs were back in October and they were within normal limits. He denies no side effects from the medication when he is taking it  He is in need of the flu shot today  Current Outpatient Medications   Medication Sig Dispense Refill    prednisoLONE acetate (PRED FORTE) 1 % ophthalmic suspension SHAKE LIQUID AND INSTILL 1 DROP IN RIGHT EYE THREE TIMES DAILY      levothyroxine (SYNTHROID) 75 MCG tablet Take 1 tablet by mouth daily 90 tablet 0    dofetilide (TIKOSYN) 500 MCG capsule Take 1 capsule by mouth every 12 hours 60 capsule 3    dilTIAZem (CARDIZEM CD) 120 MG extended release capsule Take 1 capsule by mouth daily 30 capsule 3    aspirin (COLLEEN ASPIRIN) 325 MG tablet Take by mouth      pravastatin (PRAVACHOL) 80 MG tablet TAKE 1 TABLET BY MOUTH DAILY 90 tablet 3    moxifloxacin (VIGAMOX) 0.5 % ophthalmic solution INSTILL 1 DROP IN LEFT EYE FOUR TIMES DAILY AFTER SURGERY      lisinopril (PRINIVIL;ZESTRIL) 5 MG tablet Take 1 tablet by mouth daily 90 tablet 3    loratadine (CLARITIN) 10 MG capsule Take 10 mg by mouth daily      dorzolamide-timolol (COSOPT) 22.3-6.8 MG/ML ophthalmic solution INT 1 GTT INTO OU BID      omeprazole (PRILOSEC OTC) 20 MG tablet Take 20 mg by mouth daily      Multiple Vitamins-Minerals (MULTIVITAMIN-MINERALS) TABS tablet Take 1 tablet by mouth daily 30 tablet 0     No current facility-administered medications for this visit. Patient's past medical history, surgical history, family history, medications,  and allergies  were all reviewed and updated as appropriate today. Review of Systems   All other systems reviewed and are negative. Physical Exam  Vitals and nursing note reviewed. Constitutional:       Appearance: Normal appearance. He is well-developed. HENT:      Head: Normocephalic. Right Ear: Tympanic membrane, ear canal and external ear normal.      Left Ear: Tympanic membrane, ear canal and external ear normal.      Nose: Nose normal.      Mouth/Throat:      Mouth: Mucous membranes are moist.      Pharynx: Oropharynx is clear. No oropharyngeal exudate or posterior oropharyngeal erythema. Eyes:      Extraocular Movements: Extraocular movements intact. Conjunctiva/sclera: Conjunctivae normal.      Pupils: Pupils are equal, round, and reactive to light. Cardiovascular:      Rate and Rhythm: Tachycardia present. Rhythm irregular. Pulmonary:      Effort: Pulmonary effort is normal.      Breath sounds: Normal breath sounds. No wheezing. Abdominal:      General: Bowel sounds are normal.      Palpations: Abdomen is soft. Tenderness: There is no abdominal tenderness. Musculoskeletal:         General: Normal range of motion. Cervical back: Normal range of motion and neck supple. Lymphadenopathy:      Cervical: No cervical adenopathy. Skin:     General: Skin is warm and dry. Neurological:      General: No focal deficit present. Mental Status: He is alert and oriented to person, place, and time. Psychiatric:         Mood and Affect: Mood normal.         Behavior: Behavior normal.         Thought Content: Thought content normal.         Judgment: Judgment normal.       Vitals:    10/22/21 1112   BP: (!) 140/80   Pulse: 109   SpO2: 98%       Assessment:  Encounter Diagnoses   Name Primary?     Paroxysmal atrial fibrillation (HCC) Yes    Hypothyroidism due to acquired atrophy of thyroid     Immunization due        Controlled SubstancesMonitoring:  NA    Plan:  1. Paroxysmal atrial fibrillation (HCC)  Problem  He was instructed to call cardiology today  He was also instructed if he does not feel well any chest pain radiating jaw pain to go to the ER    2. Hypothyroidism due to acquired atrophy of thyroid  Problem-has not been on medication for a week now refilled prescription  - levothyroxine (SYNTHROID) 75 MCG tablet; Take 1 tablet by mouth daily  Dispense: 90 tablet; Refill: 0    3. Immunization due  - INFLUENZA, QUADV, ADJUVANTED, 65 YRS =, IM, PF, PREFILL SYR, 0.5ML (FLUAD)      Annabelle Ding, APRN - CNP, FNP    Reviewed treatment plan with patient. Patient verbalized understanding to treatment plan and questions were answered. [address] [facility].  [place], [address]

## 2021-10-26 ENCOUNTER — CARE COORDINATION (OUTPATIENT)
Dept: CASE MANAGEMENT | Age: 71
End: 2021-10-26

## 2021-10-26 NOTE — CARE COORDINATION
[facility] Transitions Follow Up Call    10/26/2021    Patient: Arvis Signs  Patient : 1950   MRN: [id number]  Reason for Admission: afib  Discharge Date: 10/14/21 RARS: Readmission Risk Score: 6         Spoke with: na    Attempted to reach patient via phone for transition call. VM left stating purpose of call along with my contact information requesting a return call. Care Transitions Subsequent and Final Call    Subsequent and Final Calls  Care Transitions Interventions  Other Interventions: Follow Up  No future appointments.     Jun Sharma RN

## 2021-10-26 NOTE — TELEPHONE ENCOUNTER
I spoke with patient and let him know I will add his ablation to next year's schedule unless we have a cancellation. Also, to call if he decides he needs a CV in the meantime and we will check with AGK at that time.

## 2021-11-02 ENCOUNTER — CARE COORDINATION (OUTPATIENT)
Dept: CASE MANAGEMENT | Age: 71
End: 2021-11-02

## 2021-11-02 NOTE — CARE COORDINATION
Stephenie 45 Transitions Follow Up Call    2021    Patient: Oswaldo Meredith  Patient : 1950   MRN: [id number]  Reason for Admission: afib w/ RVR  Discharge Date: 10/14/21 RARS: Readmission Risk Score: 11         Spoke with: na    Second and final attempt made to reach patient for transition call. VM left stating purpose of call along with my contact information requesting a return call. Episode ended due to unsuccessful contact. Care Transitions Subsequent and Final Call    Subsequent and Final Calls  Care Transitions Interventions  Other Interventions: Follow Up  No future appointments.     Niharika Negron RN

## 2021-11-12 RX ORDER — DOFETILIDE 0.5 MG/1
500 CAPSULE ORAL EVERY 12 HOURS SCHEDULED
Qty: 180 CAPSULE | Refills: 2 | Status: SHIPPED | OUTPATIENT
Start: 2021-11-12 | End: 2021-12-28

## 2021-11-12 NOTE — TELEPHONE ENCOUNTER
TSH 10/11/2021  BMP 10/13/2021  CBC 10/11/2021  EKG 10/13/2021  Last OV 06/03/2021 with ADDISON   Last OV 06/22/2021 with ZULEYMA   TY

## 2021-12-03 ENCOUNTER — TELEPHONE (OUTPATIENT)
Dept: CARDIOLOGY CLINIC | Age: 71
End: 2021-12-03

## 2021-12-03 NOTE — TELEPHONE ENCOUNTER
Spoke with patient. Patient is scheduled with Dr. Tonny Edmondson for 2/28/21 (in [place] prior) on 2/28/21 at 7:30am [facility], arrival time of 6:30am to the Cath Lab. Please have patient arrive to the main entrance of [facility] and check in with the registration desk. Please call patient regarding medication instructions. Remind patient to be NPO after midnight (8 hours prior). Do not apply lotions/creams on skin the day of procedure. COVID testing - RAPID. General instructions mailed.

## 2021-12-11 DIAGNOSIS — E03.4 HYPOTHYROIDISM DUE TO ACQUIRED ATROPHY OF THYROID: ICD-10-CM

## 2021-12-13 RX ORDER — LEVOTHYROXINE SODIUM 0.07 MG/1
75 TABLET ORAL DAILY
Qty: 90 TABLET | Refills: 0 | Status: SHIPPED | OUTPATIENT
Start: 2021-12-13 | End: 2022-03-28

## 2021-12-28 RX ORDER — DOFETILIDE 0.5 MG/1
500 CAPSULE ORAL EVERY 12 HOURS SCHEDULED
Qty: 180 CAPSULE | Refills: 2 | Status: SHIPPED | OUTPATIENT
Start: 2021-12-28

## 2021-12-28 NOTE — TELEPHONE ENCOUNTER
BMP 10/14/21  EKG 10/11/21  OV AGK 6/22/21    Pended:  dofetilide (TIKOSYN) 500 MCG capsule TAKE 1 CAPSULE BY MOUTH EVERY 12 HOURS - 90 day supply w/ refill x2

## 2022-02-08 RX ORDER — DILTIAZEM HYDROCHLORIDE 120 MG/1
120 CAPSULE, COATED, EXTENDED RELEASE ORAL DAILY
Qty: 90 CAPSULE | Refills: 1 | Status: ON HOLD | OUTPATIENT
Start: 2022-02-08 | End: 2022-03-01 | Stop reason: HOSPADM

## 2022-02-08 NOTE — TELEPHONE ENCOUNTER
Last seen hospital 10/11/2021  Last OV 06/22/2021  Ablation schedule for 02/28/2022  EKG 10/11/2021  Pt also would like to know if he needs any lab work prior to ablation on 02/28/2022.   BMP 10/14/2021  CBC 10/11/2021

## 2022-02-08 NOTE — TELEPHONE ENCOUNTER
Pts wife called to request refill on   dilTIAZem (CARDIZEM CD) 120 MG extended release capsule    Preferred pharmacy is [facility] in [address] [place] [address]. Pt prefers 90 day refill. Last ov 06/22/2021 agk. Pt will be ou of this medication this week. Pt also would like to know if he needs any lab work prior to ablation on 02/28/2022.

## 2022-02-23 ENCOUNTER — ANESTHESIA EVENT (OUTPATIENT)
Dept: CARDIAC CATH/INVASIVE PROCEDURES | Age: 72
End: 2022-02-23
Payer: MEDICARE

## 2022-02-23 NOTE — TELEPHONE ENCOUNTER
HOLD multivitamin morning of procedure. All other meds with small sips of water. Informed patient.  V/u.

## 2022-02-28 ENCOUNTER — HOSPITAL ENCOUNTER (OUTPATIENT)
Dept: CARDIAC CATH/INVASIVE PROCEDURES | Age: 72
Discharge: HOME | End: 2022-03-01
Attending: INTERNAL MEDICINE | Admitting: INTERNAL MEDICINE
Payer: MEDICARE

## 2022-02-28 ENCOUNTER — ANESTHESIA (OUTPATIENT)
Dept: CARDIAC CATH/INVASIVE PROCEDURES | Age: 72
End: 2022-02-28
Payer: MEDICARE

## 2022-02-28 VITALS
DIASTOLIC BLOOD PRESSURE: 55 MMHG | TEMPERATURE: 93.4 F | SYSTOLIC BLOOD PRESSURE: 96 MMHG | OXYGEN SATURATION: 98 % | RESPIRATION RATE: 4 BRPM

## 2022-02-28 PROBLEM — I48.91 ATRIAL FIBRILLATION (HCC): Status: ACTIVE | Noted: 2022-02-28

## 2022-02-28 LAB
ABO/RH: NORMAL
ABO/RH: NORMAL
ANION GAP SERPL CALCULATED.3IONS-SCNC: 11 MMOL/L (ref 3–16)
ANTIBODY SCREEN: NORMAL
APTT: 39.9 SEC (ref 26.2–38.6)
BUN BLDV-MCNC: 14 MG/DL (ref 7–20)
CALCIUM SERPL-MCNC: 9.7 MG/DL (ref 8.3–10.6)
CHLORIDE BLD-SCNC: 100 MMOL/L (ref 99–110)
CO2: 27 MMOL/L (ref 21–32)
CREAT SERPL-MCNC: 0.9 MG/DL (ref 0.8–1.3)
EKG ATRIAL RATE: 63 BPM
EKG DIAGNOSIS: NORMAL
EKG P AXIS: 62 DEGREES
EKG P-R INTERVAL: 184 MS
EKG Q-T INTERVAL: 464 MS
EKG QRS DURATION: 90 MS
EKG QTC CALCULATION (BAZETT): 474 MS
EKG R AXIS: 11 DEGREES
EKG T AXIS: 40 DEGREES
EKG VENTRICULAR RATE: 63 BPM
GFR AFRICAN AMERICAN: >60
GFR NON-AFRICAN AMERICAN: >60
GLUCOSE BLD-MCNC: 96 MG/DL (ref 70–99)
GLUCOSE BLD-MCNC: 97 MG/DL (ref 70–99)
HCT VFR BLD CALC: 40.2 % (ref 40.5–52.5)
HEMOGLOBIN: 13.3 G/DL (ref 13.5–17.5)
INR BLD: 0.96 (ref 0.88–1.12)
MCH RBC QN AUTO: 29.4 PG (ref 26–34)
MCHC RBC AUTO-ENTMCNC: 33.1 G/DL (ref 31–36)
MCV RBC AUTO: 88.9 FL (ref 80–100)
PDW BLD-RTO: 14.1 % (ref 12.4–15.4)
PERFORMED ON: NORMAL
PLATELET # BLD: 311 K/UL (ref 135–450)
PMV BLD AUTO: 7.5 FL (ref 5–10.5)
POTASSIUM REFLEX MAGNESIUM: 3.9 MMOL/L (ref 3.5–5.1)
PROTHROMBIN TIME: 10.8 SEC (ref 9.9–12.7)
RBC # BLD: 4.52 M/UL (ref 4.2–5.9)
SARS-COV-2, NAAT: NOT DETECTED
SODIUM BLD-SCNC: 138 MMOL/L (ref 136–145)
WBC # BLD: 5.5 K/UL (ref 4–11)

## 2022-02-28 PROCEDURE — 2500000003 HC RX 250 WO HCPCS: Performed by: NURSE ANESTHETIST, CERTIFIED REGISTERED

## 2022-02-28 PROCEDURE — 85610 PROTHROMBIN TIME: CPT

## 2022-02-28 PROCEDURE — 86900 BLOOD TYPING SEROLOGIC ABO: CPT

## 2022-02-28 PROCEDURE — C1893 INTRO/SHEATH, FIXED,NON-PEEL: HCPCS

## 2022-02-28 PROCEDURE — 2580000003 HC RX 258: Performed by: ANESTHESIOLOGY

## 2022-02-28 PROCEDURE — 85027 COMPLETE CBC AUTOMATED: CPT

## 2022-02-28 PROCEDURE — 93657 TX L/R ATRIAL FIB ADDL: CPT | Performed by: INTERNAL MEDICINE

## 2022-02-28 PROCEDURE — 6370000000 HC RX 637 (ALT 250 FOR IP): Performed by: INTERNAL MEDICINE

## 2022-02-28 PROCEDURE — 93623 PRGRMD STIMJ&PACG IV RX NFS: CPT

## 2022-02-28 PROCEDURE — C1730 CATH, EP, 19 OR FEW ELECT: HCPCS

## 2022-02-28 PROCEDURE — 7100000000 HC PACU RECOVERY - FIRST 15 MIN

## 2022-02-28 PROCEDURE — 93623 PRGRMD STIMJ&PACG IV RX NFS: CPT | Performed by: INTERNAL MEDICINE

## 2022-02-28 PROCEDURE — 93655 ICAR CATH ABLTJ DSCRT ARRHYT: CPT | Performed by: INTERNAL MEDICINE

## 2022-02-28 PROCEDURE — 7100000001 HC PACU RECOVERY - ADDTL 15 MIN

## 2022-02-28 PROCEDURE — 2500000003 HC RX 250 WO HCPCS

## 2022-02-28 PROCEDURE — 93005 ELECTROCARDIOGRAM TRACING: CPT | Performed by: ANESTHESIOLOGY

## 2022-02-28 PROCEDURE — 2580000003 HC RX 258: Performed by: NURSE ANESTHETIST, CERTIFIED REGISTERED

## 2022-02-28 PROCEDURE — 36415 COLL VENOUS BLD VENIPUNCTURE: CPT

## 2022-02-28 PROCEDURE — C1766 INTRO/SHEATH,STRBLE,NON-PEEL: HCPCS

## 2022-02-28 PROCEDURE — C1894 INTRO/SHEATH, NON-LASER: HCPCS

## 2022-02-28 PROCEDURE — 6360000002 HC RX W HCPCS: Performed by: NURSE ANESTHETIST, CERTIFIED REGISTERED

## 2022-02-28 PROCEDURE — 2060000000 HC ICU INTERMEDIATE R&B

## 2022-02-28 PROCEDURE — 87635 SARS-COV-2 COVID-19 AMP PRB: CPT

## 2022-02-28 PROCEDURE — 93656 COMPRE EP EVAL ABLTJ ATR FIB: CPT | Performed by: INTERNAL MEDICINE

## 2022-02-28 PROCEDURE — 93657 TX L/R ATRIAL FIB ADDL: CPT

## 2022-02-28 PROCEDURE — C1760 CLOSURE DEV, VASC: HCPCS

## 2022-02-28 PROCEDURE — 86923 COMPATIBILITY TEST ELECTRIC: CPT

## 2022-02-28 PROCEDURE — C1769 GUIDE WIRE: HCPCS

## 2022-02-28 PROCEDURE — 6360000002 HC RX W HCPCS

## 2022-02-28 PROCEDURE — C1732 CATH, EP, DIAG/ABL, 3D/VECT: HCPCS

## 2022-02-28 PROCEDURE — C1759 CATH, INTRA ECHOCARDIOGRAPHY: HCPCS

## 2022-02-28 PROCEDURE — 3700000000 HC ANESTHESIA ATTENDED CARE

## 2022-02-28 PROCEDURE — C1713 ANCHOR/SCREW BN/BN,TIS/BN: HCPCS

## 2022-02-28 PROCEDURE — 3700000001 HC ADD 15 MINUTES (ANESTHESIA)

## 2022-02-28 PROCEDURE — 2709999900 HC NON-CHARGEABLE SUPPLY

## 2022-02-28 PROCEDURE — 86850 RBC ANTIBODY SCREEN: CPT

## 2022-02-28 PROCEDURE — 93655 ICAR CATH ABLTJ DSCRT ARRHYT: CPT

## 2022-02-28 PROCEDURE — 80048 BASIC METABOLIC PNL TOTAL CA: CPT

## 2022-02-28 PROCEDURE — 86901 BLOOD TYPING SEROLOGIC RH(D): CPT

## 2022-02-28 PROCEDURE — 93622 COMP EP EVAL L VENTR PAC&REC: CPT | Performed by: INTERNAL MEDICINE

## 2022-02-28 PROCEDURE — 85347 COAGULATION TIME ACTIVATED: CPT

## 2022-02-28 PROCEDURE — 93656 COMPRE EP EVAL ABLTJ ATR FIB: CPT

## 2022-02-28 PROCEDURE — 93622 COMP EP EVAL L VENTR PAC&REC: CPT

## 2022-02-28 PROCEDURE — 93010 ELECTROCARDIOGRAM REPORT: CPT | Performed by: INTERNAL MEDICINE

## 2022-02-28 PROCEDURE — 85730 THROMBOPLASTIN TIME PARTIAL: CPT

## 2022-02-28 RX ORDER — DILTIAZEM HYDROCHLORIDE 120 MG/1
120 CAPSULE, COATED, EXTENDED RELEASE ORAL DAILY
Status: DISCONTINUED | OUTPATIENT
Start: 2022-03-01 | End: 2022-03-01 | Stop reason: HOSPADM

## 2022-02-28 RX ORDER — PRAVASTATIN SODIUM 80 MG/1
80 TABLET ORAL DAILY
Status: DISCONTINUED | OUTPATIENT
Start: 2023-02-01 | End: 2022-03-01 | Stop reason: HOSPADM

## 2022-02-28 RX ORDER — PHENYLEPHRINE HCL IN 0.9% NACL 1 MG/10 ML
SYRINGE (ML) INTRAVENOUS PRN
Status: DISCONTINUED | OUTPATIENT
Start: 2022-02-28 | End: 2022-02-28 | Stop reason: SDUPTHER

## 2022-02-28 RX ORDER — ACETAMINOPHEN 325 MG/1
650 TABLET ORAL EVERY 4 HOURS PRN
Status: DISCONTINUED | OUTPATIENT
Start: 2022-02-28 | End: 2022-03-01 | Stop reason: HOSPADM

## 2022-02-28 RX ORDER — OXYCODONE HYDROCHLORIDE AND ACETAMINOPHEN 5; 325 MG/1; MG/1
1 TABLET ORAL EVERY 4 HOURS PRN
Status: DISCONTINUED | OUTPATIENT
Start: 2022-02-28 | End: 2022-03-01 | Stop reason: HOSPADM

## 2022-02-28 RX ORDER — LEVOTHYROXINE SODIUM 0.03 MG/1
75 TABLET ORAL DAILY
Status: DISCONTINUED | OUTPATIENT
Start: 2022-03-01 | End: 2022-03-01 | Stop reason: HOSPADM

## 2022-02-28 RX ORDER — HEPARIN SODIUM 1000 [USP'U]/ML
INJECTION, SOLUTION INTRAVENOUS; SUBCUTANEOUS
Status: COMPLETED | OUTPATIENT
Start: 2022-02-28 | End: 2022-02-28

## 2022-02-28 RX ORDER — PREDNISOLONE ACETATE 10 MG/ML
1 SUSPENSION/ DROPS OPHTHALMIC EVERY 8 HOURS
Status: DISCONTINUED | OUTPATIENT
Start: 2022-02-28 | End: 2022-03-01 | Stop reason: HOSPADM

## 2022-02-28 RX ORDER — SODIUM CHLORIDE 0.9 % (FLUSH) 0.9 %
5-40 SYRINGE (ML) INJECTION PRN
Status: DISCONTINUED | OUTPATIENT
Start: 2022-02-28 | End: 2022-03-01 | Stop reason: HOSPADM

## 2022-02-28 RX ORDER — SODIUM CHLORIDE, SODIUM LACTATE, POTASSIUM CHLORIDE, CALCIUM CHLORIDE 600; 310; 30; 20 MG/100ML; MG/100ML; MG/100ML; MG/100ML
INJECTION, SOLUTION INTRAVENOUS CONTINUOUS
Status: DISCONTINUED | OUTPATIENT
Start: 2022-02-28 | End: 2022-03-01 | Stop reason: HOSPADM

## 2022-02-28 RX ORDER — SUCRALFATE 1 G/1
1 TABLET ORAL EVERY 8 HOURS SCHEDULED
Status: DISCONTINUED | OUTPATIENT
Start: 2022-02-28 | End: 2022-03-01 | Stop reason: HOSPADM

## 2022-02-28 RX ORDER — PROTAMINE SULFATE 10 MG/ML
INJECTION, SOLUTION INTRAVENOUS
Status: COMPLETED | OUTPATIENT
Start: 2022-02-28 | End: 2022-02-28

## 2022-02-28 RX ORDER — SODIUM CHLORIDE 0.9 % (FLUSH) 0.9 %
5-40 SYRINGE (ML) INJECTION EVERY 12 HOURS SCHEDULED
Status: DISCONTINUED | OUTPATIENT
Start: 2022-02-28 | End: 2022-03-01 | Stop reason: HOSPADM

## 2022-02-28 RX ORDER — SODIUM CHLORIDE 9 MG/ML
25 INJECTION, SOLUTION INTRAVENOUS PRN
Status: DISCONTINUED | OUTPATIENT
Start: 2022-02-28 | End: 2022-03-01 | Stop reason: HOSPADM

## 2022-02-28 RX ORDER — ROCURONIUM BROMIDE 10 MG/ML
INJECTION, SOLUTION INTRAVENOUS PRN
Status: DISCONTINUED | OUTPATIENT
Start: 2022-02-28 | End: 2022-02-28 | Stop reason: SDUPTHER

## 2022-02-28 RX ORDER — COLCHICINE 0.6 MG/1
0.6 TABLET ORAL DAILY
Status: DISCONTINUED | OUTPATIENT
Start: 2022-02-28 | End: 2022-03-01 | Stop reason: HOSPADM

## 2022-02-28 RX ORDER — SODIUM CHLORIDE 0.9 % (FLUSH) 0.9 %
10 SYRINGE (ML) INJECTION PRN
Status: DISCONTINUED | OUTPATIENT
Start: 2022-02-28 | End: 2022-03-01 | Stop reason: HOSPADM

## 2022-02-28 RX ORDER — HYDROMORPHONE HCL 110MG/55ML
0.5 PATIENT CONTROLLED ANALGESIA SYRINGE INTRAVENOUS EVERY 4 HOURS PRN
Status: DISCONTINUED | OUTPATIENT
Start: 2022-02-28 | End: 2022-03-01 | Stop reason: HOSPADM

## 2022-02-28 RX ORDER — HEPARIN SODIUM 10000 [USP'U]/100ML
INJECTION, SOLUTION INTRAVENOUS CONTINUOUS PRN
Status: COMPLETED | OUTPATIENT
Start: 2022-02-28 | End: 2022-02-28

## 2022-02-28 RX ORDER — PANTOPRAZOLE SODIUM 40 MG/1
40 TABLET, DELAYED RELEASE ORAL
Status: DISCONTINUED | OUTPATIENT
Start: 2022-02-28 | End: 2022-03-01 | Stop reason: HOSPADM

## 2022-02-28 RX ORDER — MEPERIDINE HYDROCHLORIDE 50 MG/ML
12.5 INJECTION INTRAMUSCULAR; INTRAVENOUS; SUBCUTANEOUS EVERY 5 MIN PRN
Status: DISCONTINUED | OUTPATIENT
Start: 2022-02-28 | End: 2022-03-01 | Stop reason: HOSPADM

## 2022-02-28 RX ORDER — ASPIRIN 325 MG
325 TABLET ORAL DAILY
Status: DISCONTINUED | OUTPATIENT
Start: 2022-03-01 | End: 2022-03-01 | Stop reason: HOSPADM

## 2022-02-28 RX ORDER — MOXIFLOXACIN 5 MG/ML
1 SOLUTION/ DROPS OPHTHALMIC 3 TIMES DAILY
Status: DISCONTINUED | OUTPATIENT
Start: 2022-02-28 | End: 2022-02-28

## 2022-02-28 RX ORDER — DOFETILIDE 0.25 MG/1
500 CAPSULE ORAL EVERY 12 HOURS SCHEDULED
Status: DISCONTINUED | OUTPATIENT
Start: 2022-02-28 | End: 2022-03-01 | Stop reason: HOSPADM

## 2022-02-28 RX ORDER — CETIRIZINE HYDROCHLORIDE 10 MG/1
10 TABLET ORAL DAILY
Status: DISCONTINUED | OUTPATIENT
Start: 2022-03-01 | End: 2022-03-01 | Stop reason: HOSPADM

## 2022-02-28 RX ORDER — M-VIT,TX,IRON,MINS/CALC/FOLIC 27MG-0.4MG
1 TABLET ORAL DAILY
Status: DISCONTINUED | OUTPATIENT
Start: 2022-02-28 | End: 2022-03-01 | Stop reason: HOSPADM

## 2022-02-28 RX ORDER — DORZOLAMIDE HYDROCHLORIDE AND TIMOLOL MALEATE 20; 5 MG/ML; MG/ML
1 SOLUTION/ DROPS OPHTHALMIC 2 TIMES DAILY
Status: DISCONTINUED | OUTPATIENT
Start: 2022-02-28 | End: 2022-03-01 | Stop reason: HOSPADM

## 2022-02-28 RX ORDER — LISINOPRIL 10 MG/1
5 TABLET ORAL DAILY
Status: DISCONTINUED | OUTPATIENT
Start: 2022-03-01 | End: 2022-03-01 | Stop reason: HOSPADM

## 2022-02-28 RX ORDER — ONDANSETRON 2 MG/ML
4 INJECTION INTRAMUSCULAR; INTRAVENOUS
Status: ACTIVE | OUTPATIENT
Start: 2022-02-28 | End: 2022-02-28

## 2022-02-28 RX ORDER — SODIUM CHLORIDE 9 MG/ML
INJECTION, SOLUTION INTRAVENOUS CONTINUOUS PRN
Status: DISCONTINUED | OUTPATIENT
Start: 2022-02-28 | End: 2022-02-28 | Stop reason: SDUPTHER

## 2022-02-28 RX ORDER — LIDOCAINE HYDROCHLORIDE 20 MG/ML
INJECTION, SOLUTION INFILTRATION; PERINEURAL PRN
Status: DISCONTINUED | OUTPATIENT
Start: 2022-02-28 | End: 2022-02-28 | Stop reason: SDUPTHER

## 2022-02-28 RX ORDER — SODIUM CHLORIDE 0.9 % (FLUSH) 0.9 %
10 SYRINGE (ML) INJECTION EVERY 12 HOURS SCHEDULED
Status: DISCONTINUED | OUTPATIENT
Start: 2022-02-28 | End: 2022-03-01 | Stop reason: HOSPADM

## 2022-02-28 RX ORDER — PROPOFOL 10 MG/ML
INJECTION, EMULSION INTRAVENOUS PRN
Status: DISCONTINUED | OUTPATIENT
Start: 2022-02-28 | End: 2022-02-28 | Stop reason: SDUPTHER

## 2022-02-28 RX ORDER — ONDANSETRON 2 MG/ML
INJECTION INTRAMUSCULAR; INTRAVENOUS PRN
Status: DISCONTINUED | OUTPATIENT
Start: 2022-02-28 | End: 2022-02-28 | Stop reason: SDUPTHER

## 2022-02-28 RX ORDER — FENTANYL CITRATE 50 UG/ML
INJECTION, SOLUTION INTRAMUSCULAR; INTRAVENOUS PRN
Status: DISCONTINUED | OUTPATIENT
Start: 2022-02-28 | End: 2022-02-28 | Stop reason: SDUPTHER

## 2022-02-28 RX ADMIN — SODIUM CHLORIDE: 9 INJECTION, SOLUTION INTRAVENOUS at 10:31

## 2022-02-28 RX ADMIN — PANTOPRAZOLE SODIUM 40 MG: 40 TABLET, DELAYED RELEASE ORAL at 16:42

## 2022-02-28 RX ADMIN — Medication 100 MCG: at 10:35

## 2022-02-28 RX ADMIN — ROCURONIUM BROMIDE 50 MG: 10 SOLUTION INTRAVENOUS at 07:57

## 2022-02-28 RX ADMIN — SUGAMMADEX 100 MG: 100 INJECTION, SOLUTION INTRAVENOUS at 10:03

## 2022-02-28 RX ADMIN — COLCHICINE 0.6 MG: 0.6 TABLET, FILM COATED ORAL at 16:42

## 2022-02-28 RX ADMIN — SODIUM CHLORIDE, PRESERVATIVE FREE 10 ML: 5 INJECTION INTRAVENOUS at 21:13

## 2022-02-28 RX ADMIN — ROCURONIUM BROMIDE 20 MG: 10 SOLUTION INTRAVENOUS at 08:50

## 2022-02-28 RX ADMIN — PROPOFOL 150 MG: 10 INJECTION, EMULSION INTRAVENOUS at 07:57

## 2022-02-28 RX ADMIN — Medication 100 MCG: at 10:30

## 2022-02-28 RX ADMIN — LIDOCAINE HYDROCHLORIDE 60 MG: 20 INJECTION, SOLUTION INFILTRATION; PERINEURAL at 07:57

## 2022-02-28 RX ADMIN — SUGAMMADEX 100 MG: 100 INJECTION, SOLUTION INTRAVENOUS at 11:33

## 2022-02-28 RX ADMIN — Medication 100 MCG: at 08:12

## 2022-02-28 RX ADMIN — FENTANYL CITRATE 50 MCG: 50 INJECTION INTRAMUSCULAR; INTRAVENOUS at 10:14

## 2022-02-28 RX ADMIN — Medication 100 MCG: at 08:28

## 2022-02-28 RX ADMIN — DORZOLAMIDE HYDROCHLORIDE AND TIMOLOL MALEATE 1 DROP: 20; 5 SOLUTION/ DROPS OPHTHALMIC at 21:10

## 2022-02-28 RX ADMIN — Medication 100 MCG: at 10:39

## 2022-02-28 RX ADMIN — PROTAMINE SULFATE 50 MG: 10 INJECTION, SOLUTION INTRAVENOUS at 11:52

## 2022-02-28 RX ADMIN — DOFETILIDE 500 MCG: 0.25 CAPSULE ORAL at 21:10

## 2022-02-28 RX ADMIN — SUCRALFATE 1 G: 1 TABLET ORAL at 21:11

## 2022-02-28 RX ADMIN — PHENYLEPHRINE HYDROCHLORIDE 15 MCG/MIN: 10 INJECTION INTRAVENOUS at 08:37

## 2022-02-28 RX ADMIN — ROCURONIUM BROMIDE 10 MG: 10 SOLUTION INTRAVENOUS at 10:14

## 2022-02-28 RX ADMIN — Medication 100 MCG: at 08:18

## 2022-02-28 RX ADMIN — SUCRALFATE 1 G: 1 TABLET ORAL at 16:42

## 2022-02-28 RX ADMIN — SODIUM CHLORIDE: 9 INJECTION, SOLUTION INTRAVENOUS at 07:44

## 2022-02-28 RX ADMIN — HEPARIN SODIUM 10000 UNITS/HR: 10000 INJECTION, SOLUTION INTRAVENOUS at 09:12

## 2022-02-28 RX ADMIN — Medication 1 TABLET: at 16:43

## 2022-02-28 RX ADMIN — ONDANSETRON 4 MG: 2 INJECTION INTRAMUSCULAR; INTRAVENOUS at 07:57

## 2022-02-28 RX ADMIN — HEPARIN SODIUM 10000 UNITS: 1000 INJECTION, SOLUTION INTRAVENOUS; SUBCUTANEOUS at 09:12

## 2022-02-28 ASSESSMENT — PULMONARY FUNCTION TESTS
PIF_VALUE: 14
PIF_VALUE: 14
PIF_VALUE: 15
PIF_VALUE: 15
PIF_VALUE: 14
PIF_VALUE: 18
PIF_VALUE: 15
PIF_VALUE: 14
PIF_VALUE: 17
PIF_VALUE: 17
PIF_VALUE: 15
PIF_VALUE: 14
PIF_VALUE: 14
PIF_VALUE: 18
PIF_VALUE: 14
PIF_VALUE: 18
PIF_VALUE: 15
PIF_VALUE: 15
PIF_VALUE: 19
PIF_VALUE: 18
PIF_VALUE: 18
PIF_VALUE: 14
PIF_VALUE: 17
PIF_VALUE: 2
PIF_VALUE: 14
PIF_VALUE: 14
PIF_VALUE: 19
PIF_VALUE: 14
PIF_VALUE: 15
PIF_VALUE: 14
PIF_VALUE: 14
PIF_VALUE: 15
PIF_VALUE: 14
PIF_VALUE: 18
PIF_VALUE: 15
PIF_VALUE: 13
PIF_VALUE: 15
PIF_VALUE: 15
PIF_VALUE: 14
PIF_VALUE: 15
PIF_VALUE: 19
PIF_VALUE: 19
PIF_VALUE: 17
PIF_VALUE: 17
PIF_VALUE: 18
PIF_VALUE: 2
PIF_VALUE: 17
PIF_VALUE: 15
PIF_VALUE: 14
PIF_VALUE: 15
PIF_VALUE: 15
PIF_VALUE: 4
PIF_VALUE: 14
PIF_VALUE: 17
PIF_VALUE: 18
PIF_VALUE: 18
PIF_VALUE: 15
PIF_VALUE: 17
PIF_VALUE: 16
PIF_VALUE: 18
PIF_VALUE: 15
PIF_VALUE: 14
PIF_VALUE: 15
PIF_VALUE: 14
PIF_VALUE: 15
PIF_VALUE: 14
PIF_VALUE: 18
PIF_VALUE: 14
PIF_VALUE: 15
PIF_VALUE: 15
PIF_VALUE: 0
PIF_VALUE: 15
PIF_VALUE: 15
PIF_VALUE: 18
PIF_VALUE: 14
PIF_VALUE: 15
PIF_VALUE: 5
PIF_VALUE: 17
PIF_VALUE: 14
PIF_VALUE: 15
PIF_VALUE: 18
PIF_VALUE: 14
PIF_VALUE: 15
PIF_VALUE: 14
PIF_VALUE: 2
PIF_VALUE: 17
PIF_VALUE: 14
PIF_VALUE: 15
PIF_VALUE: 14
PIF_VALUE: 1
PIF_VALUE: 15
PIF_VALUE: 14
PIF_VALUE: 18
PIF_VALUE: 17
PIF_VALUE: 17
PIF_VALUE: 18
PIF_VALUE: 2
PIF_VALUE: 14
PIF_VALUE: 17
PIF_VALUE: 14
PIF_VALUE: 15
PIF_VALUE: 15
PIF_VALUE: 14
PIF_VALUE: 14
PIF_VALUE: 18
PIF_VALUE: 11
PIF_VALUE: 15
PIF_VALUE: 16
PIF_VALUE: 18
PIF_VALUE: 14
PIF_VALUE: 14
PIF_VALUE: 15
PIF_VALUE: 19
PIF_VALUE: 14
PIF_VALUE: 17
PIF_VALUE: 14
PIF_VALUE: 15
PIF_VALUE: 15
PIF_VALUE: 14
PIF_VALUE: 18
PIF_VALUE: 18
PIF_VALUE: 5
PIF_VALUE: 15
PIF_VALUE: 18
PIF_VALUE: 15
PIF_VALUE: 4
PIF_VALUE: 1
PIF_VALUE: 17
PIF_VALUE: 18
PIF_VALUE: 18
PIF_VALUE: 19
PIF_VALUE: 15
PIF_VALUE: 22
PIF_VALUE: 17
PIF_VALUE: 17
PIF_VALUE: 14
PIF_VALUE: 15
PIF_VALUE: 14
PIF_VALUE: 17
PIF_VALUE: 17
PIF_VALUE: 16
PIF_VALUE: 2
PIF_VALUE: 15
PIF_VALUE: 3
PIF_VALUE: 15
PIF_VALUE: 14
PIF_VALUE: 18
PIF_VALUE: 14
PIF_VALUE: 15
PIF_VALUE: 14
PIF_VALUE: 13
PIF_VALUE: 14
PIF_VALUE: 17
PIF_VALUE: 15
PIF_VALUE: 15
PIF_VALUE: 18
PIF_VALUE: 17
PIF_VALUE: 14
PIF_VALUE: 14
PIF_VALUE: 2
PIF_VALUE: 15
PIF_VALUE: 15
PIF_VALUE: 16
PIF_VALUE: 14
PIF_VALUE: 14
PIF_VALUE: 15
PIF_VALUE: 18
PIF_VALUE: 14
PIF_VALUE: 14
PIF_VALUE: 4
PIF_VALUE: 18
PIF_VALUE: 14
PIF_VALUE: 15
PIF_VALUE: 18
PIF_VALUE: 2
PIF_VALUE: 18
PIF_VALUE: 14
PIF_VALUE: 17
PIF_VALUE: 19
PIF_VALUE: 14
PIF_VALUE: 16
PIF_VALUE: 18
PIF_VALUE: 14
PIF_VALUE: 17
PIF_VALUE: 18
PIF_VALUE: 15
PIF_VALUE: 14
PIF_VALUE: 14
PIF_VALUE: 19
PIF_VALUE: 21
PIF_VALUE: 14
PIF_VALUE: 15
PIF_VALUE: 14
PIF_VALUE: 18
PIF_VALUE: 17
PIF_VALUE: 15
PIF_VALUE: 17
PIF_VALUE: 15
PIF_VALUE: 15
PIF_VALUE: 14
PIF_VALUE: 15
PIF_VALUE: 18
PIF_VALUE: 5
PIF_VALUE: 18
PIF_VALUE: 4
PIF_VALUE: 14
PIF_VALUE: 17
PIF_VALUE: 4
PIF_VALUE: 17
PIF_VALUE: 15
PIF_VALUE: 14
PIF_VALUE: 15
PIF_VALUE: 16
PIF_VALUE: 15
PIF_VALUE: 19
PIF_VALUE: 15
PIF_VALUE: 3
PIF_VALUE: 0
PIF_VALUE: 15
PIF_VALUE: 15

## 2022-02-28 ASSESSMENT — ENCOUNTER SYMPTOMS: SHORTNESS OF BREATH: 0

## 2022-02-28 ASSESSMENT — PAIN SCALES - GENERAL
PAINLEVEL_OUTOF10: 5
PAINLEVEL_OUTOF10: 0

## 2022-02-28 ASSESSMENT — PAIN DESCRIPTION - PROGRESSION: CLINICAL_PROGRESSION: OTHER (COMMENT)

## 2022-02-28 ASSESSMENT — PAIN DESCRIPTION - LOCATION: LOCATION: OTHER (COMMENT)

## 2022-02-28 ASSESSMENT — PAIN DESCRIPTION - FREQUENCY: FREQUENCY: OTHER (COMMENT)

## 2022-02-28 ASSESSMENT — LIFESTYLE VARIABLES: SMOKING_STATUS: 0

## 2022-02-28 ASSESSMENT — PAIN DESCRIPTION - PAIN TYPE: TYPE: OTHER (COMMENT)

## 2022-02-28 ASSESSMENT — PAIN DESCRIPTION - DESCRIPTORS: DESCRIPTORS: OTHER (COMMENT)

## 2022-02-28 ASSESSMENT — PAIN DESCRIPTION - ONSET: ONSET: OTHER (COMMENT)

## 2022-02-28 ASSESSMENT — PAIN DESCRIPTION - ORIENTATION: ORIENTATION: OTHER (COMMENT)

## 2022-02-28 NOTE — H&P
[facility]   Cardiology Admission Note              Date:   2/28/2022  Patient name: Elena Randall  Date of admission:  2/28/2022  6:40 AM  MRN:   [id number]  YOB: 1950    Primary Care physician: Dory Ding, APRN - CNP    Reason for Admission:  atrial fibrillation    CHIEF COMPLAINT:  Symptomatic atrial fibrillation     History Obtained From:  patient    HISTORY OF PRESENT ILLNESS:    Mr. Fatoumata Montana is a pleasant 47-year-old male with a medical history significant for paroxysmal atrial fibrillation status post ablation with Dr. Edgar Tai and Margarito, hypertension, diabetes mellitus type 2, and nonischemic cardiomyopathy with recovered ejection fraction who presents from home for ablation for symptomatic atrial fibrillation. Patient continues to have breakthrough atrial fibrillation. He reports that dofetilide has been working but continues to have breakthrough. Past Medical History:   has a past medical history of Arthritis, Atrial fibrillation ([address] Hyperlipidemia, Hypertension, SCC (squamous cell carcinoma), Sleep apnea, and Systolic heart failure ([address] Past Surgical History:   has a past surgical history that includes Cardioversion (2005); Total hip arthroplasty (Left, 2013); Colonoscopy ([id number]); and ablation of dysrhythmic focus. Home Medications:    Prior to Admission medications    Medication Sig Start Date End Date Taking?  Authorizing Provider   dilTIAZem (CARDIZEM CD) 120 MG extended release capsule Take 1 capsule by mouth daily 2/8/22  Yes Jenni Owen MD   dofetilide (TIKOSYN) 500 MCG capsule TAKE 1 CAPSULE BY MOUTH EVERY 12 HOURS 12/28/21  Yes Ayad Marshall MD   levothyroxine (SYNTHROID) 75 MCG tablet TAKE 1 TABLET BY MOUTH DAILY 12/13/21  Yes Ute Ladd MD   prednisoLONE acetate (PRED FORTE) 1 % ophthalmic suspension SHAKE LIQUID AND INSTILL 1 DROP IN RIGHT EYE THREE TIMES DAILY 10/15/21  Yes Historical Provider, MD   dilTIAZem (CARDIZEM CD) 120 MG extended release capsule Take 1 capsule by mouth daily 10/15/21  Yes Daniel Yi MD   aspirin (COLLEEN ASPIRIN) 325 MG tablet Take by mouth   Yes Historical Provider, MD   pravastatin (PRAVACHOL) 80 MG tablet TAKE 1 TABLET BY MOUTH DAILY 6/30/21  Yes DARSHANA Karimi CNP   moxifloxacin (VIGAMOX) 0.5 % ophthalmic solution INSTILL 1 DROP IN LEFT EYE FOUR TIMES DAILY AFTER SURGERY 4/1/21  Yes Historical Provider, MD   lisinopril (PRINIVIL;ZESTRIL) 5 MG tablet Take 1 tablet by mouth daily 3/25/21  Yes DARSHANA Cordon CNP   loratadine (CLARITIN) 10 MG capsule Take 10 mg by mouth daily   Yes Historical Provider, MD   dorzolamide-timolol (COSOPT) 22.3-6.8 MG/ML ophthalmic solution INT 1 GTT INTO OU BID 4/14/20  Yes Historical Provider, MD   omeprazole (PRILOSEC OTC) 20 MG tablet Take 20 mg by mouth daily   Yes Historical Provider, MD   Multiple Vitamins-Minerals (MULTIVITAMIN-MINERALS) TABS tablet Take 1 tablet by mouth daily 7/24/19  Yes DARSHANA Cordon CNP       Allergies:  Patient has no known allergies. Social History:   reports that he has never smoked. He has never used smokeless tobacco. He reports that he does not drink alcohol and does not use drugs. Family History: family history includes Cancer in his mother; Heart Disease (age of onset: 79) in his father; High Blood Pressure in his mother. REVIEW OF SYSTEMS:    · Constitutional: there has been no unanticipated weight loss. There's been no change in energy level, sleep pattern, or activity level. · Eyes: No visual changes or diplopia. No scleral icterus. · ENT: No Headaches, hearing loss or vertigo. No mouth sores or sore throat. · Cardiovascular: No chest pain, Yes dyspnea on exertion, Yes palpitations or No loss of consciousness. No cough, hemoptysis, No pleuritic pain, or phlebitis. · Respiratory: No cough or wheezing, no sputum production. No hematemesis.     · Gastrointestinal: No abdominal pain, appetite loss, blood in stools. No change in bowel or bladder habits. · Genitourinary: No dysuria, trouble voiding, or hematuria. · Musculoskeletal:  No gait disturbance, No weakness or joint complaints. · Integumentary: No rash or pruritis. · Neurological: No headache, diplopia, change in muscle strength, numbness or tingling. No change in gait, balance, coordination, mood, affect, memory, mentation, behavior. · Psychiatric: No anxiety, or depression. · Endocrine: No temperature intolerance. No excessive thirst, fluid intake, or urination. No tremor. · Hematologic/Lymphatic: No abnormal bruising or bleeding, blood clots or swollen lymph nodes. · Allergic/Immunologic: No nasal congestion or hives. PHYSICAL EXAM:    Physical Examination:    Vital Signs:           Blood pressure 114/67, pulse 59, temperature 96.8 °F (36 °C), temperature source Temporal, resp. rate 11, SpO2 99 %. Temp  Av.4 °F (34.7 °C)  Min: 93.2 °F (34 °C)  Max: 96.8 °F (36 °C)  Pulse  Av  Min: 59  Max: 59  BP  Min: 80/53  Max: 149/76  SpO2  Av.8 %  Min: 97 %  Max: 100 %  FiO2   Av.1 %  Min: 21 %  Max: 98 %    Admission Weight:         I/O:     Intake/Output Summary (Last 24 hours) at 2022 1220  Last data filed at 2022 1142  Gross per 24 hour   Intake 1400 ml   Output --   Net 1400 ml          Vent Settings:  Vent Information  SpO2: 99 %   Constitutional and General Appearance: alert, cooperative, no distress and appears stated age  HEENT: PERRL, no cervical lymphadenopathy. No masses palpable.  Normal oral mucosa  Respiratory:  · Normal excursion and expansion without use of accessory muscles  · Resp Auscultation: Normal breath sounds without dullness or wheezing  Cardiovascular:  · The apical impulse is not displaced  · RRR S1S2 w/o M/G/R  Abdomen:  · No masses or tenderness  · Bowel sounds present  Extremities:  ·  No Cyanosis or Clubbing  ·  Lower extremity edema: No  ·  Skin: Warm and dry  Neurological:  · Alert and oriented. · Moves all extremities well  · No abnormalities of mood, affect, memory, mentation, or behavior are noted    DATA:    CARDIOLOGY LABS:   CBC:   Recent Labs     02/28/22  0650   WBC 5.5   HGB 13.3*   HCT 40.2*        BMP:   Recent Labs     02/28/22  0650      K 3.9   CO2 27   BUN 14   CREATININE 0.9   LABGLOM >60   GLUCOSE 97     BNP: No results for input(s): BNP in the last 72 hours. PT/INR:   Recent Labs     02/28/22  0650   PROTIME 10.8   INR 0.96     APTT:  Recent Labs     02/28/22  0650   APTT 39.9*     CARDIAC ENZYMES:No results for input(s): CKMB, CKMBINDEX, TROPONINI in the last 72 hours. Invalid input(s): CKTOTAL;3  FASTING LIPID PANEL:  Lab Results   Component Value Date    HDL 63 12/11/2017    HDL 61 10/21/2011    LDLCALC 112 12/11/2017    TRIG 93 12/11/2017     LIVER PROFILE:No results for input(s): AST, ALT, ALB in the last 72 hours.     IMPRESSION:    Patient Active Problem List   Diagnosis    Hyperlipidemia    HTN (hypertension)    Squamous cell carcinoma of skin    Bilateral shoulder tendinopathy    Primary osteoarthritis of both shoulders    History of joint swelling    Pre-diabetes    Anemia    Moderate obstructive sleep apnea    Presence of Watchman left atrial appendage closure device    Paroxysmal atrial fibrillation (HCC)    Hypercalcemia    Coronary artery calcification seen on CT scan    HH (hiatus hernia)    Prostate enlargement    Splenic artery aneurysm (HCC)    Calculus of gallbladder without cholecystitis without obstruction    Diverticulosis of colon without diverticulitis    Periodic limb movement sleep disorder    Pulmonary infiltrate    Anemia due to chronic blood loss    Atypical chest pain    BPH (benign prostatic hyperplasia)    Chronic anticoagulation    Coronary atherosclerosis    ED (erectile dysfunction)    Elevated serum cholesterol    Encounter for monitoring sotalol therapy    Essential thrombocytosis (HCC)    Gastroesophageal reflux disease without esophagitis    Generalized osteoarthritis    GI bleed    History of seronegative inflammatory arthritis    Intestinal malabsorption    Osteoarthritis of left hip    Overweight with body mass index (BMI) 25.0-29.9    Status post left hip replacement    Benign essential hypertension    Rhinitis, chronic    S/P placement of VNS (vagus nerve stimulation) device    Hypothyroidism due to medication    Atrial fibrillation with RVR (HCC)    Bilateral cataracts    Entropion of both eyes     RECOMMENDATIONS:  1. Atrial fibrillation ablation. Discussed with patient and nursing. All questions and concerns were addressed to the patient/family. Alternatives to my treatment were discussed. The note was completed using EMR. Every effort was made to ensure accuracy; however, inadvertent computerized transcription errors may be present.     Vicki Michelle MD  Cardiac Electrophysiology  [address] [facility]  [phone] [facility]

## 2022-02-28 NOTE — PROGRESS NOTES
Patient returned to the cath lab and report given to Asim Davidson. Patient has bilat vas cave. Both sides soft and only a tiny visible amount of blood on rt. interior dressing. Removed 2cc of air from each TR band. Hands warmed up after removal of air.

## 2022-02-28 NOTE — FLOWSHEET NOTE
02/28/22 1530   Assessment   Charting Type Shift assessment   Neurological   Neuro (WDL) WDL   Level of Consciousness Alert (0)   [place] Coma Scale   Eye Opening 4   Best Verbal Response 5   Best Motor Response 6   [place] Coma Scale Score 15   HEENT   HEENT (WDL) WDL   Respiratory   Respiratory (WDL) WDL   Respiratory Pattern Regular   Respiratory Depth Normal   Respiratory Quality/Effort Unlabored   Chest Assessment Chest expansion symmetrical   L Breath Sounds Clear   R Breath Sounds Clear   Cardiac   Cardiac (WDL) WDL   Rhythm Interpretation   Pulse 51   Gastrointestinal   Abdominal (WDL) WDL   Peripheral Vascular   Peripheral Vascular (WDL) WDL   Edema None   Skin Color/Condition   Skin Color/Condition (WDL) WDL   Skin Integrity   Skin Integrity (WDL) X  (TR bands bilat wrists and groin sites)   Musculoskeletal   Musculoskeletal (WDL) WDL   Genitourinary   Genitourinary (WDL) WDL   Flank Tenderness No   Suprapubic Tenderness No   Dysuria No   Anus/Rectum   Anus/Rectum (WDL) WDL   Psychosocial   Psychosocial (WDL) WDL

## 2022-02-28 NOTE — PROCEDURES
[facility]  Electrophysiology Procedure Report    Attending   Vicki Michelle MD    Procedures Atrial fibrillation ablation  Diagnostic electrophysiologic study  3-D electroanatomical mapping of the left atrium and right atrium  Transseptal catheterization  Wide area circumferential ablation with PV isolation  Ablation of typical atrial flutter  Ablation of atypica atrial flutter  Posterior wall isolation  Intra-cardiac echocardiography    Indications  Symptomatic paroxysmal atrial fibrillation    Description of Procedure   After the risks and benefits of the procedure were explained and informed consent was obtained, the patient was brought to the electrophysiology lab in the nonsedated and fasting state. General anesthesia was administered under anesthesia physician/nurse guidance (please see anesthesia records for medication details). A condom catheter was placed prior to the start of the procedure. The patient was connected to the EP lab monitoring system. The patient was prepared and draped in standard fashion. Using the modified Seldinger technique, the following sheaths and catheters were inserted under ultrasound guidance:    1. A 8 F sheath was inserted into the right femoral vein. 2.  A 8 F sheath was inserted into the right femoral vein. 3.  A 6 F sheath was inserted into the left femoral vein. 4.  A 9 F sheath was inserted into the left femoral vein. 5.  A 20 gauge sheath was placed in the right radial artery for invasive blood pressure monitoring. The patient arrived in normal sinus rhythm. The ICE catheter was advanced into the right right atrium and right venricle for transseptal, mapping, and function/mechanical evaluation during ablation. After anatomy was generated the [employer] [place] catheter was advanced into the IVC, SVC and RV for right atrial anatomy using the Carto Mapping system (3D).   The [facility] was then advanced into the coronary sinus for left atrial pacing and electrophysiology study. Electrophysiology study was then performed (see below for details). The esophagus temperature probe was inserted with anesthesia's assistance. The esophageal temperature monitoring was performed using a multi-electrode probe throughout the ablation, and power and lesions were adjusted to avoid temperatures >= 1.0 C above baseline core temperature. High power short duration lesion sets were used (posterior 45W for up to 15 second and anterior 45W for up to 30 seconds). The right femoral sheath was exchanged for Vizigo sheath. A long BRK1 needled was inserted into the Vizigo sheath. The Vizigo sheath was advanced into the SVC using ablation catheter and RA map generated by decanav catheter. Right atrial pressure was measured. This was advanced darrian the the left atrium via the modified Brockenbrough procedure for trans-septal puncture under intracardiac echocardiographic and fluoroscopic guidance in BRANDY using a SafeSept wire. The SafeSept wire was advanced into the left superior pulmonary vein. Flush into the left atrium during transseptal was also used to confirm true transseptal puncture. The left atrial pressure was measured. After transseptal catheterization, IV heparin was admistered for a target ACT >300 for the duration of the procedure. The PVs were mapped with a Pentray catheter using Carto Mapping system, demonstrating conduction into the PVs.  There were two left and two right pulmonary veins. LV pacing using ablation catheter was performed to assess retrograde conduction and possible accessory pathways (none identified). Wide area circumferential catheter ablation was performed and bilateral PVAI was achieved. The PVs were remapped with the Pentray catheter and demonstrated entrance and exit block. Patient was then started on isoproterenol drip get heart rate up to maximum tolerated (blood pressure) or at least 120 beats per minute.   Entrance and exit block was confirmed. Programmed stimulation for CS was performed to induce atrial tachycardia/flutter on isoproterenol. Tachycardia induced tachycardia was induced followed by atrial fibrillation. Entrainment mapping suggested right atrial flutter. Activation mapping with ripple suggested roof flutter. In order to limit flutters we decided to place anterior roof line. PES resulted in TIT again followed by atrial fibrillation that was not sustained. Inferior posterior line was placed. Voltage mapping and pacing in the posterior wall suggested isolation of posterior all. Differential pacing confirmed both lines (see below). Differential pacing in the RA showed continued conduction across CTI despite previous ablation. Ablation near TV annulus resulted in bidirectional block across CTI (see below). Basic intervals were obtained. Catheters were removed (Vascade occlusion devices utilized) and heparin was reversed per standard protocol using protamine. The patient was allowed to awake from anesthesia and was returned to prep and recovery for further recovery. Dr. Lola Obrien MD, the attending physician, was present throughout the procedure and for interpretation of the data. Complications None  EBL   <20cc    Pericardium: No pericardial effusion post ablation. RESULTS    ECG   Baseline   Normal sinus rhythm, within normal limits       Post Ablation   Normal sinus rhythm with nonspecific ST-T wave abnormalities    RESULTS  Basic Intervals  Status Rhythm PA AH HV SC QRS QT RR   Pre NSR 35 101 48 180    Post NSR 52 73 56 175      Stimulation measurements  2 to 1 CL Wenck CL Dual AVN  Pathway?  VA conduction SVT Comment    440 No Present and concentric       Refractory Periods  PCL A ERP PCL AVN ERP PCL V ERP PCL V ERP  PCL Access path ERP Comment   600 270 600 280 - - - - - - -     Supraventricular Arryhthmia(s) induced  Arrhythmia Induced Arrhythmia CL Duration Term How Induced? Morphology AXIS Comment   CTI flutter 280 sustained TIT PES and isuprel concentric     Roof line 300 sustained TIT PES and isuprel eccentric       Ventricular Arrhythmia(s) induced  Arrhythmia induced Arrhythmia CL Duration Term How Induced? Morphology AXIS VT- Transition Comment   none             Hemodynamic Data  Arterial pressure mean 66 mmHg  Right atrial pressure mean 5 mmHg  Left atrial presure mean 9 mmHg    Impression  1. Two left and two right pulmonary veins with normal right and left atrial pressures. 2.  Successful wide area circumferential ablation. 3.  Successful ablation and isolation of pulmonary veins. 4.  Successful ablation of typical isthmus-dependant atrial flutter. 5.  Successful ablation of roof dependent flutter. 6.  Successful posterior wall isolation. Plan  - Continue dofetilide 500 mcg BID.  - Continue diltiazem. - Start on pantoprazole 40 mg BID for 4 weeks post ablation.  - Start on sucralfate for 2 weeks post ablation for 2 weeks post ablation. Proceduralist Notes:  - Posterior wall isolation.  - Typical flutter ablation completed. Break in previous line was near TV.     Images:  Left Atrium PA      Left Atrium AP      Left Atrium Roof Dependent Flutter      Right Atrium CTI Line      Pericardium

## 2022-02-28 NOTE — ANESTHESIA POSTPROCEDURE EVALUATION
Department of Anesthesiology  Postprocedure Note    Patient: Beryle Hammer  MRN: [id number]  YOB: 1950  Date of evaluation: 2/28/2022  Time:  12:50 PM     Procedure Summary     Date: 02/28/22 Room / Location: [facility] Cardiac Cath Lab    Anesthesia Start: 1913 Anesthesia Stop: 7560    Procedure: ABLATION Diagnosis:       Paroxysmal atrial fibrillation      Paroxysmal atrial fibrillation    Scheduled Providers:  Responsible Provider: Luiz Lux MD    Anesthesia Type: general ASA Status: 3          Anesthesia Type: general    Rolf Phase I: Rolf Score: 10    Rolf Phase II:      Last vitals: Reviewed and per EMR flowsheets.      Vitals:    02/28/22 1219 02/28/22 1220 02/28/22 1221 02/28/22 1222   BP:       Pulse: 54 54 54 55   Resp:       Temp:       TempSrc:       SpO2: 99% 98% 98% 98%     Anesthesia Post Evaluation    Patient location during evaluation: bedside  Patient participation: complete - patient participated  Level of consciousness: awake and alert  Airway patency: patent  Nausea & Vomiting: no nausea  Complications: no  Cardiovascular status: hemodynamically stable  Respiratory status: acceptable  Hydration status: euvolemic

## 2022-02-28 NOTE — ANESTHESIA PRE PROCEDURE
Department of Anesthesiology  Preprocedure Note       Name:  Viktoriya Main   Age:  74 y.o.  :  1950                                          MRN:  [id number]         Date:  2022      Surgeon: * No surgeons listed *    Procedure: * No procedures listed *    Medications prior to admission:   Prior to Admission medications    Medication Sig Start Date End Date Taking?  Authorizing Provider   dilTIAZem (CARDIZEM CD) 120 MG extended release capsule Take 1 capsule by mouth daily 22   ADRIANA Macias MD   dofetilide (TIKOSYN) 500 MCG capsule TAKE 1 CAPSULE BY MOUTH EVERY 12 HOURS 21   Young Hackett MD   levothyroxine (SYNTHROID) 75 MCG tablet TAKE 1 TABLET BY MOUTH DAILY 21   Kash Shepherd MD   prednisoLONE acetate (PRED FORTE) 1 % ophthalmic suspension SHAKE LIQUID AND INSTILL 1 DROP IN RIGHT EYE THREE TIMES DAILY 10/15/21   Historical Provider, MD   dilTIAZem (CARDIZEM CD) 120 MG extended release capsule Take 1 capsule by mouth daily 10/15/21   Weston Mcclure MD   aspirin (COLLEEN ASPIRIN) 325 MG tablet Take by mouth    Historical Provider, MD   pravastatin (PRAVACHOL) 80 MG tablet TAKE 1 TABLET BY MOUTH DAILY 21   DARSHANA Clemens - CNP   moxifloxacin (VIGAMOX) 0.5 % ophthalmic solution INSTILL 1 DROP IN LEFT EYE FOUR TIMES DAILY AFTER SURGERY 21   Historical Provider, MD   lisinopril (PRINIVIL;ZESTRIL) 5 MG tablet Take 1 tablet by mouth daily 3/25/21   DARSHANA Mathews CNP   loratadine (CLARITIN) 10 MG capsule Take 10 mg by mouth daily    Historical Provider, MD   dorzolamide-timolol (COSOPT) 22.3-6.8 MG/ML ophthalmic solution INT 1 GTT INTO OU BID 20   Historical Provider, MD   omeprazole (PRILOSEC OTC) 20 MG tablet Take 20 mg by mouth daily    Historical Provider, MD   Multiple Vitamins-Minerals (MULTIVITAMIN-MINERALS) TABS tablet Take 1 tablet by mouth daily 19   DARSHANA Mathews CNP       Current medications:    Current Facility-Administered Medications   Medication Dose Route Frequency Provider Last Rate Last Admin    lactated ringers infusion   IntraVENous Continuous Timbo Najera MD        sodium chloride flush 0.9 % injection 10 mL  10 mL IntraVENous 2 times per day Timbo Najera MD        sodium chloride flush 0.9 % injection 10 mL  10 mL IntraVENous PRN Timbo Najera MD        0.9 % sodium chloride infusion  25 mL IntraVENous PRN Timbo Najera MD        famotidine (PEPCID) injection 20 mg  20 mg IntraVENous Once Timbo Najera MD           Allergies:  No Known Allergies    Problem List:    Patient Active Problem List   Diagnosis Code    Hyperlipidemia E78.5    HTN (hypertension) I10    Squamous cell carcinoma of skin     Bilateral shoulder tendinopathy M67.911, M67.912    Primary osteoarthritis of both shoulders M19.011, M19.012    History of joint swelling Z87.39    Pre-diabetes R73.03    Anemia D64.9    Moderate obstructive sleep apnea G47.33    Presence of Watchman left atrial appendage closure device Z95.818    Paroxysmal atrial fibrillation (HCC) I48.0    Hypercalcemia E83.52    Coronary artery calcification seen on CT scan I25.10    HH (hiatus hernia) K44.9    Prostate enlargement N40.0    Splenic artery aneurysm (HCC) I72.8    Calculus of gallbladder without cholecystitis without obstruction K80.20    Diverticulosis of colon without diverticulitis K57.30    Periodic limb movement sleep disorder G47.61    Pulmonary infiltrate R91.8    Anemia due to chronic blood loss D50.0    Atypical chest pain R07.89    BPH (benign prostatic hyperplasia) N40.0    Chronic anticoagulation Z79.01    Coronary atherosclerosis I25.10    ED (erectile dysfunction) N52.9    Elevated serum cholesterol E78.9    Encounter for monitoring sotalol therapy Z51.81, Z79.899    Essential thrombocytosis (HCC) D47.3    Gastroesophageal reflux disease without esophagitis K21.9    Generalized osteoarthritis M15.9    GI bleed K92.2    History of seronegative inflammatory arthritis Z87.39    Intestinal malabsorption K90.9    Osteoarthritis of left hip M16.12    Overweight with body mass index (BMI) 25.0-29.9 E66.3    Status post left hip replacement Z96.642    Benign essential hypertension I10    Rhinitis, chronic J31.0    S/P placement of VNS (vagus nerve stimulation) device Z96.89    Hypothyroidism due to medication E03.2    Atrial fibrillation with RVR (HCC) I48.91    Bilateral cataracts H26.9    Entropion of both eyes H02.003, H02.006       Past Medical History:        Diagnosis Date    Arthritis     Atrial fibrillation ([address] 2007    S/P CARDIOVERSION, EF 49% on stress test 2009    Hyperlipidemia     Hypertension     SCC (squamous cell carcinoma)     Dr. Demar Dobbins Sleep apnea     Systolic heart failure [facility])        Past Surgical History:        Procedure Laterality Date    ABLATION OF DYSRHYTHMIC FOCUS      Afib PVI etc / right sided flutter line    CARDIOVERSION  2005    COLONOSCOPY  [id number]    tics    TOTAL HIP ARTHROPLASTY Left 2013       Social History:    Social History     Tobacco Use    Smoking status: Never Smoker    Smokeless tobacco: Never Used   Substance Use Topics    Alcohol use: No     Alcohol/week: 0.0 standard drinks     Comment: social                                Counseling given: Not Answered      Vital Signs (Current): There were no vitals filed for this visit.                                            BP Readings from Last 3 Encounters:   10/22/21 (!) 140/80   10/14/21 (!) 156/90   06/22/21 130/70       NPO Status:  MN+, SEE MAR FOR AM MEDS                                                                               BMI:   Wt Readings from Last 3 Encounters:   10/22/21 192 lb 9.6 oz (87.4 kg)   10/14/21 190 lb (86.2 kg)   06/22/21 193 lb 8 oz (87.8 kg)     There is no height or weight on file to calculate BMI.    CBC:   Lab Results   Component Value Date    WBC 7.9 10/11/2021    RBC 4.40 10/11/2021    HGB 13.0 10/11/2021    HCT 39.0 10/11/2021    MCV 88.6 10/11/2021    RDW 14.0 10/11/2021     10/11/2021       CMP:   Lab Results   Component Value Date     10/14/2021    K 4.3 10/14/2021    K 4.2 10/11/2021     10/14/2021    CO2 28 10/14/2021    BUN 13 10/14/2021    CREATININE 0.9 10/14/2021    GFRAA >60 10/14/2021    GFRAA >60 06/14/2013    AGRATIO 1.3 10/11/2021    LABGLOM >60 10/14/2021    GLUCOSE 97 10/14/2021    PROT 6.8 10/11/2021    PROT 7.0 07/06/2012    CALCIUM 9.9 10/14/2021    BILITOT 0.3 10/11/2021    ALKPHOS 108 10/11/2021    AST 20 10/11/2021    ALT 24 10/11/2021       POC Tests: No results for input(s): POCGLU, POCNA, POCK, POCCL, POCBUN, POCHEMO, POCHCT in the last 72 hours. Coags:   Lab Results   Component Value Date    PROTIME 11.4 10/12/2021    INR 1.01 10/12/2021    APTT 28.8 05/21/2020       HCG (If Applicable): No results found for: PREGTESTUR, PREGSERUM, HCG, HCGQUANT     ABGs: No results found for: PHART, PO2ART, NRF7AUB, SXE1SMJ, BEART, Z4OAQKAY     Type & Screen (If Applicable):  No results found for: LABABO, LABRH    Drug/Infectious Status (If Applicable):  No results found for: HIV, HEPCAB    COVID-19 Screening (If Applicable):   Lab Results   Component Value Date    COVID19 Not Detected 05/19/2020           Anesthesia Evaluation  Patient summary reviewed no history of anesthetic complications:   Airway: Mallampati: III  TM distance: <3 FB   Neck ROM: limited  Mouth opening: > = 3 FB Dental:          Pulmonary: breath sounds clear to auscultation  (+) sleep apnea (INSPIRE):      (-) COPD, asthma, shortness of breath, recent URI and not a current smoker          Patient did not smoke on day of surgery.                  Cardiovascular:    (+) hypertension:, CAD: non-obstructive, dysrhythmias (S/P WATCHMAN): atrial fibrillation, hyperlipidemia    (-) pacemaker, past MI, CABG/stent,  angina and  CHF    ECG reviewed  Rhythm: regular  Rate: normal           Beta Blocker:  Not on Beta Blocker         Neuro/Psych:   Negative Neuro/Psych ROS     (-) seizures, TIA, CVA and psychiatric history           GI/Hepatic/Renal:   (+) hiatal hernia, GERD: well controlled,      (-) liver disease, no renal disease, bowel prep and no morbid obesity       Endo/Other:    (+) hypothyroidism: arthritis: OA., malignancy/cancer (SKIN). (-) diabetes mellitus, blood dyscrasia               Abdominal:       Abdomen: soft. Vascular: negative vascular ROS. Other Findings:             Anesthesia Plan      general     ASA 3       Induction: intravenous. arterial line  MIPS: Postoperative opioids intended and Prophylactic antiemetics administered. Anesthetic plan and risks discussed with patient. Plan discussed with CRNA. This pre-anesthesia assessment may be used as a history and physical.    DOS STAFF ADDENDUM:    Pt seen and examined, chart reviewed (including anesthesia, drug and allergy history). No interval changes to history and physical examination. Anesthetic plan, risks, benefits, alternatives, and personnel involved discussed with patient. Patient verbalized an understanding and agrees to proceed.       Luiz Lux MD  February 28, 2022  7:06 AM      Luiz Lux MD   2/28/2022

## 2022-03-01 VITALS
HEART RATE: 75 BPM | BODY MASS INDEX: 29.18 KG/M2 | DIASTOLIC BLOOD PRESSURE: 78 MMHG | RESPIRATION RATE: 16 BRPM | HEIGHT: 69 IN | WEIGHT: 197 LBS | SYSTOLIC BLOOD PRESSURE: 128 MMHG | TEMPERATURE: 98.4 F | OXYGEN SATURATION: 96 %

## 2022-03-01 LAB
ANION GAP SERPL CALCULATED.3IONS-SCNC: 10 MMOL/L (ref 3–16)
BUN BLDV-MCNC: 11 MG/DL (ref 7–20)
CALCIUM SERPL-MCNC: 9 MG/DL (ref 8.3–10.6)
CHLORIDE BLD-SCNC: 105 MMOL/L (ref 99–110)
CO2: 25 MMOL/L (ref 21–32)
CREAT SERPL-MCNC: 0.9 MG/DL (ref 0.8–1.3)
EKG ATRIAL RATE: 78 BPM
EKG DIAGNOSIS: NORMAL
EKG P AXIS: 77 DEGREES
EKG P-R INTERVAL: 172 MS
EKG Q-T INTERVAL: 408 MS
EKG QRS DURATION: 90 MS
EKG QTC CALCULATION (BAZETT): 465 MS
EKG R AXIS: 25 DEGREES
EKG T AXIS: 49 DEGREES
EKG VENTRICULAR RATE: 78 BPM
GFR AFRICAN AMERICAN: >60
GFR NON-AFRICAN AMERICAN: >60
GLUCOSE BLD-MCNC: 95 MG/DL (ref 70–99)
HCT VFR BLD CALC: 37.7 % (ref 40.5–52.5)
HEMOGLOBIN: 12.6 G/DL (ref 13.5–17.5)
MAGNESIUM: 2 MG/DL (ref 1.8–2.4)
MCH RBC QN AUTO: 29.3 PG (ref 26–34)
MCHC RBC AUTO-ENTMCNC: 33.3 G/DL (ref 31–36)
MCV RBC AUTO: 87.9 FL (ref 80–100)
PDW BLD-RTO: 14.2 % (ref 12.4–15.4)
PLATELET # BLD: 278 K/UL (ref 135–450)
PMV BLD AUTO: 8.2 FL (ref 5–10.5)
POC ACT LR: 295 SEC
POC ACT LR: 343 SEC
POC ACT LR: 375 SEC
POC ACT LR: 381 SEC
POC ACT LR: 386 SEC
POC ACT LR: >400 SEC
POTASSIUM SERPL-SCNC: 4.4 MMOL/L (ref 3.5–5.1)
RBC # BLD: 4.29 M/UL (ref 4.2–5.9)
SODIUM BLD-SCNC: 140 MMOL/L (ref 136–145)
WBC # BLD: 8.3 K/UL (ref 4–11)

## 2022-03-01 PROCEDURE — 2580000003 HC RX 258: Performed by: ANESTHESIOLOGY

## 2022-03-01 PROCEDURE — 6370000000 HC RX 637 (ALT 250 FOR IP): Performed by: INTERNAL MEDICINE

## 2022-03-01 PROCEDURE — 93005 ELECTROCARDIOGRAM TRACING: CPT | Performed by: INTERNAL MEDICINE

## 2022-03-01 PROCEDURE — 99217 PR OBSERVATION CARE DISCHARGE MANAGEMENT: CPT | Performed by: NURSE PRACTITIONER

## 2022-03-01 PROCEDURE — 85027 COMPLETE CBC AUTOMATED: CPT

## 2022-03-01 PROCEDURE — 36415 COLL VENOUS BLD VENIPUNCTURE: CPT

## 2022-03-01 PROCEDURE — 83735 ASSAY OF MAGNESIUM: CPT

## 2022-03-01 PROCEDURE — 80048 BASIC METABOLIC PNL TOTAL CA: CPT

## 2022-03-01 PROCEDURE — 93010 ELECTROCARDIOGRAM REPORT: CPT | Performed by: INTERNAL MEDICINE

## 2022-03-01 RX ORDER — SUCRALFATE 1 G/1
1 TABLET ORAL EVERY 8 HOURS SCHEDULED
Qty: 42 TABLET | Refills: 0 | Status: SHIPPED | OUTPATIENT
Start: 2022-03-01 | End: 2022-09-09

## 2022-03-01 RX ORDER — COLCHICINE 0.6 MG/1
0.6 TABLET ORAL DAILY
Qty: 10 TABLET | Refills: 0 | Status: SHIPPED | OUTPATIENT
Start: 2022-03-02 | End: 2022-09-09

## 2022-03-01 RX ORDER — PANTOPRAZOLE SODIUM 40 MG/1
40 TABLET, DELAYED RELEASE ORAL
Qty: 60 TABLET | Refills: 0 | Status: SHIPPED | OUTPATIENT
Start: 2022-03-01 | End: 2022-04-07 | Stop reason: SDUPTHER

## 2022-03-01 RX ADMIN — COLCHICINE 0.6 MG: 0.6 TABLET, FILM COATED ORAL at 08:14

## 2022-03-01 RX ADMIN — SODIUM CHLORIDE, PRESERVATIVE FREE 10 ML: 5 INJECTION INTRAVENOUS at 08:17

## 2022-03-01 RX ADMIN — DILTIAZEM HYDROCHLORIDE 120 MG: 120 CAPSULE, COATED, EXTENDED RELEASE ORAL at 08:14

## 2022-03-01 RX ADMIN — DORZOLAMIDE HYDROCHLORIDE AND TIMOLOL MALEATE 1 DROP: 20; 5 SOLUTION/ DROPS OPHTHALMIC at 08:55

## 2022-03-01 RX ADMIN — LEVOTHYROXINE SODIUM 75 MCG: 0.03 TABLET ORAL at 08:14

## 2022-03-01 RX ADMIN — SUCRALFATE 1 G: 1 TABLET ORAL at 05:13

## 2022-03-01 RX ADMIN — ASPIRIN 325 MG: 325 TABLET ORAL at 08:14

## 2022-03-01 RX ADMIN — PANTOPRAZOLE SODIUM 40 MG: 40 TABLET, DELAYED RELEASE ORAL at 06:39

## 2022-03-01 RX ADMIN — Medication 1 TABLET: at 08:14

## 2022-03-01 RX ADMIN — LISINOPRIL 5 MG: 10 TABLET ORAL at 08:14

## 2022-03-01 RX ADMIN — CETIRIZINE HYDROCHLORIDE 10 MG: 10 TABLET, FILM COATED ORAL at 08:14

## 2022-03-01 RX ADMIN — DOFETILIDE 500 MCG: 0.25 CAPSULE ORAL at 08:14

## 2022-03-01 ASSESSMENT — PAIN SCALES - GENERAL
PAINLEVEL_OUTOF10: 0
PAINLEVEL_OUTOF10: 0

## 2022-03-01 NOTE — DISCHARGE SUMMARY
Patient ID:  Tam Kapoor  [id number]  71 y.o.  1950    Admit date: 2/28/2022    Discharge date and time: 3/1/2022    Admitting Physician: Eulalio Valverde MD     Discharge NP: DARSHANA Rudolph-CNP    Admission Diagnoses: Paroxysmal atrial fibrillation [I48.0]  Atrial fibrillation [facility]) [I48.91]    Discharge Diagnoses: SAME    Admission Condition: fair    Discharged Condition: good    Hospital Course: Tam Kapoor was admitted on 2/28/2022 and had an elective redo RFCA of AF. Rhythm has been sinus. He has no complaints. Denies chest pain, palpitations, shortness of breath, and dizziness. Has eaten, ambulated, and voided. Assessment:   Symptomatic paroxsymal atrial arrhythmias (AF/AT): stable              -s/p DCCV 4/2020 and 2/2021              -s/p RFCA for PAF and CTI 7/2019               -s/p redo RFCA of AF 2/28/2022               -s/p Watchman implant 11/2019              -amiodarone stopped due to hypothyroidism               -has failed Multaq and sotalol               -Tikosyn started 10/2021  NICM with recovered EF   Diastolic dysfunction  AI: mild on echo 1/2021   HTN: controlled   DM  History of GIB  Acquired hypothyroidism              -amiodarone stopped 12/2019     Plan:   Continue  mg (Watchman), Tikosyn, Cardizem and lisinopril   Pantoprazole 40 mg PO BID x 30 days post ablation  Colchicine 0.6 mg daily x 2 weeks post ablation   Carafate 1 gram PO QID x 2 weeks post ablation   Post procedure instructions reviewed  Follow up in office on 6/2/2022     DATA   Echo 1/22/2021 [facility]):    A complete echo was performed using color flow Doppler, spectral Doppler, M-mode and echo contrast.    Lumason contrast was used during the study.   Left ventricle cavity appears normal.    Left ventricular wall thickness is normal.    The left ventricular EF by visual approximation is 55-60%. Normal left ventricular systolic function.     Left ventricular wall motion is within normal limits.   There is grade III left ventricular diastolic dysfunction and elevated   left atrial pressure.   Left atrium cavity is mildly dilated.   There is mild aortic valve regurgitation.   There is mild mitral valve regurgitation.   There is mild pulmonary hypertension. The estimated right ventricular systolic pressure is 75.9 mmHg.   No previous echocardiogram available to compare.     Echo 11/2019:  Conclusions      Summary   This is a limited echo to assess for pericardial effusion following Watchman   Device implantation.      Overall left ventricular function is normal. Ejection fraction is visually   estimated to be 55-60%.   No significant pericardial effusion.     LUIZA 12/2019:   Conclusions      Summary   Ejection fraction is visually estimated to be 55-60 %.   Mild mitral and tricuspid regurgitation.   Watchman device is well seated within the left atrial appendage.  No leak   around the device   Mild to moderate aortic regurgitation.         Discharge Exam:  /78   Pulse 75   Temp 98.4 °F (36.9 °C) (Oral)   Resp 16   Ht 5' 9\" (1.753 m)   Wt 197 lb (89.4 kg)   SpO2 96%   BMI 29.09 kg/m²     General Appearance:    Alert, cooperative, no distress, appears stated age   Head:    Normocephalic, without obvious abnormality, atraumatic   Eyes:    PERRL, conjunctiva/corneas clear, EOM's intact        Ears:    deferred   Nose:   Nares normal, septum midline, mucosa normal, no drainage    or sinus tenderness   Throat:   Lips, mucosa, and tongue normal; teeth and gums normal   Neck:   Supple, symmetrical, trachea midline, no adenopathy;        thyroid:  No enlargement/tenderness/nodules; no carotid    bruit or JVD   Back:     Symmetric, no curvature, ROM normal, no CVA tenderness   Lungs:     Clear to auscultation bilaterally, respirations unlabored   Chest wall:    No tenderness or deformity   Heart:    Regular rate and rhythm, S1 and S2 normal, no murmur, rub   or gallop; NSR  on tele Abdomen:     Soft, non-tender, bowel sounds active all four quadrants,     no masses, no organomegaly   Genitalia:    deferred   Rectal:    deferred    Extremities:   Extremities normal, atraumatic, no cyanosis or edema; bilateral  femoral sites stable (no sutures present)    Pulses:   2+ and symmetric all extremities   Skin:   Skin color, texture, turgor normal, no rashes or lesions   Lymph nodes:   Cervical, supraclavicular, and axillary nodes normal   Neurologic:   CNII-XII intact. Normal strength, sensation and reflexes       throughout     Disposition: home    Patient Instructions:      Medication List      START taking these medications    colchicine 0.6 MG tablet  Commonly known as: COLCRYS  Take 1 tablet by mouth daily for 10 days  Start taking on: March 2, 2022     pantoprazole 40 MG tablet  Commonly known as: PROTONIX  Take 1 tablet by mouth 2 times daily (before meals)  Replaces: PriLOSEC OTC 20 MG tablet     sucralfate 1 GM tablet  Commonly known as: CARAFATE  Take 1 tablet by mouth every 8 hours for 14 days        CHANGE how you take these medications    dilTIAZem 120 MG extended release capsule  Commonly known as: CARDIZEM CD  Take 1 capsule by mouth daily  What changed: Another medication with the same name was removed. Continue taking this medication, and follow the directions you see here.         CONTINUE taking these medications    Juliana Aspirin 325 MG tablet  Generic drug: aspirin     Claritin 10 MG capsule  Generic drug: loratadine     dofetilide 500 MCG capsule  Commonly known as: TIKOSYN  TAKE 1 CAPSULE BY MOUTH EVERY 12 HOURS     dorzolamide-timolol 22.3-6.8 MG/ML ophthalmic solution  Commonly known as: COSOPT     levothyroxine 75 MCG tablet  Commonly known as: SYNTHROID  TAKE 1 TABLET BY MOUTH DAILY     lisinopril 5 MG tablet  Commonly known as: PRINIVIL;ZESTRIL  Take 1 tablet by mouth daily     moxifloxacin 0.5 % ophthalmic solution  Commonly known as: VIGAMOX     multivitamin-minerals Tabs tablet  Take 1 tablet by mouth daily     pravastatin 80 MG tablet  Commonly known as: PRAVACHOL  TAKE 1 TABLET BY MOUTH DAILY     prednisoLONE acetate 1 % ophthalmic suspension  Commonly known as: PRED FORTE        STOP taking these medications    PriLOSEC OTC 20 MG tablet  Generic drug: omeprazole  Replaced by: pantoprazole 40 MG tablet           Where to Get Your Medications      These medications were sent to [address] [facility]    Phone: [phone]   · colchicine 0.6 MG tablet  · pantoprazole 40 MG tablet  · sucralfate 1 GM tablet         Post procedure instructions given  Activity: as tolerated  Diet: cardiac diet    Triny Lopez, DARSHANA-ZORAN  [facility] [address]  [phone]     Time spent on discharge of patient: 35 minutes, including plan of care, patient education, and care coordination

## 2022-03-01 NOTE — PROGRESS NOTES
Pt s/p AFIB ABL with Dr. Jian Coffman. Followed up this AM to access site status, pain, and overall experience. L and R femoral groin dressings were clean, dry and intact; groins were soft with no signs of hematoma. R and L radial dressings were also both clean, dry and intact; sites were soft with no signs of any hematoma. Pt did not have any complaints of pain at any of the sites. Pt did question why he had to have a redo ablation; educated pt on re-occurrence rate and isolation of PVs. Pt was overall satisfied with his experience and states, \"I'm ready to go home. \"

## 2022-03-02 ENCOUNTER — CARE COORDINATION (OUTPATIENT)
Dept: CASE MANAGEMENT | Age: 72
End: 2022-03-02

## 2022-03-02 LAB
BLOOD BANK DISPENSE STATUS: NORMAL
BLOOD BANK DISPENSE STATUS: NORMAL
BLOOD BANK PRODUCT CODE: NORMAL
BLOOD BANK PRODUCT CODE: NORMAL
BPU ID: NORMAL
BPU ID: NORMAL
DESCRIPTION BLOOD BANK: NORMAL
DESCRIPTION BLOOD BANK: NORMAL

## 2022-03-02 NOTE — CARE COORDINATION
[facility]    Call within 2 business days of discharge: Yes    Patient: Zahra Ansari Patient : 1950   MRN: [id number]  Reason for Admission: atrial fib s/p ablation  Discharge Date: 3/1/22 RARS: Readmission Risk Score: 6.2 ( )      Last Discharge [facility]       Complaint Diagnosis Description Type Department Provider    22   Admission (Discharged) from [address] [place] [address]., MD           Spoke with: [address]: [facility]    Non-face-to-face services provided:  Obtained and reviewed discharge summary and/or continuity of care documents     Transitions of Care Initial Call  Challenges to be reviewed by the provider   Additional needs identified to be addressed with provider: No  none             Method of communication with provider : none      Advance Care Planning:   Does patient have an Advance Directive: reviewed and current, reviewed and needs to be updated, not on file; education provided, not on file, patient declined education, decision maker updated and referral to internal ACP facilitator. Was this a readmission? No  Patient stated reason for admission: ablation  Patients top risk factors for readmission: medical condition-afib s/p ablation    Care Transition Nurse (CTN) contacted the patient by telephone to perform post hospital discharge assessment. Verified name and  with patient as identifiers. Provided introduction to self, and explanation of the CTN role. CTN reviewed discharge instructions, medical action plan and red flags with patient who verbalized understanding. Patient given an opportunity to ask questions and does not have any further questions or concerns at this time. Were discharge instructions available to patient? Yes.  Reviewed appropriate site of care based on symptoms and resources available to patient including: Specialist. The patient agrees to contact the PCP office for questions related to their healthcare. Medication reconciliation was not performed with patient, who verbalizes understanding of administration of home medications. Advised obtaining a 90-day supply of all daily and as-needed medications. Patient declined    Covid Risk Education     Educated patient about risk for severe COVID-19 due to risk factors according to CDC guidelines. CTN reviewed discharge instructions, medical action plan and red flag symptoms with the patient who verbalized understanding. Discussed COVID vaccination status: Yes. Education provided on COVID-19 vaccination as appropriate. Discussed exposure protocols and quarantine with CDC Guidelines. Patient was given an opportunity to verbalize any questions and concerns and agrees to contact CTN or health care provider for questions related to their healthcare. Patient answered call and \"feeling fine\" since discharge from hospital. Discussed recent hospitalization. Stated that he proceeded with ablation even though he was in sinus rhythm and hopeful that is permanent. Patient confirmed that he had post d/c paperwork and declined medication reconciliation or new medication review. Stated that \"they reviewed the paperwork with me at discharge and know my medicines\". CTN offered to assist follow up visit scheduling MD appts and stated that Dr Marshall Ross had already been scheduled and declined any further assistance. CTN provided contact information. No further follow-up call indicated based on severity of symptoms and risk factors.       Care Transitions 24 Hour Call    Do you have any ongoing symptoms?: No  Do you have a copy of your discharge instructions?: Yes  Do you have all of your prescriptions and are they filled?: Yes  Have you been contacted by a [facility] Pharmacist?: No  Have you scheduled your follow up appointment?: Yes  How are you going to get to your appointment?: Car - family or friend to transport  Were you discharged with any Home Care or Post Acute Services: No  Do you feel like you have everything you need to keep you well at home?: Yes  Care Transitions Interventions         Follow Up  Future Appointments   Date Time Provider Stuart Aguilar   6/2/2022 10:45 AM MD Jennifer Zhang RN

## 2022-03-28 DIAGNOSIS — E03.4 HYPOTHYROIDISM DUE TO ACQUIRED ATROPHY OF THYROID: ICD-10-CM

## 2022-03-28 RX ORDER — LEVOTHYROXINE SODIUM 0.07 MG/1
75 TABLET ORAL DAILY
Qty: 90 TABLET | Refills: 0 | Status: SHIPPED | OUTPATIENT
Start: 2022-03-28 | End: 2022-06-16

## 2022-03-28 NOTE — TELEPHONE ENCOUNTER
Refill Request Came to Dr. Aubree Crowell pt hasn't established with anyone at this office yet.      Last Seen: Last Seen Department: 10/22/2021  Last Seen by PCP: Visit date not found    Last Written: 12/13/21 #90 with no refill     Next Appointment:   Future Appointments   Date Time Provider Stuart Aguilar   6/2/2022 10:45 AM ADRIANA Ewing, MD [facility]             Requested Prescriptions     Pending Prescriptions Disp Refills    levothyroxine (SYNTHROID) 75 MCG tablet [Pharmacy Med Name: LEVOTHYROXINE 0.075MG (75MCG) TABS] 90 tablet 0     Sig: TAKE 1 TABLET BY MOUTH DAILY

## 2022-04-05 RX ORDER — LISINOPRIL 5 MG/1
5 TABLET ORAL DAILY
Qty: 90 TABLET | Refills: 3 | Status: SHIPPED | OUTPATIENT
Start: 2022-04-05

## 2022-04-05 NOTE — TELEPHONE ENCOUNTER
Pt is requesting refill of Lisinopril 5mg. Pts pharmacy stated that they sent us a request on 4/1/22. Pt is currently out of this medication. Preferred pharmacy is [facility] in [address] [place] [address]. Last ov 06/22/2021 sanjeev.

## 2022-04-07 RX ORDER — PANTOPRAZOLE SODIUM 40 MG/1
40 TABLET, DELAYED RELEASE ORAL
Qty: 60 TABLET | Refills: 0 | Status: SHIPPED | OUTPATIENT
Start: 2022-04-07 | End: 2022-04-08

## 2022-04-07 NOTE — TELEPHONE ENCOUNTER
Received call from Pt's wife requesting medication that Pt took in hospital recently pantoprazole (PROTONIX) 40 MG tablet     Pt will be calling back and scheduling NTP appt       Last Office Visit  -  10/22/21  Next Office Visit  -  None

## 2022-04-08 RX ORDER — PANTOPRAZOLE SODIUM 40 MG/1
TABLET, DELAYED RELEASE ORAL
Qty: 180 TABLET | Refills: 0 | Status: SHIPPED | OUTPATIENT
Start: 2022-04-08 | End: 2022-06-07

## 2022-06-02 ENCOUNTER — OFFICE VISIT (OUTPATIENT)
Dept: CARDIOLOGY CLINIC | Age: 72
End: 2022-06-02
Payer: MEDICARE

## 2022-06-02 VITALS
WEIGHT: 174 LBS | BODY MASS INDEX: 25.77 KG/M2 | OXYGEN SATURATION: 98 % | HEART RATE: 63 BPM | HEIGHT: 69 IN | DIASTOLIC BLOOD PRESSURE: 72 MMHG | SYSTOLIC BLOOD PRESSURE: 128 MMHG

## 2022-06-02 DIAGNOSIS — Z09 HOSPITAL DISCHARGE FOLLOW-UP: ICD-10-CM

## 2022-06-02 DIAGNOSIS — I48.91 ATRIAL FIBRILLATION, UNSPECIFIED TYPE (HCC): Primary | ICD-10-CM

## 2022-06-02 DIAGNOSIS — I48.0 PAROXYSMAL ATRIAL FIBRILLATION (HCC): ICD-10-CM

## 2022-06-02 DIAGNOSIS — Z95.818 PRESENCE OF WATCHMAN LEFT ATRIAL APPENDAGE CLOSURE DEVICE: ICD-10-CM

## 2022-06-02 PROCEDURE — G8417 CALC BMI ABV UP PARAM F/U: HCPCS | Performed by: INTERNAL MEDICINE

## 2022-06-02 PROCEDURE — 3017F COLORECTAL CA SCREEN DOC REV: CPT | Performed by: INTERNAL MEDICINE

## 2022-06-02 PROCEDURE — 1111F DSCHRG MED/CURRENT MED MERGE: CPT | Performed by: INTERNAL MEDICINE

## 2022-06-02 PROCEDURE — 1036F TOBACCO NON-USER: CPT | Performed by: INTERNAL MEDICINE

## 2022-06-02 PROCEDURE — 1123F ACP DISCUSS/DSCN MKR DOCD: CPT | Performed by: INTERNAL MEDICINE

## 2022-06-02 PROCEDURE — 99214 OFFICE O/P EST MOD 30 MIN: CPT | Performed by: INTERNAL MEDICINE

## 2022-06-02 PROCEDURE — 93000 ELECTROCARDIOGRAM COMPLETE: CPT | Performed by: INTERNAL MEDICINE

## 2022-06-02 PROCEDURE — G8427 DOCREV CUR MEDS BY ELIG CLIN: HCPCS | Performed by: INTERNAL MEDICINE

## 2022-06-02 NOTE — PROGRESS NOTES
[address]   Electrophysiology Note    Date: 6/2/22  Patient Name: Saeid Pierce  YOB: 1950     Chief Complaint: Follow-Up from Hospital, Atrial Fibrillation, Hyperlipidemia, and Hypertension    Primary Care Physician: DARSHANA Swartz CNP      HISTORY OF PRESENT ILLNESS: Saeid Pierce is a 74 y.o. male who initially presented for follow up for paroxysmal atrial fibrillation. He has a history of GI bleed on Eliquis and has undergone cardioversion 6 times for AF at [facility]. Underwent Watchman device implantation on 12/18/2019. He underwent a cardioversion on 4/1/2020 for paroxsymal atrial fibrillation. His EKG on 12/10/20 demonstrated SR with HR 61 BPM. On 12/10/20 he reported he is doing well from a cardiac standpoint. He reported he is taking his dronedarone and metoprolol as prescribed and tolerates them well. He reported that he is active around his home and tolerates that well. In February, 2021, while patient was in [place], he went to ED there for AF with RVR. He underwent a CV while in [place] and he was switched from Multaq to sotalol. On 6/22/2021, ECG demonstrates SB (57). He reports that he has been in and out of AF since January and that he experiences near syncope and dizziness when he is in AF. Interval History since last office visit: Patient was admitted in 10/2021 with symptomatic AF and was started on Tikosyn. He had ad AF ablation on 2/28/2022. Today, 6/2/2022, ECG demonstrates SB (56). He reports feeling much better post ablation. He is taking his medications as prescribed. Patient denies current edema, chest pain, sob, palpitations, dizziness or syncope. Past Medical History:   has a past medical history of Arthritis, Atrial fibrillation ([address] Hyperlipidemia, Hypertension, SCC (squamous cell carcinoma), Sleep apnea, and Systolic heart failure ([address]     Past Surgical History:   has a past surgical history that includes Cardioversion (2005); Total hip arthroplasty (Left, 2013); Colonoscopy (01409505); and ablation of dysrhythmic focus. Social History:   reports that he has never smoked. He has never used smokeless tobacco. He reports that he does not drink alcohol and does not use drugs. Family History: family history includes Cancer in his mother; Heart Disease (age of onset: 79) in his father; High Blood Pressure in his mother. Allergies:  Patient has no known allergies. Home Medications:    Prior to Admission medications    Medication Sig Start Date End Date Taking?  Authorizing Provider   pantoprazole (PROTONIX) 40 MG tablet TAKE 1 TABLET BY MOUTH TWICE DAILY BEFORE MEALS 4/8/22  Yes Constantine Ann MD   lisinopril (PRINIVIL;ZESTRIL) 5 MG tablet Take 1 tablet by mouth daily 4/5/22  Yes Cristina Bejarano MD   levothyroxine (SYNTHROID) 75 MCG tablet TAKE 1 TABLET BY MOUTH DAILY 3/28/22  Yes Meño Rodriguez MD   dofetilide (TIKOSYN) 500 MCG capsule TAKE 1 CAPSULE BY MOUTH EVERY 12 HOURS 12/28/21  Yes Harlan Alvarez MD   prednisoLONE acetate (PRED FORTE) 1 % ophthalmic suspension SHAKE LIQUID AND INSTILL 1 DROP IN RIGHT EYE THREE TIMES DAILY 10/15/21  Yes Historical Provider, MD   dilTIAZem (CARDIZEM CD) 120 MG extended release capsule Take 1 capsule by mouth daily 10/15/21  Yes Yolanda Lala MD   aspirin (COLLEEN ASPIRIN) 325 MG tablet Take by mouth   Yes Historical Provider, MD   pravastatin (PRAVACHOL) 80 MG tablet TAKE 1 TABLET BY MOUTH DAILY 6/30/21  Yes DARSHANA Vallejo - CNP   moxifloxacin (VIGAMOX) 0.5 % ophthalmic solution INSTILL 1 DROP IN LEFT EYE FOUR TIMES DAILY AFTER SURGERY 4/1/21  Yes Historical Provider, MD   loratadine (CLARITIN) 10 MG capsule Take 10 mg by mouth daily   Yes Historical Provider, MD   dorzolamide-timolol (COSOPT) 22.3-6.8 MG/ML ophthalmic solution INT 1 GTT INTO OU BID 4/14/20  Yes Historical Provider, MD   Multiple Vitamins-Minerals (MULTIVITAMIN-MINERALS) TABS tablet Take 1 tablet by mouth daily 7/24/19  Yes Herminio Johnson, APRN - CNP   colchicine (COLCRYS) 0.6 MG tablet Take 1 tablet by mouth daily for 10 days  Patient not taking: Reported on 6/2/2022 3/2/22 3/12/22  DARSHANA Alegria CNP   sucralfate (CARAFATE) 1 GM tablet Take 1 tablet by mouth every 8 hours for 14 days  Patient not taking: Reported on 6/2/2022 3/1/22 3/15/22  DARSHANA Alegria CNP       REVIEW OF SYSTEMS:      All 14-point review of systems are completed and pertinent positives are mentioned in the history of present illness. Other systems are reviewed and are negative. Physical Examination:    /72   Pulse 63   Ht 5' 9\" (1.753 m)   Wt 174 lb (78.9 kg)   SpO2 98%   BMI 25.70 kg/m²    Constitutional and General Appearance: alert, cooperative, no distress and appears stated age  HEENT: PERRL, no cervical lymphadenopathy. No masses palpable. Normal oral mucosa  Respiratory:  · Normal excursion and expansion without use of accessory muscles  · Resp Auscultation: Normal breath sounds without dullness or wheezing  Cardiovascular:  · The apical impulse is not displaced  · RRR S1S2 w/o M/G/R. Abdomen:  · No masses or tenderness  · Bowel sounds present  Extremities:  ·  No Cyanosis or Clubbing  ·  Lower extremity edema: No  · Skin: Warm and dry  Neurological:  · Alert and oriented. · Moves all extremities well  · No abnormalities of mood, affect, memory, mentation, or behavior are noted    DATA:    ECG 6/2/22: Personally reviewed. ECHO: 1/22/21   A complete echo was performed using color flow Doppler, spectral   Doppler, M-mode and echo contrast.     Lumason contrast was used during the study.   Left ventricle cavity appears normal.     Left ventricular wall thickness is normal.     The left ventricular EF by visual approximation is 55-60%. Normal left   ventricular systolic function.   Left ventricular wall motion is within normal limits.      There is grade III left ventricular heart rates are not well controlled and his blood pressures are soft and I am not sure if we will be able to control his heart rates with corey agents given his current hemodynamic status. All questions were answered. He is unable to afford his dronedarone we will have him start a load of amiodarone. 06/09/2020  Patient doing well. Able to afford drondarone. No more atrial fibrillation (symptomatic). No side effects from medications. Tolerating aspirin (has Watchman). No shortness of breath or palpitations. Discussed ablation. He is doing well and would like to continue current course. - Continue dronedarone. - Continue metoprolol.  - Continue aspirin.  - Follow up in 6 months or PRN. Will extend to yearly thereafter. 12/10/2020  Patient presents for follow-up. He is been doing well in general.  He said no more palpitations. We discussed ablation. At this point patient like to hold off. He may require cardioversions in the future and/or ablation.  - Continue dronedarone. - Continue metoprolol.  - Continue aspirin for Watchman.  - Follow up with EP NP in 1 year unless/until procedure/discussion required or PRN.    6/22/2021  Patient presents for follow-up. He had episode of atrial fibrillation with RVR while in [place]. He was transitioned from dronedarone to sotalol. We discussed options including ablation, and alternative antiarrhythmic as he continues to have some breakthrough atrial fibrillation that is symptomatic with dizziness. Patient would like to move forward with ablation. We will hold his sotalol 3 days prior to admission and admit him after his ablation for dofetilide. - Schedule atrial fibrillation ablation with LUIZA (Watchman). - Switch from sotalol to dofetilide post ablation. 6/2/2022  Patient been doing much better since his ablation on 02/28/2022. He has had no bleeding issues and has a watchman device.   We discussed discontinuing his dofetilide however I think coming at his atrial fibrillation from both a ablation standpoint and chemical warfare will be the best way to keep him out of atrial fibrillation. Patient agrees with this plan. His QTC remained stable. - Continue dofetilide 500 mcg BID (QTc 455 msec). - Continue diltiazem 120 mg daily, ok to discontinue if need be. - Follow up in 6 months. 2.  Dizziness  Stable/resolved. 06/22/2021  - Plan as per above. 6/2/2022  Resolved post ablation.  - Plan as per above. RECOMMENDATIONS:  1. Continue Tikosyn. 2. Continue current plan of care. 3. Follow up in 6 months, or sooner if needed    QUALITY MEASURES  1. Tobacco Cessation Counseling: NA  2. Retake of BP if >140/90:   NA  3. Documentation to PCP/referring for new patient:  Sent to PCP at close of office visit  4. CAD patient on anti-platelet: NA  5. CAD patient on STATIN therapy:  NA  6. Patient with CHF and aFib on anticoagulation:  Taken off of  All questions and concerns were addressed to the patient/family. Alternatives to my treatment were discussed. Dr. Coral Norton MD  Electrophysiology  [place] [address]. [address] [place] [address]. [address]. [address] [place] [address]  Phone: [phone]  Fax: [phone]   6/2/2022     NOTE: This report was transcribed using voice recognition software. Every effort was made to ensure accuracy, however, inadvertent computerized transcription errors may be present. Yung Monzon RN, am scribing for and in the presence of Dr. Trace Rouse. 06/02/22 11:13 AM   Nyla Negron RN    I reviewed with the resident the medical history and the resident's findings on the physical examination. I discussed with the resident the patient's diagnosis and concur with the plan.

## 2022-06-07 RX ORDER — PANTOPRAZOLE SODIUM 40 MG/1
TABLET, DELAYED RELEASE ORAL
Qty: 180 TABLET | Refills: 0 | Status: SHIPPED | OUTPATIENT
Start: 2022-06-07 | End: 2022-07-21

## 2022-06-16 DIAGNOSIS — E03.4 HYPOTHYROIDISM DUE TO ACQUIRED ATROPHY OF THYROID: ICD-10-CM

## 2022-06-16 RX ORDER — LEVOTHYROXINE SODIUM 0.07 MG/1
75 TABLET ORAL DAILY
Qty: 90 TABLET | Refills: 0 | Status: SHIPPED | OUTPATIENT
Start: 2022-06-16 | End: 2022-09-12

## 2022-06-24 RX ORDER — PRAVASTATIN SODIUM 80 MG/1
80 TABLET ORAL DAILY
Qty: 90 TABLET | Refills: 1 | Status: SHIPPED | OUTPATIENT
Start: 2022-06-24 | End: 2022-10-10

## 2022-06-24 NOTE — TELEPHONE ENCOUNTER
I saw this patient back in 2019 at which time I addressed his statin medication. The simvastatin was changed to pravastatin due to an interaction with amiodarone. The statin was originally initiated by a different cardiology group. Unfortunately he does not have a primary care provider nor sees general cardiology. I will refill at this time but would encourage patient to get a PCP to manage his HLD.   Thank you, Golden Sumner

## 2022-07-21 RX ORDER — PANTOPRAZOLE SODIUM 40 MG/1
TABLET, DELAYED RELEASE ORAL
Qty: 180 TABLET | Refills: 0 | Status: SHIPPED | OUTPATIENT
Start: 2022-07-21

## 2022-07-21 NOTE — TELEPHONE ENCOUNTER
Last Office Visit  - 10/21/21 Cody Dexter  Next Office Visit  -  7/27/22 to establish care with rock rouse

## 2022-07-22 ENCOUNTER — HOSPITAL ENCOUNTER (INPATIENT)
Age: 72
LOS: 6 days | Discharge: HOME HEALTH | DRG: 871 | End: 2022-07-28
Attending: STUDENT IN AN ORGANIZED HEALTH CARE EDUCATION/TRAINING PROGRAM | Admitting: INTERNAL MEDICINE
Payer: MEDICARE

## 2022-07-22 ENCOUNTER — APPOINTMENT (OUTPATIENT)
Dept: GENERAL RADIOLOGY | Age: 72
DRG: 871 | End: 2022-07-22
Payer: MEDICARE

## 2022-07-22 ENCOUNTER — APPOINTMENT (OUTPATIENT)
Dept: CT IMAGING | Age: 72
DRG: 871 | End: 2022-07-22
Payer: MEDICARE

## 2022-07-22 DIAGNOSIS — N40.1 BPH WITH URINARY OBSTRUCTION: ICD-10-CM

## 2022-07-22 DIAGNOSIS — S22.42XA CLOSED FRACTURE OF MULTIPLE RIBS OF LEFT SIDE, INITIAL ENCOUNTER: Primary | ICD-10-CM

## 2022-07-22 DIAGNOSIS — B95.61 MSSA BACTEREMIA: ICD-10-CM

## 2022-07-22 DIAGNOSIS — N13.8 BPH WITH URINARY OBSTRUCTION: ICD-10-CM

## 2022-07-22 DIAGNOSIS — D72.829 LEUKOCYTOSIS, UNSPECIFIED TYPE: ICD-10-CM

## 2022-07-22 DIAGNOSIS — R78.81 MSSA BACTEREMIA: ICD-10-CM

## 2022-07-22 DIAGNOSIS — R91.8 INFILTRATE OF LUNG PRESENT ON COMPUTED TOMOGRAPHY: ICD-10-CM

## 2022-07-22 PROBLEM — J15.69 GRAM-NEGATIVE PNEUMONIA: Status: ACTIVE | Noted: 2022-07-22

## 2022-07-22 PROBLEM — J15.6 GRAM-NEGATIVE PNEUMONIA (HCC): Status: ACTIVE | Noted: 2022-07-22

## 2022-07-22 LAB
A/G RATIO: 1.1 (ref 1.1–2.2)
ALBUMIN SERPL-MCNC: 3.9 G/DL (ref 3.4–5)
ALP BLD-CCNC: 104 U/L (ref 40–129)
ALT SERPL-CCNC: 15 U/L (ref 10–40)
ANION GAP SERPL CALCULATED.3IONS-SCNC: 13 MMOL/L (ref 3–16)
AST SERPL-CCNC: 23 U/L (ref 15–37)
BASOPHILS ABSOLUTE: 0.1 K/UL (ref 0–0.2)
BASOPHILS RELATIVE PERCENT: 0.6 %
BILIRUB SERPL-MCNC: 0.5 MG/DL (ref 0–1)
BUN BLDV-MCNC: 15 MG/DL (ref 7–20)
CALCIUM SERPL-MCNC: 9.9 MG/DL (ref 8.3–10.6)
CHLORIDE BLD-SCNC: 96 MMOL/L (ref 99–110)
CO2: 23 MMOL/L (ref 21–32)
CREAT SERPL-MCNC: 1 MG/DL (ref 0.8–1.3)
EKG ATRIAL RATE: 108 BPM
EKG DIAGNOSIS: NORMAL
EKG P AXIS: 28 DEGREES
EKG P-R INTERVAL: 174 MS
EKG Q-T INTERVAL: 314 MS
EKG QRS DURATION: 76 MS
EKG QTC CALCULATION (BAZETT): 420 MS
EKG R AXIS: 16 DEGREES
EKG T AXIS: 59 DEGREES
EKG VENTRICULAR RATE: 108 BPM
EOSINOPHILS ABSOLUTE: 0 K/UL (ref 0–0.6)
EOSINOPHILS RELATIVE PERCENT: 0 %
GFR AFRICAN AMERICAN: >60
GFR NON-AFRICAN AMERICAN: >60
GLUCOSE BLD-MCNC: 143 MG/DL (ref 70–99)
HCT VFR BLD CALC: 38.5 % (ref 40.5–52.5)
HEMOGLOBIN: 12.7 G/DL (ref 13.5–17.5)
LACTIC ACID: 1.3 MMOL/L (ref 0.4–2)
LYMPHOCYTES ABSOLUTE: 0.6 K/UL (ref 1–5.1)
LYMPHOCYTES RELATIVE PERCENT: 5.6 %
MCH RBC QN AUTO: 28.5 PG (ref 26–34)
MCHC RBC AUTO-ENTMCNC: 32.9 G/DL (ref 31–36)
MCV RBC AUTO: 86.7 FL (ref 80–100)
MONOCYTES ABSOLUTE: 1.4 K/UL (ref 0–1.3)
MONOCYTES RELATIVE PERCENT: 11.9 %
NEUTROPHILS ABSOLUTE: 9.4 K/UL (ref 1.7–7.7)
NEUTROPHILS RELATIVE PERCENT: 81.9 %
PDW BLD-RTO: 14.9 % (ref 12.4–15.4)
PLATELET # BLD: 250 K/UL (ref 135–450)
PMV BLD AUTO: 7.8 FL (ref 5–10.5)
POTASSIUM REFLEX MAGNESIUM: 3.7 MMOL/L (ref 3.5–5.1)
RAPID INFLUENZA  B AGN: NEGATIVE
RAPID INFLUENZA A AGN: NEGATIVE
RBC # BLD: 4.44 M/UL (ref 4.2–5.9)
SARS-COV-2, NAAT: NOT DETECTED
SODIUM BLD-SCNC: 132 MMOL/L (ref 136–145)
TOTAL PROTEIN: 7.4 G/DL (ref 6.4–8.2)
TROPONIN: <0.01 NG/ML
WBC # BLD: 11.5 K/UL (ref 4–11)

## 2022-07-22 PROCEDURE — 2580000003 HC RX 258: Performed by: STUDENT IN AN ORGANIZED HEALTH CARE EDUCATION/TRAINING PROGRAM

## 2022-07-22 PROCEDURE — 6360000004 HC RX CONTRAST MEDICATION: Performed by: STUDENT IN AN ORGANIZED HEALTH CARE EDUCATION/TRAINING PROGRAM

## 2022-07-22 PROCEDURE — 6370000000 HC RX 637 (ALT 250 FOR IP): Performed by: INTERNAL MEDICINE

## 2022-07-22 PROCEDURE — 96374 THER/PROPH/DIAG INJ IV PUSH: CPT

## 2022-07-22 PROCEDURE — 6360000002 HC RX W HCPCS: Performed by: INTERNAL MEDICINE

## 2022-07-22 PROCEDURE — 85025 COMPLETE CBC W/AUTO DIFF WBC: CPT

## 2022-07-22 PROCEDURE — 96375 TX/PRO/DX INJ NEW DRUG ADDON: CPT

## 2022-07-22 PROCEDURE — 6360000002 HC RX W HCPCS: Performed by: STUDENT IN AN ORGANIZED HEALTH CARE EDUCATION/TRAINING PROGRAM

## 2022-07-22 PROCEDURE — 93005 ELECTROCARDIOGRAM TRACING: CPT | Performed by: STUDENT IN AN ORGANIZED HEALTH CARE EDUCATION/TRAINING PROGRAM

## 2022-07-22 PROCEDURE — 6360000002 HC RX W HCPCS: Performed by: NURSE PRACTITIONER

## 2022-07-22 PROCEDURE — 83605 ASSAY OF LACTIC ACID: CPT

## 2022-07-22 PROCEDURE — 94640 AIRWAY INHALATION TREATMENT: CPT

## 2022-07-22 PROCEDURE — 87635 SARS-COV-2 COVID-19 AMP PRB: CPT

## 2022-07-22 PROCEDURE — 87804 INFLUENZA ASSAY W/OPTIC: CPT

## 2022-07-22 PROCEDURE — 6370000000 HC RX 637 (ALT 250 FOR IP): Performed by: STUDENT IN AN ORGANIZED HEALTH CARE EDUCATION/TRAINING PROGRAM

## 2022-07-22 PROCEDURE — 87186 SC STD MICRODIL/AGAR DIL: CPT

## 2022-07-22 PROCEDURE — 87150 DNA/RNA AMPLIFIED PROBE: CPT

## 2022-07-22 PROCEDURE — 71045 X-RAY EXAM CHEST 1 VIEW: CPT

## 2022-07-22 PROCEDURE — 99222 1ST HOSP IP/OBS MODERATE 55: CPT | Performed by: INTERNAL MEDICINE

## 2022-07-22 PROCEDURE — 87040 BLOOD CULTURE FOR BACTERIA: CPT

## 2022-07-22 PROCEDURE — 84484 ASSAY OF TROPONIN QUANT: CPT

## 2022-07-22 PROCEDURE — 99285 EMERGENCY DEPT VISIT HI MDM: CPT

## 2022-07-22 PROCEDURE — 93010 ELECTROCARDIOGRAM REPORT: CPT | Performed by: INTERNAL MEDICINE

## 2022-07-22 PROCEDURE — 71260 CT THORAX DX C+: CPT

## 2022-07-22 PROCEDURE — 80053 COMPREHEN METABOLIC PANEL: CPT

## 2022-07-22 PROCEDURE — 1200000000 HC SEMI PRIVATE

## 2022-07-22 RX ORDER — ACETAMINOPHEN 650 MG/1
650 SUPPOSITORY RECTAL EVERY 6 HOURS PRN
Status: DISCONTINUED | OUTPATIENT
Start: 2022-07-22 | End: 2022-07-25

## 2022-07-22 RX ORDER — TRAMADOL HYDROCHLORIDE 50 MG/1
100 TABLET ORAL EVERY 6 HOURS PRN
Status: DISCONTINUED | OUTPATIENT
Start: 2022-07-22 | End: 2022-07-28 | Stop reason: HOSPADM

## 2022-07-22 RX ORDER — POLYETHYLENE GLYCOL 3350 17 G/17G
17 POWDER, FOR SOLUTION ORAL DAILY PRN
Status: DISCONTINUED | OUTPATIENT
Start: 2022-07-22 | End: 2022-07-28 | Stop reason: HOSPADM

## 2022-07-22 RX ORDER — LACTOBACILLUS RHAMNOSUS GG 10B CELL
1 CAPSULE ORAL 2 TIMES DAILY WITH MEALS
Status: DISCONTINUED | OUTPATIENT
Start: 2022-07-22 | End: 2022-07-28 | Stop reason: HOSPADM

## 2022-07-22 RX ORDER — DILTIAZEM HYDROCHLORIDE 120 MG/1
120 CAPSULE, COATED, EXTENDED RELEASE ORAL DAILY
Status: DISCONTINUED | OUTPATIENT
Start: 2022-07-22 | End: 2022-07-28 | Stop reason: HOSPADM

## 2022-07-22 RX ORDER — IPRATROPIUM BROMIDE AND ALBUTEROL SULFATE 2.5; .5 MG/3ML; MG/3ML
1 SOLUTION RESPIRATORY (INHALATION)
Status: DISCONTINUED | OUTPATIENT
Start: 2022-07-22 | End: 2022-07-22

## 2022-07-22 RX ORDER — 0.9 % SODIUM CHLORIDE 0.9 %
1000 INTRAVENOUS SOLUTION INTRAVENOUS ONCE
Status: COMPLETED | OUTPATIENT
Start: 2022-07-22 | End: 2022-07-22

## 2022-07-22 RX ORDER — DOFETILIDE 0.25 MG/1
500 CAPSULE ORAL EVERY 12 HOURS
Status: DISCONTINUED | OUTPATIENT
Start: 2022-07-23 | End: 2022-07-28 | Stop reason: HOSPADM

## 2022-07-22 RX ORDER — SODIUM CHLORIDE 9 MG/ML
INJECTION, SOLUTION INTRAVENOUS PRN
Status: DISCONTINUED | OUTPATIENT
Start: 2022-07-22 | End: 2022-07-28 | Stop reason: HOSPADM

## 2022-07-22 RX ORDER — IPRATROPIUM BROMIDE AND ALBUTEROL SULFATE 2.5; .5 MG/3ML; MG/3ML
1 SOLUTION RESPIRATORY (INHALATION) EVERY 4 HOURS PRN
Status: DISCONTINUED | OUTPATIENT
Start: 2022-07-22 | End: 2022-07-28 | Stop reason: HOSPADM

## 2022-07-22 RX ORDER — ACETAMINOPHEN 500 MG
1000 TABLET ORAL ONCE
Status: COMPLETED | OUTPATIENT
Start: 2022-07-22 | End: 2022-07-22

## 2022-07-22 RX ORDER — SODIUM CHLORIDE 0.9 % (FLUSH) 0.9 %
5-40 SYRINGE (ML) INJECTION EVERY 12 HOURS SCHEDULED
Status: DISCONTINUED | OUTPATIENT
Start: 2022-07-22 | End: 2022-07-28 | Stop reason: HOSPADM

## 2022-07-22 RX ORDER — ACETAMINOPHEN 325 MG/1
650 TABLET ORAL EVERY 6 HOURS PRN
Status: DISCONTINUED | OUTPATIENT
Start: 2022-07-22 | End: 2022-07-25

## 2022-07-22 RX ORDER — DOFETILIDE 0.25 MG/1
500 CAPSULE ORAL EVERY 12 HOURS SCHEDULED
Status: DISCONTINUED | OUTPATIENT
Start: 2022-07-22 | End: 2022-07-22

## 2022-07-22 RX ORDER — SODIUM CHLORIDE 0.9 % (FLUSH) 0.9 %
5-40 SYRINGE (ML) INJECTION PRN
Status: DISCONTINUED | OUTPATIENT
Start: 2022-07-22 | End: 2022-07-28 | Stop reason: HOSPADM

## 2022-07-22 RX ORDER — MORPHINE SULFATE 2 MG/ML
2 INJECTION, SOLUTION INTRAMUSCULAR; INTRAVENOUS EVERY 4 HOURS PRN
Status: DISCONTINUED | OUTPATIENT
Start: 2022-07-22 | End: 2022-07-28 | Stop reason: HOSPADM

## 2022-07-22 RX ORDER — HYDRALAZINE HYDROCHLORIDE 20 MG/ML
10 INJECTION INTRAMUSCULAR; INTRAVENOUS EVERY 4 HOURS PRN
Status: DISCONTINUED | OUTPATIENT
Start: 2022-07-22 | End: 2022-07-28 | Stop reason: HOSPADM

## 2022-07-22 RX ORDER — ASPIRIN 325 MG
325 TABLET ORAL DAILY
Status: DISCONTINUED | OUTPATIENT
Start: 2022-07-22 | End: 2022-07-28 | Stop reason: HOSPADM

## 2022-07-22 RX ORDER — MORPHINE SULFATE 4 MG/ML
4 INJECTION, SOLUTION INTRAMUSCULAR; INTRAVENOUS ONCE
Status: COMPLETED | OUTPATIENT
Start: 2022-07-22 | End: 2022-07-22

## 2022-07-22 RX ORDER — AZITHROMYCIN 250 MG/1
500 TABLET, FILM COATED ORAL EVERY 24 HOURS
Status: COMPLETED | OUTPATIENT
Start: 2022-07-23 | End: 2022-07-25

## 2022-07-22 RX ORDER — LEVOTHYROXINE SODIUM 0.07 MG/1
1 TABLET ORAL DAILY
Status: DISCONTINUED | OUTPATIENT
Start: 2022-07-22 | End: 2022-07-22 | Stop reason: SDUPTHER

## 2022-07-22 RX ORDER — PRAVASTATIN SODIUM 80 MG/1
80 TABLET ORAL DAILY
Status: DISCONTINUED | OUTPATIENT
Start: 2022-07-22 | End: 2022-07-28 | Stop reason: HOSPADM

## 2022-07-22 RX ORDER — PANTOPRAZOLE SODIUM 40 MG/1
40 TABLET, DELAYED RELEASE ORAL
Status: DISCONTINUED | OUTPATIENT
Start: 2022-07-22 | End: 2022-07-28 | Stop reason: HOSPADM

## 2022-07-22 RX ORDER — TRAMADOL HYDROCHLORIDE 50 MG/1
50 TABLET ORAL EVERY 6 HOURS PRN
Status: DISCONTINUED | OUTPATIENT
Start: 2022-07-22 | End: 2022-07-28 | Stop reason: HOSPADM

## 2022-07-22 RX ORDER — ENOXAPARIN SODIUM 100 MG/ML
40 INJECTION SUBCUTANEOUS DAILY
Status: DISCONTINUED | OUTPATIENT
Start: 2022-07-22 | End: 2022-07-28 | Stop reason: HOSPADM

## 2022-07-22 RX ORDER — LISINOPRIL 5 MG/1
5 TABLET ORAL DAILY
Status: DISCONTINUED | OUTPATIENT
Start: 2022-07-22 | End: 2022-07-28 | Stop reason: HOSPADM

## 2022-07-22 RX ORDER — DORZOLAMIDE HYDROCHLORIDE AND TIMOLOL MALEATE 20; 5 MG/ML; MG/ML
1 SOLUTION/ DROPS OPHTHALMIC 2 TIMES DAILY
Status: DISCONTINUED | OUTPATIENT
Start: 2022-07-22 | End: 2022-07-28 | Stop reason: HOSPADM

## 2022-07-22 RX ORDER — METOPROLOL TARTRATE 5 MG/5ML
5 INJECTION INTRAVENOUS EVERY 5 MIN PRN
Status: DISCONTINUED | OUTPATIENT
Start: 2022-07-22 | End: 2022-07-28 | Stop reason: HOSPADM

## 2022-07-22 RX ORDER — ONDANSETRON 2 MG/ML
4 INJECTION INTRAMUSCULAR; INTRAVENOUS ONCE
Status: COMPLETED | OUTPATIENT
Start: 2022-07-22 | End: 2022-07-22

## 2022-07-22 RX ADMIN — PRAVASTATIN SODIUM 80 MG: 80 TABLET ORAL at 23:08

## 2022-07-22 RX ADMIN — AZITHROMYCIN MONOHYDRATE 500 MG: 500 INJECTION, POWDER, LYOPHILIZED, FOR SOLUTION INTRAVENOUS at 12:26

## 2022-07-22 RX ADMIN — CEFTRIAXONE SODIUM 2000 MG: 2 INJECTION, POWDER, FOR SOLUTION INTRAMUSCULAR; INTRAVENOUS at 11:42

## 2022-07-22 RX ADMIN — MORPHINE SULFATE 2 MG: 2 INJECTION, SOLUTION INTRAMUSCULAR; INTRAVENOUS at 23:11

## 2022-07-22 RX ADMIN — DILTIAZEM HYDROCHLORIDE 120 MG: 120 CAPSULE, COATED, EXTENDED RELEASE ORAL at 14:50

## 2022-07-22 RX ADMIN — IPRATROPIUM BROMIDE AND ALBUTEROL SULFATE 1 AMPULE: .5; 2.5 SOLUTION RESPIRATORY (INHALATION) at 16:20

## 2022-07-22 RX ADMIN — ONDANSETRON 4 MG: 2 INJECTION INTRAMUSCULAR; INTRAVENOUS at 09:37

## 2022-07-22 RX ADMIN — LEVOTHYROXINE SODIUM 75 MCG: 0.05 TABLET ORAL at 14:50

## 2022-07-22 RX ADMIN — ACETAMINOPHEN 1000 MG: 500 TABLET ORAL at 09:37

## 2022-07-22 RX ADMIN — MORPHINE SULFATE 4 MG: 4 INJECTION, SOLUTION INTRAMUSCULAR; INTRAVENOUS at 09:37

## 2022-07-22 RX ADMIN — SODIUM CHLORIDE 1000 ML: 9 INJECTION, SOLUTION INTRAVENOUS at 11:33

## 2022-07-22 RX ADMIN — ENOXAPARIN SODIUM 40 MG: 100 INJECTION SUBCUTANEOUS at 14:52

## 2022-07-22 RX ADMIN — LISINOPRIL 5 MG: 5 TABLET ORAL at 14:50

## 2022-07-22 RX ADMIN — Medication 1 CAPSULE: at 15:03

## 2022-07-22 RX ADMIN — IOPAMIDOL 75 ML: 755 INJECTION, SOLUTION INTRAVENOUS at 09:53

## 2022-07-22 RX ADMIN — DOFETILIDE 500 MCG: 0.25 CAPSULE ORAL at 14:50

## 2022-07-22 RX ADMIN — SODIUM CHLORIDE 1000 ML: 9 INJECTION, SOLUTION INTRAVENOUS at 11:34

## 2022-07-22 RX ADMIN — TRAMADOL HYDROCHLORIDE 100 MG: 50 TABLET, COATED ORAL at 14:49

## 2022-07-22 RX ADMIN — PANTOPRAZOLE SODIUM 40 MG: 40 TABLET, DELAYED RELEASE ORAL at 14:50

## 2022-07-22 RX ADMIN — DORZOLAMIDE HYDROCHLORIDE AND TIMOLOL MALEATE 1 DROP: 20; 5 SOLUTION/ DROPS OPHTHALMIC at 14:52

## 2022-07-22 ASSESSMENT — PAIN DESCRIPTION - DESCRIPTORS
DESCRIPTORS: STABBING
DESCRIPTORS: SHOOTING;SHARP

## 2022-07-22 ASSESSMENT — ENCOUNTER SYMPTOMS
NAUSEA: 0
CHEST TIGHTNESS: 1
SORE THROAT: 0
COUGH: 0
CONSTIPATION: 0
DIARRHEA: 0
ABDOMINAL PAIN: 0
SHORTNESS OF BREATH: 1
COLOR CHANGE: 0
BLOOD IN STOOL: 0
BACK PAIN: 1
RHINORRHEA: 0
VOMITING: 0

## 2022-07-22 ASSESSMENT — PAIN SCALES - GENERAL
PAINLEVEL_OUTOF10: 8
PAINLEVEL_OUTOF10: 10
PAINLEVEL_OUTOF10: 7
PAINLEVEL_OUTOF10: 6
PAINLEVEL_OUTOF10: 9

## 2022-07-22 ASSESSMENT — PAIN DESCRIPTION - ORIENTATION
ORIENTATION: LEFT
ORIENTATION: LEFT

## 2022-07-22 ASSESSMENT — PAIN DESCRIPTION - LOCATION
LOCATION: RIB CAGE
LOCATION: BACK;CHEST

## 2022-07-22 ASSESSMENT — PAIN - FUNCTIONAL ASSESSMENT: PAIN_FUNCTIONAL_ASSESSMENT: 0-10

## 2022-07-22 ASSESSMENT — PAIN DESCRIPTION - PAIN TYPE: TYPE: ACUTE PAIN

## 2022-07-22 NOTE — ACP (ADVANCE CARE PLANNING)
Advance Care Planning     General Advance Care Planning (ACP) Conversation    Date of Conversation: 7/22/2022  Conducted with: Patient with Decision Making Capacity    Healthcare Decision Maker:    Primary Decision Maker: Jayde Billingsley - [phone]    Secondary Decision Maker: Sonalianayeli Ortiz - Child - [phone]    Supplemental (Other) Decision Maker: Johnson Schumacher - Child - [phone]  Click here to complete [address] including selection of the Healthcare Decision Maker Relationship (ie \"Primary\"). Today we documented Decision Maker(s) consistent with Legal Next of Kin hierarchy.     Content/Action Overview:  Has NO ACP documents/care preferences - information provided, considering goals and options          Length of Voluntary ACP Conversation in minutes:  <16 minutes (Non-Billable)    TU Armenta

## 2022-07-22 NOTE — CARE COORDINATION
CASE MANAGEMENT INITIAL ASSESSMENT      Reviewed chart and completed assessment with patient and wife  Family present: wife  Explained Case Management role/services. yes    Primary contact information:wife, [facility] Decision Maker :   Primary Decision Maker: Isaiah Ojeda - [phone]    Secondary Decision Maker: Shakila Olguin - Child - [phone]    Supplemental (Other) Decision Maker: Sharon Morrison Child - [phone]          Can this person be reached and be able to respond quickly, such as within a few minutes or hours? Yes      Admit date/status:7/22/22  Diagnosis:rib fxs   Is this a Readmission?:  No      Insurance:[facility] medicare   Precert required for SNF: Yes       3 night stay required: No    Living arrangements, Adls, care needs, prior to admission:home with wife, independent in ADLs    Durable Medical Equipment at home:  Walker__Cane__RTS__ BSC__Shower Chair__  02__ HHN__ CPAP__  BiPap__  Hospital Bed__ W/C___ Other_____    Services in the home and/or outpatient, prior to [address] Prescription coverage? Yes Will pt require financial assistance with medications No     Transportation needs: car     Dialysis Facility (if applicable)   Name:  Address:  Dialysis Schedule:  Phone:  Fax:    PT/OT recs:    Hospital Exemption Notification (HEN):    Barriers to discharge:medical complications    Plan/comments:Referred to patient for d/c planning. Spoke to patient and wife. Patient is a 74year old male admitted for rib fxs. Patient usually lives at home with wife. Patient reports he is independent in ADLs. Patient denies d/c needs at this time.   Electronically signed by TU Billy on 7/22/2022 at 2:15 PM      ECO on chart for MD signature

## 2022-07-22 NOTE — PROGRESS NOTES
Admission completed and charted. VSS. Four eye completed and charted. Bed locked and in lowest position. Call light within reach. Pt denies any other needs at this time. Will continue to monitor.

## 2022-07-22 NOTE — ED NOTES
Report called to [facility], RN, C3.  Transport requested      Arti Doherty RN  07/22/22 7010

## 2022-07-22 NOTE — CONSULTS
Pulmonary & Critical Care   History and Physical       Patient Name:  Marianne Luke ADMISSION/CC: Rib fracture    PCP: Patrice Gold, DARSHANA - CNP    HISTORY OF PRESENT ILLNESS:   74y.o. year old male who is known case of paroxysmal atrial fibrillation status post ablation was a mechanical fall 10 days ago after that he has been having right side bilateral chest pain that was sharp mainly with taking deep breath also has back pain the patient mentioned that he did not pass out or lose consciousness he did not feel dizzy no headache no nausea no vomiting no abdominal pain. No cough no sputum production no orthopnea or PND no edema to lower extremities. The patient has a watchman device he was on chronic anticoagulation has history of GI bleed    Has history of obstructive sleep apnea he had a hypoglossal nerve stimulator at voltage 2 amplitude 1.8-2.8 and has been compliant with it. Past Medical History:   has a past medical history of Arthritis, Atrial fibrillation ([address] Hyperlipidemia, Hypertension, SCC (squamous cell carcinoma), Sleep apnea, and Systolic heart failure ([address] Past Surgical History:   has a past surgical history that includes Cardioversion (2005); Total hip arthroplasty (Left, 2013); Colonoscopy (07312480); and ablation of dysrhythmic focus. Social History:   reports that he has never smoked. He has never used smokeless tobacco. He reports that he does not drink alcohol and does not use drugs. Family History: family history includes Cancer in his mother; Heart Disease (age of onset: 79) in his father; High Blood Pressure in his mother. Allergies:  Patient has no known allergies.     Past Medical History:   Diagnosis Date    Arthritis     Atrial fibrillation ([address] 2007    S/P CARDIOVERSION, EF 49% on stress test 2009    Hyperlipidemia     Hypertension     SCC (squamous cell carcinoma)     Dr. Mathew Sanchez    Sleep apnea     Systolic heart failure [facility])        Past Surgical History:   Procedure Laterality Date    ABLATION OF DYSRHYTHMIC FOCUS      Afib PVI etc / right sided flutter line    CARDIOVERSION  2005    COLONOSCOPY  [id number]    tics    TOTAL HIP ARTHROPLASTY Left 2013       family history includes Cancer in his mother; Heart Disease (age of onset: 79) in his father; High Blood Pressure in his mother.     Social History     Tobacco Use    Smoking status: Never    Smokeless tobacco: Never   Substance Use Topics    Alcohol use: No     Alcohol/week: 0.0 standard drinks     Comment: social        Meds:    Current Facility-Administered Medications:     metoprolol (LOPRESSOR) injection 5 mg, 5 mg, IntraVENous, Q5 Min PRN, Denise Ortiz MD    aspirin tablet 325 mg, 325 mg, Oral, Daily, Nemo Streeter MD    dilTIAZem (CARDIZEM CD) extended release capsule 120 mg, 120 mg, Oral, Daily, Nemo Streeter MD, 120 mg at 07/22/22 1450    dofetilide (TIKOSYN) capsule 500 mcg, 500 mcg, Oral, 2 times per day, Nemo Streeter MD, 500 mcg at 07/22/22 1450    dorzolamide-timolol (COSOPT) 22.3-6.8 MG/ML ophthalmic solution 1 drop, 1 drop, Both Eyes, BID, Nemo Streeter MD, 1 drop at 07/22/22 1452    lisinopril (PRINIVIL;ZESTRIL) tablet 5 mg, 5 mg, Oral, Daily, Nemo Streeter MD, 5 mg at 07/22/22 1450    pantoprazole (PROTONIX) tablet 40 mg, 40 mg, Oral, BID AC, Nemo Streeter MD, 40 mg at 07/22/22 1450    pravastatin (PRAVACHOL) tablet 80 mg, 80 mg, Oral, Daily, Nemo Streeter MD    sodium chloride flush 0.9 % injection 5-40 mL, 5-40 mL, IntraVENous, 2 times per day, Nemo Streeter MD    sodium chloride flush 0.9 % injection 5-40 mL, 5-40 mL, IntraVENous, PRN, Nemo Streeter MD    0.9 % sodium chloride infusion, , IntraVENous, PRN, Nemo Streeter MD    enoxaparin (LOVENOX) injection 40 mg, 40 mg, SubCUTAneous, Daily, Nemo Streeter MD, 40 mg at 07/22/22 1452    polyethylene glycol (GLYCOLAX) packet 17 g, 17 g, Oral, Daily PRN, Dilshad Martinez MD    acetaminophen (TYLENOL) tablet 650 mg, 650 mg, Oral, Q6H PRN **OR** acetaminophen (TYLENOL) suppository 650 mg, 650 mg, Rectal, Q6H PRN, Dilshad Martinez MD    ipratropium-albuterol (DUONEB) nebulizer solution 1 ampule, 1 ampule, Inhalation, Q4H WA, Dilshad Martinez MD    [START ON 7/23/2022] cefTRIAXone (ROCEPHIN) 1000 mg IVPB in 50 mL D5W minibag, 1,000 mg, IntraVENous, Q24H **AND** [START ON 7/23/2022] azithromycin (ZITHROMAX) tablet 500 mg, 500 mg, Oral, Q24H, Dilshad Martinez MD    lactobacillus (CULTURELLE) capsule 1 capsule, 1 capsule, Oral, BID WC, Dilshad Martinez MD, 1 capsule at 07/22/22 1503    hydrALAZINE (APRESOLINE) injection 10 mg, 10 mg, IntraVENous, Q4H PRN, Dilshad Martinez MD    traMADol (ULTRAM) tablet 50 mg, 50 mg, Oral, Q6H PRN **OR** traMADol (ULTRAM) tablet 100 mg, 100 mg, Oral, Q6H PRN, Dilshad Martinez MD, 100 mg at 07/22/22 1449    levothyroxine (SYNTHROID) tablet 75 mcg, 75 mcg, Oral, Daily, Dilshad Martinez MD, 75 mcg at 07/22/22 1450     Continuous Infusions:   sodium chloride         PRN Meds:  metoprolol, sodium chloride flush, sodium chloride, polyethylene glycol, acetaminophen **OR** acetaminophen, hydrALAZINE, traMADol **OR** traMADol    Allergies:  Patient has No Known Allergies. REVIEW OF SYSTEMS:  Review of Systems  Review of system all systems have been reviewed and they have been negative except what stated above in HPI  I personally reviewed the patient's medication list, medical and surgical history, and updated as needed.      Objective:   EXAM:  BP (!) 151/83   Pulse 98   Temp 98.2 °F (36.8 °C) (Oral)   Resp 18   Ht 5' 8\" (1.727 m)   Wt 170 lb (77.1 kg)   SpO2 98%   BMI 25.85 kg/m²    Physical Exam   Constitutional he is shivering  Head and neck is supple no lymphadenopathy JVP is not elevated  Cardiovascular normal S1-S2 could not hear any murmurs  Pulmonary: Decreased air entry bilateral could not hear any crackles or wheezes. Abdomen soft nontender organomegaly cannot be felt  Remedies no edema rashes  Neurologically is alert oriented conscious x3 without any focal deficit musculoskeletal he has no deformities   psychologically he is appropriate mood. Data Reviewed:   LABS:  :   Recent Labs     07/22/22  0820   WBC 11.5*   HGB 12.7*   HCT 38.5*   MCV 86.7        BMP:   Recent Labs     07/22/22 0820   *   K 3.7   CL 96*   CO2 23   BUN 15   CREATININE 1.0     PROFILE:   Recent Labs     07/22/22  0820   AST 23   ALT 15   BILITOT 0.5   ALKPHOS 104     PT/INR: No results for input(s): PROTIME, INR in the last 72 hours. APTT:No results for input(s): APTT in the last 72 hours.  :No results for input(s): NITRITE, COLORU, PHUR, LABCAST, WBCUA, RBCUA, MUCUS, TRICHOMONAS, YEAST, BACTERIA, CLARITYU, SPECGRAV, LEUKOCYTESUR, UROBILINOGEN, BILIRUBINUR, BLOODU, GLUCOSEU, AMORPHOUS in the last 72 hours. Invalid input(s): KETONESU  No results for input(s): PHART, EKL9WCO, PO2ART in the last 72 hours. Plan:      Multiple rib fracture after mechanical fall pain control with tramadol incentive spirometry if the pain continues to to be a problem we will recommend intercostal nerve blockade.     Mild bilateral atelectasis incentive spirometry    Thanks for the consultation please call for any questions or concerns          Álvaro Decker MD  7/22/2022  3:31 PM

## 2022-07-22 NOTE — RT PROTOCOL NOTE
RT Inhaler-Nebulizer Bronchodilator Protocol Note    There is a bronchodilator order in the chart from a provider indicating to follow the RT Bronchodilator Protocol and there is an Initiate RT Inhaler-Nebulizer Bronchodilator Protocol order as well (see protocol at bottom of note). CXR Findings:  XR CHEST PORTABLE    Result Date: 7/22/2022  No acute process. The findings from the last RT Protocol Assessment were as follows:   History Pulmonary Disease: None or smoker <15 pack years  Respiratory Pattern: Regular pattern and RR 12-20 bpm  Breath Sounds: Slightly diminished and/or crackles  Cough: Strong, spontaneous, non-productive  Indication for Bronchodilator Therapy: Decreased or absent breath sounds  Bronchodilator Assessment Score: 2    Aerosolized bronchodilator medication orders have been revised according to the RT Inhaler-Nebulizer Bronchodilator Protocol below. Respiratory Therapist to perform RT Therapy Protocol Assessment initially then follow the protocol. Repeat RT Therapy Protocol Assessment PRN for score 0-3 or on second treatment, BID, and PRN for scores above 3. No Indications - adjust the frequency to every 6 hours PRN wheezing or bronchospasm, if no treatments needed after 48 hours then discontinue using Per Protocol order mode. If indication present, adjust the RT bronchodilator orders based on the Bronchodilator Assessment Score as indicated below. Use Inhaler orders unless patient has one or more of the following: on home nebulizer, not able to hold breath for 10 seconds, is not alert and oriented, cannot activate and use MDI correctly, or respiratory rate 25 breaths per minute or more, then use the equivalent nebulizer order(s) with same Frequency and PRN reasons based on the score. If a patient is on this medication at home then do not decrease Frequency below that used at home.     0-3 - enter or revise RT bronchodilator order(s) to equivalent RT Bronchodilator order with Frequency of every 4 hours PRN for wheezing or increased work of breathing using Per Protocol order mode. 4-6 - enter or revise RT Bronchodilator order(s) to two equivalent RT bronchodilator orders with one order with BID Frequency and one order with Frequency of every 4 hours PRN wheezing or increased work of breathing using Per Protocol order mode. 7-10 - enter or revise RT Bronchodilator order(s) to two equivalent RT bronchodilator orders with one order with TID Frequency and one order with Frequency of every 4 hours PRN wheezing or increased work of breathing using Per Protocol order mode. 11-13 - enter or revise RT Bronchodilator order(s) to one equivalent RT bronchodilator order with QID Frequency and an Albuterol order with Frequency of every 4 hours PRN wheezing or increased work of breathing using Per Protocol order mode. Greater than 13 - enter or revise RT Bronchodilator order(s) to one equivalent RT bronchodilator order with every 4 hours Frequency and an Albuterol order with Frequency of every 2 hours PRN wheezing or increased work of breathing using Per Protocol order mode. RT to enter RT Home Evaluation for COPD & MDI Assessment order using Per Protocol order mode.     Electronically signed by Julia Cr RCP on 7/22/2022 at 4:23 PM

## 2022-07-22 NOTE — H&P
Internal Medicine History and Physical    Pt evaluated on:  7/22/2022    CHIEF COMPLAINT:  chest pain and shortness of breath    History of Present Illness: This is a 74 y.o. WM with PMHx sig for afib s/p multiple cardioversions and ablations and now s/p Watchman presents 10 days after fall on concrete with severe chest pain. PTt did not have syncopal episode but merely tripped and fell hard on chest.  The chest pain was mild at first until he was working under his sink and twisted his back. Then started with severe chest pain and significant shortness of breath. He was found to have rib fracture from ribs 3-10 on the L along with mild soft tissue chest wall edema extending to lateral abd wall. Has significant osteopenia of spine, and multiple chronic appearing deformities of thoracic spine. Pt was unaware that he had broken any ribs which is very surprising. He dose have significant atelectasis with bibasilar infiltrates. Likely early pneumonia.     Past Medical History:   Diagnosis Date    Arthritis     Atrial fibrillation ([address] 2007    S/P CARDIOVERSION, EF 49% on stress test 2009    Hyperlipidemia     Hypertension     SCC (squamous cell carcinoma)     Dr. Jimmie Wright    Sleep apnea     Systolic heart failure [facility])      Active Ambulatory Problems     Diagnosis Date Noted    Hyperlipidemia 08/17/2010    HTN (hypertension) 10/13/2010    Squamous cell carcinoma of skin 11/07/2011    Bilateral shoulder tendinopathy 03/14/2016    Primary osteoarthritis of both shoulders 03/14/2016    History of joint swelling 10/04/2018    Pre-diabetes 11/26/2018    Anemia 09/12/2019    Moderate obstructive sleep apnea 09/12/2019    Presence of Watchman left atrial appendage closure device 11/18/2019    Paroxysmal atrial fibrillation ([address] 11/19/2019    Hypercalcemia 02/13/2020    Coronary artery calcification seen on CT scan 05/19/2020    HH (hiatus hernia) 05/19/2020    Prostate enlargement 05/19/2020    Splenic artery Hypertension     SCC (squamous cell carcinoma)     Dr. Vilma Burgos    Sleep apnea     Systolic heart failure [facility])      Past Surgical History:   Procedure Laterality Date    ABLATION OF DYSRHYTHMIC FOCUS      Afib PVI etc / right sided flutter line    CARDIOVERSION  2005    COLONOSCOPY  [id number]    tics    TOTAL HIP ARTHROPLASTY Left 2013         Medications Prior to Admission:    Not in a hospital admission.   Current Facility-Administered Medications   Medication Dose Route Frequency Provider Last Rate Last Admin    0.9 % sodium chloride bolus  1,000 mL IntraVENous Once Riaz Sheth MD        cefepime (MAXIPIME) 2000 mg IVPB minibag  2,000 mg IntraVENous Q12H Riaz Sheth MD        0.9 % sodium chloride bolus  1,000 mL IntraVENous Once Riaz Sheth MD        metoprolol (LOPRESSOR) injection 5 mg  5 mg IntraVENous Q5 Min PRN Riaz Sheth MD         Current Outpatient Medications   Medication Sig Dispense Refill    pantoprazole (PROTONIX) 40 MG tablet TAKE 1 TABLET BY MOUTH TWICE DAILY BEFORE MEALS 180 tablet 0    pravastatin (PRAVACHOL) 80 MG tablet TAKE 1 TABLET BY MOUTH DAILY 90 tablet 1    levothyroxine (SYNTHROID) 75 MCG tablet TAKE 1 TABLET BY MOUTH DAILY 90 tablet 0    lisinopril (PRINIVIL;ZESTRIL) 5 MG tablet Take 1 tablet by mouth daily 90 tablet 3    colchicine (COLCRYS) 0.6 MG tablet Take 1 tablet by mouth daily for 10 days (Patient not taking: Reported on 6/2/2022) 10 tablet 0    sucralfate (CARAFATE) 1 GM tablet Take 1 tablet by mouth every 8 hours for 14 days (Patient not taking: Reported on 6/2/2022) 42 tablet 0    dofetilide (TIKOSYN) 500 MCG capsule TAKE 1 CAPSULE BY MOUTH EVERY 12 HOURS 180 capsule 2    prednisoLONE acetate (PRED FORTE) 1 % ophthalmic suspension SHAKE LIQUID AND INSTILL 1 DROP IN RIGHT EYE THREE TIMES DAILY      dilTIAZem (CARDIZEM CD) 120 MG extended release capsule Take 1 capsule by mouth daily 30 capsule 3    aspirin (COLLEEN ASPIRIN) 325 MG tablet Take by mouth moxifloxacin (VIGAMOX) 0.5 % ophthalmic solution INSTILL 1 DROP IN LEFT EYE FOUR TIMES DAILY AFTER SURGERY      loratadine (CLARITIN) 10 MG capsule Take 10 mg by mouth daily      dorzolamide-timolol (COSOPT) 22.3-6.8 MG/ML ophthalmic solution INT 1 GTT INTO OU BID      Multiple Vitamins-Minerals (MULTIVITAMIN-MINERALS) TABS tablet Take 1 tablet by mouth daily 30 tablet 0       Prior to Admission medications    Medication Sig Start Date End Date Taking?  Authorizing Provider   pantoprazole (PROTONIX) 40 MG tablet TAKE 1 TABLET BY MOUTH TWICE DAILY BEFORE MEALS 7/21/22   DARSHANA Salmeron CNP   pravastatin (PRAVACHOL) 80 MG tablet TAKE 1 TABLET BY MOUTH DAILY 6/24/22   RheDARSHANA Pradhan CNP   levothyroxine (SYNTHROID) 75 MCG tablet TAKE 1 TABLET BY MOUTH DAILY 6/16/22   Makeda Oh MD   lisinopril (PRINIVIL;ZESTRIL) 5 MG tablet Take 1 tablet by mouth daily 4/5/22   ADRIANA Coppola MD   colchicine (COLCRYS) 0.6 MG tablet Take 1 tablet by mouth daily for 10 days  Patient not taking: Reported on 6/2/2022 3/2/22 3/12/22  DARSHANA Alegria CNP   sucralfate (CARAFATE) 1 GM tablet Take 1 tablet by mouth every 8 hours for 14 days  Patient not taking: Reported on 6/2/2022 3/1/22 3/15/22  DARSHANA Alegria CNP   dofetilide (TIKOSYN) 500 MCG capsule TAKE 1 CAPSULE BY MOUTH EVERY 12 HOURS 12/28/21   Clarisa Sanchez MD   prednisoLONE acetate (PRED FORTE) 1 % ophthalmic suspension SHAKE LIQUID AND INSTILL 1 DROP IN RIGHT EYE THREE TIMES DAILY 10/15/21   Historical Provider, MD   dilTIAZem (CARDIZEM CD) 120 MG extended release capsule Take 1 capsule by mouth daily 10/15/21   Selam Tejeda MD   aspirin (COLLEEN ASPIRIN) 325 MG tablet Take by mouth    Historical Provider, MD   moxifloxacin (VIGAMOX) 0.5 % ophthalmic solution INSTILL 1 DROP IN LEFT EYE FOUR TIMES DAILY AFTER SURGERY 4/1/21   Historical Provider, MD   loratadine (CLARITIN) 10 MG capsule Take 10 mg by mouth daily    Historical Provider, MD   dorzolamide-timolol (COSOPT) 22.3-6.8 MG/ML ophthalmic solution INT 1 GTT INTO OU BID 4/14/20   Historical Provider, MD   Multiple Vitamins-Minerals (MULTIVITAMIN-MINERALS) TABS tablet Take 1 tablet by mouth daily 7/24/19   DARSHANA Bangura - CNP         Allergies:    Patient has no known allergies. Social History:      reports that he has never smoked. He has never used smokeless tobacco. He reports that he does not drink alcohol and does not use drugs. Family History:   Family History   Problem Relation Age of Onset    Cancer Mother         BONE    High Blood Pressure Mother     Heart Disease Father 79          REVIEW OF SYSTEMS:  As above in the HPI. All other review of systems were asked and were negative except for chest wall pain on L, thoracic spine pain. PHYSICAL EXAM:    Vitals:  /68   Pulse (!) 101   Temp (!) 100.6 °F (38.1 °C) (Oral)   Resp 17   Ht 5' 8\" (1.727 m)   Wt 170 lb (77.1 kg)   SpO2 97%   BMI 25.85 kg/m²     General:  ill appearing with chills  HEENT:  Normocephalic, atraumatic. Pupils equal, round, reactive to light. No scleral icterus. No conjunctival injection. Normal lips, teeth, and gums. No nasal discharge. Neck:  Supple  Heart:  Normal s1/s2, RRR, no murmurs, gallops, or rubs. no leg edema  Lungs:  diminished bilaterally, no wheeze, no rales, no rhonchi, no use of accessory muscles  Abd: bowel sounds present, soft, nontender, nondistended, no masses  Extrem:  No clubbing, cyanosis,  no edema, 2+ pedal pulses, brisk capillary refill  Skin:  Warm and dry, no open lesions or rash,  normal color/perfusion  Psych:  A&O x 3, appropriate mood and affect. Neuro: grossly intact, moves all four extremities.       Breast: deferred  Rectal: deferred  Genitalia:  deferred    LABS:  Lab Results   Component Value Date/Time    WBC 11.5 07/22/2022 08:20 AM    RBC 4.44 07/22/2022 08:20 AM    HGB 12.7 07/22/2022 08:20 AM    HCT 38.5 07/22/2022 08:20 AM MCV 86.7 07/22/2022 08:20 AM    MCH 28.5 07/22/2022 08:20 AM    MCHC 32.9 07/22/2022 08:20 AM    RDW 14.9 07/22/2022 08:20 AM     07/22/2022 08:20 AM    MPV 7.8 07/22/2022 08:20 AM     Lab Results   Component Value Date/Time     07/22/2022 08:20 AM    K 3.7 07/22/2022 08:20 AM    CL 96 07/22/2022 08:20 AM    CO2 23 07/22/2022 08:20 AM    BUN 15 07/22/2022 08:20 AM    CREATININE 1.0 07/22/2022 08:20 AM    GFRAA >60 07/22/2022 08:20 AM    GFRAA >60 06/14/2013 05:55 AM    AGRATIO 1.1 07/22/2022 08:20 AM    LABGLOM >60 07/22/2022 08:20 AM    GLUCOSE 143 07/22/2022 08:20 AM    PROT 7.4 07/22/2022 08:20 AM    PROT 7.0 07/06/2012 08:25 AM    LABALBU 3.9 07/22/2022 08:20 AM    CALCIUM 9.9 07/22/2022 08:20 AM    BILITOT 0.5 07/22/2022 08:20 AM    ALKPHOS 104 07/22/2022 08:20 AM    AST 23 07/22/2022 08:20 AM    ALT 15 07/22/2022 08:20 AM     Lab Results   Component Value Date/Time    PROTIME 10.8 02/28/2022 06:50 AM    INR 0.96 02/28/2022 06:50 AM     Recent Labs     07/22/22  0820   TROPONINI <0.01     Lab Results   Component Value Date/Time    NITRU Negative 11/12/2019 02:32 PM    COLORU YELLOW 11/12/2019 02:32 PM    PHUR 7.0 11/12/2019 02:32 PM    CLARITYU Clear 11/12/2019 02:32 PM    SPECGRAV 1.016 11/12/2019 02:32 PM    LEUKOCYTESUR Negative 11/12/2019 02:32 PM    UROBILINOGEN 0.2 11/12/2019 02:32 PM    BILIRUBINUR Negative 11/12/2019 02:32 PM    BILIRUBINUR neg 06/13/2013 08:33 AM    BILIRUBINUR NEGATIVE 10/21/2011 09:15 AM    BLOODU Negative 11/12/2019 02:32 PM    GLUCOSEU Negative 11/12/2019 02:32 PM    GLUCOSEU NEGATIVE 10/21/2011 09:15 AM    KETUA Negative 11/12/2019 02:32 PM     Lab Results   Component Value Date/Time    MG 2.00 03/01/2022 05:04 AM    PHOS 3.1 02/24/2020 10:28 AM     Lab Results   Component Value Date/Time    LABA1C 6.1 11/21/2018 12:27 PM     Lab Results   Component Value Date/Time    TSH 1.95 10/11/2021 01:06 PM    FERRITIN 310.7 05/19/2020 05:05 PM    IRON 102 10/01/2019 02:08 PM TIBC 330 10/01/2019 02:08 PM     Lab Results   Component Value Date/Time    TRIG 93 12/11/2017 12:00 AM    HDL 63 12/11/2017 12:00 AM    HDL 61 10/21/2011 09:13 AM    LDLCALC 112 12/11/2017 12:00 AM    LABVLDL 14 07/06/2012 08:25 AM     No results found for: AMYLASE, LIPASE  No results found for: BNP  Lab Results   Component Value Date/Time    LACTA 1.3 07/22/2022 08:20 AM     No results found for: PHART, PH, WTH0QEV, PCO2, PO2ART, PO2, YBK9PVF, HCO3, BEART, BE, THGBART, THB, HYO6DWJ, M7YKGXQX, O2SAT  No results found for: LABAMPH, BARBSCNU, Arloa Pillar, CANNAB, COCAINESCRN, LABMETH, OPIATESCREENURINE, PHENCYCLIDINESCREENURINE, PPXUR, ETOH  Lab Results   Component Value Date/Time    DDIMER <200 05/21/2020 04:57 AM       Lab Results   Component Value Date/Time    VITD25 41.0 02/24/2020 10:28 AM         RADIOLOGY:  XR CHEST PORTABLE    Result Date: 7/22/2022  EXAMINATION: ONE XRAY VIEW OF THE CHEST 7/22/2022 8:34 am COMPARISON: 10/11/2021 HISTORY: ORDERING SYSTEM PROVIDED HISTORY: chest pain TECHNOLOGIST PROVIDED HISTORY: Reason for exam:->chest pain Reason for Exam: chest pain FINDINGS: The lungs are without acute focal process. There is no effusion or pneumothorax. The cardiomediastinal silhouette is stable. The osseous structures are stable. No acute process. CT CHEST ABDOMEN PELVIS W CONTRAST    Result Date: 7/22/2022  EXAMINATION: CT OF THE CHEST, ABDOMEN, AND PELVIS WITH CONTRAST 7/22/2022 9:43 am TECHNIQUE: CT of the chest, abdomen and pelvis was performed with the administration of intravenous contrast. Multiplanar reformatted images are provided for review. Automated exposure control, iterative reconstruction, and/or weight based adjustment of the mA/kV was utilized to reduce the radiation dose to as low as reasonably achievable.  COMPARISON: 03/11/2020 HISTORY: ORDERING SYSTEM PROVIDED HISTORY: fall with ongoing chest and abdominal pain TECHNOLOGIST PROVIDED HISTORY: Reason for exam:->fall with ongoing chest and abdominal pain Additional Contrast?->None Decision Support Exception - unselect if not a suspected or confirmed emergency medical condition->Emergency Medical Condition (MA) Reason for Exam: fall x2 weeks ago, c/o chest and back pain x2 days, SOB FINDINGS: Chest: Mediastinum: There is no sternal fracture or mediastinal hematoma. Mild cardiomegaly. No pericardial effusion. Moderate to severe coronary calcifications identified. No adenopathy noted. Lungs/pleura: Bibasilar infiltrates noted. Bilateral trace pleural effusions. .  No pneumothorax. Soft Tissues/Bones: Subacute appearing healing fractures identified involving the left 3rd through 10th ribs anterolaterally. Mild soft tissue left chest wall edema extending to left upper lateral abdominal wall. Severe osteopenia. Kyphosis noted with multiple chronic anterior wedge deformities of midthoracic vertebral bodies. No acute fractures noted in the thoracic spine. A battery pack is noted in the right anterior chest wall Abdomen/Pelvis: Organs: Mild diffuse hepatic steatosis. Focal fatty infiltration noted along the falciform ligament. Normal gallbladder, pancreas, spleen and adrenals. Simple cyst in the kidneys. No evidence of renal trauma noted. Normal ureters. GI/Bowel: No acute abnormality or trauma noted to the stomach, small bowel. There is no small bowel obstruction. No free intraperitoneal air identified. Colonic diverticulosis involving the descending and sigmoid colon without acute diverticulitis. Pelvis: Markedly enlarged prostate with mass effect on the urinary bladder. No acute trauma to the urinary bladder. Mild urinary bladder wall thickening noted. No pelvic hematoma or adenopathy. Peritoneum/Retroperitoneum: Abdominal aorta is normal in caliber with no evidence of aneurysmal dilatation or dissection. No retroperitoneal adenopathy or bleed. . Bones/Soft Tissues: Significant osteopenic changes and degenerative changes noted in the bony structures. .  Moderate lumbar degenerative changes noted. Left hip arthroplasty is intact. No displaced fractures noted in the pelvis. 1. Multiple subacute left ribs healing fractures involving left 3rd through 1 10th ribs anterolaterally, correlate for point tenderness. Mild left chest wall edema and left upper abdominal wall edema along the fractures. 2. Bilateral trace pleural effusion with bibasilar infiltrates. There is no pneumothorax. 3. No acute finding in the abdomen pelvis. 4. Colonic diverticulosis. 5. Markedly enlarged prostate.            PHYSICIAN CERTIFICATION    I certify that Diann Covington is expected to be hospitalized for >2 midnights based on the following assessment and plan:      ASSESSMENT:      Patient Active Problem List   Diagnosis    Hyperlipidemia    HTN (hypertension)    Squamous cell carcinoma of skin    Bilateral shoulder tendinopathy    Primary osteoarthritis of both shoulders    History of joint swelling    Pre-diabetes    Anemia    Moderate obstructive sleep apnea    Presence of Watchman left atrial appendage closure device    Paroxysmal atrial fibrillation (HCC)    Hypercalcemia    Coronary artery calcification seen on CT scan    HH (hiatus hernia)    Prostate enlargement    Splenic artery aneurysm (HCC)    Calculus of gallbladder without cholecystitis without obstruction    Diverticulosis of colon without diverticulitis    Periodic limb movement sleep disorder    Pulmonary infiltrate    Anemia due to chronic blood loss    Atypical chest pain    BPH (benign prostatic hyperplasia)    Chronic anticoagulation    Coronary atherosclerosis    ED (erectile dysfunction)    Elevated serum cholesterol    Encounter for monitoring sotalol therapy    Essential thrombocytosis (HCC)    Gastroesophageal reflux disease without esophagitis    Generalized osteoarthritis    GI bleed    History of seronegative inflammatory arthritis    Intestinal malabsorption    Osteoarthritis of left hip    Overweight with body mass index (BMI) 25.0-29.9    Status post left hip replacement    Benign essential hypertension    Rhinitis, chronic    S/P placement of VNS (vagus nerve stimulation) device    Hypothyroidism due to medication    Atrial fibrillation with RVR (HCC)    Bilateral cataracts    Entropion of both eyes    Atrial fibrillation (HCC)    Gram-negative pneumonia (HCC)       Principal Problem:    Gram-negative pneumonia ([address]  Resolved Problems:    * No resolved hospital problems.  *      PLAN:     Likely gram neg pneumonia - early transition from atelectasis - started on ceftriaxone and azithromycin - no evidence of sepsis  Pulm contusion and chest wall pain - prn tramadol - pt remains on RA  Multiple rib fractures - already healing - have ordered prn tramadol as needed  Thoracic spine pain - will get MRI to evaluate further - if not able then will get bone scan to compare with CT already done  Significant BPH - will monitor for urinary difficulties  Afib - continue tikosyn, cardizem,       DVT prophylaxis Yes:  lovenox  GI prophylaxis protonix  Antibiotic prophylaxis:  Yes  lactobacillus    Code status Full code        Please note that over 50 minutes was spent in evaluating the patient, review of records and results, discussion with staff/family, etc.    Electronically signed by Cheryl Russell MD on 7/22/2022 at 11:21 AM

## 2022-07-22 NOTE — PROGRESS NOTES
4 Eyes Skin Assessment     The patient is being assess for  Admission    I agree that 2 RN's have performed a thorough Head to Toe Skin Assessment on the patient. ALL assessment sites listed below have been assessed. Areas assessed by both nurses:   [x]   Head, Face, and Ears   [x]   Shoulders, Back, and Chest  [x]   Arms, Elbows, and Hands   [x]   Coccyx, Sacrum, and Ischum  [x]   Legs, Feet, and Heels        Does the Patient have Skin Breakdown?   No         Keshawn Prevention initiated:  No   Wound Care Orders initiated:  No      [facility] nurse consulted for Pressure Injury (Stage 3,4, Unstageable, DTI, NWPT, and Complex wounds):  No      Nurse 1 eSignature: Electronically signed by Brendon Kovacs RN on 7/22/22 at 5:46 PM EDT    **SHARE this note so that the co-signing nurse is able to place an eSignature**    Nurse 2 eSignature: Viktoria Rowan

## 2022-07-22 NOTE — CONSULTS
Consult placed to Pulmonology    Who:  Dr. Aleks Talbert  Date:7/22/2022,  Time:2:45 PM        Electronically signed by Hugo Jerry on 7/22/2022 at 2:45 PM

## 2022-07-23 LAB
AMORPHOUS: ABNORMAL /HPF
ANION GAP SERPL CALCULATED.3IONS-SCNC: 13 MMOL/L (ref 3–16)
BACTERIA: ABNORMAL /HPF
BASOPHILS ABSOLUTE: 0 K/UL (ref 0–0.2)
BASOPHILS RELATIVE PERCENT: 0.3 %
BILIRUBIN URINE: NEGATIVE
BLOOD, URINE: ABNORMAL
BUN BLDV-MCNC: 15 MG/DL (ref 7–20)
CALCIUM SERPL-MCNC: 9.6 MG/DL (ref 8.3–10.6)
CHLORIDE BLD-SCNC: 95 MMOL/L (ref 99–110)
CLARITY: CLEAR
CO2: 24 MMOL/L (ref 21–32)
COARSE CASTS, UA: ABNORMAL /LPF (ref 0–2)
COLOR: ABNORMAL
CREAT SERPL-MCNC: 1 MG/DL (ref 0.8–1.3)
EOSINOPHILS ABSOLUTE: 0 K/UL (ref 0–0.6)
EOSINOPHILS RELATIVE PERCENT: 0 %
EPITHELIAL CELLS, UA: ABNORMAL /HPF (ref 0–5)
GFR AFRICAN AMERICAN: >60
GFR NON-AFRICAN AMERICAN: >60
GLUCOSE BLD-MCNC: 126 MG/DL (ref 70–99)
GLUCOSE URINE: NEGATIVE MG/DL
HCT VFR BLD CALC: 35.9 % (ref 40.5–52.5)
HEMOGLOBIN: 11.9 G/DL (ref 13.5–17.5)
KETONES, URINE: NEGATIVE MG/DL
LEUKOCYTE ESTERASE, URINE: NEGATIVE
LYMPHOCYTES ABSOLUTE: 0.6 K/UL (ref 1–5.1)
LYMPHOCYTES RELATIVE PERCENT: 5.7 %
MCH RBC QN AUTO: 28.7 PG (ref 26–34)
MCHC RBC AUTO-ENTMCNC: 33.1 G/DL (ref 31–36)
MCV RBC AUTO: 87 FL (ref 80–100)
MICROSCOPIC EXAMINATION: YES
MONOCYTES ABSOLUTE: 1.4 K/UL (ref 0–1.3)
MONOCYTES RELATIVE PERCENT: 12.5 %
MUCUS: ABNORMAL /LPF
NEUTROPHILS ABSOLUTE: 9.2 K/UL (ref 1.7–7.7)
NEUTROPHILS RELATIVE PERCENT: 81.5 %
NITRITE, URINE: NEGATIVE
PDW BLD-RTO: 14.7 % (ref 12.4–15.4)
PH UA: 6 (ref 5–8)
PLATELET # BLD: 217 K/UL (ref 135–450)
PMV BLD AUTO: 8.4 FL (ref 5–10.5)
POTASSIUM REFLEX MAGNESIUM: 3.9 MMOL/L (ref 3.5–5.1)
PROTEIN UA: 30 MG/DL
RBC # BLD: 4.13 M/UL (ref 4.2–5.9)
RBC UA: ABNORMAL /HPF (ref 0–4)
REPORT: NORMAL
SODIUM BLD-SCNC: 132 MMOL/L (ref 136–145)
SPECIFIC GRAVITY UA: >=1.03 (ref 1–1.03)
URINE REFLEX TO CULTURE: ABNORMAL
URINE TYPE: ABNORMAL
UROBILINOGEN, URINE: 0.2 E.U./DL
WBC # BLD: 11.3 K/UL (ref 4–11)
WBC UA: ABNORMAL /HPF (ref 0–5)

## 2022-07-23 PROCEDURE — 1200000000 HC SEMI PRIVATE

## 2022-07-23 PROCEDURE — 85025 COMPLETE CBC W/AUTO DIFF WBC: CPT

## 2022-07-23 PROCEDURE — 36415 COLL VENOUS BLD VENIPUNCTURE: CPT

## 2022-07-23 PROCEDURE — 97165 OT EVAL LOW COMPLEX 30 MIN: CPT

## 2022-07-23 PROCEDURE — 6360000002 HC RX W HCPCS: Performed by: INTERNAL MEDICINE

## 2022-07-23 PROCEDURE — 2580000003 HC RX 258: Performed by: INTERNAL MEDICINE

## 2022-07-23 PROCEDURE — 97530 THERAPEUTIC ACTIVITIES: CPT

## 2022-07-23 PROCEDURE — 81001 URINALYSIS AUTO W/SCOPE: CPT

## 2022-07-23 PROCEDURE — 6360000002 HC RX W HCPCS: Performed by: NURSE PRACTITIONER

## 2022-07-23 PROCEDURE — 6370000000 HC RX 637 (ALT 250 FOR IP): Performed by: NURSE PRACTITIONER

## 2022-07-23 PROCEDURE — 80048 BASIC METABOLIC PNL TOTAL CA: CPT

## 2022-07-23 PROCEDURE — 6370000000 HC RX 637 (ALT 250 FOR IP): Performed by: INTERNAL MEDICINE

## 2022-07-23 RX ORDER — LIDOCAINE 4 G/G
1 PATCH TOPICAL DAILY
Status: DISCONTINUED | OUTPATIENT
Start: 2022-07-23 | End: 2022-07-28 | Stop reason: HOSPADM

## 2022-07-23 RX ORDER — TAMSULOSIN HYDROCHLORIDE 0.4 MG/1
0.4 CAPSULE ORAL DAILY
Status: DISCONTINUED | OUTPATIENT
Start: 2022-07-23 | End: 2022-07-28 | Stop reason: HOSPADM

## 2022-07-23 RX ADMIN — DORZOLAMIDE HYDROCHLORIDE AND TIMOLOL MALEATE 1 DROP: 20; 5 SOLUTION/ DROPS OPHTHALMIC at 09:18

## 2022-07-23 RX ADMIN — LISINOPRIL 5 MG: 5 TABLET ORAL at 09:15

## 2022-07-23 RX ADMIN — DORZOLAMIDE HYDROCHLORIDE AND TIMOLOL MALEATE 1 DROP: 20; 5 SOLUTION/ DROPS OPHTHALMIC at 00:30

## 2022-07-23 RX ADMIN — ENOXAPARIN SODIUM 40 MG: 100 INJECTION SUBCUTANEOUS at 09:16

## 2022-07-23 RX ADMIN — PANTOPRAZOLE SODIUM 40 MG: 40 TABLET, DELAYED RELEASE ORAL at 09:16

## 2022-07-23 RX ADMIN — SODIUM CHLORIDE, PRESERVATIVE FREE 10 ML: 5 INJECTION INTRAVENOUS at 20:14

## 2022-07-23 RX ADMIN — DILTIAZEM HYDROCHLORIDE 120 MG: 120 CAPSULE, COATED, EXTENDED RELEASE ORAL at 09:15

## 2022-07-23 RX ADMIN — TRAMADOL HYDROCHLORIDE 100 MG: 50 TABLET, COATED ORAL at 00:23

## 2022-07-23 RX ADMIN — DORZOLAMIDE HYDROCHLORIDE AND TIMOLOL MALEATE 1 DROP: 20; 5 SOLUTION/ DROPS OPHTHALMIC at 20:14

## 2022-07-23 RX ADMIN — SODIUM CHLORIDE, PRESERVATIVE FREE 10 ML: 5 INJECTION INTRAVENOUS at 00:26

## 2022-07-23 RX ADMIN — SODIUM CHLORIDE, PRESERVATIVE FREE 10 ML: 5 INJECTION INTRAVENOUS at 09:18

## 2022-07-23 RX ADMIN — TRAMADOL HYDROCHLORIDE 100 MG: 50 TABLET, COATED ORAL at 11:08

## 2022-07-23 RX ADMIN — MORPHINE SULFATE 2 MG: 2 INJECTION, SOLUTION INTRAMUSCULAR; INTRAVENOUS at 09:16

## 2022-07-23 RX ADMIN — PRAVASTATIN SODIUM 80 MG: 80 TABLET ORAL at 20:13

## 2022-07-23 RX ADMIN — DOFETILIDE 500 MCG: 0.25 CAPSULE ORAL at 15:37

## 2022-07-23 RX ADMIN — AZITHROMYCIN MONOHYDRATE 500 MG: 250 TABLET ORAL at 11:00

## 2022-07-23 RX ADMIN — DOFETILIDE 500 MCG: 0.25 CAPSULE ORAL at 03:16

## 2022-07-23 RX ADMIN — Medication 1 CAPSULE: at 09:15

## 2022-07-23 RX ADMIN — ASPIRIN 325 MG: 325 TABLET ORAL at 09:15

## 2022-07-23 RX ADMIN — CEFTRIAXONE SODIUM 1000 MG: 1 INJECTION, POWDER, FOR SOLUTION INTRAMUSCULAR; INTRAVENOUS at 11:04

## 2022-07-23 RX ADMIN — MORPHINE SULFATE 2 MG: 2 INJECTION, SOLUTION INTRAMUSCULAR; INTRAVENOUS at 03:16

## 2022-07-23 RX ADMIN — PANTOPRAZOLE SODIUM 40 MG: 40 TABLET, DELAYED RELEASE ORAL at 15:37

## 2022-07-23 RX ADMIN — Medication 1 CAPSULE: at 15:37

## 2022-07-23 RX ADMIN — LEVOTHYROXINE SODIUM 75 MCG: 0.05 TABLET ORAL at 09:15

## 2022-07-23 ASSESSMENT — PAIN DESCRIPTION - LOCATION
LOCATION: BACK;RIB CAGE
LOCATION: BACK;RIB CAGE

## 2022-07-23 ASSESSMENT — PAIN SCALES - GENERAL
PAINLEVEL_OUTOF10: 3
PAINLEVEL_OUTOF10: 6
PAINLEVEL_OUTOF10: 7

## 2022-07-23 NOTE — PROGRESS NOTES
pelvis. Colonic diverticulosis. Markedly enlarged prostate. Portable CXR (7/22) No acute process. Objective:   Vitals:  /76   Pulse 97   Temp 98.2 °F (36.8 °C) (Oral)   Resp 16   Ht 5' 8\" (1.727 m)   Wt 171 lb 14.4 oz (78 kg)   SpO2 94% on RA  BMI 26.14 kg/m²   General appearance: alert and cooperative with exam  Lungs:  diffuse rhonchi, reasonable overall air movement  Heart: regular rate and rhythm, S1, S2 normal, no murmur, click, rub or gallop  Abdomen: soft, non-tender; bowel sounds normal; no masses,  no organomegaly  Extremities: extremities normal, atraumatic, no cyanosis or edema  Neurologic: No obvious focal neurologic deficits. Assessment and Plan:   Community acquired pneumonia:  Possibly associated with atelectasis from chest wall contusion. Antibiotic therapy with ceftriaxone and azithromycin. Multiple rib fractures and chest wall pain. Multiple subacute left ribs healing fractures involving left 3rd through 10th ribs anterolaterally by CT. Analgesic therapy with tramadol. Atrial fibrillation:  Continues on dofetilide 500 mcg q12h and rate control with diltiazem 120 mg daily. Hypothyroid:  Continues on levothyroxine 75 mcg daily. Hypertension:  Continues on lisinopril 5 mg daily and diltiazem as above. History of BPH. Not on medication. Gastric reflux and stress ulcer prophylaxis with pantoprazole (Protonix) 40 mg daily. Obstructive sleep apnea on Inspire hypoglossal nerve stimulator (Feb 2021). Advance Directive: Full Code  DVT prophylaxis with enoxaparin 40 mg sub-Q daily.    Discharge planning:  two more days inpatient status, PT/OT evaluation       Jess Slaughter MD  [facility] Hospitalist

## 2022-07-23 NOTE — PROGRESS NOTES
Department of Pharmacy    Notification received from laboratory of positive blood culture results. Organism(s) detected: Staph aureus   Pt currently receiving Ceftriaxone 1g q24h  Recommendation changing empiric antibiotics to: Clindamycin  600mg IV every 6 or 8 hours or 300mg orally tid or qid.       Roberta Chau [facility] 7/23/2022 2:00 AM

## 2022-07-23 NOTE — PLAN OF CARE
Problem: Pain  Goal: Verbalizes/displays adequate comfort level or baseline comfort level  Flowsheets (Taken 7/23/2022 9132)  Verbalizes/displays adequate comfort level or baseline comfort level:   Encourage patient to monitor pain and request assistance   Implement non-pharmacological measures as appropriate and evaluate response   Administer analgesics based on type and severity of pain and evaluate response   Assess pain using appropriate pain scale  Note: PT instructed to hold pillow to left side when coughing, sneezing, getting out of bed to decrease pain caused by rib fx. Order obtained for lidocaine patch for lower back pain 9/10. PT ambulating between bed and chair. Heat packs offered and PRN morphine now available. PT using IS.

## 2022-07-23 NOTE — PLAN OF CARE
Patient with a urinary retention, bladder scan greater than 1000 mL. Will place Sotelo started on Flomax.

## 2022-07-24 LAB
ANION GAP SERPL CALCULATED.3IONS-SCNC: 11 MMOL/L (ref 3–16)
BANDED NEUTROPHILS RELATIVE PERCENT: 25 % (ref 0–7)
BASOPHILS ABSOLUTE: 0 K/UL (ref 0–0.2)
BASOPHILS RELATIVE PERCENT: 0 %
BUN BLDV-MCNC: 20 MG/DL (ref 7–20)
CALCIUM SERPL-MCNC: 9.6 MG/DL (ref 8.3–10.6)
CHLORIDE BLD-SCNC: 94 MMOL/L (ref 99–110)
CO2: 26 MMOL/L (ref 21–32)
CREAT SERPL-MCNC: 1 MG/DL (ref 0.8–1.3)
EOSINOPHILS ABSOLUTE: 0 K/UL (ref 0–0.6)
EOSINOPHILS RELATIVE PERCENT: 0 %
GFR AFRICAN AMERICAN: >60
GFR NON-AFRICAN AMERICAN: >60
GLUCOSE BLD-MCNC: 132 MG/DL (ref 70–99)
HCT VFR BLD CALC: 35.3 % (ref 40.5–52.5)
HEMATOLOGY PATH CONSULT: YES
HEMOGLOBIN: 11.9 G/DL (ref 13.5–17.5)
LYMPHOCYTES ABSOLUTE: 0.7 K/UL (ref 1–5.1)
LYMPHOCYTES RELATIVE PERCENT: 8 %
MCH RBC QN AUTO: 29.3 PG (ref 26–34)
MCHC RBC AUTO-ENTMCNC: 33.7 G/DL (ref 31–36)
MCV RBC AUTO: 86.8 FL (ref 80–100)
MONOCYTES ABSOLUTE: 1.6 K/UL (ref 0–1.3)
MONOCYTES RELATIVE PERCENT: 19 %
NEUTROPHILS ABSOLUTE: 6 K/UL (ref 1.7–7.7)
NEUTROPHILS RELATIVE PERCENT: 48 %
PDW BLD-RTO: 14.8 % (ref 12.4–15.4)
PLATELET # BLD: 233 K/UL (ref 135–450)
PMV BLD AUTO: 8.3 FL (ref 5–10.5)
POIKILOCYTES: ABNORMAL
POTASSIUM REFLEX MAGNESIUM: 3.6 MMOL/L (ref 3.5–5.1)
RBC # BLD: 4.07 M/UL (ref 4.2–5.9)
SODIUM BLD-SCNC: 131 MMOL/L (ref 136–145)
WBC # BLD: 8.2 K/UL (ref 4–11)

## 2022-07-24 PROCEDURE — 6370000000 HC RX 637 (ALT 250 FOR IP): Performed by: NURSE PRACTITIONER

## 2022-07-24 PROCEDURE — 36415 COLL VENOUS BLD VENIPUNCTURE: CPT

## 2022-07-24 PROCEDURE — 97161 PT EVAL LOW COMPLEX 20 MIN: CPT

## 2022-07-24 PROCEDURE — 84153 ASSAY OF PSA TOTAL: CPT

## 2022-07-24 PROCEDURE — 6370000000 HC RX 637 (ALT 250 FOR IP): Performed by: HOSPITALIST

## 2022-07-24 PROCEDURE — 1200000000 HC SEMI PRIVATE

## 2022-07-24 PROCEDURE — 97530 THERAPEUTIC ACTIVITIES: CPT

## 2022-07-24 PROCEDURE — 6360000002 HC RX W HCPCS: Performed by: INTERNAL MEDICINE

## 2022-07-24 PROCEDURE — 6370000000 HC RX 637 (ALT 250 FOR IP): Performed by: UROLOGY

## 2022-07-24 PROCEDURE — 80048 BASIC METABOLIC PNL TOTAL CA: CPT

## 2022-07-24 PROCEDURE — 2580000003 HC RX 258: Performed by: INTERNAL MEDICINE

## 2022-07-24 PROCEDURE — 97116 GAIT TRAINING THERAPY: CPT

## 2022-07-24 PROCEDURE — 6370000000 HC RX 637 (ALT 250 FOR IP): Performed by: INTERNAL MEDICINE

## 2022-07-24 PROCEDURE — 6360000002 HC RX W HCPCS: Performed by: NURSE PRACTITIONER

## 2022-07-24 PROCEDURE — 85025 COMPLETE CBC W/AUTO DIFF WBC: CPT

## 2022-07-24 RX ORDER — TAMSULOSIN HYDROCHLORIDE 0.4 MG/1
0.4 CAPSULE ORAL DAILY
Qty: 30 CAPSULE | Refills: 5 | Status: SHIPPED | OUTPATIENT
Start: 2022-07-25

## 2022-07-24 RX ORDER — FINASTERIDE 5 MG/1
5 TABLET, FILM COATED ORAL DAILY
Status: DISCONTINUED | OUTPATIENT
Start: 2022-07-24 | End: 2022-07-28 | Stop reason: HOSPADM

## 2022-07-24 RX ORDER — FINASTERIDE 5 MG/1
5 TABLET, FILM COATED ORAL DAILY
Qty: 30 TABLET | Refills: 5 | Status: SHIPPED | OUTPATIENT
Start: 2022-07-24

## 2022-07-24 RX ADMIN — Medication 1 CAPSULE: at 09:28

## 2022-07-24 RX ADMIN — DORZOLAMIDE HYDROCHLORIDE AND TIMOLOL MALEATE 1 DROP: 20; 5 SOLUTION/ DROPS OPHTHALMIC at 20:08

## 2022-07-24 RX ADMIN — PANTOPRAZOLE SODIUM 40 MG: 40 TABLET, DELAYED RELEASE ORAL at 09:28

## 2022-07-24 RX ADMIN — LISINOPRIL 5 MG: 5 TABLET ORAL at 09:15

## 2022-07-24 RX ADMIN — MORPHINE SULFATE 2 MG: 2 INJECTION, SOLUTION INTRAMUSCULAR; INTRAVENOUS at 09:25

## 2022-07-24 RX ADMIN — CEFTRIAXONE SODIUM 1000 MG: 1 INJECTION, POWDER, FOR SOLUTION INTRAMUSCULAR; INTRAVENOUS at 11:55

## 2022-07-24 RX ADMIN — Medication 1 CAPSULE: at 16:43

## 2022-07-24 RX ADMIN — AZITHROMYCIN MONOHYDRATE 500 MG: 250 TABLET ORAL at 11:57

## 2022-07-24 RX ADMIN — LEVOTHYROXINE SODIUM 75 MCG: 0.05 TABLET ORAL at 09:15

## 2022-07-24 RX ADMIN — DORZOLAMIDE HYDROCHLORIDE AND TIMOLOL MALEATE 1 DROP: 20; 5 SOLUTION/ DROPS OPHTHALMIC at 09:17

## 2022-07-24 RX ADMIN — ASPIRIN 325 MG: 325 TABLET ORAL at 09:14

## 2022-07-24 RX ADMIN — MORPHINE SULFATE 2 MG: 2 INJECTION, SOLUTION INTRAMUSCULAR; INTRAVENOUS at 01:28

## 2022-07-24 RX ADMIN — PRAVASTATIN SODIUM 80 MG: 80 TABLET ORAL at 20:07

## 2022-07-24 RX ADMIN — PANTOPRAZOLE SODIUM 40 MG: 40 TABLET, DELAYED RELEASE ORAL at 16:43

## 2022-07-24 RX ADMIN — ENOXAPARIN SODIUM 40 MG: 100 INJECTION SUBCUTANEOUS at 09:14

## 2022-07-24 RX ADMIN — DOFETILIDE 500 MCG: 0.25 CAPSULE ORAL at 14:38

## 2022-07-24 RX ADMIN — SODIUM CHLORIDE, PRESERVATIVE FREE 10 ML: 5 INJECTION INTRAVENOUS at 20:08

## 2022-07-24 RX ADMIN — MORPHINE SULFATE 2 MG: 2 INJECTION, SOLUTION INTRAMUSCULAR; INTRAVENOUS at 16:46

## 2022-07-24 RX ADMIN — DOFETILIDE 500 MCG: 0.25 CAPSULE ORAL at 02:47

## 2022-07-24 RX ADMIN — SODIUM CHLORIDE, PRESERVATIVE FREE 10 ML: 5 INJECTION INTRAVENOUS at 09:28

## 2022-07-24 RX ADMIN — DILTIAZEM HYDROCHLORIDE 120 MG: 120 CAPSULE, COATED, EXTENDED RELEASE ORAL at 09:15

## 2022-07-24 RX ADMIN — TAMSULOSIN HYDROCHLORIDE 0.4 MG: 0.4 CAPSULE ORAL at 09:15

## 2022-07-24 RX ADMIN — FINASTERIDE 5 MG: 5 TABLET, FILM COATED ORAL at 14:38

## 2022-07-24 RX ADMIN — MORPHINE SULFATE 2 MG: 2 INJECTION, SOLUTION INTRAMUSCULAR; INTRAVENOUS at 23:46

## 2022-07-24 ASSESSMENT — PAIN SCALES - GENERAL
PAINLEVEL_OUTOF10: 4
PAINLEVEL_OUTOF10: 4
PAINLEVEL_OUTOF10: 8
PAINLEVEL_OUTOF10: 4

## 2022-07-24 ASSESSMENT — PAIN DESCRIPTION - DESCRIPTORS
DESCRIPTORS: ACHING
DESCRIPTORS: ACHING
DESCRIPTORS: ACHING;SHARP

## 2022-07-24 ASSESSMENT — PAIN DESCRIPTION - ORIENTATION
ORIENTATION: LOWER

## 2022-07-24 ASSESSMENT — PAIN DESCRIPTION - LOCATION
LOCATION: BACK

## 2022-07-24 NOTE — PROGRESS NOTES
Pt axo. Assessment completed as charted. Pt c/o back pain. Medicated per orders. Denies additional needs at this time. Will continue to monitor. Call light in reach.

## 2022-07-24 NOTE — PROGRESS NOTES
Physical Therapy  Facility/Department: [facility] C3 TELE/MED SURG/ONC  Physical Therapy Initial Assessment    Name: Jillian Castellanos  : 1950  MRN: [id number]  Date of Service: 2022    Discharge Recommendations:  Home with assist PRN, Home with Home health PT, Outpatient PT (home vs. OP PT for low back at D/C depending on pt's progress during hospital stay)   PT Equipment Recommendations  Equipment Needed: Yes  Mobility Devices: Lorrayne Julia: Rolling  Other: Pt has RW from his wife, although pt unsure it is the correct size/height for him      Patient Diagnosis(es): The primary encounter diagnosis was Closed fracture of multiple ribs of left side, initial encounter. Diagnoses of Leukocytosis, unspecified type and Infiltrate of lung present on computed tomography were also pertinent to this visit. Past Medical History:  has a past medical history of Arthritis, Atrial fibrillation ([facility] [address] Hyperlipidemia, Hypertension, SCC (squamous cell carcinoma), Sleep apnea, and Systolic heart failure ([address] Past Surgical History:  has a past surgical history that includes Cardioversion (); Total hip arthroplasty (Left, ); Colonoscopy ([id number]); and ablation of dysrhythmic focus. Assessment   Body Structures, Functions, Activity Limitations Requiring Skilled Therapeutic Intervention: Decreased functional mobility ; Decreased strength; Increased pain;Decreased balance  Treatment Diagnosis: Decreased (I) with functional mobility  Specific Instructions for Next Treatment: Progress ther ex and mobility as tolerated  Therapy Prognosis: Good  Decision Making: Low Complexity  Requires PT Follow-Up: Yes  Activity Tolerance: Patient tolerated evaluation without incident;Patient tolerated treatment well;Patient limited by pain     Plan   Plan: 3-5 times per week  Specific Instructions for Next Treatment: Progress ther ex and mobility as tolerated  Current Treatment Recommendations: Strengthening, Balance training, Functional mobility training, Transfer training, Endurance training, Gait training, Home exercise program, Safety education & training, Equipment evaluation, education, & procurement, Therapeutic activities, Stair training  Safety Devices: Left in chair, Nurse notified, Call light within reach, All caroline prominences offloaded, Gait belt     Restrictions  Restrictions/Precautions  Restrictions/Precautions: General Precautions, Fall Risk     Subjective   General  Chart Reviewed: Yes  Patient assessed for rehabilitation services?: Yes  Family / Caregiver Present: No  Referring Practitioner: Dr. Jean Marie Cuba  Referral Date : 07/23/22  Diagnosis: CAP, multiple subacute L rib fx's (ribs 3-10)  Follows Commands: Within Functional Limits  General Comment  Comments: Pt sitting on couch upon entry of PT  Subjective & Pain: Pt agreeable to work with PT this morning. States he's feeling more central low back pain today and feels he's not moving around as well today vs. yesterday. C/o \"8/10\" acute pain in center of low back as well as \"popping\" feelings in that area when ambulating (RN, Billie, aware).     Social/Functional History  Social/Functional History  Lives With: Spouse (available to assist)  Type of Home: House  Home Layout: One level, Work area in basement (office & rec room in lower level)  Home Access: Stairs to enter without rails  Entrance Stairs - Number of Steps: 1  Bathroom Shower/Tub: Walk-in shower  Bathroom Toilet: Handicap height  Bathroom Equipment: Grab bars in [address]:  (INSPIRE-CPAP; adjustable bed, electric recliner, but not LiFT chair)  Has the patient had two or more falls in the past year or any fall with injury in the past year?: Yes (fall 2 weeks ago)  Receives Help From: Family (since fall 2 weeks ago for LE dressing & transfers; normally independent)  ADL Assistance: Independent  Ambulation Assistance: Independent (without AD)  Transfer Assistance: Independent  Active : Yes  Mode of Transportation: Truck  Occupation: Self employed  Type of Occupation: builds swimming [employer]  Additional Comments: reports sleeping in [address] since fall; not working much 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    [address]: Within Functional Limits  Hearing  Hearing: Within functional limits      Cognition   Orientation  Overall Orientation Status: Within Normal Limits     Objective   Heart Rate: 91  Heart Rate Source: Monitor  BP: 113/73  BP Method: Automatic  Patient Position: Sitting (on couch)  MAP (Calculated): 86.33  Resp: 18  SpO2: 98 %  O2 Device: None (Room air)    Observation/Palpation  Posture: Good  Gross Assessment  AROM: Within functional limits  Strength: Generally decreased, functional  Coordination: Generally decreased, functional  Tone: Normal     Bed Mobility Training  Bed Mobility Training: No (pt OOB before & after therapy session)    Balance  Sitting: Intact  Standing: Impaired  Standing - Static: Good  Standing - Dynamic: Fair;Occasional (pt using RW)    Transfer Training  Sit to Stand: Stand-by assistance; Additional time  Stand to Sit: Stand-by assistance; Additional time  Bed to Chair: Stand-by assistance; Additional time (using RW, moving from couch to chair)    Gait Training  Overall Level of Assistance: Contact-guard assistance;Stand-by assistance (CGA/close SBA x 1)  Interventions: Verbal cues  Speed/Yeimy: Pace decreased (< 100 feet/min); Slow  Step Length: Left shortened;Right shortened  Gait Abnormalities:  (pt amb with decreased speed and stride length B with increased B stance time; safe and steady despite slow pace (no LOB), although pt requests support of RW for added stability)  Distance (ft): 50 Feet (amb distance limited by LBP)  Assistive Device: Walker, rolling    AM-PAC Score  AM-PAC Inpatient Mobility Raw Score : 18 (07/24/22 1383)  AM-PAC Inpatient T-Scale Score : 43.63 (07/24/22 1427)  Mobility Inpatient CMS 0-100% Score: 46.58 (07/24/22

## 2022-07-24 NOTE — DISCHARGE INSTRUCTIONS
Call office for follow up with Dr. Janet Crabtree in 1-2 weeks   Continue griffin to gravity    Griffin  -if it stops draining or you get large blood clots and urine is cherry red continously, then call the urology office or go to the ER.  -if you or family are able to, please remove griffin catheter the early morning prior to your office visit that day. Cut griffin below Y-connection and let drain for 20 seconds and then pull out griffin and discard. Dr. Janet Crabtree  Urology   Office: [phone]    [facility]  [address]    [facility] [place] [address]        Follow up with MD Lisa Ray (Infectious Disease) in 2-3 weeks  Follow up with your primary care physician (PCP) in 1-2 weeks.

## 2022-07-24 NOTE — PROGRESS NOTES
Shift assessment complete. Pt A&Ox4. VSS stable. Pt denies any further needs at this time. Bed is in lowest position and call light is within reach.

## 2022-07-24 NOTE — PROGRESS NOTES
Hospitalist Progress Note  7/24/2022 10:16 AM  Subjective:   Admit Date: 7/22/2022  PCP: Verito Woods CNP  Status:  Inpatient  Interval History: Hospital Day: 3, admitted with community acquired pneumonia with gram positive cocci in blood culture on ceftriaxone and azithromycin. He fell two weeks ago with rib fracture and atelectasis. Hospitalized Feb 2022 for atrial fibrillation ablation. Coude catheter placed (7/23) for urinary retention. Diet: regular, 2 gram sodium restriction  Right antecubital peripheral IV (7/22, day #3)  Urinary catheter (7/23, day #2)  Medications:     lidocaine  4% patch TransDERmal Daily   tamsulosin  0.4 mg Oral Daily   aspirin  325 mg Oral Daily   diltiazem  120 mg Oral Daily   dorzolamide-timolol  1 drop Both Eyes BID   lisinopril  5 mg Oral Daily   pantoprazole  40 mg Oral BID AC   pravastatin  80 mg Oral Daily   enoxaparin  40 mg SubCUTAneous Daily   ceftriaxone  1,000 mg IntraVENous Q24H (7/22, day #3)   azithromycin  500 mg Oral Q24H (7/22, day #3)   lactobacillus  1 capsule Oral BID WC   levothyroxine  75 mcg Oral Daily   dofetilide  500 mcg Oral Q12H     Recent Labs     07/22/22  0820 07/23/22  0543 07/24/22  0534   WBC 11.5* 11.3* 8.2   HGB 12.7* 11.9* 11.9*    217 233   MCV 86.7 87.0 86.8     Recent Labs     07/22/22  0820 07/23/22  0543 07/24/22  0534   * 132* 131*   K 3.7 3.9 3.6   CL 96* 95* 94*   CO2 23 24 26   BUN 15 15 20   CREATININE 1.0 1.0 1.0   GLUCOSE 143* 126* 132*     SARS-CoV-2 NAAT (7/22) not detected  Influenza A/B (7/22) negative  Blood culture x 2 (7/22) Gram positive cocci in clusters, resembling Staphylococcus  Lactate (7/22) 1.3 mmol/L     Troponin T (7/22) < 0.01     CT chest / abd / pelvis w/ IV contrast (7/22) Multiple subacute left ribs healing fractures involving left 3rd through 10th ribs anterolaterally, correlate for point tenderness. Mild left chest wall edema and left upper abdominal wall  edema along the fractures. Bilateral trace pleural effusion with bibasilar infiltrates. There is no pneumothorax. No acute finding in the abdomen pelvis. Colonic diverticulosis. Markedly enlarged prostate. Portable CXR (7/22) No acute process. Objective:   Vitals:  /73   Pulse 91   Temp 97.7 °F (oral)   Resp 18   Ht 5' 8\"  Wt 171 lb 14.4 oz (78 kg)   SpO2 98% on RA  BMI 26.14 kg/m²   General appearance: alert and cooperative with exam  Lungs:  diffuse rhonchi, reasonable overall air movement  Heart: regular rate and rhythm, S1, S2 normal, no murmur, click, rub or gallop  Abdomen: soft, non-tender; bowel sounds normal; no masses,  no organomegaly  Extremities: extremities normal, atraumatic, no cyanosis or edema  Neurologic: No obvious focal neurologic deficits. Assessment and Plan:   Community acquired pneumonia with sepsis:  Leukocytosis resolved. Blood culture positive for Staph aureus DNA. Possibly associated with atelectasis from chest wall contusion. Antibiotic therapy with ceftriaxone and azithromycin. Multiple rib fractures and chest wall pain. Multiple subacute left ribs healing fractures involving left 3rd through 10th ribs anterolaterally by CT. Analgesic therapy with tramadol. Atrial fibrillation:  Continues on dofetilide 500 mcg q12h and rate control with diltiazem 120 mg daily. Hypothyroid:  Continues on levothyroxine 75 mcg daily. Hypertension:  Continues on lisinopril 5 mg daily and diltiazem as above. Gastric reflux and stress ulcer prophylaxis with pantoprazole (Protonix) 40 mg daily. Obstructive sleep apnea on Inspire hypoglossal nerve stimulator (Feb 2021). History of BPH. Not on medication, with urinary retention requiring Coude urinary catheter:  Initiate tamsulosin. Urology evaluation. No known BPH. PSA pending. Advance Directive: Full Code  DVT prophylaxis with enoxaparin 40 mg sub-Q daily.   Discharge planning:  Monday, July 25 pending evaluation for urinary retention, PT/OT recommends home with assist and outpatient PT.        Charlie Coates MD  Rounding Hospitalist

## 2022-07-24 NOTE — CONSULTS
Consult placed    Who:Dr. Trish Dumont  Date:7/24/2022,  Time:12:35 PM        Electronically signed by Debbie Galdamez on 7/24/2022 at 12:35 PM

## 2022-07-24 NOTE — PLAN OF CARE
Problem: Safety - Adult  Goal: Free from fall injury  7/23/2022 2307 by Tierra Erickson RN  Outcome: Progressing  7/23/2022 2014 by Dylon Ibrahim RN  Outcome: Progressing

## 2022-07-24 NOTE — PROGRESS NOTES
Shift assessment complete. VSS. Sotelo in place. Pt denies any needs at this time. Bed in lowest position, and call light within reach. Will continue to monitor.

## 2022-07-24 NOTE — CONSULTS
Patient:  Jacob Alejo  YOB: 1950   CSN:  [id number]    Primary Care Provider:  DARSHANA Sherman CNP       Urology Attending Consult Note     Reason for Consultation:  Urinary retention    Chief Complaint: \"I fell. Last night I could not empty my bladder\". History from EMR and nursing staff as well    HPI:  Chi Carlos is a 74 y.o. male who presented after falling about 10 days ago. He said pain suddenly worse at home so came to hospital Friday. In house found elevated PVR, and nursing placed griffin and thus urology called for urinary retention during this hospital stay. No hematuria or hist of prostate or bladder intervention or radiation.   Did have hip replacement 10 yrs ago    Says never saw a urologist; PSA 2.5 (2019)  Dad - no prostate issues  Imaging reviewed as below    Past Medical History:   Diagnosis Date    Arthritis     Atrial fibrillation ([facility] [address] 2007    S/P CARDIOVERSION, EF 49% on stress test 2009    Hyperlipidemia     Hypertension     SCC (squamous cell carcinoma)     Dr. Anushka Che    Sleep apnea     Systolic heart failure [facility])        Past Surgical History:   Procedure Laterality Date    ABLATION OF DYSRHYTHMIC FOCUS      Afib PVI etc / right sided flutter line    CARDIOVERSION  2005    COLONOSCOPY  [id number]    tics    TOTAL HIP ARTHROPLASTY Left 2013       Medication List reviewed:     Current Facility-Administered Medications   Medication Dose Route Frequency Provider Last Rate Last Admin    lidocaine 4 % external patch 1 patch  1 patch TransDERmal Daily DARSHANA Cavanaugh CNP   1 patch at 07/24/22 0128    tamsulosin (FLOMAX) capsule 0.4 mg  0.4 mg Oral Daily Mariam Orbien MD   0.4 mg at 07/24/22 0915    metoprolol (LOPRESSOR) injection 5 mg  5 mg IntraVENous Q5 Min PRN Татьяна Amaya MD        aspirin tablet 325 mg  325 mg Oral Daily Selvin Diane MD   325 mg at 07/24/22 0914    dilTIAZem (CARDIZEM CD) extended release capsule 120 mg  120 mg Oral Daily Luz Méndez MD   120 mg at 07/24/22 0915    dorzolamide-timolol (COSOPT) 22.3-6.8 MG/ML ophthalmic solution 1 drop  1 drop Both Eyes BID Luz Méndez MD   1 drop at 07/24/22 0917    lisinopril (PRINIVIL;ZESTRIL) tablet 5 mg  5 mg Oral Daily Luz Méndez MD   5 mg at 07/24/22 0915    pantoprazole (PROTONIX) tablet 40 mg  40 mg Oral BID AC Luz Méndez MD   40 mg at 07/24/22 0928    pravastatin (PRAVACHOL) tablet 80 mg  80 mg Oral Daily Luz Méndez MD   80 mg at 07/23/22 2013    sodium chloride flush 0.9 % injection 5-40 mL  5-40 mL IntraVENous 2 times per day Luz Méndez MD   10 mL at 07/24/22 0928    sodium chloride flush 0.9 % injection 5-40 mL  5-40 mL IntraVENous PRN Luz Méndez MD        0.9 % sodium chloride infusion   IntraVENous PRN Luz Méndez MD        enoxaparin (LOVENOX) injection 40 mg  40 mg SubCUTAneous Daily Luz Méndez MD   40 mg at 07/24/22 0914    polyethylene glycol (GLYCOLAX) packet 17 g  17 g Oral Daily PRN Luz Méndez MD        acetaminophen (TYLENOL) tablet 650 mg  650 mg Oral Q6H PRN Luz Méndez MD        Or    acetaminophen (TYLENOL) suppository 650 mg  650 mg Rectal Q6H PRN Luz Méndez MD        cefTRIAXone (ROCEPHIN) 1000 mg IVPB in 50 mL D5W minibag  1,000 mg IntraVENous Q24H Luz Méndez  mL/hr at 07/24/22 1155 1,000 mg at 07/24/22 1155    And    azithromycin (ZITHROMAX) tablet 500 mg  500 mg Oral Q24H Luz Méndez MD   500 mg at 07/24/22 1157    lactobacillus (CULTURELLE) capsule 1 capsule  1 capsule Oral BID WC Luz Méndez MD   1 capsule at 07/24/22 8932    hydrALAZINE (APRESOLINE) injection 10 mg  10 mg IntraVENous Q4H PRN Luz Méndez MD        traMADol [place]) tablet 50 mg  50 mg Oral Q6H PRN Luz Méndez MD        Or    traMADol [place]) tablet 100 mg  100 mg Oral Q6H PRN Luz Méndez MD   100 mg at 07/23/22 1108    levothyroxine (SYNTHROID) tablet 75 mcg  75 mcg Oral Daily Dillon Downey MD   75 mcg at 07/24/22 0915    ipratropium-albuterol (DUONEB) nebulizer solution 1 ampule  1 ampule Inhalation Q4H PRN Dillon Downey MD        morphine (PF) injection 2 mg  2 mg IntraVENous Q4H PRN DARSHANA Pond - CNP   2 mg at 07/24/22 5925    dofetilide (TIKOSYN) capsule 500 mcg  500 mcg Oral Q12H Dillon Downey MD   500 mcg at 07/24/22 0247       No Known Allergies    Family History   Problem Relation Age of Onset    Cancer Mother         BONE    High Blood Pressure Mother     Heart Disease Father 79       Social History     Tobacco Use    Smoking status: Never    Smokeless tobacco: Never   Vaping Use    Vaping Use: Never used   Substance Use Topics    Alcohol use: No     Alcohol/week: 0.0 standard drinks     Comment: social    Drug use: No         Review of Systems: A 12 point ROS was performed and was unremarkable unless listed in the history of present illness. I/O last 3 completed shifts:   In: 120 [P.O.:120]  Out: 1500 [Urine:1500]    Physical Exam:  Patient Vitals for the past 8 hrs:   BP Temp Temp src Pulse Resp SpO2   07/24/22 1155 107/68 97.5 °F (36.4 °C) Oral 78 18 96 %   07/24/22 0912 113/73 97.7 °F (36.5 °C) Oral 91 18 98 %     Constitutional: pleasant male in NAD, with a normal body habitus   EENT: pink conjunctivae, lips without cyanosis, normal appearance of ears and nose  Neck: no masses or lesions, trachea midline   Respiratory: normal respiratory movements without distress   Cardiovascular: regular rate and rhythm, lower extremities without edema   Abdomen: soft, NTND, no masses, no guarding  Back: no CVAT, no abnormal curvature of the spine  Lymphatic: no palpable cervical or supraclavicular lymphadenopathy  Skin: warm and dry, no rashes, lesions, or ulcers  Musculoskeletal: normal ROM, m. tone, no digital cyanosis, head normocephalic  Psych: normal mood and affect, alert and appropriately answers questions  : normal bladder, penile shaft without evidence of masses, urethral meatus patent, normal testis/scrotum, nontender;  catheter with clear yellow urine  CELINE: deferred    Labs:       Lab Results   Component Value Date    PSA 2.53 12/13/2019    PSA 2.20 12/27/2012    PSA 2.02 10/21/2011    PSA 1.84 10/13/2010       Lab Results   Component Value Date    PSA 2.53 12/13/2019    PSA 2.20 12/27/2012    PSA 2.02 10/21/2011     Recent Labs     07/24/22  0534 07/23/22  0543 07/22/22  0820   WBC 8.2 11.3* 11.5*   HGB 11.9* 11.9* 12.7*   HCT 35.3* 35.9* 38.5*   MCV 86.8 87.0 86.7    217 250     Lab Results   Component Value Date    LABA1C 6.1 11/21/2018     Lab Results   Component Value Date    LABMICR YES 07/23/2022    LDLCALC 112 12/11/2017    CREATININE 1.0 07/24/2022     Lab Results   Component Value Date     (L) 07/24/2022    K 3.6 07/24/2022    CL 94 (L) 07/24/2022    CO2 26 07/24/2022    BUN 20 07/24/2022    CREATININE 1.0 07/24/2022    GLUCOSE 132 (H) 07/24/2022    CALCIUM 9.6 07/24/2022    PROT 7.4 07/22/2022    LABALBU 3.9 07/22/2022    BILITOT 0.5 07/22/2022    ALKPHOS 104 07/22/2022    AST 23 07/22/2022    ALT 15 07/22/2022    LABGLOM >60 07/24/2022    GFRAA >60 07/24/2022    AGRATIO 1.1 07/22/2022    GLOB 2.9 10/11/2021     Lab Results   Component Value Date    CREATININE 1.0 07/24/2022    CREATININE 1.0 07/23/2022    CREATININE 1.0 07/22/2022       Lab Results   Component Value Date/Time    COLORU GRETA 07/23/2022 07:15 PM    NITRU Negative 07/23/2022 07:15 PM    GLUCOSEU Negative 07/23/2022 07:15 PM    GLUCOSEU NEGATIVE 10/21/2011 09:15 AM    KETUA Negative 07/23/2022 07:15 PM    UROBILINOGEN 0.2 07/23/2022 07:15 PM    BILIRUBINUR Negative 07/23/2022 07:15 PM    BILIRUBINUR neg 06/13/2013 08:33 AM    BILIRUBINUR NEGATIVE 10/21/2011 09:15 AM          Radiology:  \"Imaging was independently reviewed by myself and I agree with the radiology interpretation except as noted above\"  CT abd/pelvis w/ ctrast:  Fairly big prostate on CT, 80--90 g with median bar. Hip prosthetic makes a little bit difficult to evaluate. No hydronephrosis no renal lesions. Bladder fairly normal     Impression/Plan:  Fall w/ rib fractures  BPH (psa 2.5 (2019)  Acute urinary retention  -avoid anti-muscarinic, antihistamine, and alpha-agonist medications as these will exacerbate urinary retention. Recommend minimizing the narcotic pain medication regimen as tolerated. Increased ambulation/mobility will also usually help with improvement in the degree of urinary retention.     -cont alpha-blocker therapy with Flomax 0.4mg once qday and added finasteride. Dc home on both meds.     -Discharge patient with the griffin and have him follow up in the urology clinic with an early morning visit. -I instructed the patient how to remove the Griffin if he desires prior to his follow-up    Will sign off. Please call if something changes, thank you.       Mary Patino MD  Cell: [phone]  Office: [phone]

## 2022-07-25 PROBLEM — S22.42XA MULTIPLE CLOSED FRACTURES OF RIBS OF LEFT SIDE: Status: ACTIVE | Noted: 2022-07-25

## 2022-07-25 PROBLEM — R78.81 MSSA BACTEREMIA: Status: ACTIVE | Noted: 2022-07-25

## 2022-07-25 PROBLEM — S22.42XD CLOSED FRACTURE OF MULTIPLE RIBS OF LEFT SIDE WITH ROUTINE HEALING: Status: ACTIVE | Noted: 2022-07-25

## 2022-07-25 PROBLEM — R97.20 ELEVATED PSA: Status: ACTIVE | Noted: 2022-07-25

## 2022-07-25 PROBLEM — B95.61 MSSA BACTEREMIA: Status: ACTIVE | Noted: 2022-07-25

## 2022-07-25 PROBLEM — M54.50 ACUTE MIDLINE LOW BACK PAIN WITHOUT SCIATICA: Status: ACTIVE | Noted: 2022-07-25

## 2022-07-25 LAB
ANION GAP SERPL CALCULATED.3IONS-SCNC: 10 MMOL/L (ref 3–16)
BLOOD CULTURE, ROUTINE: ABNORMAL
BLOOD CULTURE, ROUTINE: ABNORMAL
BUN BLDV-MCNC: 20 MG/DL (ref 7–20)
CALCIUM SERPL-MCNC: 9.7 MG/DL (ref 8.3–10.6)
CHLORIDE BLD-SCNC: 95 MMOL/L (ref 99–110)
CO2: 28 MMOL/L (ref 21–32)
CREAT SERPL-MCNC: 0.9 MG/DL (ref 0.8–1.3)
CULTURE, BLOOD 2: ABNORMAL
GFR AFRICAN AMERICAN: >60
GFR NON-AFRICAN AMERICAN: >60
GLUCOSE BLD-MCNC: 115 MG/DL (ref 70–99)
HEMATOLOGY PATH CONSULT: NORMAL
MAGNESIUM: 2.1 MG/DL (ref 1.8–2.4)
ORGANISM: ABNORMAL
POTASSIUM REFLEX MAGNESIUM: 3.5 MMOL/L (ref 3.5–5.1)
PROSTATE SPECIFIC ANTIGEN: 6.65 NG/ML (ref 0–4)
SODIUM BLD-SCNC: 133 MMOL/L (ref 136–145)

## 2022-07-25 PROCEDURE — 6360000002 HC RX W HCPCS: Performed by: INTERNAL MEDICINE

## 2022-07-25 PROCEDURE — 6370000000 HC RX 637 (ALT 250 FOR IP): Performed by: INTERNAL MEDICINE

## 2022-07-25 PROCEDURE — 99222 1ST HOSP IP/OBS MODERATE 55: CPT | Performed by: INTERNAL MEDICINE

## 2022-07-25 PROCEDURE — 6370000000 HC RX 637 (ALT 250 FOR IP)

## 2022-07-25 PROCEDURE — 83735 ASSAY OF MAGNESIUM: CPT

## 2022-07-25 PROCEDURE — 1200000000 HC SEMI PRIVATE

## 2022-07-25 PROCEDURE — 6360000002 HC RX W HCPCS: Performed by: NURSE PRACTITIONER

## 2022-07-25 PROCEDURE — 99233 SBSQ HOSP IP/OBS HIGH 50: CPT | Performed by: INTERNAL MEDICINE

## 2022-07-25 PROCEDURE — 80048 BASIC METABOLIC PNL TOTAL CA: CPT

## 2022-07-25 PROCEDURE — 2580000003 HC RX 258: Performed by: INTERNAL MEDICINE

## 2022-07-25 PROCEDURE — 36415 COLL VENOUS BLD VENIPUNCTURE: CPT

## 2022-07-25 PROCEDURE — 6370000000 HC RX 637 (ALT 250 FOR IP): Performed by: HOSPITALIST

## 2022-07-25 PROCEDURE — 6370000000 HC RX 637 (ALT 250 FOR IP): Performed by: UROLOGY

## 2022-07-25 RX ORDER — ACETAMINOPHEN 500 MG
1000 TABLET ORAL EVERY 8 HOURS SCHEDULED
Status: COMPLETED | OUTPATIENT
Start: 2022-07-25 | End: 2022-07-26

## 2022-07-25 RX ADMIN — FINASTERIDE 5 MG: 5 TABLET, FILM COATED ORAL at 09:14

## 2022-07-25 RX ADMIN — ACETAMINOPHEN 650 MG: 325 TABLET ORAL at 11:57

## 2022-07-25 RX ADMIN — TAMSULOSIN HYDROCHLORIDE 0.4 MG: 0.4 CAPSULE ORAL at 09:13

## 2022-07-25 RX ADMIN — PANTOPRAZOLE SODIUM 40 MG: 40 TABLET, DELAYED RELEASE ORAL at 15:26

## 2022-07-25 RX ADMIN — ACETAMINOPHEN 1000 MG: 500 TABLET ORAL at 21:46

## 2022-07-25 RX ADMIN — TRAMADOL HYDROCHLORIDE 100 MG: 50 TABLET, COATED ORAL at 10:32

## 2022-07-25 RX ADMIN — SODIUM CHLORIDE, PRESERVATIVE FREE 10 ML: 5 INJECTION INTRAVENOUS at 09:13

## 2022-07-25 RX ADMIN — MORPHINE SULFATE 2 MG: 2 INJECTION, SOLUTION INTRAMUSCULAR; INTRAVENOUS at 04:08

## 2022-07-25 RX ADMIN — PRAVASTATIN SODIUM 80 MG: 80 TABLET ORAL at 21:46

## 2022-07-25 RX ADMIN — LEVOTHYROXINE SODIUM 75 MCG: 0.05 TABLET ORAL at 09:13

## 2022-07-25 RX ADMIN — ASPIRIN 325 MG: 325 TABLET ORAL at 09:14

## 2022-07-25 RX ADMIN — DORZOLAMIDE HYDROCHLORIDE AND TIMOLOL MALEATE 1 DROP: 20; 5 SOLUTION/ DROPS OPHTHALMIC at 09:18

## 2022-07-25 RX ADMIN — CEFAZOLIN 2000 MG: 10 INJECTION, POWDER, FOR SOLUTION INTRAVENOUS at 14:46

## 2022-07-25 RX ADMIN — CEFTRIAXONE SODIUM 1000 MG: 1 INJECTION, POWDER, FOR SOLUTION INTRAMUSCULAR; INTRAVENOUS at 10:33

## 2022-07-25 RX ADMIN — MORPHINE SULFATE 2 MG: 2 INJECTION, SOLUTION INTRAMUSCULAR; INTRAVENOUS at 09:13

## 2022-07-25 RX ADMIN — PANTOPRAZOLE SODIUM 40 MG: 40 TABLET, DELAYED RELEASE ORAL at 09:13

## 2022-07-25 RX ADMIN — DOFETILIDE 500 MCG: 0.25 CAPSULE ORAL at 02:38

## 2022-07-25 RX ADMIN — LISINOPRIL 5 MG: 5 TABLET ORAL at 09:14

## 2022-07-25 RX ADMIN — ACETAMINOPHEN 1000 MG: 500 TABLET ORAL at 15:26

## 2022-07-25 RX ADMIN — TRAMADOL HYDROCHLORIDE 100 MG: 50 TABLET, COATED ORAL at 21:28

## 2022-07-25 RX ADMIN — Medication 1 CAPSULE: at 09:13

## 2022-07-25 RX ADMIN — DOFETILIDE 500 MCG: 0.25 CAPSULE ORAL at 15:25

## 2022-07-25 RX ADMIN — Medication 1 CAPSULE: at 15:26

## 2022-07-25 RX ADMIN — AZITHROMYCIN MONOHYDRATE 500 MG: 250 TABLET ORAL at 11:55

## 2022-07-25 RX ADMIN — CEFAZOLIN 2000 MG: 10 INJECTION, POWDER, FOR SOLUTION INTRAVENOUS at 21:52

## 2022-07-25 RX ADMIN — SODIUM CHLORIDE, PRESERVATIVE FREE 10 ML: 5 INJECTION INTRAVENOUS at 21:48

## 2022-07-25 RX ADMIN — MORPHINE SULFATE 2 MG: 2 INJECTION, SOLUTION INTRAMUSCULAR; INTRAVENOUS at 15:26

## 2022-07-25 RX ADMIN — DORZOLAMIDE HYDROCHLORIDE AND TIMOLOL MALEATE 1 DROP: 20; 5 SOLUTION/ DROPS OPHTHALMIC at 21:46

## 2022-07-25 RX ADMIN — ENOXAPARIN SODIUM 40 MG: 100 INJECTION SUBCUTANEOUS at 09:14

## 2022-07-25 RX ADMIN — DILTIAZEM HYDROCHLORIDE 120 MG: 120 CAPSULE, COATED, EXTENDED RELEASE ORAL at 09:14

## 2022-07-25 ASSESSMENT — PAIN SCALES - GENERAL
PAINLEVEL_OUTOF10: 7
PAINLEVEL_OUTOF10: 9
PAINLEVEL_OUTOF10: 8
PAINLEVEL_OUTOF10: 7
PAINLEVEL_OUTOF10: 8
PAINLEVEL_OUTOF10: 10
PAINLEVEL_OUTOF10: 9

## 2022-07-25 ASSESSMENT — PAIN DESCRIPTION - LOCATION
LOCATION: BACK
LOCATION: BACK

## 2022-07-25 ASSESSMENT — PAIN DESCRIPTION - DESCRIPTORS
DESCRIPTORS: ACHING;SHARP
DESCRIPTORS: ACHING

## 2022-07-25 ASSESSMENT — PAIN DESCRIPTION - ORIENTATION: ORIENTATION: MID

## 2022-07-25 NOTE — CONSULTS
Pharmacy to Dose Vancomycin    Dx: Bloodstream infection - Staph  Goal trough = 15-20  Pt wt = 75.9 kg  Recent Labs     07/23/22  0543 07/24/22  0534 07/25/22  0526   CREATININE 1.0 1.0 0.9     Recent Labs     07/22/22  0820 07/23/22  0543 07/24/22  0534   WBC 11.5* 11.3* 8.2     Regimen: 750 mg IV every 12 hours.   Start time: 07:50 on 07/25/2022  Exposure target: AUC24 (range)400-600 mg/L.hr   AUC24,ss: 449 mg/L.hr  Probability of AUC24 > 400: 63 %  Ctrough,ss: 14.8 mg/L  Probability of Ctrough,ss > 20: 21 %  Probability of nephrotoxicity (Lodise MILE 2009): 10 %  Vancomycin trough: 7/26 0800  Cristina Anand [facility] 7/25/2022 7:55 AM

## 2022-07-25 NOTE — CONSULTS
Infectious Diseases   Consult Note      Reason for Consult:  SA bacteremia    Requesting Physician:  DARSHANA Young       Date of Admission: 7/22/2022  Subjective:   CHIEF COMPLAINT:   none given       HPI:     Aury Gonsales is a 75yoM with history of afib, HLD, HTN, SqCC, BASSAM, CHF, L RONAK    ED 7/22/22 - 2 week history of progressive chest and back pain after a fall. CT CAP - multiple subacute L rib fractures, L chest wall edema, eliud trace pl effusion, prostatomegaly. WBC was 11.5. Admitted with working diagnosis pneumonia and started on rocephin and azithromycin     Urinary retention req griffin placement 7/23/22    BC collected on admission are positive for MSSA     Fever on admission has resolved   He is on RA   WBC is wnl with continued L shift, 25% bands today. Continued low back pain, severe, no improvement since admission. No LE weakness or paresthesias. Chest pain associated with th fall has improved. Current abx:  Cefazolin 2g q8  Culturelle        Past Surgical History:       Diagnosis Date    Arthritis     Atrial fibrillation ([address] 2007    S/P CARDIOVERSION, EF 49% on stress test 2009    Hyperlipidemia     Hypertension     SCC (squamous cell carcinoma)     Dr. Walden Degree    Sleep apnea     Systolic heart failure [facility])          Procedure Laterality Date    ABLATION OF DYSRHYTHMIC FOCUS      Afib PVI etc / right sided flutter line    CARDIOVERSION  2005    COLONOSCOPY  [id number]    tics    TOTAL HIP ARTHROPLASTY Left 2013       Social History:    TOBACCO:   reports that he has never smoked. He has never used smokeless tobacco.  ETOH:   reports no history of alcohol use. There is no history of illicit drug use or other significant epidemiologic exposures.       Family History:       Problem Relation Age of Onset    Cancer Mother         BONE    High Blood Pressure Mother     Heart Disease Father 79       Current Medications:    Current Facility-Administered Medications: ceFAZolin (ANCEF) 2000 mg in dextrose 5 % 100 mL IVPB, 2,000 mg, IntraVENous, Q8H  acetaminophen (TYLENOL) tablet 1,000 mg, 1,000 mg, Oral, 3 times per day  finasteride (PROSCAR) tablet 5 mg, 5 mg, Oral, Daily  lidocaine 4 % external patch 1 patch, 1 patch, TransDERmal, Daily  tamsulosin (FLOMAX) capsule 0.4 mg, 0.4 mg, Oral, Daily  metoprolol (LOPRESSOR) injection 5 mg, 5 mg, IntraVENous, Q5 Min PRN  aspirin tablet 325 mg, 325 mg, Oral, Daily  dilTIAZem (CARDIZEM CD) extended release capsule 120 mg, 120 mg, Oral, Daily  dorzolamide-timolol (COSOPT) 22.3-6.8 MG/ML ophthalmic solution 1 drop, 1 drop, Both Eyes, BID  lisinopril (PRINIVIL;ZESTRIL) tablet 5 mg, 5 mg, Oral, Daily  pantoprazole (PROTONIX) tablet 40 mg, 40 mg, Oral, BID AC  pravastatin (PRAVACHOL) tablet 80 mg, 80 mg, Oral, Daily  sodium chloride flush 0.9 % injection 5-40 mL, 5-40 mL, IntraVENous, 2 times per day  sodium chloride flush 0.9 % injection 5-40 mL, 5-40 mL, IntraVENous, PRN  0.9 % sodium chloride infusion, , IntraVENous, PRN  enoxaparin (LOVENOX) injection 40 mg, 40 mg, SubCUTAneous, Daily  polyethylene glycol (GLYCOLAX) packet 17 g, 17 g, Oral, Daily PRN  lactobacillus (CULTURELLE) capsule 1 capsule, 1 capsule, Oral, BID WC  hydrALAZINE (APRESOLINE) injection 10 mg, 10 mg, IntraVENous, Q4H PRN  traMADol (ULTRAM) tablet 50 mg, 50 mg, Oral, Q6H PRN **OR** traMADol (ULTRAM) tablet 100 mg, 100 mg, Oral, Q6H PRN  levothyroxine (SYNTHROID) tablet 75 mcg, 75 mcg, Oral, Daily  ipratropium-albuterol (DUONEB) nebulizer solution 1 ampule, 1 ampule, Inhalation, Q4H PRN  morphine (PF) injection 2 mg, 2 mg, IntraVENous, Q4H PRN  dofetilide (TIKOSYN) capsule 500 mcg, 500 mcg, Oral, Q12H      No Known Allergies       REVIEW OF SYSTEMS:    CONSTITUTIONAL:   per HPI    EYES:  negative for acute visual disturbance and icterus  HEENT:  negative for acute hearing loss, tinnitus, ear drainage, sinus pressure, nasal congestion, epistaxis and snoring  RESPIRATORY:  No cough, tenderness T/L spine or paraspinal muscles   SKIN:  normal skin color, texture, turgor and no redness, warmth, or swelling. No palpable nodules or stigmata of embolic phenomenon  NEUROLOGIC: nonfocal exam  ACCESS:   PIV site ok     DATA:    Old records have been reviewed    CBC:  Recent Labs     07/23/22  0543 07/24/22  0534   WBC 11.3* 8.2   RBC 4.13* 4.07*   HGB 11.9* 11.9*   HCT 35.9* 35.3*    233   MCV 87.0 86.8   MCH 28.7 29.3   MCHC 33.1 33.7   RDW 14.7 14.8   BANDSPCT  --  25*      BMP:  Recent Labs     07/23/22  0543 07/24/22  0534 07/25/22  0526   * 131* 133*   K 3.9 3.6 3.5   CL 95* 94* 95*   CO2 24 26 28   BUN 15 20 20   CREATININE 1.0 1.0 0.9   CALCIUM 9.6 9.6 9.7   GLUCOSE 126* 132* 115*        Cultures:   7/22 BC x2 MSSA   7/23 UA neg       Radiology Review:  All pertinent images / reports were reviewed as a part of this visit. 7/22/22 CT CAP   Impression   1. Multiple subacute left ribs healing fractures involving left 3rd through 1   10th ribs anterolaterally, correlate for point tenderness. Mild left chest   wall edema and left upper abdominal wall edema along the fractures. 2. Bilateral trace pleural effusion with bibasilar infiltrates. There is no   pneumothorax. 3. No acute finding in the abdomen pelvis. 4. Colonic diverticulosis. 5. Markedly enlarged prostate.        Assessment:     Patient Active Problem List   Diagnosis    Hyperlipidemia    HTN (hypertension)    Squamous cell carcinoma of skin    Bilateral shoulder tendinopathy    Primary osteoarthritis of both shoulders    History of joint swelling    Pre-diabetes    Normocytic normochromic anemia    Moderate obstructive sleep apnea    Presence of Watchman left atrial appendage closure device    Paroxysmal atrial fibrillation (HCC)    Hypercalcemia    Coronary artery calcification seen on CT scan    HH (hiatus hernia)    Prostate enlargement    Splenic artery aneurysm (HCC)    Calculus of gallbladder without cholecystitis without obstruction    Diverticulosis of colon without diverticulitis    Periodic limb movement sleep disorder    Pulmonary infiltrate    Anemia due to chronic blood loss    Atypical chest pain    BPH (benign prostatic hyperplasia)    Chronic anticoagulation    Coronary atherosclerosis    ED (erectile dysfunction)    Elevated serum cholesterol    Encounter for monitoring sotalol therapy    Essential thrombocytosis (HCC)    Gastroesophageal reflux disease without esophagitis    Generalized osteoarthritis    GI bleed    History of seronegative inflammatory arthritis    Intestinal malabsorption    Osteoarthritis of left hip    Overweight with body mass index (BMI) 25.0-29.9    Status post left hip replacement    Benign essential hypertension    Rhinitis, chronic    S/P placement of VNS (vagus nerve stimulation) device    Hypothyroidism due to medication    Atrial fibrillation with RVR (HCC)    Bilateral cataracts    Entropion of both eyes    Atrial fibrillation (HCC)    Gram-negative pneumonia (HCC)    Closed fracture of multiple ribs of left side with routine healing    Acute midline low back pain without sciatica    MSSA bacteremia    Elevated PSA         Complicated, community onset MSSA bacteremia   Primary source of bacteremia is not clear   CT CAP without fidnigns of metastatic foci of infection   Indwelling L RONAK without acute change in symptomatology to suggest seeding of the prosthesis    Acute low back following trauma but with pain persisting as other trauma-associated pain is waning   Concern in this context is hematogenous seeding of the spine and osteo-discitis.   Neurologically without focal deficits at this time except for the urinary retention, confounded by enlarged prostate     Murmur    NKDA       -continue cefazolin as ordered   -repeat BC  -MRI T/L spine w/wo malika  -baseline ESR, CRP   -echo   -watching for any developing s/s metastatic foci of infection   -will need at least 4 weeks IV abx, hold off on PICC pending clearance of bacteremia     Further recs to follow results of MRI and echo     Will follow with you        Martinez Walter M.D. Thank you for the opportunity to participate in the care of your patient.     Please do not hesitate to contact me:   [phone] office

## 2022-07-25 NOTE — PROGRESS NOTES
Patient has MRI lumbar and thoracic w/wo contrast ordered. We are unable to scan due to MRI conditions for his Inspire Implant. Requires B1+ade, which our scanners here at [facility] are unable to accommodate. Notified RN and cancelled orders.

## 2022-07-25 NOTE — CONSULTS
Consult placed via [employer]    Who:  Dr. Andrea You  Date:7/25/2022,  Time:10:18 AM        Electronically signed by Sergio Sethi on 7/25/2022 at 10:18 AM

## 2022-07-25 NOTE — DISCHARGE INSTR - COC
Continuity of Care Form    Patient Name: Radha Romo   :  1950  MRN:  [id number]    Admit date:  2022  Discharge date:  2022    Code Status Order: Full Code   Advance Directives:     Admitting Physician:  Haylee Pate MD  PCP: Phoebe Broussard, APRN - CNP    Discharging Nurse: Jesus Greenfield 23 Unit/Room#: 2142/7571-20  Discharging Unit Phone Number: [phone]    Emergency Contact:   Extended Emergency Contact Information  Primary Emergency Contact: [facility]  Address: [facility] [address] [facility] Phone: [phone]  Relation: Spouse  Secondary Emergency Contact: [address] Phone: [phone]  Relation: Child    Past Surgical History:  Past Surgical History:   Procedure Laterality Date    ABLATION OF DYSRHYTHMIC FOCUS      Afib PVI etc / right sided flutter line    CARDIOVERSION      COLONOSCOPY  [id number]    tics    TOTAL HIP ARTHROPLASTY Left        Immunization History:   Immunization History   Administered Date(s) Administered    COVID-19, J&J, (age 18y+), IM, 0.5 mL 2021    COVID-19, PFIZER PURPLE top, DILUTE for use, (age 15 y+), 30mcg/0.3mL 2021    DT (pediatric) 2000    Influenza 10/13/2010, 10/19/2011    Influenza Vaccine, unspecified formulation 10/13/2010, 10/19/2011, 2012, 2016    Influenza, High Dose (Fluzone 65 yrs and older) 10/04/2018, 10/01/2019    Influenza, Intradermal, Preservative free 2012    Influenza, Quadv, adjuvanted, 65 yrs +, IM, PF (Fluad) 10/13/2020, 10/22/2021    Pneumococcal Conjugate 13-valent (Rkoxmnt49) 2016    Pneumococcal Polysaccharide (Zdxiplcyc42) 2012, 2012    Tdap (Boostrix, Adacel) 2012    Zoster Live (Zostavax) 10/17/2015       Active Problems:  Patient Active Problem List   Diagnosis Code    Hyperlipidemia E78.5    HTN (hypertension) I10    Squamous cell carcinoma of skin Bilateral shoulder tendinopathy M67.911, M67.912    Primary osteoarthritis of both shoulders M19.011, M19.012    History of joint swelling Z87.39    Pre-diabetes R73.03    Anemia D64.9    Moderate obstructive sleep apnea G47.33    Presence of Watchman left atrial appendage closure device Z95.818    Paroxysmal atrial fibrillation (HCC) I48.0    Hypercalcemia E83.52    Coronary artery calcification seen on CT scan I25.10    HH (hiatus hernia) K44.9    Prostate enlargement N40.0    Splenic artery aneurysm (HCC) I72.8    Calculus of gallbladder without cholecystitis without obstruction K80.20    Diverticulosis of colon without diverticulitis K57.30    Periodic limb movement sleep disorder G47.61    Pulmonary infiltrate R91.8    Anemia due to chronic blood loss D50.0    Atypical chest pain R07.89    BPH (benign prostatic hyperplasia) N40.0    Chronic anticoagulation Z79.01    Coronary atherosclerosis I25.10    ED (erectile dysfunction) N52.9    Elevated serum cholesterol E78.9    Encounter for monitoring sotalol therapy Z51.81, Z79.899    Essential thrombocytosis (HCC) D47.3    Gastroesophageal reflux disease without esophagitis K21.9    Generalized osteoarthritis M15.9    GI bleed K92.2    History of seronegative inflammatory arthritis Z87.39    Intestinal malabsorption K90.9    Osteoarthritis of left hip M16.12    Overweight with body mass index (BMI) 25.0-29.9 E66.3    Status post left hip replacement Z96.642    Benign essential hypertension I10    Rhinitis, chronic J31.0    S/P placement of VNS (vagus nerve stimulation) device Z96.89    Hypothyroidism due to medication E03.2    Atrial fibrillation with RVR ([facility]) I48.91    Bilateral cataracts H26.9    Entropion of both eyes H02.003, H02.006    Atrial fibrillation ([facility]) I48.91    Gram-negative pneumonia ([facility]) J15.6       Isolation/Infection:   Isolation            No Isolation          Patient Infection Status       Infection Onset Added Last Indicated Last Indicated By Review Planned Expiration Resolved Resolved By    None active    Resolved    COVID-19 (Rule Out) 07/22/22 07/22/22 07/22/22 SARS-CoV-2 NAAT (Rapid) (Ordered)   07/22/22 Rule-Out Test Resulted    COVID-19 (Rule Out) 05/19/20 05/19/20 05/19/20 COVID-19 (Ordered)   05/20/20 Rule-Out Test Resulted            Nurse Assessment:  Last Vital Signs: /70   Pulse 83   Temp 97.9 °F (36.6 °C) (Oral)   Resp 16   Ht 5' 8\" (1.727 m)   Wt 167 lb 5.3 oz (75.9 kg)   SpO2 94%   BMI 25.44 kg/m²     Last documented pain score (0-10 scale): Pain Level: 9  Last Weight:   Wt Readings from Last 1 Encounters:   07/25/22 167 lb 5.3 oz (75.9 kg)     Mental Status:  oriented and alert    IV Access:  - PICC - site  {Anatomy; iv placement site:[id number]}, insertion date: 7/28/2022    Nursing Mobility/ADLs:  Walking   Independent  Transfer  Independent  Bathing  Independent  Dressing  Independent  [address]  Independent  Med Delivery   whole    Wound Care Documentation and Therapy:        Elimination:  Continence: Bowel: Yes  Bladder: griffin catheter  Urinary Catheter: Insertion Date: 7/25/2022 and Indication for Use of Catheter: Acute urinary retention/obstruction   Colostomy/Ileostomy/Ileal Conduit: No       Date of Last BM: 7/27/2022    Intake/Output Summary (Last 24 hours) at 7/25/2022 1001  Last data filed at 7/25/2022 0912  Gross per 24 hour   Intake 620 ml   Output 600 ml   Net 20 ml     I/O last 3 completed shifts:   In: 500 [P.O.:500]  Out: 2100 [Urine:2100]    Safety Concerns:     None    Impairments/Disabilities:      None    Nutrition Therapy:  Current Nutrition Therapy:   - Oral Diet:  General    Routes of Feeding: Oral  Liquids: No Restrictions  Daily Fluid Restriction: no  Last Modified Barium Swallow with Video (Video Swallowing Test): not done    Treatments at the Time of Hospital Discharge:   Respiratory Treatments: n/a  Oxygen Therapy:  is not on home oxygen therapy.   Ventilator:    - No ventilator support    Rehab Therapies: IV therapy  Weight Bearing Status/Restrictions: No weight bearing restrictions  Other Medical Equipment (for information only, NOT a DME order):  none  Other Treatments: HOME HEALTH CARE: LEVEL 3 [address] to establish plan of care for patient over 60 day period   Nursing  Initial home SN evaluation visit to occur within 24-48 hours for:  1)  medication management  2)  VS and clinical assessment  3)  S&S chronic disease exacerbation education + when to contact MD/NP  4)  care coordination  Medication Reconciliation during 1st SN visit  PT/OT/Speech   Evaluations in home within 24-48 hours of discharge to include DME and home safety   Frontload therapy 5 days, then 3x a week   OT to evaluate if patient has [address] needs for personal care    evaluation within 24-48 hours to evaluate resources & insurance for potential AL, [place], LTC, and Medicaid options   Palliative Care referral within 5 days of hospital discharge   PCP Visit scheduled within 3 - 7 days of hospital discharge    [facility] (If patient is agreeable and meets guidelines)      Patient's personal belongings (please select all that are sent with patient):  None    RN SIGNATURE:  Electronically signed by Bryce Esteban RN on 7/28/22 at 3:19 PM EDT    CASE MANAGEMENT/SOCIAL WORK SECTION    Inpatient Status Date: 7/22/22    Readmission Risk Assessment Score:  Readmission Risk              Risk of Unplanned Readmission:  [id number]           Discharging to Facility/ [address] [place] [id number]   [address]  IV Antibiotics      [phone]    / signature: Electronically signed by Luis Enrique Farah RN on 7/28/22 at 1:48 PM EDT    PHYSICIAN SECTION    Prognosis: Good    Condition at Discharge: Stable    Rehab Potential (if transferring to Rehab): Good    Recommended Labs or Other Treatments After Discharge: SN IV antibiotics. PT/OT      Recommended Follow-up, Labs or Other Treatments After Discharge:      ID INFUSION ORDERS  Cefazolin 2g q8 continued through 9/7/22  Weekly CBC diff, BUN, creatinine, CRP, LFTs faxed to [phone]   Routine CVC care  Call with ID related concerns   See Dr. Bethanne Cabot in 2-3 weeks, call for appt time and with concerns [phone]   Diagnosis complicated SA bacteremia and possible lumbar spine infection   Gracia Branch MD         Physician Certification: I certify the above information and transfer of Allegra Goldstein  is necessary for the continuing treatment of the diagnosis listed and that he requires [address] for greater 30 days.      Update Admission H&P: No change in H&P    PHYSICIAN SIGNATURE:  Electronically signed by DARSHANA Campos CNP on 7/27/22 at 9:05 AM EDT

## 2022-07-25 NOTE — CARE COORDINATION
Chart reviewed. S/P fall with rib fractures, pulmonary contusion and CAP. Pt is IPTA, still works construction. Denies DME needs. Pt noted to have urinary retention, with griffin catheter placement this admit. He will discharge with griffin catheter in place and follow up with Urology as an outpatient. Therapy recommending home PT, transitioning to outpatient PT. Pt is agreeable to home therapy. Referral placed to [facility].  Gloria Garcia RN

## 2022-07-25 NOTE — PLAN OF CARE
Problem: Safety - Adult  Goal: Free from fall injury  7/25/2022 0115 by Gloria Chen RN  Outcome: Progressing  7/24/2022 1557 by Nano Portillo RN  Outcome: Progressing

## 2022-07-25 NOTE — PROGRESS NOTES
Pt a/o. VSS. Shift assessment complete and charted. Pt c/o back pain that is constant and achey and then when moving it is sharp and stabbing. Admin prn analgesic as ordered and requested but is not helping much- This RN let Dr. Weston Carpio know at bedside. Will provide heat pack and see if that helps. Pt denies cough and sob. F/C draining yellow urine. Pt stable and denied needs when writer left room.

## 2022-07-25 NOTE — PROGRESS NOTES
Hospitalist Progress Note      PCP: Arturo Coates, APRN - CNP    Date of Admission: 7/22/2022    Chief Complaint: chest pain, shortness of breath    Hospital Course: This is a 74 y.o. WM with PMHx sig for afib s/p multiple cardioversions and ablations and now s/p Watchman presents 10 days after fall on concrete with severe chest pain. PTt did not have syncopal episode but merely tripped and fell hard on chest.  The chest pain was mild at first until he was working under his sink and twisted his back. Then started with severe chest pain and significant shortness of breath. He was found to have rib fracture from ribs 3-10 on the L along with mild soft tissue chest wall edema extending to lateral abd wall. Has significant osteopenia of spine, and multiple chronic appearing deformities of thoracic spine. Pt was unaware that he had broken any ribs which is very surprising. He dose have significant atelectasis with bibasilar infiltrates. Likely early pneumonia. Subjective: denies chest pain, dyspnea, n/v/d, dysuria, fever, chills, headache.  Complains of low back pain       Medications:  Reviewed    Infusion Medications    sodium chloride       Scheduled Medications    ceFAZolin  2,000 mg IntraVENous Q8H    acetaminophen  1,000 mg Oral 3 times per day    finasteride  5 mg Oral Daily    lidocaine  1 patch TransDERmal Daily    tamsulosin  0.4 mg Oral Daily    aspirin  325 mg Oral Daily    dilTIAZem  120 mg Oral Daily    dorzolamide-timolol  1 drop Both Eyes BID    lisinopril  5 mg Oral Daily    pantoprazole  40 mg Oral BID AC    pravastatin  80 mg Oral Daily    sodium chloride flush  5-40 mL IntraVENous 2 times per day    enoxaparin  40 mg SubCUTAneous Daily    lactobacillus  1 capsule Oral BID WC    levothyroxine  75 mcg Oral Daily    dofetilide  500 mcg Oral Q12H     PRN Meds: metoprolol, sodium chloride flush, sodium chloride, polyethylene glycol, hydrALAZINE, traMADol **OR** traMADol, TROPONINI in the last 72 hours. Urinalysis:      Lab Results   Component Value Date/Time    NITRU Negative 07/23/2022 07:15 PM    WBCUA 0-2 07/23/2022 07:15 PM    BACTERIA 3+ 07/23/2022 07:15 PM    RBCUA 21-50 07/23/2022 07:15 PM    BLOODU MODERATE 07/23/2022 07:15 PM    SPECGRAV >=1.030 07/23/2022 07:15 PM    GLUCOSEU Negative 07/23/2022 07:15 PM    GLUCOSEU NEGATIVE 10/21/2011 09:15 AM       Radiology:  CT CHEST ABDOMEN PELVIS W CONTRAST   Final Result   1. Multiple subacute left ribs healing fractures involving left 3rd through 1   10th ribs anterolaterally, correlate for point tenderness. Mild left chest   wall edema and left upper abdominal wall edema along the fractures. 2. Bilateral trace pleural effusion with bibasilar infiltrates. There is no   pneumothorax. 3. No acute finding in the abdomen pelvis. 4. Colonic diverticulosis. 5. Markedly enlarged prostate. XR CHEST PORTABLE   Final Result   No acute process.          MRI THORACIC SPINE W WO CONTRAST    (Results Pending)   MRI LUMBAR SPINE WO CONTRAST    (Results Pending)       Assessment/Plan:    Active Hospital Problems    Diagnosis     Closed fracture of multiple ribs of left side with routine healing [S22.42XD]      Priority: Medium    Acute midline low back pain without sciatica [M54.50]      Priority: Medium    MSSA bacteremia [R78.81, B95.61]      Priority: Medium    Elevated PSA [R97.20]      Priority: Medium    Diverticulosis of colon without diverticulitis [K57.30]     Prostate enlargement [N40.0]     Normocytic normochromic anemia [D64.9]      Chest pain  -ACS ruled out: negative trop, EKG non-acute    Multiple left-sided rib fractures  -Subacute fractures involving ribs 3-10, anterolaterally  -Fell onto concrete approximately 10 days prior to coming to the ED  -Pain management    Gram negative pneumonia, sepsis present on arrival  -Likely community-acquired  -On arrival was tachycardic, febrile, and had leukocytosis  -Edward x3 days  -Ceftriaxone x5 days  -Pulmonary toilet    MSSA bacteremia  -Afebrile, no leukocytosis  -Continue ceftriaxone  -ID consulted, appreciate recommendations  -Repeat blood cultures 7/26    Acute urinary retention  -Sotelo catheter in place  -Urology following  -Avoid narcotics if able  -Enlarged prostate noted on CT    BPH  -With PSA 6.65  -Continue flomax, finasteride  -Avoid narcotics as able given urinary retention  -Urology consulted, appreciate recommendations    Atrial fibrillation  -Continue home dose tikosyn, cardizem  -Monitor on telemetry    Chronic anemia  -H/H stable  -Continue to monitor    Back pain  -Reported pain in thoracic and lumbar areas  -Is getting none to minimal relief with current pain medications  -Will obtain MRI to further evaluate    DVT Prophylaxis: lovenox  Diet: ADULT DIET;  Regular; Low Fat/Low Chol/High Fiber/2 gm Na  Code Status: Full Code    PT/OT Eval Status: home with assist PRN, home PT/OT    Dispo - >2 days    Felicia Cabot, APRN - CNP

## 2022-07-25 NOTE — PROGRESS NOTES
INPATIENT PULMONARY CRITICAL CARE PROGRESS NOTE      Reason for visit    Chest pain/multiple rib fractures     SUBJECTIVE: Patient when seen this morning was not complaining of any significant cough or expectoration or shortness of breath or wheezing, patient did not have too much of pleuritic chest pain, patient was complaining of significant low back pain which has been acute and which is debilitating as per the patient, no radiation of the pain to the legs, patient does not have any urinary incontinence, patient has been given multiple medications for pain without any significant improvement, patient does not have any significant fever or chills, patient was afebrile and hemodynamically maintained, patient was on room air oxygen was saturating 95%, patient has sinus rhythm on the monitor, patient urine output as documented in epic was on the lower side with cumulative fluid balance of -1.5 L, patient was alert and oriented, no other pertinent review of system of concern           Physical Exam:  Blood pressure 125/73, pulse 83, temperature 97.6 °F (36.4 °C), temperature source Oral, resp. rate 18, height 5' 8\" (1.727 m), weight 167 lb 5.3 oz (75.9 kg), SpO2 95 %.'   Constitutional:  No acute distress. HENT:  Oropharynx is clear and moist. No thyromegaly. Eyes:  Conjunctivae are normal. Pupils equal, round, and reactive to light. No scleral icterus. Neck: . No tracheal deviation present. No obvious thyroid mass. Cardiovascular: Normal rate, regular rhythm, normal heart sounds. No right ventricular heave. No lower extremity edema. Pulmonary/Chest: No wheezes. No rales. Chest wall is not dull to percussion. No accessory muscle usage or stridor. Abdominal: Soft. Bowel sounds present. No distension or hernia. No tenderness. Musculoskeletal: No cyanosis. No clubbing. No obvious joint deformity. Lymphadenopathy: No cervical or supraclavicular adenopathy. Skin: Skin is warm and dry.  No rash or nodules on the exposed extremities. Psychiatric: Normal mood and affect. Behavior is normal.  No anxiety. Neurologic: Alert, awake and oriented. PERRL. Speech fluent        Results:  CBC:   Recent Labs     07/23/22  0543 07/24/22  0534   WBC 11.3* 8.2   HGB 11.9* 11.9*   HCT 35.9* 35.3*   MCV 87.0 86.8    233     BMP:   Recent Labs     07/23/22  0543 07/24/22  0534 07/25/22  0526   * 131* 133*   K 3.9 3.6 3.5   CL 95* 94* 95*   CO2 24 26 28   BUN 15 20 20   CREATININE 1.0 1.0 0.9       UA:  Recent Labs     07/23/22  1915   COLORU GRETA*   PHUR 6.0   WBCUA 0-2   RBCUA 21-50*   MUCUS 2+*   BACTERIA 3+*   CLARITYU Clear   SPECGRAV >=1.030   LEUKOCYTESUR Negative   UROBILINOGEN 0.2   BILIRUBINUR Negative   BLOODU MODERATE*   GLUCOSEU Negative   AMORPHOUS 2+       Imaging:  I have reviewed radiology images personally. CT CHEST ABDOMEN PELVIS W CONTRAST   Final Result   1. Multiple subacute left ribs healing fractures involving left 3rd through 1   10th ribs anterolaterally, correlate for point tenderness. Mild left chest   wall edema and left upper abdominal wall edema along the fractures. 2. Bilateral trace pleural effusion with bibasilar infiltrates. There is no   pneumothorax. 3. No acute finding in the abdomen pelvis. 4. Colonic diverticulosis. 5. Markedly enlarged prostate. XR CHEST PORTABLE   Final Result   No acute process. XR CHEST PORTABLE    Result Date: 7/22/2022  EXAMINATION: ONE XRAY VIEW OF THE CHEST 7/22/2022 8:34 am COMPARISON: 10/11/2021 HISTORY: ORDERING SYSTEM PROVIDED HISTORY: chest pain TECHNOLOGIST PROVIDED HISTORY: Reason for exam:->chest pain Reason for Exam: chest pain FINDINGS: The lungs are without acute focal process. There is no effusion or pneumothorax. The cardiomediastinal silhouette is stable. The osseous structures are stable. No acute process.      CT CHEST ABDOMEN PELVIS W CONTRAST    Result Date: 7/22/2022  EXAMINATION: CT OF THE CHEST, ABDOMEN, AND PELVIS WITH CONTRAST 7/22/2022 9:43 am TECHNIQUE: CT of the chest, abdomen and pelvis was performed with the administration of intravenous contrast. Multiplanar reformatted images are provided for review. Automated exposure control, iterative reconstruction, and/or weight based adjustment of the mA/kV was utilized to reduce the radiation dose to as low as reasonably achievable. COMPARISON: 03/11/2020 HISTORY: ORDERING SYSTEM PROVIDED HISTORY: fall with ongoing chest and abdominal pain TECHNOLOGIST PROVIDED HISTORY: Reason for exam:->fall with ongoing chest and abdominal pain Additional Contrast?->None Decision Support Exception - unselect if not a suspected or confirmed emergency medical condition->Emergency Medical Condition (MA) Reason for Exam: fall x2 weeks ago, c/o chest and back pain x2 days, SOB FINDINGS: Chest: Mediastinum: There is no sternal fracture or mediastinal hematoma. Mild cardiomegaly. No pericardial effusion. Moderate to severe coronary calcifications identified. No adenopathy noted. Lungs/pleura: Bibasilar infiltrates noted. Bilateral trace pleural effusions. .  No pneumothorax. Soft Tissues/Bones: Subacute appearing healing fractures identified involving the left 3rd through 10th ribs anterolaterally. Mild soft tissue left chest wall edema extending to left upper lateral abdominal wall. Severe osteopenia. Kyphosis noted with multiple chronic anterior wedge deformities of midthoracic vertebral bodies. No acute fractures noted in the thoracic spine. A battery pack is noted in the right anterior chest wall Abdomen/Pelvis: Organs: Mild diffuse hepatic steatosis. Focal fatty infiltration noted along the falciform ligament. Normal gallbladder, pancreas, spleen and adrenals. Simple cyst in the kidneys. No evidence of renal trauma noted. Normal ureters. GI/Bowel: No acute abnormality or trauma noted to the stomach, small bowel. There is no small bowel obstruction.   No free intraperitoneal air identified. Colonic diverticulosis involving the descending and sigmoid colon without acute diverticulitis. Pelvis: Markedly enlarged prostate with mass effect on the urinary bladder. No acute trauma to the urinary bladder. Mild urinary bladder wall thickening noted. No pelvic hematoma or adenopathy. Peritoneum/Retroperitoneum: Abdominal aorta is normal in caliber with no evidence of aneurysmal dilatation or dissection. No retroperitoneal adenopathy or bleed. . Bones/Soft Tissues: Significant osteopenic changes and degenerative changes noted in the bony structures. .  Moderate lumbar degenerative changes noted. Left hip arthroplasty is intact. No displaced fractures noted in the pelvis. 1. Multiple subacute left ribs healing fractures involving left 3rd through 1 10th ribs anterolaterally, correlate for point tenderness. Mild left chest wall edema and left upper abdominal wall edema along the fractures. 2. Bilateral trace pleural effusion with bibasilar infiltrates. There is no pneumothorax. 3. No acute finding in the abdomen pelvis. 4. Colonic diverticulosis. 5. Markedly enlarged prostate.       Latest Reference Range & Units 7/23/22 05:43 7/24/22 05:34 7/25/22 05:26   Sodium 136 - 145 mmol/L 132 (L) 131 (L) 133 (L)   Potassium 3.5 - 5.1 mmol/L 3.9 3.6 3.5   Chloride 99 - 110 mmol/L 95 (L) 94 (L) 95 (L)   CO2 21 - 32 mmol/L 24 26 28   BUN,BUNPL 7 - 20 mg/dL 15 20 20   Creatinine 0.8 - 1.3 mg/dL 1.0 1.0 0.9   Anion Gap 3 - 16  13 11 10   GFR Non- >60  >60 >60 >60   GFR  >60  >60 >60 >60   Magnesium 1.80 - 2.40 mg/dL   2.10   GLUCOSE, FASTING,GF 70 - 99 mg/dL 126 (H) 132 (H) 115 (H)   CALCIUM, SERUM, 020071 8.3 - 10.6 mg/dL 9.6 9.6 9.7      Latest Reference Range & Units 7/22/22 08:20 7/23/22 05:43 7/24/22 05:34   WBC 4.0 - 11.0 K/uL 11.5 (H) 11.3 (H) 8.2   RBC 4.20 - 5.90 M/uL 4.44 4.13 (L) 4.07 (L)   Hemoglobin Quant 13.5 - 17.5 g/dL 12.7 (L) 11.9 (L) 11.9 (L)   Hematocrit 40.5 - 52.5 % 38.5 (L) 35.9 (L) 35.3 (L)   MCV 80.0 - 100.0 fL 86.7 87.0 86.8   MCH 26.0 - 34.0 pg 28.5 28.7 29.3   MCHC 31.0 - 36.0 g/dL 32.9 33.1 33.7   MPV 5.0 - 10.5 fL 7.8 8.4 8.3   RDW 12.4 - 15.4 % 14.9 14.7 14.8   Platelet Count 855 - 450 K/uL 250 217 233   Neutrophils % % 81.9 81.5 48.0   Lymphocyte % % 5.6 5.7 8.0   Monocytes % % 11.9 12.5 19.0   Eosinophils % % 0.0 0.0 0.0   Basophils % % 0.6 0.3 0.0   Neutrophils Absolute 1.7 - 7.7 K/uL 9.4 (H) 9.2 (H) 6.0   Lymphocytes Absolute 1.0 - 5.1 K/uL 0.6 (L) 0.6 (L) 0.7 (L)   Monocytes Absolute 0.0 - 1.3 K/uL 1.4 (H) 1.4 (H) 1.6 (H)   Eosinophils Absolute 0.0 - 0.6 K/uL 0.0 0.0 0.0   Basophils Absolute 0.0 - 0.2 K/uL 0.1 0.0 0.0      Latest Reference Range & Units 12/27/12 14:17 12/13/19 15:33 7/24/22 05:34   Prostatic Specific Ag 0.00 - 4.00 ng/mL 2.20 2.53 6.65 (H)     Organism Staph aureus DNA Detected Abnormal     Blood Culture, Routine mecA gene not detected   See additional report for complete BCID panel. Organism Staphylococcus aureus Abnormal     Blood Culture, Routine POSITIVE for   Isolated two of two sets    Resulting Agency [address] [facility]          Susceptibility      Staphylococcus aureus (2)    Antibiotic Interpretation Microscan  Method Status    clindamycin Sensitive <=0.25 mcg/mL BACTERIAL SUSCEPTIBILITY PANEL BY HIPOLITO     erythromycin Sensitive <=0.25 mcg/mL BACTERIAL SUSCEPTIBILITY PANEL BY HIPOLITO     oxacillin Sensitive 0.5 mcg/mL BACTERIAL SUSCEPTIBILITY PANEL BY HIPOLITO     tetracycline Sensitive <=1 mcg/mL BACTERIAL SUSCEPTIBILITY PANEL BY HIPOLITO     trimethoprim-sulfamethoxazole Sensitive <=10 mcg/mL BACTERIAL SUSCEPTIBILITY PANEL BY HIPOLITO                  2019-Summary   Ejection fraction is visually estimated to be 55-60 %. Mild mitral and tricuspid regurgitation. Watchman device is well seated within the left atrial appendage. No leak   around the device   Mild to moderate aortic regurgitation.       ONE XRAY VIEW OF THE CHEST       7/22/2022 8:34 am       COMPARISON:   10/11/2021       HISTORY:   ORDERING SYSTEM PROVIDED HISTORY: chest pain   TECHNOLOGIST PROVIDED HISTORY:   Reason for exam:->chest pain   Reason for Exam: chest pain       FINDINGS:   The lungs are without acute focal process. There is no effusion or   pneumothorax. The cardiomediastinal silhouette is stable. The osseous   structures are stable. Impression   No acute process. Assessment:  Active Problems:    Closed fracture of multiple ribs of left side with routine healing    Acute midline low back pain without sciatica    MSSA bacteremia    Elevated PSA    Normocytic normochromic anemia    Prostate enlargement    Diverticulosis of colon without diverticulitis  Resolved Problems:    * No resolved hospital problems.  *          Plan:   Oxygen supplementation, if required, to keep saturation between 90 and 94% only  Patient was on room air oxygen when seen  Pulmonary toilet  Patient's x-ray chest does not show any pneumonic process  Patient has MSSA bacteremia on the blood cultures  Patient was given Rocephin and Zithromax by the admitting provider  Patient's Rocephin been changed to Ancef for now and can retry treated as per IM/ID  Patient does have history of watchman's procedure and patient has Bacteremia at this time-consider echocardiogram if deemed appropriate  Patient does not have any chest wall pain because of the rib fracture but patient is complaining of significant low back pain which is concerning to the patient and not being under control with the current medications-would recommend MRI of the back if deemed appropriate to assess for any fractures or any osteoblastic lesions of concern  Patient does have elevated PSA along with that patient has marked prostate enlargement on CT-consider urology consult if deemed appropriate  Evaluation and management of normocytic normochromic anemia as per IM  PUD and DVT prophylaxis as per IM    Case d/w patient/family and nursing       Further management as per clinical status and follow up on above recommendations as per IM      No other recommendation from Pulmonary/CCM stand point -will sign off -please call on PRN basis         Electronically signed by:  Alek Lancaster MD    7/25/2022    1:16 PM.

## 2022-07-25 NOTE — PROGRESS NOTES
Physician Progress Note      PATIENT:               Jeannette Dewitt  CSN #:                  [id number]  :                       1950  ADMIT DATE:       2022 8:18 AM  DISCH DATE:  RESPONDING  PROVIDER #:        René Diaz          QUERY TEXT:    Pt admitted with PNA. Pt noted to have + BC of Staph aureus along with fever   and tachycardia on arrival. H&P  says \"no evidence of sepsis\" and PN  says   \"CAP w/ sepsis\" If possible, please document in the progress notes and   discharge summary if you are evaluating and /or treating any of the following: The medical record reflects the following:  Risk Factors: + BC staph aureus, AF, rib fractures PTA, atelectasis and chest   wall contusion/pain    Clinical Indicators: per H&P- \"Likely gram neg pneumonia - early transition   from atelectasis - started on ceftriaxone and azithromycin - no evidence of   sepsis\"  per attending PN -\"Community acquired pneumonia with sepsis:  Leukocytosis   resolved. Blood culture positive for Staph aureus DNA. \"  on arrival -Temp 100.6, , BC + staph aureus 2 sets, WBC 11.5    Treatment: 2 L NS IVF bolus, Zithromax/ Rocephin IV, labs, XR, pain med,   pulmonary expansion maneuvers    Thank you,  [email or web address]  Options provided:  -- Sepsis w/ bacteremia d/t gram positive PNA, present on admission  -- Sepsis w/ bacteremia d/t PNA present on admission, now resolved  -- Bacteremia d/t gram positive PNA without Sepsis  -- Other - I will add my own diagnosis  -- Disagree - Not applicable / Not valid  -- Disagree - Clinically unable to determine / Unknown  -- Refer to Clinical Documentation Reviewer    PROVIDER RESPONSE TEXT:    This patient was treated for sepsis w/ bacteremia d/t PNA this admission,   which was present on admission and is currently resolved.     Query created by: Stephen Espinoza on 2022 9:23 AM      Electronically signed by:  René Diaz 2022 12:33 PM

## 2022-07-26 ENCOUNTER — APPOINTMENT (OUTPATIENT)
Dept: CT IMAGING | Age: 72
DRG: 871 | End: 2022-07-26
Payer: MEDICARE

## 2022-07-26 LAB
C-REACTIVE PROTEIN: 54.7 MG/L (ref 0–5.1)
LV EF: 58 %
LVEF MODALITY: NORMAL
SEDIMENTATION RATE, ERYTHROCYTE: 99 MM/HR (ref 0–20)

## 2022-07-26 PROCEDURE — 6370000000 HC RX 637 (ALT 250 FOR IP)

## 2022-07-26 PROCEDURE — 72133 CT LUMBAR SPINE W/O & W/DYE: CPT

## 2022-07-26 PROCEDURE — 72130 CT CHEST SPINE W/O & W/DYE: CPT

## 2022-07-26 PROCEDURE — 6370000000 HC RX 637 (ALT 250 FOR IP): Performed by: HOSPITALIST

## 2022-07-26 PROCEDURE — 6370000000 HC RX 637 (ALT 250 FOR IP): Performed by: UROLOGY

## 2022-07-26 PROCEDURE — 6360000002 HC RX W HCPCS: Performed by: INTERNAL MEDICINE

## 2022-07-26 PROCEDURE — 1200000000 HC SEMI PRIVATE

## 2022-07-26 PROCEDURE — 99232 SBSQ HOSP IP/OBS MODERATE 35: CPT | Performed by: INTERNAL MEDICINE

## 2022-07-26 PROCEDURE — 6360000004 HC RX CONTRAST MEDICATION

## 2022-07-26 PROCEDURE — 6370000000 HC RX 637 (ALT 250 FOR IP): Performed by: NURSE PRACTITIONER

## 2022-07-26 PROCEDURE — C8929 TTE W OR WO FOL WCON,DOPPLER: HCPCS

## 2022-07-26 PROCEDURE — 97116 GAIT TRAINING THERAPY: CPT

## 2022-07-26 PROCEDURE — 87040 BLOOD CULTURE FOR BACTERIA: CPT

## 2022-07-26 PROCEDURE — 6370000000 HC RX 637 (ALT 250 FOR IP): Performed by: INTERNAL MEDICINE

## 2022-07-26 PROCEDURE — 97530 THERAPEUTIC ACTIVITIES: CPT

## 2022-07-26 PROCEDURE — 2580000003 HC RX 258: Performed by: INTERNAL MEDICINE

## 2022-07-26 PROCEDURE — 6360000002 HC RX W HCPCS: Performed by: NURSE PRACTITIONER

## 2022-07-26 PROCEDURE — 86140 C-REACTIVE PROTEIN: CPT

## 2022-07-26 PROCEDURE — 36415 COLL VENOUS BLD VENIPUNCTURE: CPT

## 2022-07-26 PROCEDURE — 85652 RBC SED RATE AUTOMATED: CPT

## 2022-07-26 PROCEDURE — 97110 THERAPEUTIC EXERCISES: CPT

## 2022-07-26 RX ORDER — OXYCODONE HYDROCHLORIDE 5 MG/1
10 TABLET ORAL ONCE
Status: COMPLETED | OUTPATIENT
Start: 2022-07-26 | End: 2022-07-26

## 2022-07-26 RX ORDER — MECOBALAMIN 5000 MCG
5 TABLET,DISINTEGRATING ORAL NIGHTLY PRN
Status: DISCONTINUED | OUTPATIENT
Start: 2022-07-26 | End: 2022-07-28 | Stop reason: HOSPADM

## 2022-07-26 RX ADMIN — IOPAMIDOL 75 ML: 755 INJECTION, SOLUTION INTRAVENOUS at 15:53

## 2022-07-26 RX ADMIN — Medication 5 MG: at 20:46

## 2022-07-26 RX ADMIN — ACETAMINOPHEN 1000 MG: 500 TABLET ORAL at 04:28

## 2022-07-26 RX ADMIN — PANTOPRAZOLE SODIUM 40 MG: 40 TABLET, DELAYED RELEASE ORAL at 17:13

## 2022-07-26 RX ADMIN — OXYCODONE 10 MG: 5 TABLET ORAL at 11:56

## 2022-07-26 RX ADMIN — Medication 1 CAPSULE: at 17:13

## 2022-07-26 RX ADMIN — DORZOLAMIDE HYDROCHLORIDE AND TIMOLOL MALEATE 1 DROP: 20; 5 SOLUTION/ DROPS OPHTHALMIC at 08:33

## 2022-07-26 RX ADMIN — PRAVASTATIN SODIUM 80 MG: 80 TABLET ORAL at 20:46

## 2022-07-26 RX ADMIN — TRAMADOL HYDROCHLORIDE 100 MG: 50 TABLET, COATED ORAL at 14:39

## 2022-07-26 RX ADMIN — FINASTERIDE 5 MG: 5 TABLET, FILM COATED ORAL at 08:33

## 2022-07-26 RX ADMIN — TAMSULOSIN HYDROCHLORIDE 0.4 MG: 0.4 CAPSULE ORAL at 08:33

## 2022-07-26 RX ADMIN — TRAMADOL HYDROCHLORIDE 100 MG: 50 TABLET, COATED ORAL at 08:32

## 2022-07-26 RX ADMIN — MORPHINE SULFATE 2 MG: 2 INJECTION, SOLUTION INTRAMUSCULAR; INTRAVENOUS at 17:38

## 2022-07-26 RX ADMIN — DILTIAZEM HYDROCHLORIDE 120 MG: 120 CAPSULE, COATED, EXTENDED RELEASE ORAL at 08:33

## 2022-07-26 RX ADMIN — CEFAZOLIN 2000 MG: 10 INJECTION, POWDER, FOR SOLUTION INTRAVENOUS at 21:42

## 2022-07-26 RX ADMIN — DORZOLAMIDE HYDROCHLORIDE AND TIMOLOL MALEATE 1 DROP: 20; 5 SOLUTION/ DROPS OPHTHALMIC at 21:41

## 2022-07-26 RX ADMIN — TRAMADOL HYDROCHLORIDE 100 MG: 50 TABLET, COATED ORAL at 20:45

## 2022-07-26 RX ADMIN — CEFAZOLIN 2000 MG: 10 INJECTION, POWDER, FOR SOLUTION INTRAVENOUS at 13:34

## 2022-07-26 RX ADMIN — DOFETILIDE 500 MCG: 0.25 CAPSULE ORAL at 04:28

## 2022-07-26 RX ADMIN — PANTOPRAZOLE SODIUM 40 MG: 40 TABLET, DELAYED RELEASE ORAL at 08:33

## 2022-07-26 RX ADMIN — LISINOPRIL 5 MG: 5 TABLET ORAL at 08:33

## 2022-07-26 RX ADMIN — Medication 1 CAPSULE: at 08:33

## 2022-07-26 RX ADMIN — CEFAZOLIN 2000 MG: 10 INJECTION, POWDER, FOR SOLUTION INTRAVENOUS at 06:34

## 2022-07-26 RX ADMIN — ENOXAPARIN SODIUM 40 MG: 100 INJECTION SUBCUTANEOUS at 08:32

## 2022-07-26 RX ADMIN — DOFETILIDE 500 MCG: 0.25 CAPSULE ORAL at 17:13

## 2022-07-26 RX ADMIN — ASPIRIN 325 MG: 325 TABLET ORAL at 08:33

## 2022-07-26 RX ADMIN — LEVOTHYROXINE SODIUM 75 MCG: 0.05 TABLET ORAL at 08:33

## 2022-07-26 ASSESSMENT — PAIN SCALES - GENERAL
PAINLEVEL_OUTOF10: 0
PAINLEVEL_OUTOF10: 10
PAINLEVEL_OUTOF10: 4
PAINLEVEL_OUTOF10: 10

## 2022-07-26 NOTE — CARE COORDINATION
Chart review day 2. Management per Pulmonology, ID, and IM. S/p mechanical fall, resulting in several fractured ribs. CAP, ID following for management of ATBX. will need at least 4 weeks IV abx, \"hold off on PICC pending clearance of bacteremia\". MRI T/L spine w/wo malika ordered. Per Nursing note, unable to get MRI due to Inspire sleep apnea implant incompatibility. Pt IPTA, still works construction. CM following for IVABX needs. Referral placed to Alyx Santos will notify Divine Peter with [facility] to follow also for potential IVABX needs. Gloria Dominique RN     Spoke with pt and son, Aisha Velazco, at bedside. Most questions revolved around pain control. Pt states pain medication is not effective. CM notified RN. Pt sitting up in chair, states that getting OOB has \"helped a little\" with his back pain. Pt v/u that we are awaiting blood culture results for PICC line placement for possible long term IVABX. Pt continues to deny other discharge planning needs. CM will continue to monitor.  Gloria Dominique RN

## 2022-07-26 NOTE — PROGRESS NOTES
Pt a/o. VSS. Shift assessment complete and charted. Pt states he feels lousy and weaker today and yesterday. Lidocaine patch placed, heat pack provided and prn analgesic admin as ordered and requested. Pt stable and denied needs when writer left room.

## 2022-07-26 NOTE — PROGRESS NOTES
Hospitalist Progress Note      PCP: Nataliya Person APRN - CNP    Date of Admission: 7/22/2022    Chief Complaint: chest pain, shortness of breath    Hospital Course: This is a 74 y.o. WM with PMHx sig for afib s/p multiple cardioversions and ablations and now s/p Watchman presents 10 days after fall on concrete with severe chest pain. PTt did not have syncopal episode but merely tripped and fell hard on chest.  The chest pain was mild at first until he was working under his sink and twisted his back. Then started with severe chest pain and significant shortness of breath. He was found to have rib fracture from ribs 3-10 on the L along with mild soft tissue chest wall edema extending to lateral abd wall. Has significant osteopenia of spine, and multiple chronic appearing deformities of thoracic spine. Pt was unaware that he had broken any ribs which is very surprising. He dose have significant atelectasis with bibasilar infiltrates. Likely early pneumonia. Subjective: denies chest pain, dyspnea, n/v/d, dysuria, fever, chills, headache.  Complains of low back pain       Medications:  Reviewed    Infusion Medications    sodium chloride       Scheduled Medications    ceFAZolin  2,000 mg IntraVENous Q8H    finasteride  5 mg Oral Daily    lidocaine  1 patch TransDERmal Daily    tamsulosin  0.4 mg Oral Daily    aspirin  325 mg Oral Daily    dilTIAZem  120 mg Oral Daily    dorzolamide-timolol  1 drop Both Eyes BID    lisinopril  5 mg Oral Daily    pantoprazole  40 mg Oral BID AC    pravastatin  80 mg Oral Daily    sodium chloride flush  5-40 mL IntraVENous 2 times per day    enoxaparin  40 mg SubCUTAneous Daily    lactobacillus  1 capsule Oral BID WC    levothyroxine  75 mcg Oral Daily    dofetilide  500 mcg Oral Q12H     PRN Meds: melatonin, perflutren lipid microspheres, metoprolol, sodium chloride flush, sodium chloride, polyethylene glycol, hydrALAZINE, traMADol **OR** traMADol, ipratropium-albuterol, morphine      Intake/Output Summary (Last 24 hours) at 7/26/2022 1248  Last data filed at 7/26/2022 4826  Gross per 24 hour   Intake 701 ml   Output 850 ml   Net -149 ml       Physical Exam Performed:    BP (!) 150/75   Pulse 67   Temp 97.4 °F (36.3 °C) (Oral)   Resp 16   Ht 5' 8\" (1.727 m)   Wt 170 lb 9.6 oz (77.4 kg)   SpO2 99%   BMI 25.94 kg/m²     General appearance: resting comfortably in bed. No apparent distress, appears stated age and cooperative. HEENT: Pupils equal, round, and reactive to light. Conjunctivae/corneas clear. Neck: Supple, with full range of motion. No jugular venous distention. Trachea midline. Respiratory: Normal respiratory effort. Clear to auscultation, bilaterally without Rales/Wheezes/Rhonchi. Room air. Cardiovascular: Regular rate and rhythm with normal S1/S2. Murmur. No rubs or gallops. Abdomen: Soft, non-tender, non-distended with normal bowel sounds. : clear, yellow urine noted in griffin bag  Musculoskeletal: No clubbing, cyanosis or edema bilaterally. Full range of motion without deformity. Skin: Skin color, texture, turgor normal. No rashes or lesions. Neurologic: Neurovascularly intact without any focal sensory/motor deficits. Cranial nerves: II-XII intact, grossly non-focal.  Psychiatric: Alert and oriented, thought content appropriate, normal insight  Capillary Refill: Brisk,3 seconds, normal   Peripheral Pulses: +2 palpable, equal bilaterally       Labs:   Recent Labs     07/24/22  0534   WBC 8.2   HGB 11.9*   HCT 35.3*        Recent Labs     07/24/22  0534 07/25/22  0526   * 133*   K 3.6 3.5   CL 94* 95*   CO2 26 28   BUN 20 20   CREATININE 1.0 0.9   CALCIUM 9.6 9.7     No results for input(s): AST, ALT, BILIDIR, BILITOT, ALKPHOS in the last 72 hours. No results for input(s): INR in the last 72 hours. No results for input(s): Joni Rogers in the last 72 hours.     Urinalysis:      Lab Results   Component Value Date/Time    NITRU Negative 07/23/2022 07:15 PM    WBCUA 0-2 07/23/2022 07:15 PM    BACTERIA 3+ 07/23/2022 07:15 PM    RBCUA 21-50 07/23/2022 07:15 PM    BLOODU MODERATE 07/23/2022 07:15 PM    SPECGRAV >=1.030 07/23/2022 07:15 PM    GLUCOSEU Negative 07/23/2022 07:15 PM    GLUCOSEU NEGATIVE 10/21/2011 09:15 AM       Radiology:  CT CHEST ABDOMEN PELVIS W CONTRAST   Final Result   1. Multiple subacute left ribs healing fractures involving left 3rd through 1   10th ribs anterolaterally, correlate for point tenderness. Mild left chest   wall edema and left upper abdominal wall edema along the fractures. 2. Bilateral trace pleural effusion with bibasilar infiltrates. There is no   pneumothorax. 3. No acute finding in the abdomen pelvis. 4. Colonic diverticulosis. 5. Markedly enlarged prostate. XR CHEST PORTABLE   Final Result   No acute process.          CT LUMBAR SPINE W WO CONTRAST    (Results Pending)   CT THORACIC SPINE W WO CONTRAST    (Results Pending)       Assessment/Plan:    Active Hospital Problems    Diagnosis     Multiple closed fractures of ribs of left side [S22.42XA]      Priority: Medium    Acute midline low back pain without sciatica [M54.50]      Priority: Medium    MSSA bacteremia [R78.81, B95.61]      Priority: Medium    Elevated PSA [R97.20]      Priority: Medium    Diverticulosis of colon without diverticulitis [K57.30]     Prostate enlargement [N40.0]     Normocytic normochromic anemia [D64.9]      Chest pain  -ACS ruled out: negative trop, EKG non-acute    Multiple left-sided rib fractures  -Likely due to mechanical fall  -Subacute fractures involving ribs 3-10, anterolaterally  -Fell onto concrete approximately 10 days prior to coming to the ED  -Pain management    Gram negative pneumonia, sepsis present on arrival  -Likely community-acquired  -On arrival was tachycardic, febrile, and had leukocytosis  -Azithro course completed  -Continue ceftriaxone  -Pulmonary toilet    MSSA bacteremia  -Afebrile, no leukocytosis  -Continue ceftriaxone  -Per ID will need at least 4 weeks IV antibiotics  -Repeat blood cultures 7/26  -ESR 99, CRP 54.7  -ID consulted, appreciate recommendations    Acute urinary retention  -Sotelo catheter in place  -Urology following, appreciate recommendations  -Avoid narcotics if able  -Enlarged prostate noted on CT    BPH  -With PSA 6.65  -Continue flomax, finasteride  -Avoid narcotics as able given urinary retention  -Urology consulted, appreciate recommendations    Low back pain  -Not well managed with PRN tramadol and tylenol  -A one time dose of oxycodone will be given to see if pt gets any relief  -Unable to obtain MRI at this hospital due to Insipre implant. Discussed with ID, and will obtain CT of thoracic and lumbar spine to evaluate for metastasis and/or infection    Atrial fibrillation  -Continue home dose tikosyn, cardizem  -Monitor on telemetry    Chronic anemia  -H/H stable, 11.9/35.3  -Continue to monitor      DVT Prophylaxis: lovenox  Diet: ADULT DIET;  Regular; Low Fat/Low Chol/High Fiber/2 gm Na  Code Status: Full Code    PT/OT Eval Status: home with assist PRN, home PT/OT    Dispo - >2 days    DARSHANA Thrasher - CNP

## 2022-07-26 NOTE — CARE COORDINATION
Referral received to check IV Benefits. Patients benefit information is as follows: Therapy:Cefazolin 2g Q8    Co-Insurance %:80/20  OOP:$3400        Amt Met:$314    Pt.  Copay:$96/week for Cefazolin, $23/day for per anand    Electronically signed by David Grayson on 7/26/2022 at 11:05 AM   Cell # [phone], Office # [phone]

## 2022-07-26 NOTE — PROGRESS NOTES
Infectious Disease Follow up Notes    CC :  MSSA bacteremia, low back pain      Antibiotics:   Cefazolin 2g q8    Admit Date:   7/22/2022  Hospital Day: 5    Subjective:   He remains AF   Feels weak all over. No LE paresthesias, numbness. Sotelo in place  Low back still very painful.     Not candidate for MRI 2/2 Inspire device (hypoglossal n stimulator)    Objective:     Patient Vitals for the past 8 hrs:   BP Temp Temp src Pulse Resp SpO2   07/26/22 1443 139/84 97 °F (36.1 °C) Oral 73 16 99 %       EXAM:  General:   alert, conversant, NAD    HEENT:   NCAT, PERRL, sclera anicteric   NECK:   Supple       LUNGS:   CTA eliud without W/R/R    CV:   RRR    ABD:   soft, flat, NT      EXT: No stigmata of emboli  No focal rash  No LE edema          LINE:    PIV in place  Sotelo        Scheduled Meds:   ceFAZolin  2,000 mg IntraVENous Q8H    finasteride  5 mg Oral Daily    lidocaine  1 patch TransDERmal Daily    tamsulosin  0.4 mg Oral Daily    aspirin  325 mg Oral Daily    dilTIAZem  120 mg Oral Daily    dorzolamide-timolol  1 drop Both Eyes BID    lisinopril  5 mg Oral Daily    pantoprazole  40 mg Oral BID AC    pravastatin  80 mg Oral Daily    sodium chloride flush  5-40 mL IntraVENous 2 times per day    enoxaparin  40 mg SubCUTAneous Daily    lactobacillus  1 capsule Oral BID WC    levothyroxine  75 mcg Oral Daily    dofetilide  500 mcg Oral Q12H       Continuous Infusions:   sodium chloride            Data Review:    Lab Results   Component Value Date    WBC 8.2 07/24/2022    HGB 11.9 (L) 07/24/2022    HCT 35.3 (L) 07/24/2022    MCV 86.8 07/24/2022     07/24/2022     Lab Results   Component Value Date    CREATININE 0.9 07/25/2022    BUN 20 07/25/2022     (L) 07/25/2022    K 3.5 07/25/2022    CL 95 (L) 07/25/2022    CO2 28 07/25/2022       Hepatic Function Panel:   Lab Results   Component Value Date/Time    ALKPHOS 104 07/22/2022 08:20 AM    ALT 15 2022 08:20 AM    AST 23 2022 08:20 AM    PROT 7.4 2022 08:20 AM    PROT 7.0 2012 08:25 AM    BILITOT 0.5 2022 08:20 AM    LABALBU 3.9 2022 08:20 AM       Sed Rate   Date Value Ref Range Status   2022 99 (H) 0 - 20 mm/Hr Final   2018 77 (H) 0 - 20 mm/Hr Final   2018 60 (H) 0 - 20 mm/Hr Final     CRP   Date Value Ref Range Status   2022 54.7 (H) 0.0 - 5.1 mg/L Final     Comment:     WR-CRP Reference range:  30D-199Y    <5.1  <30D        Not established    CRP is used in the detection and evaluation of infection,  tissue injury, inflammatory disorders, and associated  disease. Increases in CRP values are non-specific and  should not be interpreted without a complete clinical  evaluation.  reference ranges have not been  established and values should be interpreted within clinical  context and with serial measurements, if clinically  appropriate. 2018 22.3 (H) 0.0 - 5.1 mg/L Final     Comment:     WR-CRP Reference Range:  0D-29D      <0.6  30D-199Y    <5.1    CRP is used in the detection and evaluation of infection, tissue injury,  inflammatory disorders and associated diseases. Increases in CRP values  are non-specific and should not be interpreted without a complete  clinical evaluation. 2018 10.5 (H) 0.0 - 5.1 mg/L Final     Comment:     WR-CRP Reference Range:  0D-29D      <0.6  30D-199Y    <5.1    CRP is used in the detection and evaluation of infection, tissue injury,  inflammatory disorders and associated diseases. Increases in CRP values  are non-specific and should not be interpreted without a complete  clinical evaluation. Cultures:        BC x2 MSSA       UA neg    BC x2 NGTD        Radiology Review:  All pertinent images / reports were reviewed as a part of this visit. 22 CT CAP   Impression   1.  Multiple subacute left ribs healing fractures involving left 3rd through 1 10th ribs anterolaterally, correlate for point tenderness. Mild left chest   wall edema and left upper abdominal wall edema along the fractures. 2. Bilateral trace pleural effusion with bibasilar infiltrates. There is no   pneumothorax. 3. No acute finding in the abdomen pelvis. 4. Colonic diverticulosis. 5. Markedly enlarged prostate. CT T/L spine - negative    TTE 7/26/22   Summary   Normal left ventricular systolic function with ejection fraction of 55-60%. No regional wall motion abnormalites are seen. Left ventricular diastolic filling pressure is normal.   Compared to previous study left ventricle systolic function appears same as   echo on 11-. Mild mitral and aortic regurgitation. No vegetations are seen, LUIZA recommended   if clinically indicated. Systolic pulmonic artery pressure (SPAP) is normal estimated at 32 mmHg   (Right atrial pressure of 3 mmHg      Assessment:     Patient Active Problem List    Diagnosis Date Noted    Multiple closed fractures of ribs of left side 07/25/2022     Priority: Medium    Acute midline low back pain without sciatica 07/25/2022     Priority: Medium    MSSA bacteremia 07/25/2022     Priority: Medium    Elevated PSA 07/25/2022     Priority: Medium    Gram-negative pneumonia ([address] 07/22/2022     Priority: Medium    Atrial fibrillation ([address] 02/28/2022    Atrial fibrillation with RVR ([address] 10/11/2021    Hypothyroidism due to medication 07/21/2021    Anemia due to chronic blood loss 05/18/2021    Elevated serum cholesterol 05/18/2021    Intestinal malabsorption 05/18/2021    Rhinitis, chronic 04/29/2021    S/P placement of VNS (vagus nerve stimulation) device 04/29/2021    Atypical chest pain 01/20/2021     Overview Note:     Last Assessment & Plan:   · Likely secondary to tachycardia from A.  Fib  · History of normal stress echo 2018  · Patient typically walks 2 miles per day without anginal symptoms  · Patient has ruled out for MI with negative troponins x3  · Continue aspirin      Gastroesophageal reflux disease without esophagitis 01/20/2021     Overview Note:     Last Assessment & Plan:   -Stable on PPI therapy      Benign essential hypertension 01/20/2021     Overview Note:     Last Assessment & Plan:   Formatting of this note might be different from the original.  Well controlled on current regimen. Low sodium diet.       Coronary artery calcification seen on CT scan 05/19/2020    HH (hiatus hernia) 05/19/2020    Prostate enlargement 05/19/2020    Splenic artery aneurysm ([facility] [address] 05/19/2020    Calculus of gallbladder without cholecystitis without obstruction 05/19/2020    Diverticulosis of colon without diverticulitis 05/19/2020    Periodic limb movement sleep disorder 05/19/2020    Pulmonary infiltrate 05/19/2020    Overweight with body mass index (BMI) 25.0-29.9 03/11/2020     Overview Note:     Formatting of this note might be different from the original.  Added automatically from request for surgery [id number]  Formatting of this note might be different from the original.  Added automatically from request for surgery [id number]      Hypercalcemia 02/13/2020    Entropion of both eyes 12/06/2019    Paroxysmal atrial fibrillation ([facility] [address] 11/19/2019    Presence of Watchman left atrial appendage closure device 11/18/2019    Bilateral cataracts 11/14/2019    Normocytic normochromic anemia 09/12/2019    Moderate obstructive sleep apnea 09/12/2019    GI bleed 06/07/2019    Essential thrombocytosis (HCC) 04/19/2019    Generalized osteoarthritis 04/19/2019    History of seronegative inflammatory arthritis 04/19/2019    Pre-diabetes 11/26/2018    History of joint swelling 10/04/2018     Overview Note:     L wrist swollen in 9/18, suspicious for gout or pseudogout, not joint fluid aspirated      Chronic anticoagulation 05/02/2018    Encounter for monitoring sotalol therapy 05/02/2018    Bilateral shoulder tendinopathy 03/14/2016    Primary osteoarthritis of both shoulders 03/14/2016    BPH (benign prostatic hyperplasia) 07/10/2015    Coronary atherosclerosis 07/10/2015    ED (erectile dysfunction) 07/10/2015    Status post left hip replacement 07/11/2013    Osteoarthritis of left hip 02/18/2013    Squamous cell carcinoma of skin 11/07/2011     Overview Note:     replace inactive diagnosis      HTN (hypertension) 10/13/2010    Hyperlipidemia 56/54/5678       Complicated, community onset MSSA bacteremia   Primary source of bacteremia is not clear  CT CAP without findings of metastatic foci of infection  Indwelling L RONAK without acute change in symptomatology to suggest seeding of the prosthesis     Acute low back following trauma but with pain persisting as other trauma-associated pain is waning  Elevated inflammatory markers  Not candidate for MRI due to nerve stimulator in place. CT was negative but less sensitive for osteo-discitis. I think that spine infection remains likely. The absence of epidural abscess and paraspinal abscess on CT is reassuring. Acute urinary retention  Suspecting prostatomegaly and not neurologic insult      NKDA     Plan:     Repeat BC were collected and are in process  Hold off on PICC for at least 48 hours after [facility] collection     TTE reviewed. Not planning to pursue LUIZA at this point unless bacteremia is persistent or other complications arise     Anticipate at least 4 weeks IV abx for complicated SAB  Will continue to monitor for improvement in low back pain and decline in inflammatory markers. Will consider repeating CT in a few weeks depending on those factors. If discitis was confirmed, would treat for 6-8 weeks     Continue to monitor for s/s evolving metastatic foci of infection.   At this time, not evident        Discussed with patient/family, all questions answered        Reema Branch MD  Phone: [phone]   Fax : [phone]

## 2022-07-27 LAB
INR BLD: 1.01 (ref 0.87–1.14)
PROTHROMBIN TIME: 13.1 SEC (ref 11.7–14.5)

## 2022-07-27 PROCEDURE — 97116 GAIT TRAINING THERAPY: CPT

## 2022-07-27 PROCEDURE — 6360000002 HC RX W HCPCS: Performed by: INTERNAL MEDICINE

## 2022-07-27 PROCEDURE — 99232 SBSQ HOSP IP/OBS MODERATE 35: CPT | Performed by: INTERNAL MEDICINE

## 2022-07-27 PROCEDURE — 2580000003 HC RX 258: Performed by: INTERNAL MEDICINE

## 2022-07-27 PROCEDURE — 6370000000 HC RX 637 (ALT 250 FOR IP)

## 2022-07-27 PROCEDURE — 6370000000 HC RX 637 (ALT 250 FOR IP): Performed by: HOSPITALIST

## 2022-07-27 PROCEDURE — 6370000000 HC RX 637 (ALT 250 FOR IP): Performed by: INTERNAL MEDICINE

## 2022-07-27 PROCEDURE — 36415 COLL VENOUS BLD VENIPUNCTURE: CPT

## 2022-07-27 PROCEDURE — 6360000002 HC RX W HCPCS: Performed by: NURSE PRACTITIONER

## 2022-07-27 PROCEDURE — 97530 THERAPEUTIC ACTIVITIES: CPT

## 2022-07-27 PROCEDURE — 6370000000 HC RX 637 (ALT 250 FOR IP): Performed by: NURSE PRACTITIONER

## 2022-07-27 PROCEDURE — 1200000000 HC SEMI PRIVATE

## 2022-07-27 PROCEDURE — 6370000000 HC RX 637 (ALT 250 FOR IP): Performed by: UROLOGY

## 2022-07-27 PROCEDURE — 85610 PROTHROMBIN TIME: CPT

## 2022-07-27 RX ORDER — TRAMADOL HYDROCHLORIDE 50 MG/1
50 TABLET ORAL EVERY 6 HOURS PRN
Qty: 12 TABLET | Refills: 0 | Status: SHIPPED | OUTPATIENT
Start: 2022-07-27 | End: 2022-07-30

## 2022-07-27 RX ORDER — CYCLOBENZAPRINE HCL 10 MG
10 TABLET ORAL 3 TIMES DAILY PRN
Status: DISCONTINUED | OUTPATIENT
Start: 2022-07-27 | End: 2022-07-28 | Stop reason: HOSPADM

## 2022-07-27 RX ORDER — LACTOBACILLUS RHAMNOSUS GG 10B CELL
1 CAPSULE ORAL 2 TIMES DAILY WITH MEALS
Qty: 60 CAPSULE | Refills: 0 | Status: SHIPPED | OUTPATIENT
Start: 2022-07-27

## 2022-07-27 RX ADMIN — CYCLOBENZAPRINE 10 MG: 10 TABLET, FILM COATED ORAL at 23:32

## 2022-07-27 RX ADMIN — SODIUM CHLORIDE, PRESERVATIVE FREE 10 ML: 5 INJECTION INTRAVENOUS at 12:29

## 2022-07-27 RX ADMIN — PANTOPRAZOLE SODIUM 40 MG: 40 TABLET, DELAYED RELEASE ORAL at 08:16

## 2022-07-27 RX ADMIN — Medication 1 CAPSULE: at 08:16

## 2022-07-27 RX ADMIN — Medication 1 CAPSULE: at 16:39

## 2022-07-27 RX ADMIN — TAMSULOSIN HYDROCHLORIDE 0.4 MG: 0.4 CAPSULE ORAL at 08:17

## 2022-07-27 RX ADMIN — MORPHINE SULFATE 2 MG: 2 INJECTION, SOLUTION INTRAMUSCULAR; INTRAVENOUS at 00:15

## 2022-07-27 RX ADMIN — CEFAZOLIN 2000 MG: 10 INJECTION, POWDER, FOR SOLUTION INTRAVENOUS at 21:37

## 2022-07-27 RX ADMIN — PANTOPRAZOLE SODIUM 40 MG: 40 TABLET, DELAYED RELEASE ORAL at 15:42

## 2022-07-27 RX ADMIN — LISINOPRIL 5 MG: 5 TABLET ORAL at 08:16

## 2022-07-27 RX ADMIN — MORPHINE SULFATE 2 MG: 2 INJECTION, SOLUTION INTRAMUSCULAR; INTRAVENOUS at 16:40

## 2022-07-27 RX ADMIN — TRAMADOL HYDROCHLORIDE 100 MG: 50 TABLET, COATED ORAL at 11:30

## 2022-07-27 RX ADMIN — ENOXAPARIN SODIUM 40 MG: 100 INJECTION SUBCUTANEOUS at 08:17

## 2022-07-27 RX ADMIN — CYCLOBENZAPRINE 10 MG: 10 TABLET, FILM COATED ORAL at 01:43

## 2022-07-27 RX ADMIN — ASPIRIN 325 MG: 325 TABLET ORAL at 08:16

## 2022-07-27 RX ADMIN — MORPHINE SULFATE 2 MG: 2 INJECTION, SOLUTION INTRAMUSCULAR; INTRAVENOUS at 21:20

## 2022-07-27 RX ADMIN — TRAMADOL HYDROCHLORIDE 100 MG: 50 TABLET, COATED ORAL at 23:32

## 2022-07-27 RX ADMIN — CEFAZOLIN 2000 MG: 10 INJECTION, POWDER, FOR SOLUTION INTRAVENOUS at 05:53

## 2022-07-27 RX ADMIN — CEFAZOLIN 2000 MG: 10 INJECTION, POWDER, FOR SOLUTION INTRAVENOUS at 15:46

## 2022-07-27 RX ADMIN — SODIUM CHLORIDE, PRESERVATIVE FREE 10 ML: 5 INJECTION INTRAVENOUS at 21:21

## 2022-07-27 RX ADMIN — PRAVASTATIN SODIUM 80 MG: 80 TABLET ORAL at 21:20

## 2022-07-27 RX ADMIN — DILTIAZEM HYDROCHLORIDE 120 MG: 120 CAPSULE, COATED, EXTENDED RELEASE ORAL at 08:17

## 2022-07-27 RX ADMIN — DOFETILIDE 500 MCG: 0.25 CAPSULE ORAL at 15:42

## 2022-07-27 RX ADMIN — DOFETILIDE 500 MCG: 0.25 CAPSULE ORAL at 04:40

## 2022-07-27 RX ADMIN — MORPHINE SULFATE 2 MG: 2 INJECTION, SOLUTION INTRAMUSCULAR; INTRAVENOUS at 08:17

## 2022-07-27 RX ADMIN — FINASTERIDE 5 MG: 5 TABLET, FILM COATED ORAL at 08:16

## 2022-07-27 RX ADMIN — LEVOTHYROXINE SODIUM 75 MCG: 0.05 TABLET ORAL at 08:16

## 2022-07-27 RX ADMIN — DORZOLAMIDE HYDROCHLORIDE AND TIMOLOL MALEATE 1 DROP: 20; 5 SOLUTION/ DROPS OPHTHALMIC at 21:37

## 2022-07-27 RX ADMIN — Medication 5 MG: at 21:20

## 2022-07-27 RX ADMIN — DORZOLAMIDE HYDROCHLORIDE AND TIMOLOL MALEATE 1 DROP: 20; 5 SOLUTION/ DROPS OPHTHALMIC at 11:32

## 2022-07-27 ASSESSMENT — PAIN DESCRIPTION - ORIENTATION
ORIENTATION: MID
ORIENTATION: LOWER;MID
ORIENTATION: MID
ORIENTATION: MID;LOWER

## 2022-07-27 ASSESSMENT — PAIN SCALES - GENERAL
PAINLEVEL_OUTOF10: 0
PAINLEVEL_OUTOF10: 6
PAINLEVEL_OUTOF10: 10
PAINLEVEL_OUTOF10: 10
PAINLEVEL_OUTOF10: 8
PAINLEVEL_OUTOF10: 6
PAINLEVEL_OUTOF10: 5
PAINLEVEL_OUTOF10: 10
PAINLEVEL_OUTOF10: 4
PAINLEVEL_OUTOF10: 9

## 2022-07-27 ASSESSMENT — PAIN DESCRIPTION - ONSET
ONSET: ON-GOING
ONSET: ON-GOING

## 2022-07-27 ASSESSMENT — PAIN DESCRIPTION - LOCATION
LOCATION: BACK

## 2022-07-27 ASSESSMENT — PAIN DESCRIPTION - PAIN TYPE
TYPE: ACUTE PAIN
TYPE: ACUTE PAIN

## 2022-07-27 ASSESSMENT — PAIN DESCRIPTION - DESCRIPTORS
DESCRIPTORS: ACHING

## 2022-07-27 ASSESSMENT — PAIN - FUNCTIONAL ASSESSMENT
PAIN_FUNCTIONAL_ASSESSMENT: ACTIVITIES ARE NOT PREVENTED
PAIN_FUNCTIONAL_ASSESSMENT: PREVENTS OR INTERFERES SOME ACTIVE ACTIVITIES AND ADLS
PAIN_FUNCTIONAL_ASSESSMENT: PREVENTS OR INTERFERES WITH MANY ACTIVE NOT PASSIVE ACTIVITIES
PAIN_FUNCTIONAL_ASSESSMENT: PREVENTS OR INTERFERES SOME ACTIVE ACTIVITIES AND ADLS

## 2022-07-27 ASSESSMENT — PAIN DESCRIPTION - FREQUENCY
FREQUENCY: CONTINUOUS
FREQUENCY: CONTINUOUS

## 2022-07-27 NOTE — PROGRESS NOTES
Physical Therapy  Facility/Department: [place] [address] TELE/MED SURG/ONC  Daily Treatment Note  NAME: Allegra Goldstein  : 1950  MRN: [id number]    Date of Service: 2022    Discharge Recommendations:  Home with assist PRN, Home with Home health PT, Outpatient PT   PT Equipment Recommendations  Equipment Needed: Yes  Eddi Sosa: Rolling  Other: Pt has RW from his wife, although pt unsure it is the correct size/height for him  [facility] 6 Clicks Inpatient Mobility:  AM-PAC Mobility Inpatient   How much difficulty turning over in bed?: None  How much difficulty sitting down on / standing up from a chair with arms?: None  How much difficulty moving from lying on back to sitting on side of bed?: None  How much help from another person moving to and from a bed to a chair?: A Little  How much help from another person needed to walk in hospital room?: A Little  How much help from another person for climbing 3-5 steps with a railing?: A Little  AM-[facility] Inpatient Mobility Raw Score : 21  AM-PAC Inpatient T-Scale Score : 50.25  Mobility Inpatient CMS 0-100% Score: 28.97  Mobility Inpatient CMS G-Code Modifier : CJ   Patient Diagnosis(es): The primary encounter diagnosis was Closed fracture of multiple ribs of left side, initial encounter. Diagnoses of Leukocytosis, unspecified type, Infiltrate of lung present on computed tomography, BPH with urinary obstruction, and MSSA bacteremia were also pertinent to this visit. Assessment   Assessment: Patient tolerated session well without incident;Patient was not limited by pain. Cont per PT POC.   Activity Tolerance: Patient tolerated treatment well  Equipment Needed: Yes  Other: Pt has RW from his wife, although pt unsure it is the correct size/height for him     Plan    Plan  Plan: 3-5 times per week  Specific Instructions for Next Treatment: Progress ther ex and mobility as tolerated  Current Treatment Recommendations: Strengthening;Balance training;Functional mobility training;Transfer training; Endurance training;Gait training;Home exercise program;Safety education & training;Equipment evaluation, education, & procurement; Therapeutic activities;Stair training     Restrictions  Restrictions/Precautions  Restrictions/Precautions: General Precautions, Fall Risk     Subjective    Subjective  Pain: No pain c/o at rest.  Orientation  Overall Orientation Status: Within Normal Limits     Objective   Vitals  Comment: 163/81 HR 71 O2 98% RA  Bed Mobility Training  Bed Mobility Training: Yes  Supine to Sit: Modified independent  Sit to Supine: Modified independent  Balance  Sitting: Intact  Standing: Impaired  Standing - Static: Good  Standing - Dynamic: Fair  Transfer Training  Transfer Training: Yes  Sit to Stand: Supervision; Additional time  Stand to Sit: Supervision; Additional time  Gait Training  Gait Training: Yes  Gait  Overall Level of Assistance: Stand-by assistance  Interventions: Verbal cues  Speed/Yeimy: Pace decreased (< 100 feet/min); Slow  Step Length: Left shortened;Right shortened  Distance (ft): 75 Feet (x2)  Assistive Device: Walker, rolling     PT Exercises  Exercise Treatment: performed BLE TE 12X EACH: AP, QS, GS,  HIP ABD, LAQ, HS     Safety Devices  Type of Devices: Nurse notified;Call light within reach; All caroline prominences offloaded;Gait belt;Left in bed; Chair alarm in place       Goals  Short Term Goals  Time Frame for Short term goals: 1 week, 7/31/22 (unless otherwise specified)  Short term goal 1: Pt will transfer supine <-> sit with modified(I)  -7/26 mod ind  Short term goal 2: Pt will transfer sit <-> stand and bed>chair using RW or least AD with modified(I)   -7/26 supv  Short term goal 3: Pt will ambulate x 150 feet using RW or least AD with supervision/modified(I)   -7/26 sba rw 75'  Short term goal 4: Pt will ambulate up/down 8 steps using 1 handrail PRN with supervision   -7/26 NT  Short term goal 5: By 7/27/22: Pt will tolerate 12-15 reps BLE exercise for strengthening, balance, and endurance   -7/26 met  Patient Goals   Patient goals :  \"To go home\"    Education  Patient Education  Education Given To: Patient  Education Provided: Role of Therapy;Plan of Care;Precautions;Transfer Training;Equipment  Education Method: Demonstration;Verbal  Barriers to Learning: None  Education Outcome: Verbalized understanding;Demonstrated understanding    Therapy Time   Individual Concurrent Group Co-treatment   Time In  1305          Time Out  1343         Minutes  [facility]

## 2022-07-27 NOTE — PROGRESS NOTES
Negative 07/23/2022 07:15 PM    WBCUA 0-2 07/23/2022 07:15 PM    BACTERIA 3+ 07/23/2022 07:15 PM    RBCUA 21-50 07/23/2022 07:15 PM    BLOODU MODERATE 07/23/2022 07:15 PM    SPECGRAV >=1.030 07/23/2022 07:15 PM    GLUCOSEU Negative 07/23/2022 07:15 PM    GLUCOSEU NEGATIVE 10/21/2011 09:15 AM       Radiology:  CT LUMBAR SPINE W WO CONTRAST   Preliminary Result   No acute lumbar spine abnormality. No lumbar spine metastasis. Mild-to-moderate spondylosis. CT THORACIC SPINE W WO CONTRAST   Preliminary Result   Unremarkable CT of the thoracic spine. No thoracic spine metastasis. CT CHEST ABDOMEN PELVIS W CONTRAST   Final Result   1. Multiple subacute left ribs healing fractures involving left 3rd through 1   10th ribs anterolaterally, correlate for point tenderness. Mild left chest   wall edema and left upper abdominal wall edema along the fractures. 2. Bilateral trace pleural effusion with bibasilar infiltrates. There is no   pneumothorax. 3. No acute finding in the abdomen pelvis. 4. Colonic diverticulosis. 5. Markedly enlarged prostate. XR CHEST PORTABLE   Final Result   No acute process.              Assessment/Plan:    Active Hospital Problems    Diagnosis     Multiple closed fractures of ribs of left side [S22.42XA]      Priority: Medium    Acute midline low back pain without sciatica [M54.50]      Priority: Medium    MSSA bacteremia [R78.81, B95.61]      Priority: Medium    Elevated PSA [R97.20]      Priority: Medium    Diverticulosis of colon without diverticulitis [K57.30]     Prostate enlargement [N40.0]     Normocytic normochromic anemia [D64.9]      Chest pain  -ACS ruled out: negative trop, EKG non-acute    Multiple left-sided rib fractures, improving  -Likely due to mechanical fall  -Subacute fractures involving ribs 3-10, anterolaterally  -Fell onto concrete approximately 10 days prior to coming to the ED  -Pain management    Gram negative pneumonia, sepsis present on arrival  -Likely community-acquired  -On arrival was tachycardic, febrile, and had leukocytosis  -Azithro course completed  -Continue ceftriaxone  -Pulmonary toilet    MSSA bacteremia  -Afebrile, no leukocytosis  -Continue ceftriaxone  -Per ID will need at least 4 weeks IV antibiotics  -Repeat blood cultures drawn 7/26, NGTD (~24 hours)  -If cultures remain clear at 48 hours, will get PICC  -ESR 99, CRP 54.7  -ID consulted, appreciate recommendations    Acute urinary retention  -Sotelo catheter in place  -Urology following, appreciate recommendations  -Avoid narcotics if able  -Enlarged prostate noted on CT    BPH  -With PSA 6.65  -Continue flomax, finasteride  -Avoid narcotics as able given urinary retention  -Urology consulted, appreciate recommendations    Low back pain  -Not well managed with PRN tramadol and tylenol  -A one time dose of oxycodone will be given to see if pt gets any relief  -Unable to obtain MRI at this hospital due to Insipre implant. Discussed with ID, and will obtain CT of thoracic and lumbar spine to evaluate for metastasis and/or infection  -CT thoracic and lumbar spine with no evidence of metastasis or infection  -Low back is likely due to DDD w/ disc bulge from L2-S1    Atrial fibrillation  -Continue home dose tikosyn, cardizem  -Monitor on telemetry    Chronic anemia  -H/H stable, 11.9/35.3  -Continue to monitor      DVT Prophylaxis: lovenox  Diet: ADULT DIET;  Regular; Low Fat/Low Chol/High Fiber/2 gm Na  Code Status: Full Code    PT/OT Eval Status: home with assist PRN, home PT/OT    Dispo - 1-2 days    DARSHANA Mahoney - CNP

## 2022-07-27 NOTE — CARE COORDINATION
Chart reviewed. Management per pulmonology, ID and IM. Repeat BC in process. Per ID. \"Hold off on PICC for at least 48 hours after collection\". [facility] and [facility] are following and aware of need for IVABX at discharge. Anticipate PICC placement tomorrow and discharge following day on Friday. Will keep pt here for afternoon dose, SOC following morning. Pt from home with wife. IPTA. CM following for any other discharge planning needs.  Gloria Dominique RN

## 2022-07-27 NOTE — PROGRESS NOTES
Physical Therapy  Facility/Department: [place] [address] TELE/MED SURG/ONC  Daily Treatment Note  NAME: Dulce Acosta  : 1950  MRN: [id number]    Date of Service: 2022    Discharge Recommendations:  Home with assist PRN, Home with Home health PT, Outpatient PT   PT Equipment Recommendations  Equipment Needed: No    AM-PAC Mobility Inpatient   How much difficulty turning over in bed?: None  How much difficulty sitting down on / standing up from a chair with arms?: None  How much difficulty moving from lying on back to sitting on side of bed?: None  How much help from another person moving to and from a bed to a chair?: None  How much help from another person needed to walk in hospital room?: A Little  How much help from another person for climbing 3-5 steps with a railing?: A Little  AM-PAC Inpatient Mobility Raw Score : 22  AM-PAC Inpatient T-Scale Score : 53.28  Mobility Inpatient CMS 0-100% Score: 20.91  Mobility Inpatient CMS G-Code Modifier : CJ     Patient Diagnosis(es): The primary encounter diagnosis was Closed fracture of multiple ribs of left side, initial encounter. Diagnoses of Leukocytosis, unspecified type, Infiltrate of lung present on computed tomography, BPH with urinary obstruction, and MSSA bacteremia were also pertinent to this visit. Assessment   Assessment: Pt progressing with ambulation and is slow and steady without AD to amb 150'. Pt con't to be recommended for con't skiled PT and home with A PRN and OPPT. Activity Tolerance: Patient tolerated treatment well  Equipment Needed: No  Other: Pt has RW from his wife, although pt unsure it is the correct size/height for him     Plan    Plan  Plan: 3-5 times per week  Specific Instructions for Next Treatment: Progress ther ex and mobility as tolerated  Current Treatment Recommendations: Strengthening;Balance training;Functional mobility training;Transfer training; Endurance training;Gait training;Home exercise program;Safety education & training;Equipment evaluation, education, & procurement; Therapeutic activities;Stair training     Restrictions  Restrictions/Precautions  Restrictions/Precautions: General Precautions, Fall Risk     Subjective    Subjective  Subjective: Pt agreeable to a walk, just getting back to bed on arrival  Pain: pan 2/10 center back, worse when pain meds \"wear off\"  Orientation  Overall Orientation Status: Within Normal Limits  Cognition  Overall Cognitive Status: WFL     Objective   Vitals:   Vitals:    07/27/22    BP: 120/70   Pulse: 60   Resp:    Temp:    SpO2: 98%          Bed Mobility Training  Bed Mobility Training: Yes  Supine to Sit: Modified independent  Sit to Supine: Modified independent    Balance  Sitting: Intact  Standing: Intact  Standing - Static: Good  Standing - Dynamic: Fair    Transfer Training  Transfer Training: Yes  Sit to Stand: Supervision; Additional time  Stand to Sit: Supervision; Additional time    Gait Training  Gait Training: Yes  Gait  Overall Level of Assistance: Stand-by assistance  Interventions: Verbal cues  Speed/Yeimy: Slow  Step Length: Left shortened;Right shortened  Distance (ft): 150 Feet  Assistive Device: Gait belt; no AD     PT Exercises  Exercise Treatment: defer, pt just starting to rest when arrived and is tired. Pt wants to call doctor to cancel appt for tomorrow morning. Safety Devices  Type of Devices:  All caroline prominences offloaded;Bed alarm in place;Call light within reach;Gait belt;Left in bed;Sitter present       Goals  Short Term Goals  Time Frame for Short term goals: 1 week, 7/31/22 (unless otherwise specified)  Short term goal 1: Pt will transfer supine <-> sit with modified(I)  -7/26 mod ind  Short term goal 2: Pt will transfer sit <-> stand and bed>chair using RW or least AD with modified(I)   -7/26 supv  Short term goal 3: Pt will ambulate x 150 feet using RW or least AD with supervision/modified(I)   -7/26 sba rw 75'  Short term goal 4: Pt will ambulate up/down 8 steps using 1 handrail PRN with supervision   -7/26 NT  Short term goal 5: By 7/27/22: Pt will tolerate 12-15 reps BLE exercise for strengthening, balance, and endurance   -7/26 met  Patient Goals   Patient goals :  \"To go home\"         Therapy Time   Individual Concurrent Group Co-treatment   Time In 1510         Time Out 1535         Minutes [id number] [id number]

## 2022-07-27 NOTE — PROGRESS NOTES
Infectious Disease Follow up Notes    CC :  MSSA bacteremia, low back pain      Antibiotics:   Cefazolin 2g q8  culturelle     Admit Date:   7/22/2022  Hospital Day: 6    Subjective:   He remains AF  Seems more comfortable today  Has walked in the room  Used lidocaine patch last night.   Had a dose of flexeril that helped as well as 3 doses MSO4 in the last 24 hours     Objective:     Patient Vitals for the past 8 hrs:   BP Temp Temp src Pulse Resp SpO2   07/27/22 1200 -- -- -- -- 18 --   07/27/22 1140 123/77 98.6 °F (37 °C) Oral 69 18 98 %   07/27/22 0847 -- -- -- -- 18 --   07/27/22 0811 119/73 -- -- 71 -- 100 %         EXAM:  General:   alert, conversant, NAD  Able to rise to stand on his own     HEENT:   NCAT, PERRL, sclera anicteric   NECK:   Supple       LUNGS:   CTA eliud without W/R/R    CV:   RRR    ABD:   soft, flat, NT     No tenderness to palpation of the spine    EXT: No stigmata of emboli  No focal rash  No LE edema          LINE:    PIV in place  [place]        Scheduled Meds:   ceFAZolin  2,000 mg IntraVENous Q8H    finasteride  5 mg Oral Daily    lidocaine  1 patch TransDERmal Daily    tamsulosin  0.4 mg Oral Daily    aspirin  325 mg Oral Daily    dilTIAZem  120 mg Oral Daily    dorzolamide-timolol  1 drop Both Eyes BID    lisinopril  5 mg Oral Daily    pantoprazole  40 mg Oral BID AC    pravastatin  80 mg Oral Daily    sodium chloride flush  5-40 mL IntraVENous 2 times per day    enoxaparin  40 mg SubCUTAneous Daily    lactobacillus  1 capsule Oral BID WC    levothyroxine  75 mcg Oral Daily    dofetilide  500 mcg Oral Q12H       Continuous Infusions:   sodium chloride            Data Review:    Lab Results   Component Value Date    WBC 8.2 07/24/2022    HGB 11.9 (L) 07/24/2022    HCT 35.3 (L) 07/24/2022    MCV 86.8 07/24/2022     07/24/2022     Lab Results   Component Value Date    CREATININE 0.9 07/25/2022    BUN 20 2022     (L) 2022    K 3.5 2022    CL 95 (L) 2022    CO2 28 2022       Hepatic Function Panel:   Lab Results   Component Value Date/Time    ALKPHOS 104 2022 08:20 AM    ALT 15 2022 08:20 AM    AST 23 2022 08:20 AM    PROT 7.4 2022 08:20 AM    PROT 7.0 2012 08:25 AM    BILITOT 0.5 2022 08:20 AM    LABALBU 3.9 2022 08:20 AM       Sed Rate   Date Value Ref Range Status   2022 99 (H) 0 - 20 mm/Hr Final   2018 77 (H) 0 - 20 mm/Hr Final   2018 60 (H) 0 - 20 mm/Hr Final     CRP   Date Value Ref Range Status   2022 54.7 (H) 0.0 - 5.1 mg/L Final     Comment:     WR-CRP Reference range:  30D-199Y    <5.1  <30D        Not established    CRP is used in the detection and evaluation of infection,  tissue injury, inflammatory disorders, and associated  disease. Increases in CRP values are non-specific and  should not be interpreted without a complete clinical  evaluation.  reference ranges have not been  established and values should be interpreted within clinical  context and with serial measurements, if clinically  appropriate. 2018 22.3 (H) 0.0 - 5.1 mg/L Final     Comment:     WR-CRP Reference Range:  0D-29D      <0.6  30D-199Y    <5.1    CRP is used in the detection and evaluation of infection, tissue injury,  inflammatory disorders and associated diseases. Increases in CRP values  are non-specific and should not be interpreted without a complete  clinical evaluation. 2018 10.5 (H) 0.0 - 5.1 mg/L Final     Comment:     WR-CRP Reference Range:  0D-29D      <0.6  30D-199Y    <5.1    CRP is used in the detection and evaluation of infection, tissue injury,  inflammatory disorders and associated diseases. Increases in CRP values  are non-specific and should not be interpreted without a complete  clinical evaluation.             Cultures:        BC x2 MSSA       UA neg    BC x2 NGTD Radiology Review:  All pertinent images / reports were reviewed as a part of this visit. 7/22/22 CT CAP   Impression   1. Multiple subacute left ribs healing fractures involving left 3rd through 1   10th ribs anterolaterally, correlate for point tenderness. Mild left chest   wall edema and left upper abdominal wall edema along the fractures. 2. Bilateral trace pleural effusion with bibasilar infiltrates. There is no   pneumothorax. 3. No acute finding in the abdomen pelvis. 4. Colonic diverticulosis. 5. Markedly enlarged prostate. CT T/L spine - negative    TTE 7/26/22   Summary   Normal left ventricular systolic function with ejection fraction of 55-60%. No regional wall motion abnormalites are seen. Left ventricular diastolic filling pressure is normal.   Compared to previous study left ventricle systolic function appears same as   echo on 11-. Mild mitral and aortic regurgitation. No vegetations are seen, LUIZA recommended   if clinically indicated.    Systolic pulmonic artery pressure (SPAP) is normal estimated at 32 mmHg   (Right atrial pressure of 3 mmHg      Assessment:     Patient Active Problem List    Diagnosis Date Noted    Multiple closed fractures of ribs of left side 07/25/2022     Priority: Medium    Acute midline low back pain without sciatica 07/25/2022     Priority: Medium    MSSA bacteremia 07/25/2022     Priority: Medium    Elevated PSA 07/25/2022     Priority: Medium    Gram-negative pneumonia ([address] 07/22/2022     Priority: Medium    Atrial fibrillation ([address] 02/28/2022    Atrial fibrillation with RVR ([address] 10/11/2021    Hypothyroidism due to medication 07/21/2021    Anemia due to chronic blood loss 05/18/2021    Elevated serum cholesterol 05/18/2021    Intestinal malabsorption 05/18/2021    Rhinitis, chronic 04/29/2021    S/P placement of VNS (vagus nerve stimulation) device 04/29/2021    Atypical chest pain 01/20/2021     Overview Note:     Last Assessment & Plan:   · Likely secondary to tachycardia from A. Fib  · History of normal stress echo 2018  · Patient typically walks 2 miles per day without anginal symptoms  · Patient has ruled out for MI with negative troponins x3  · Continue aspirin      Gastroesophageal reflux disease without esophagitis 01/20/2021     Overview Note:     Last Assessment & Plan:   -Stable on PPI therapy      Benign essential hypertension 01/20/2021     Overview Note:     Last Assessment & Plan:   Formatting of this note might be different from the original.  Well controlled on current regimen. Low sodium diet.       Coronary artery calcification seen on CT scan 05/19/2020    HH (hiatus hernia) 05/19/2020    Prostate enlargement 05/19/2020    Splenic artery aneurysm ([address] 05/19/2020    Calculus of gallbladder without cholecystitis without obstruction 05/19/2020    Diverticulosis of colon without diverticulitis 05/19/2020    Periodic limb movement sleep disorder 05/19/2020    Pulmonary infiltrate 05/19/2020    Overweight with body mass index (BMI) 25.0-29.9 03/11/2020     Overview Note:     Formatting of this note might be different from the original.  Added automatically from request for surgery [id number]  Formatting of this note might be different from the original.  Added automatically from request for surgery [id number]      Hypercalcemia 02/13/2020    Entropion of both eyes 12/06/2019    Paroxysmal atrial fibrillation ([address] 11/19/2019    Presence of Watchman left atrial appendage closure device 11/18/2019    Bilateral cataracts 11/14/2019    Normocytic normochromic anemia 09/12/2019    Moderate obstructive sleep apnea 09/12/2019    GI bleed 06/07/2019    Essential thrombocytosis (HCC) 04/19/2019    Generalized osteoarthritis 04/19/2019    History of seronegative inflammatory arthritis 04/19/2019    Pre-diabetes 11/26/2018    History of joint swelling 10/04/2018     Overview Note:     L wrist swollen in 9/18, suspicious for gout or pseudogout, not joint fluid aspirated      Chronic anticoagulation 05/02/2018    Encounter for monitoring sotalol therapy 05/02/2018    Bilateral shoulder tendinopathy 03/14/2016    Primary osteoarthritis of both shoulders 03/14/2016    BPH (benign prostatic hyperplasia) 07/10/2015    Coronary atherosclerosis 07/10/2015    ED (erectile dysfunction) 07/10/2015    Status post left hip replacement 07/11/2013    Osteoarthritis of left hip 02/18/2013    Squamous cell carcinoma of skin 11/07/2011     Overview Note:     replace inactive diagnosis      HTN (hypertension) 10/13/2010    Hyperlipidemia 21/09/0597       Complicated, community onset MSSA bacteremia   Primary source of bacteremia is not clear  CT CAP without findings of metastatic foci of infection  Indwelling L RONAK without acute change in symptomatology to suggest seeding of the prosthesis     Acute low back following trauma but with pain persisting as other trauma-associated pain is waning  Elevated inflammatory markers  Not candidate for MRI due to nerve stimulator in place. CT was negative but less sensitive for osteo-discitis. I think that spine infection remains likely, though absence of tenderness speaks against.   Absence of epidural abscess and paraspinal abscess on CT is reassuring. Acute urinary retention  Suspecting prostatomegaly and not neurologic insult      NKDA     Plan:     Repeat BC were collected and are in process  Hold off on PICC for at least 48 hours after [facility] collection     TTE reviewed. Not planning to pursue LUIZA at this point unless bacteremia is persistent or other complications arise     Anticipate at least 4 weeks IV abx for complicated SAB  Will continue to monitor for improvement in low back pain and decline in inflammatory markers. Will consider repeating CT in a few weeks depending on those factors. If discitis was confirmed, would treat for 6-8 weeks     Continue to monitor for s/s evolving metastatic foci of infection.   At this time, not evident    PICC tomorrow, single lumen, if BC remain negative     Recommended Follow-up, Labs or Other Treatments After Discharge:      ID INFUSION ORDERS  Cefazolin 2g q8 continued through 9/7/22  Weekly CBC diff, BUN, creatinine, CRP, LFTs faxed to [phone]   Routine CVC care  Call with ID related concerns   See Dr. Julio Cesar Zambrano in 2-3 weeks, call for appt time and with concerns [phone]   Diagnosis complicated SA bacteremia and possible lumbar spine infection   Napoleon Lindsay MD       Discussed with patient/family, all questions answered        Selvin Osborn MD  Phone: [phone]   Fax : [phone]

## 2022-07-27 NOTE — PROGRESS NOTES
Pt alert and oriented, VSS. C/o back pain, PRN morphine given, see eMAR. Denies any further needs at this time. Will continue to monitor.

## 2022-07-27 NOTE — PLAN OF CARE
Problem: Safety - Adult  Goal: Free from fall injury  Outcome: Progressing Towards Goal     Problem: Pain  Goal: Verbalizes/displays adequate comfort level or baseline comfort level  Outcome: Progressing Towards Goal

## 2022-07-28 ENCOUNTER — APPOINTMENT (OUTPATIENT)
Dept: GENERAL RADIOLOGY | Age: 72
DRG: 871 | End: 2022-07-28
Payer: MEDICARE

## 2022-07-28 VITALS
WEIGHT: 172 LBS | BODY MASS INDEX: 26.07 KG/M2 | HEART RATE: 75 BPM | SYSTOLIC BLOOD PRESSURE: 129 MMHG | OXYGEN SATURATION: 97 % | TEMPERATURE: 97.6 F | RESPIRATION RATE: 16 BRPM | DIASTOLIC BLOOD PRESSURE: 70 MMHG | HEIGHT: 68 IN

## 2022-07-28 PROCEDURE — 6370000000 HC RX 637 (ALT 250 FOR IP): Performed by: INTERNAL MEDICINE

## 2022-07-28 PROCEDURE — 6370000000 HC RX 637 (ALT 250 FOR IP): Performed by: NURSE PRACTITIONER

## 2022-07-28 PROCEDURE — 2580000003 HC RX 258: Performed by: INTERNAL MEDICINE

## 2022-07-28 PROCEDURE — 6370000000 HC RX 637 (ALT 250 FOR IP): Performed by: HOSPITALIST

## 2022-07-28 PROCEDURE — 6370000000 HC RX 637 (ALT 250 FOR IP): Performed by: UROLOGY

## 2022-07-28 PROCEDURE — 71045 X-RAY EXAM CHEST 1 VIEW: CPT

## 2022-07-28 PROCEDURE — 6360000002 HC RX W HCPCS: Performed by: INTERNAL MEDICINE

## 2022-07-28 PROCEDURE — 6360000002 HC RX W HCPCS: Performed by: NURSE PRACTITIONER

## 2022-07-28 PROCEDURE — 02HV33Z INSERTION OF INFUSION DEVICE INTO SUPERIOR VENA CAVA, PERCUTANEOUS APPROACH: ICD-10-PCS | Performed by: INTERNAL MEDICINE

## 2022-07-28 RX ORDER — CYCLOBENZAPRINE HCL 10 MG
10 TABLET ORAL 3 TIMES DAILY PRN
Qty: 12 TABLET | Refills: 0 | Status: SHIPPED | OUTPATIENT
Start: 2022-07-28 | End: 2022-08-01

## 2022-07-28 RX ADMIN — ASPIRIN 325 MG: 325 TABLET ORAL at 09:07

## 2022-07-28 RX ADMIN — DOFETILIDE 500 MCG: 0.25 CAPSULE ORAL at 14:35

## 2022-07-28 RX ADMIN — PANTOPRAZOLE SODIUM 40 MG: 40 TABLET, DELAYED RELEASE ORAL at 18:04

## 2022-07-28 RX ADMIN — SODIUM CHLORIDE, PRESERVATIVE FREE 10 ML: 5 INJECTION INTRAVENOUS at 09:16

## 2022-07-28 RX ADMIN — TRAMADOL HYDROCHLORIDE 100 MG: 50 TABLET, COATED ORAL at 14:42

## 2022-07-28 RX ADMIN — DILTIAZEM HYDROCHLORIDE 120 MG: 120 CAPSULE, COATED, EXTENDED RELEASE ORAL at 09:07

## 2022-07-28 RX ADMIN — DORZOLAMIDE HYDROCHLORIDE AND TIMOLOL MALEATE 1 DROP: 20; 5 SOLUTION/ DROPS OPHTHALMIC at 09:11

## 2022-07-28 RX ADMIN — MORPHINE SULFATE 2 MG: 2 INJECTION, SOLUTION INTRAMUSCULAR; INTRAVENOUS at 18:05

## 2022-07-28 RX ADMIN — TAMSULOSIN HYDROCHLORIDE 0.4 MG: 0.4 CAPSULE ORAL at 09:07

## 2022-07-28 RX ADMIN — DOFETILIDE 500 MCG: 0.25 CAPSULE ORAL at 02:21

## 2022-07-28 RX ADMIN — Medication 1 CAPSULE: at 09:07

## 2022-07-28 RX ADMIN — LEVOTHYROXINE SODIUM 75 MCG: 0.05 TABLET ORAL at 09:07

## 2022-07-28 RX ADMIN — TRAMADOL HYDROCHLORIDE 100 MG: 50 TABLET, COATED ORAL at 06:06

## 2022-07-28 RX ADMIN — CYCLOBENZAPRINE 10 MG: 10 TABLET, FILM COATED ORAL at 09:06

## 2022-07-28 RX ADMIN — CEFAZOLIN 2000 MG: 10 INJECTION, POWDER, FOR SOLUTION INTRAVENOUS at 06:00

## 2022-07-28 RX ADMIN — Medication 1 CAPSULE: at 18:04

## 2022-07-28 RX ADMIN — PANTOPRAZOLE SODIUM 40 MG: 40 TABLET, DELAYED RELEASE ORAL at 09:07

## 2022-07-28 RX ADMIN — ENOXAPARIN SODIUM 40 MG: 100 INJECTION SUBCUTANEOUS at 09:07

## 2022-07-28 RX ADMIN — MORPHINE SULFATE 2 MG: 2 INJECTION, SOLUTION INTRAMUSCULAR; INTRAVENOUS at 02:16

## 2022-07-28 RX ADMIN — FINASTERIDE 5 MG: 5 TABLET, FILM COATED ORAL at 09:07

## 2022-07-28 RX ADMIN — LISINOPRIL 5 MG: 5 TABLET ORAL at 09:07

## 2022-07-28 RX ADMIN — CEFAZOLIN 2000 MG: 10 INJECTION, POWDER, FOR SOLUTION INTRAVENOUS at 14:42

## 2022-07-28 ASSESSMENT — PAIN - FUNCTIONAL ASSESSMENT
PAIN_FUNCTIONAL_ASSESSMENT: PREVENTS OR INTERFERES SOME ACTIVE ACTIVITIES AND ADLS
PAIN_FUNCTIONAL_ASSESSMENT: PREVENTS OR INTERFERES SOME ACTIVE ACTIVITIES AND ADLS

## 2022-07-28 ASSESSMENT — PAIN SCALES - GENERAL
PAINLEVEL_OUTOF10: 4
PAINLEVEL_OUTOF10: 3
PAINLEVEL_OUTOF10: 8
PAINLEVEL_OUTOF10: 5
PAINLEVEL_OUTOF10: 5

## 2022-07-28 ASSESSMENT — PAIN DESCRIPTION - ONSET: ONSET: ON-GOING

## 2022-07-28 ASSESSMENT — PAIN DESCRIPTION - DESCRIPTORS
DESCRIPTORS: ACHING
DESCRIPTORS: ACHING

## 2022-07-28 ASSESSMENT — PAIN DESCRIPTION - LOCATION
LOCATION: BACK
LOCATION: BACK

## 2022-07-28 ASSESSMENT — PAIN DESCRIPTION - ORIENTATION
ORIENTATION: LOWER;MID
ORIENTATION: MID

## 2022-07-28 ASSESSMENT — PAIN DESCRIPTION - FREQUENCY: FREQUENCY: CONTINUOUS

## 2022-07-28 NOTE — PROGRESS NOTES
Pt discharged per order. Given written and verbal discharge instructions. Left with PICC and griffin in place. Left floor in stable condition to home with all belongings.

## 2022-07-28 NOTE — DISCHARGE SUMMARY
Hospital Medicine Discharge Summary    Patient ID: [facility]      Patient's PCP: DARSHANA Benitez CNP    Admit Date: 7/22/2022     Discharge Date: 7/28/22    Admitting Provider: No admitting provider for patient encounter. Discharge Provider: DARSHANA Grant CNP     Discharge Diagnoses: Active Hospital Problems    Diagnosis     Multiple closed fractures of ribs of left side [S22.42XA]      Priority: Medium    Acute midline low back pain without sciatica [M54.50]      Priority: Medium    MSSA bacteremia [R78.81, B95.61]      Priority: Medium    Elevated PSA [R97.20]      Priority: Medium    Diverticulosis of colon without diverticulitis [K57.30]     Prostate enlargement [N40.0]     Normocytic normochromic anemia [D64.9]        The patient was seen and examined on day of discharge and this discharge summary is in conjunction with any daily progress note from day of discharge. Hospital Course: This is a 74 y.o. WM with PMHx sig for afib s/p multiple cardioversions and ablations and now s/p Watchman presents 10 days after fall on concrete with severe chest pain. PTt did not have syncopal episode but merely tripped and fell hard on chest.  The chest pain was mild at first until he was working under his sink and twisted his back. Then started with severe chest pain and significant shortness of breath. He was found to have rib fracture from ribs 3-10 on the L along with mild soft tissue chest wall edema extending to lateral abd wall. Has significant osteopenia of spine, and multiple chronic appearing deformities of thoracic spine. Pt was unaware that he had broken any ribs which is very surprising. He dose have significant atelectasis with bibasilar infiltrates. Likely early pneumonia. Chest pain. ACS ruled out: negative trop, EKG non-acute     Multiple left-sided rib fractures, improving. Likely due to mechanical fall.  Subacute fractures involving ribs 3-10, distention. Trachea midline. Respiratory: Normal respiratory effort. Clear to auscultation, bilaterally without Rales/Wheezes/Rhonchi. Cardiovascular: Regular rate and rhythm with normal S1/S2 without murmurs, rubs or gallops. Abdomen: Soft, non-tender, non-distended with normal bowel sounds. : clear, yellow urine noted in griffin bag  Musculoskeletal: No clubbing, cyanosis or edema bilaterally. Full range of motion without deformity. Skin: Skin color, texture, turgor normal. No rashes or lesions. Neurologic: Neurovascularly intact without any focal sensory/motor deficits. Cranial nerves: II-XII intact, grossly non-focal.  Psychiatric: Alert and oriented, thought content appropriate, normal insight  Capillary Refill: Brisk,< 3 seconds   Peripheral Pulses: +2 palpable, equal bilaterally       Labs: For convenience and continuity at follow-up the following most recent labs are provided:      CBC:    Lab Results   Component Value Date/Time    WBC 8.2 07/24/2022 05:34 AM    HGB 11.9 07/24/2022 05:34 AM    HCT 35.3 07/24/2022 05:34 AM     07/24/2022 05:34 AM       Renal:    Lab Results   Component Value Date/Time     07/25/2022 05:26 AM    K 3.5 07/25/2022 05:26 AM    CL 95 07/25/2022 05:26 AM    CO2 28 07/25/2022 05:26 AM    BUN 20 07/25/2022 05:26 AM    CREATININE 0.9 07/25/2022 05:26 AM    CALCIUM 9.7 07/25/2022 05:26 AM    PHOS 3.1 02/24/2020 10:28 AM         Significant Diagnostic Studies    Radiology:   CT LUMBAR SPINE W WO CONTRAST   Final Result   No acute lumbar spine abnormality. No lumbar spine metastasis. Mild-to-moderate spondylosis. CT THORACIC SPINE W WO CONTRAST   Final Result   Unremarkable CT of the thoracic spine. No thoracic spine metastasis. CT CHEST ABDOMEN PELVIS W CONTRAST   Final Result   1. Multiple subacute left ribs healing fractures involving left 3rd through 1   10th ribs anterolaterally, correlate for point tenderness.   Mild left chest wall edema and left upper abdominal wall edema along the fractures. 2. Bilateral trace pleural effusion with bibasilar infiltrates. There is no   pneumothorax. 3. No acute finding in the abdomen pelvis. 4. Colonic diverticulosis. 5. Markedly enlarged prostate. XR CHEST PORTABLE   Final Result   No acute process. IR PICC WO SQ PORT/PUMP > 5 YEARS    (Results Pending)          Consults:     IP CONSULT TO PULMONOLOGY  IP CONSULT TO UROLOGY  IP CONSULT TO PHARMACY  IP CONSULT TO INFECTIOUS DISEASES  IP CONSULT TO HOME CARE NEEDS    Disposition: home     Condition at Discharge: Stable    Discharge Instructions/Follow-up:  PCP within 1 week. Urology 7/28. IV antibiotics via PICC per ID recommendations. Labs and follow up per ID recs also. Code Status:  Full Code     Activity: activity as tolerated    Diet: regular diet      Discharge Medications:     Current Discharge Medication List             Details   cyclobenzaprine (FLEXERIL) 10 MG tablet Take 1 tablet by mouth 3 times daily as needed for Muscle spasms  Qty: 12 tablet, Refills: 0      lactobacillus (CULTURELLE) capsule Take 1 capsule by mouth in the morning and 1 capsule in the evening. Take with meals. Qty: 60 capsule, Refills: 0      traMADol (ULTRAM) 50 MG tablet Take 1 tablet by mouth every 6 hours as needed for Pain for up to 3 days. Qty: 12 tablet, Refills: 0    Comments: Reduce doses taken as pain becomes manageable  Associated Diagnoses: Closed fracture of multiple ribs of left side, initial encounter      tamsulosin (FLOMAX) 0.4 MG capsule Take 1 capsule by mouth in the morning. Qty: 30 capsule, Refills: 5    Associated Diagnoses: BPH with urinary obstruction      finasteride (PROSCAR) 5 MG tablet Take 1 tablet by mouth in the morning.   Qty: 30 tablet, Refills: 5    Associated Diagnoses: BPH with urinary obstruction                Details   pantoprazole (PROTONIX) 40 MG tablet TAKE 1 TABLET BY MOUTH TWICE DAILY BEFORE MEALS  Qty: 180 tablet, Refills: 0    Comments: ZERO refills remain on this prescription. Your patient is requesting advance approval of refills for this medication to [address]      pravastatin (PRAVACHOL) 80 MG tablet TAKE 1 TABLET BY MOUTH DAILY  Qty: 90 tablet, Refills: 1      levothyroxine (SYNTHROID) 75 MCG tablet TAKE 1 TABLET BY MOUTH DAILY  Qty: 90 tablet, Refills: 0    Associated Diagnoses: Hypothyroidism due to acquired atrophy of thyroid      lisinopril (PRINIVIL;ZESTRIL) 5 MG tablet Take 1 tablet by mouth daily  Qty: 90 tablet, Refills: 3      sucralfate (CARAFATE) 1 GM tablet Take 1 tablet by mouth every 8 hours for 14 days  Qty: 42 tablet, Refills: 0      dofetilide (TIKOSYN) 500 MCG capsule TAKE 1 CAPSULE BY MOUTH EVERY 12 HOURS  Qty: 180 capsule, Refills: 2    Comments: **Patient requests 90 days supply**      prednisoLONE acetate (PRED FORTE) 1 % ophthalmic suspension SHAKE LIQUID AND INSTILL 1 DROP IN RIGHT EYE THREE TIMES DAILY      dilTIAZem (CARDIZEM CD) 120 MG extended release capsule Take 1 capsule by mouth daily  Qty: 30 capsule, Refills: 3      aspirin 325 MG tablet Take by mouth      moxifloxacin (VIGAMOX) 0.5 % ophthalmic solution INSTILL 1 DROP IN LEFT EYE FOUR TIMES DAILY AFTER SURGERY      loratadine (CLARITIN) 10 MG capsule Take 10 mg by mouth daily      dorzolamide-timolol (COSOPT) 22.3-6.8 MG/ML ophthalmic solution INT 1 GTT INTO OU BID             Time Spent on discharge is more than 45 minutes in the examination, evaluation, counseling and review of medications and discharge plan. Signed:    DARSHANA Cr CNP   7/28/2022      Thank you DARSHANA Matthews CNP for the opportunity to be involved in this patient's care. If you have any questions or concerns, please feel free to contact me at [phone].

## 2022-07-28 NOTE — PROGRESS NOTES
Order for PICC line per Dr. Yessi Lester. Pre procedure and timeout done with pt's RN. Successful insertion of a Single lumen PICC line into pt's left basilic vein. No issues gaining access or advancing guidewire/introducer/PICC line. PICC is cut at 46cm and out externally 0cm. Single lumen flushes without resistance and draw back brisk blood return. PICC site CDI with hemostasis maintained and a biopatch applied to site. Pt instructed to stay in bed and keep arm flat and still for 30 minutes  to promote hemostasis.      RN given handoff report

## 2022-07-28 NOTE — PROGRESS NOTES
Shift assessment completed. A/O x4. Flat affect. C/O \"no energy\". VSS. Respirations unlabored. Lungs clear and diminished. PRN IV Morphine administered for c/o lower mid back pain 6/10. Requires assistance with adjusting in bed d/t back pain. F/C patent, draining chloé urine. Encouraged educated patient to drink fluids; verbalized understanding. No peripheral edema. IV Ancef as ordered. Denies any needs at this time.

## 2022-07-28 NOTE — PROGRESS NOTES
PICC line coiled per X-ray. Left Sided PICC removed without complications. Occlusive dressing C/D/I. Patient aware to keep dressing on for 24hrs.

## 2022-07-28 NOTE — CARE COORDINATION
Chart reviewed. Management per ID, Pulmonology and IM. Plan for PICC placement today. [facility] and [facility] following for long term IVABX needs. Will need to keep pt here for afternoon dose of IVABX, with [facility] SOC the following morning. Pt home with wife, Lex Child. CM following for dc planning needs. Ramone Covington RN     Anticipate discharge this afternoon/evening. Pt should get PICC line today. Pt will need afternoon dose of IVABX prior to discharge. Pt will miss night dose. CM spoke with [facility] at [facility]. They are able to started services tomorrow morning for IVABX therapy.  Gloria Berumen RN

## 2022-07-28 NOTE — CARE COORDINATION
CASE MANAGEMENT DISCHARGE SUMMARY      Discharge to: Home with [facility] and [facility] Winkler completed: NA  Hospital Exemption Notification (HENS) completed: NA    IMM given: (date) 7/27/22    New Durable Medical Equipment ordered/agency: None    Transportation:    Family/car: private       Confirmed discharge plan with:     Patient: yes     Family:  yes per pt     Facility/Agency, name: per Dana Powell and Alan Bang at [place] [address], name: [facility]here[facility] Tripathi, after PICC placed and last dose of ABX given

## 2022-07-28 NOTE — PROGRESS NOTES
Order for PICC line per Dr. Bethanne Cabot. Pre procedure and timeout done with pt's RN. Successful insertion of a single lumen PICC line into pt's right basilic vein. No issues gaining access or advancing guidewire/introducer/PICC line. PICC tip terminates in the SVC according to [employer] 3CG tip confirmation system. PICC was seen dropping into SVC with tip tracking technology and discernable peaked p waves were noted without negative deflection. A printout will be in pt's chart. PICC is cut at 43cm and out externally 0cm. Single lumens flushes without resistance and draw back brisk blood return. PICC site CDI with hemostasis maintained and a biopatch applied to site. Pt instructed to stay in bed and keep arm flat and still for 30 minutes  to promote hemostasis.      [address] RN given handoff report

## 2022-07-28 NOTE — CARE COORDINATION
[facility]    DC order noted, all docs needed have been faxed to [facility] for home care services.     Home care to see patient within [address] [facility] [address], [place]SN [place]  [facility] [phone]

## 2022-07-28 NOTE — PROGRESS NOTES
Agree with assessment completed by student nurse, Ingris Noriega. Pt given PRN flexeril for back pain. Denies any further needs at this time. Will continue to monitor.

## 2022-07-30 LAB
BLOOD CULTURE, ROUTINE: NORMAL
CULTURE, BLOOD 2: NORMAL

## 2022-08-01 ENCOUNTER — HOSPITAL ENCOUNTER (OUTPATIENT)
Age: 72
Setting detail: SPECIMEN
Discharge: HOME | End: 2022-08-01
Payer: MEDICARE

## 2022-08-01 ENCOUNTER — PHARMACY VISIT (OUTPATIENT)
Dept: INFECTIOUS DISEASES | Age: 72
End: 2022-08-01

## 2022-08-01 DIAGNOSIS — B95.61 MSSA BACTEREMIA: Primary | ICD-10-CM

## 2022-08-01 DIAGNOSIS — R78.81 MSSA BACTEREMIA: Primary | ICD-10-CM

## 2022-08-01 LAB
ALBUMIN SERPL-MCNC: 3.3 G/DL (ref 3.4–5)
ALP BLD-CCNC: 130 U/L (ref 40–129)
ALT SERPL-CCNC: 18 U/L (ref 10–40)
AST SERPL-CCNC: 36 U/L (ref 15–37)
BASOPHILS ABSOLUTE: 0.1 K/UL (ref 0–0.2)
BASOPHILS RELATIVE PERCENT: 0.7 %
BILIRUB SERPL-MCNC: <0.2 MG/DL (ref 0–1)
BILIRUBIN DIRECT: <0.2 MG/DL (ref 0–0.3)
BILIRUBIN, INDIRECT: ABNORMAL MG/DL (ref 0–1)
BUN BLDV-MCNC: 13 MG/DL (ref 7–20)
C-REACTIVE PROTEIN: 11.6 MG/L (ref 0–5.1)
CREAT SERPL-MCNC: 0.9 MG/DL (ref 0.8–1.3)
EOSINOPHILS ABSOLUTE: 0.3 K/UL (ref 0–0.6)
EOSINOPHILS RELATIVE PERCENT: 3.5 %
GFR AFRICAN AMERICAN: >60
GFR NON-AFRICAN AMERICAN: >60
HCT VFR BLD CALC: 33.9 % (ref 40.5–52.5)
HEMOGLOBIN: 11.4 G/DL (ref 13.5–17.5)
LYMPHOCYTES ABSOLUTE: 1 K/UL (ref 1–5.1)
LYMPHOCYTES RELATIVE PERCENT: 13.1 %
MCH RBC QN AUTO: 29 PG (ref 26–34)
MCHC RBC AUTO-ENTMCNC: 33.6 G/DL (ref 31–36)
MCV RBC AUTO: 86.3 FL (ref 80–100)
MONOCYTES ABSOLUTE: 1.2 K/UL (ref 0–1.3)
MONOCYTES RELATIVE PERCENT: 16.5 %
NEUTROPHILS ABSOLUTE: 5 K/UL (ref 1.7–7.7)
NEUTROPHILS RELATIVE PERCENT: 66.2 %
PDW BLD-RTO: 14.9 % (ref 12.4–15.4)
PLATELET # BLD: 566 K/UL (ref 135–450)
PMV BLD AUTO: 7 FL (ref 5–10.5)
RBC # BLD: 3.92 M/UL (ref 4.2–5.9)
TOTAL PROTEIN: 6.9 G/DL (ref 6.4–8.2)
WBC # BLD: 7.5 K/UL (ref 4–11)

## 2022-08-01 PROCEDURE — 84520 ASSAY OF UREA NITROGEN: CPT

## 2022-08-01 PROCEDURE — 86140 C-REACTIVE PROTEIN: CPT

## 2022-08-01 PROCEDURE — 82565 ASSAY OF CREATININE: CPT

## 2022-08-01 PROCEDURE — 85025 COMPLETE CBC W/AUTO DIFF WBC: CPT

## 2022-08-01 PROCEDURE — 36415 COLL VENOUS BLD VENIPUNCTURE: CPT

## 2022-08-01 PROCEDURE — 80076 HEPATIC FUNCTION PANEL: CPT

## 2022-08-01 RX ORDER — CEFAZOLIN SODIUM 1 G/3ML
2000 INJECTION, POWDER, FOR SOLUTION INTRAMUSCULAR; INTRAVENOUS EVERY 8 HOURS
Qty: 222 EACH | Refills: 0
Start: 2022-08-01 | End: 2022-09-07

## 2022-08-01 NOTE — PROGRESS NOTES
CT in a few weeks based on back pain resolution and inflammatory markers    Follow up in ID Clinic Needs f/u with Dr. Niraj Lugo in 2-3 weeks    LDA/OPAT [address] [facility] RN Agency  [facility]     Lab and medication orders have been placed. Clinical Pharmacist will review patient weekly or as needed and make recommendations regarding the above therapy plan. Thank you,  Tye Blanco, PharmD, [facility]  Infectious Disease Pharmacy [facility]  [address]  [address] [facility] [address] [facility]  Phone: [phone] (also available on [employer])  Fax: [phone]    For Pharmacy [address] [facility] in place:  Yes  Time Spent (min): 10

## 2022-08-02 ENCOUNTER — TELEPHONE (OUTPATIENT)
Dept: INFECTIOUS DISEASES | Age: 72
End: 2022-08-02

## 2022-08-05 ENCOUNTER — TELEPHONE (OUTPATIENT)
Dept: FAMILY MEDICINE CLINIC | Age: 72
End: 2022-08-05

## 2022-08-05 NOTE — TELEPHONE ENCOUNTER
Contacted pt in regards to orders for his cath supplies. Pt needs to schedule to establish with EG and to also inform the orders would need to go thru Urologist. Melia Ip to contact office.

## 2022-08-08 ENCOUNTER — TELEPHONE (OUTPATIENT)
Dept: INFECTIOUS DISEASES | Age: 72
End: 2022-08-08

## 2022-08-08 ENCOUNTER — HOSPITAL ENCOUNTER (OUTPATIENT)
Age: 72
Setting detail: SPECIMEN
Discharge: HOME | End: 2022-08-08
Payer: MEDICARE

## 2022-08-08 LAB
ALBUMIN SERPL-MCNC: 3.6 G/DL (ref 3.4–5)
ALP BLD-CCNC: 122 U/L (ref 40–129)
ALT SERPL-CCNC: <5 U/L (ref 10–40)
AST SERPL-CCNC: 23 U/L (ref 15–37)
BASOPHILS ABSOLUTE: 0.2 K/UL (ref 0–0.2)
BASOPHILS RELATIVE PERCENT: 2.1 %
BILIRUB SERPL-MCNC: <0.2 MG/DL (ref 0–1)
BILIRUBIN DIRECT: <0.2 MG/DL (ref 0–0.3)
BILIRUBIN, INDIRECT: ABNORMAL MG/DL (ref 0–1)
BUN BLDV-MCNC: 15 MG/DL (ref 7–20)
C-REACTIVE PROTEIN: 9.4 MG/L (ref 0–5.1)
CREAT SERPL-MCNC: 0.9 MG/DL (ref 0.8–1.3)
EOSINOPHILS ABSOLUTE: 0.3 K/UL (ref 0–0.6)
EOSINOPHILS RELATIVE PERCENT: 3.5 %
GFR AFRICAN AMERICAN: >60
GFR NON-AFRICAN AMERICAN: >60
HCT VFR BLD CALC: 35.7 % (ref 40.5–52.5)
HEMOGLOBIN: 11.8 G/DL (ref 13.5–17.5)
LYMPHOCYTES ABSOLUTE: 1 K/UL (ref 1–5.1)
LYMPHOCYTES RELATIVE PERCENT: 13.6 %
MCH RBC QN AUTO: 28.3 PG (ref 26–34)
MCHC RBC AUTO-ENTMCNC: 33.2 G/DL (ref 31–36)
MCV RBC AUTO: 85.2 FL (ref 80–100)
MONOCYTES ABSOLUTE: 0.9 K/UL (ref 0–1.3)
MONOCYTES RELATIVE PERCENT: 11.6 %
NEUTROPHILS ABSOLUTE: 5.3 K/UL (ref 1.7–7.7)
NEUTROPHILS RELATIVE PERCENT: 69.2 %
PDW BLD-RTO: 14.3 % (ref 12.4–15.4)
PLATELET # BLD: 521 K/UL (ref 135–450)
PMV BLD AUTO: 7.8 FL (ref 5–10.5)
RBC # BLD: 4.19 M/UL (ref 4.2–5.9)
TOTAL PROTEIN: 7.5 G/DL (ref 6.4–8.2)
WBC # BLD: 7.6 K/UL (ref 4–11)

## 2022-08-08 PROCEDURE — 86140 C-REACTIVE PROTEIN: CPT

## 2022-08-08 PROCEDURE — 85025 COMPLETE CBC W/AUTO DIFF WBC: CPT

## 2022-08-08 PROCEDURE — 84520 ASSAY OF UREA NITROGEN: CPT

## 2022-08-08 PROCEDURE — 80076 HEPATIC FUNCTION PANEL: CPT

## 2022-08-08 PROCEDURE — 36415 COLL VENOUS BLD VENIPUNCTURE: CPT

## 2022-08-08 PROCEDURE — 82565 ASSAY OF CREATININE: CPT

## 2022-08-15 ENCOUNTER — HOSPITAL ENCOUNTER (OUTPATIENT)
Age: 72
Setting detail: SPECIMEN
Discharge: HOME | End: 2022-08-15
Payer: MEDICARE

## 2022-08-15 LAB
ALBUMIN SERPL-MCNC: 3.4 G/DL (ref 3.4–5)
ALP BLD-CCNC: 100 U/L (ref 40–129)
ALT SERPL-CCNC: <5 U/L (ref 10–40)
AST SERPL-CCNC: 16 U/L (ref 15–37)
BASOPHILS ABSOLUTE: 0.1 K/UL (ref 0–0.2)
BASOPHILS RELATIVE PERCENT: 1.4 %
BILIRUB SERPL-MCNC: <0.2 MG/DL (ref 0–1)
BILIRUBIN DIRECT: <0.2 MG/DL (ref 0–0.3)
BILIRUBIN, INDIRECT: ABNORMAL MG/DL (ref 0–1)
BUN BLDV-MCNC: 16 MG/DL (ref 7–20)
C-REACTIVE PROTEIN: 3.3 MG/L (ref 0–5.1)
CREAT SERPL-MCNC: 0.8 MG/DL (ref 0.8–1.3)
EOSINOPHILS ABSOLUTE: 0.3 K/UL (ref 0–0.6)
EOSINOPHILS RELATIVE PERCENT: 5.4 %
GFR AFRICAN AMERICAN: >60
GFR NON-AFRICAN AMERICAN: >60
HCT VFR BLD CALC: 33.6 % (ref 40.5–52.5)
HEMOGLOBIN: 11.1 G/DL (ref 13.5–17.5)
LYMPHOCYTES ABSOLUTE: 1 K/UL (ref 1–5.1)
LYMPHOCYTES RELATIVE PERCENT: 20.1 %
MCH RBC QN AUTO: 28.1 PG (ref 26–34)
MCHC RBC AUTO-ENTMCNC: 33 G/DL (ref 31–36)
MCV RBC AUTO: 85.1 FL (ref 80–100)
MONOCYTES ABSOLUTE: 0.7 K/UL (ref 0–1.3)
MONOCYTES RELATIVE PERCENT: 12.9 %
NEUTROPHILS ABSOLUTE: 3.1 K/UL (ref 1.7–7.7)
NEUTROPHILS RELATIVE PERCENT: 60.2 %
PDW BLD-RTO: 13.7 % (ref 12.4–15.4)
PLATELET # BLD: 374 K/UL (ref 135–450)
PMV BLD AUTO: 7.6 FL (ref 5–10.5)
PROSTATE SPECIFIC ANTIGEN: 4.8 NG/ML (ref 0–4)
RBC # BLD: 3.95 M/UL (ref 4.2–5.9)
TOTAL PROTEIN: 6.9 G/DL (ref 6.4–8.2)
WBC # BLD: 5.1 K/UL (ref 4–11)

## 2022-08-15 PROCEDURE — 82565 ASSAY OF CREATININE: CPT

## 2022-08-15 PROCEDURE — 84153 ASSAY OF PSA TOTAL: CPT

## 2022-08-15 PROCEDURE — 84520 ASSAY OF UREA NITROGEN: CPT

## 2022-08-15 PROCEDURE — 36415 COLL VENOUS BLD VENIPUNCTURE: CPT

## 2022-08-15 PROCEDURE — 86140 C-REACTIVE PROTEIN: CPT

## 2022-08-15 PROCEDURE — 80076 HEPATIC FUNCTION PANEL: CPT

## 2022-08-15 PROCEDURE — 85025 COMPLETE CBC W/AUTO DIFF WBC: CPT

## 2022-08-22 ENCOUNTER — HOSPITAL ENCOUNTER (OUTPATIENT)
Age: 72
Setting detail: SPECIMEN
Discharge: HOME | End: 2022-08-22
Payer: MEDICARE

## 2022-08-22 LAB
ALBUMIN SERPL-MCNC: 3.7 G/DL (ref 3.4–5)
ALP BLD-CCNC: 93 U/L (ref 40–129)
ALT SERPL-CCNC: <5 U/L (ref 10–40)
AST SERPL-CCNC: 17 U/L (ref 15–37)
BASOPHILS ABSOLUTE: 0 K/UL (ref 0–0.2)
BASOPHILS RELATIVE PERCENT: 1 %
BILIRUB SERPL-MCNC: <0.2 MG/DL (ref 0–1)
BILIRUBIN DIRECT: <0.2 MG/DL (ref 0–0.3)
BILIRUBIN, INDIRECT: ABNORMAL MG/DL (ref 0–1)
BUN BLDV-MCNC: 14 MG/DL (ref 7–20)
C-REACTIVE PROTEIN: <3 MG/L (ref 0–5.1)
CREAT SERPL-MCNC: 0.9 MG/DL (ref 0.8–1.3)
EOSINOPHILS ABSOLUTE: 0.4 K/UL (ref 0–0.6)
EOSINOPHILS RELATIVE PERCENT: 8.6 %
GFR AFRICAN AMERICAN: >60
GFR NON-AFRICAN AMERICAN: >60
HCT VFR BLD CALC: 34.3 % (ref 40.5–52.5)
HEMOGLOBIN: 11.1 G/DL (ref 13.5–17.5)
LYMPHOCYTES ABSOLUTE: 0.7 K/UL (ref 1–5.1)
LYMPHOCYTES RELATIVE PERCENT: 16.4 %
MCH RBC QN AUTO: 27.7 PG (ref 26–34)
MCHC RBC AUTO-ENTMCNC: 32.3 G/DL (ref 31–36)
MCV RBC AUTO: 85.6 FL (ref 80–100)
MONOCYTES ABSOLUTE: 0.6 K/UL (ref 0–1.3)
MONOCYTES RELATIVE PERCENT: 14.4 %
NEUTROPHILS ABSOLUTE: 2.5 K/UL (ref 1.7–7.7)
NEUTROPHILS RELATIVE PERCENT: 59.6 %
PDW BLD-RTO: 14.3 % (ref 12.4–15.4)
PLATELET # BLD: 326 K/UL (ref 135–450)
PMV BLD AUTO: 7.9 FL (ref 5–10.5)
RBC # BLD: 4.01 M/UL (ref 4.2–5.9)
TOTAL PROTEIN: 6.5 G/DL (ref 6.4–8.2)
WBC # BLD: 4.3 K/UL (ref 4–11)

## 2022-08-22 PROCEDURE — 80076 HEPATIC FUNCTION PANEL: CPT

## 2022-08-22 PROCEDURE — 36415 COLL VENOUS BLD VENIPUNCTURE: CPT

## 2022-08-22 PROCEDURE — 85025 COMPLETE CBC W/AUTO DIFF WBC: CPT

## 2022-08-22 PROCEDURE — 86140 C-REACTIVE PROTEIN: CPT

## 2022-08-22 PROCEDURE — 84520 ASSAY OF UREA NITROGEN: CPT

## 2022-08-22 PROCEDURE — 82565 ASSAY OF CREATININE: CPT

## 2022-08-25 ENCOUNTER — HOSPITAL ENCOUNTER (OUTPATIENT)
Dept: INTERVENTIONAL RADIOLOGY/VASCULAR | Age: 72
Discharge: HOME | End: 2022-08-25

## 2022-08-25 PROCEDURE — 36568 INSJ PICC <5 YR W/O IMAGING: CPT

## 2022-08-25 PROCEDURE — C1751 CATH, INF, PER/CENT/MIDLINE: HCPCS

## 2022-08-29 ENCOUNTER — HOSPITAL ENCOUNTER (OUTPATIENT)
Age: 72
Discharge: HOME | End: 2022-08-29
Payer: MEDICARE

## 2022-08-29 LAB
ALBUMIN SERPL-MCNC: 3.6 G/DL (ref 3.4–5)
ALP BLD-CCNC: 83 U/L (ref 40–129)
ALT SERPL-CCNC: <5 U/L (ref 10–40)
AST SERPL-CCNC: 17 U/L (ref 15–37)
BASOPHILS ABSOLUTE: 0.1 K/UL (ref 0–0.2)
BASOPHILS RELATIVE PERCENT: 1.1 %
BILIRUB SERPL-MCNC: <0.2 MG/DL (ref 0–1)
BILIRUBIN DIRECT: <0.2 MG/DL (ref 0–0.3)
BILIRUBIN, INDIRECT: ABNORMAL MG/DL (ref 0–1)
BUN BLDV-MCNC: 15 MG/DL (ref 7–20)
C-REACTIVE PROTEIN: <3 MG/L (ref 0–5.1)
CREAT SERPL-MCNC: 0.9 MG/DL (ref 0.8–1.3)
EOSINOPHILS ABSOLUTE: 0.2 K/UL (ref 0–0.6)
EOSINOPHILS RELATIVE PERCENT: 4.4 %
GFR AFRICAN AMERICAN: >60
GFR NON-AFRICAN AMERICAN: >60
HCT VFR BLD CALC: 32.9 % (ref 40.5–52.5)
HEMOGLOBIN: 10.7 G/DL (ref 13.5–17.5)
LYMPHOCYTES ABSOLUTE: 0.7 K/UL (ref 1–5.1)
LYMPHOCYTES RELATIVE PERCENT: 15.4 %
MCH RBC QN AUTO: 28 PG (ref 26–34)
MCHC RBC AUTO-ENTMCNC: 32.5 G/DL (ref 31–36)
MCV RBC AUTO: 86.2 FL (ref 80–100)
MONOCYTES ABSOLUTE: 0.5 K/UL (ref 0–1.3)
MONOCYTES RELATIVE PERCENT: 11.9 %
NEUTROPHILS ABSOLUTE: 3.1 K/UL (ref 1.7–7.7)
NEUTROPHILS RELATIVE PERCENT: 67.2 %
PDW BLD-RTO: 14.6 % (ref 12.4–15.4)
PLATELET # BLD: 313 K/UL (ref 135–450)
PMV BLD AUTO: 7.6 FL (ref 5–10.5)
RBC # BLD: 3.81 M/UL (ref 4.2–5.9)
SEDIMENTATION RATE, ERYTHROCYTE: 37 MM/HR (ref 0–20)
TOTAL PROTEIN: 6.2 G/DL (ref 6.4–8.2)
WBC # BLD: 4.6 K/UL (ref 4–11)

## 2022-08-29 PROCEDURE — 36415 COLL VENOUS BLD VENIPUNCTURE: CPT

## 2022-08-29 PROCEDURE — 84520 ASSAY OF UREA NITROGEN: CPT

## 2022-08-29 PROCEDURE — 82565 ASSAY OF CREATININE: CPT

## 2022-08-29 PROCEDURE — 85652 RBC SED RATE AUTOMATED: CPT

## 2022-08-29 PROCEDURE — 86140 C-REACTIVE PROTEIN: CPT

## 2022-08-29 PROCEDURE — 80076 HEPATIC FUNCTION PANEL: CPT

## 2022-08-29 PROCEDURE — 85025 COMPLETE CBC W/AUTO DIFF WBC: CPT

## 2022-08-30 ENCOUNTER — OFFICE VISIT (OUTPATIENT)
Dept: INFECTIOUS DISEASES | Age: 72
End: 2022-08-30
Payer: MEDICARE

## 2022-08-30 ENCOUNTER — TELEPHONE (OUTPATIENT)
Dept: INFECTIOUS DISEASES | Age: 72
End: 2022-08-30

## 2022-08-30 VITALS
SYSTOLIC BLOOD PRESSURE: 138 MMHG | TEMPERATURE: 98 F | DIASTOLIC BLOOD PRESSURE: 72 MMHG | HEIGHT: 69 IN | BODY MASS INDEX: 25.4 KG/M2

## 2022-08-30 DIAGNOSIS — R78.81 MSSA BACTEREMIA: Primary | ICD-10-CM

## 2022-08-30 DIAGNOSIS — B95.61 MSSA BACTEREMIA: Primary | ICD-10-CM

## 2022-08-30 PROCEDURE — 99214 OFFICE O/P EST MOD 30 MIN: CPT | Performed by: INTERNAL MEDICINE

## 2022-08-30 PROCEDURE — 1036F TOBACCO NON-USER: CPT | Performed by: INTERNAL MEDICINE

## 2022-08-30 PROCEDURE — 3017F COLORECTAL CA SCREEN DOC REV: CPT | Performed by: INTERNAL MEDICINE

## 2022-08-30 PROCEDURE — G8427 DOCREV CUR MEDS BY ELIG CLIN: HCPCS | Performed by: INTERNAL MEDICINE

## 2022-08-30 PROCEDURE — G8417 CALC BMI ABV UP PARAM F/U: HCPCS | Performed by: INTERNAL MEDICINE

## 2022-08-30 PROCEDURE — 1123F ACP DISCUSS/DSCN MKR DOCD: CPT | Performed by: INTERNAL MEDICINE

## 2022-08-30 NOTE — PROGRESS NOTES
Department of Internal Medicine  Infectious Diseases      Patient Name: Nicol Quarles      Date of visit: 8/30/2022  SUBJECTIVE:  Jakub Buitrago  is a 74 y.o. male who returns today for hospital follow-up. History of afib, HLD, HTN, SqCC, BASSAM, CHF  L RONAK  Inspire device in place      Admitted via ED 7/22-7/28/22   2 week history of progressive chest and back pain after a fall. CT CAP - multiple subacute L rib fractures, L chest wall edema, eliud trace pl effusion, prostatomegaly. WBC was 11.5. Admitted with working diagnosis pneumonia and started on rocephin and azithromycin    Urinary retention req griffin placement 7/23/22     BC collected on admission were positive for MSSA   Low back pain persisted   ESR was 99, CRP was 54  CT TL spine was negative. He was not candidate for MRI. Bacteremia cleared quickly. TTE was reassuring. He was discharged to complete a course of IV cefazolin and presumptive therapy for spine infection. He has tolerated IV abx well. PICC is functioning as expected. Low back pain slowly resolved. No fever, chills. L RONAK feels the same as always. No N/V/D/rash/pruritus   No new focal complaints.           REVIEW OF SYSTEMS:    CONSTITUTIONAL:  negative for fevers, chills   EYES:  negative for blurred vision, eye discharge, visual disturbance and icterus  HEENT:  negative for acute hearing loss, tinnitus, ear drainage, sinus pressure, nasal congestion, epistaxis and snoring  RESPIRATORY:  No cough or hemoptysis   CARDIOVASCULAR:  negative for chest pain  GASTROINTESTINAL:  negative for nausea, vomiting, diarrhea, constipation, blood in stool and abdominal pain  GENITOURINARY:   voiding without difficulty   Griffin was removed by Urology after discharge   HEMATOLOGIC/LYMPHATIC:  negative for easy bruising, bleeding and lymphadenopathy  ALLERGIC/IMMUNOLOGIC:  negative for recurrent infections, angioedema, anaphylaxis and drug reactions  ENDOCRINE:  negative for weight changes and diabetic symptoms including polyuria, polydipsia and polyphagia  MUSCULOSKELETAL:  negative for acute joint swelling, decreased range of motion and muscle weakness  NEUROLOGICAL:  negative for headaches, slurred speech, unilateral weakness  PSYCHIATRIC/BEHAVIORAL: negative for hallucinations, behavioral problems, confusion and agitation. Medications:    Current Outpatient Medications   Medication Sig Dispense Refill    ceFAZolin (ANCEF) 1 g injection Infuse 2,000 mg intravenously in the morning and 2,000 mg at noon and 2,000 mg in the evening. 222 each 0    lactobacillus (CULTURELLE) capsule Take 1 capsule by mouth in the morning and 1 capsule in the evening. Take with meals. 60 capsule 0    tamsulosin (FLOMAX) 0.4 MG capsule Take 1 capsule by mouth in the morning. 30 capsule 5    finasteride (PROSCAR) 5 MG tablet Take 1 tablet by mouth in the morning.  30 tablet 5    pantoprazole (PROTONIX) 40 MG tablet TAKE 1 TABLET BY MOUTH TWICE DAILY BEFORE MEALS 180 tablet 0    pravastatin (PRAVACHOL) 80 MG tablet TAKE 1 TABLET BY MOUTH DAILY 90 tablet 1    levothyroxine (SYNTHROID) 75 MCG tablet TAKE 1 TABLET BY MOUTH DAILY 90 tablet 0    lisinopril (PRINIVIL;ZESTRIL) 5 MG tablet Take 1 tablet by mouth daily 90 tablet 3    dofetilide (TIKOSYN) 500 MCG capsule TAKE 1 CAPSULE BY MOUTH EVERY 12 HOURS 180 capsule 2    prednisoLONE acetate (PRED FORTE) 1 % ophthalmic suspension SHAKE LIQUID AND INSTILL 1 DROP IN RIGHT EYE THREE TIMES DAILY      dilTIAZem (CARDIZEM CD) 120 MG extended release capsule Take 1 capsule by mouth daily 30 capsule 3    aspirin 325 MG tablet Take by mouth      moxifloxacin (VIGAMOX) 0.5 % ophthalmic solution INSTILL 1 DROP IN LEFT EYE FOUR TIMES DAILY AFTER SURGERY      loratadine (CLARITIN) 10 MG capsule Take 10 mg by mouth daily      dorzolamide-timolol (COSOPT) 22.3-6.8 MG/ML ophthalmic solution INT 1 GTT INTO OU BID      sucralfate (CARAFATE) 1 GM tablet Take 1 tablet by mouth every 8 hours for 14 days (Patient not taking: Reported on 6/2/2022) 42 tablet 0     No current facility-administered medications for this visit. Physical:  VITALS:  /72   Temp 98 °F (36.7 °C) (Oral)   Ht 5' 9\" (1.753 m)   BMI 25.40 kg/m²     General Appearance:  alert, oriented, NAD   Skin:   warm, dry, leathered  No focal rash  No stigmata of emboli   Mouth/Throat:   facial symmetry  Sclera anicteric   Neck:   supple, symmetrical   Lungs:   CTA Ney without W/R/R  Heart:   RRR without murmur   Abdomen:   soft, flat, NT   Extremities:  no LE edema     Vascular Access:   PICC RUE In place       Cultures:   7/22     BC x2 MSSA   7/23     UA neg         Radiology Review:  All pertinent images / reports were reviewed as a part of this visit. 7/22/22 CT CAP   Impression   1. Multiple subacute left ribs healing fractures involving left 3rd through 1   10th ribs anterolaterally, correlate for point tenderness. Mild left chest   wall edema and left upper abdominal wall edema along the fractures. 2. Bilateral trace pleural effusion with bibasilar infiltrates. There is no   pneumothorax. 3. No acute finding in the abdomen pelvis. 4. Colonic diverticulosis. 5. Markedly enlarged prostate. CT T/L spine - negative     TTE 7/26/22   Summary   Normal left ventricular systolic function with ejection fraction of 55-60%. No regional wall motion abnormalites are seen. Left ventricular diastolic filling pressure is normal.   Compared to previous study left ventricle systolic function appears same as   echo on 11-. Mild mitral and aortic regurgitation. No vegetations are seen, LUIZA recommended   if clinically indicated.    Systolic pulmonic artery pressure (SPAP) is normal estimated at 32 mmHg   (Right atrial pressure of 3 mmHg    Assessment:          Patient Active Problem List   Diagnosis    Hyperlipidemia    HTN (hypertension)    Squamous cell carcinoma of skin    Bilateral shoulder tendinopathy Primary osteoarthritis of both shoulders    History of joint swelling    Pre-diabetes    Normocytic normochromic anemia    Moderate obstructive sleep apnea    Presence of Watchman left atrial appendage closure device    Paroxysmal atrial fibrillation (HCC)    Hypercalcemia    Coronary artery calcification seen on CT scan    HH (hiatus hernia)    Prostate enlargement    Splenic artery aneurysm (HCC)    Calculus of gallbladder without cholecystitis without obstruction    Diverticulosis of colon without diverticulitis    Periodic limb movement sleep disorder    Pulmonary infiltrate    Anemia due to chronic blood loss    Atypical chest pain    BPH (benign prostatic hyperplasia)    Chronic anticoagulation    Coronary atherosclerosis    ED (erectile dysfunction)    Elevated serum cholesterol    Encounter for monitoring sotalol therapy    Essential thrombocytosis (HCC)    Gastroesophageal reflux disease without esophagitis    Generalized osteoarthritis    GI bleed    History of seronegative inflammatory arthritis    Intestinal malabsorption    Osteoarthritis of left hip    Overweight with body mass index (BMI) 25.0-29.9    Status post left hip replacement    Benign essential hypertension    Rhinitis, chronic    S/P placement of VNS (vagus nerve stimulation) device    Hypothyroidism due to medication    Atrial fibrillation with RVR ([address]    Bilateral cataracts    Entropion of both eyes    Atrial fibrillation (HCC)    Gram-negative pneumonia (HCC)    Closed fracture of multiple ribs of left side with routine healing    Acute midline low back pain without sciatica    MSSA bacteremia    Elevated PSA            Complicated, community onset MSSA bacteremia   Primary source of bacteremia is not clear   CT CAP without findings of metastatic foci of infection   Indwelling L RONAK without acute change in symptomatology to suggest seeding of the prosthesis     Acute low back following trauma but with pain persisting as other trauma-associated pain is waning   CT was negative   Not candidate for MRI due to nerve stimulator in place   Concern in this context is hematogenous seeding of the spine and osteo-discitis.    Back pain has resolved  CRP is wnl       Acute urinary retention  Resolved     NKDA       Plan     Clinically doing wlel  Has had 5 weeks culture directed IV abx therapy dating from first negative BC     DC IV Abx  PICC removed without complication     Monitor expectantly and call with concerns of escalating pain, fever, etc     All questions addressed         Marry Osborn MD

## 2022-09-09 ENCOUNTER — OFFICE VISIT (OUTPATIENT)
Dept: FAMILY MEDICINE CLINIC | Age: 72
End: 2022-09-09
Payer: MEDICARE

## 2022-09-09 VITALS
DIASTOLIC BLOOD PRESSURE: 82 MMHG | OXYGEN SATURATION: 97 % | BODY MASS INDEX: 24.65 KG/M2 | HEART RATE: 78 BPM | WEIGHT: 166.4 LBS | HEIGHT: 69 IN | SYSTOLIC BLOOD PRESSURE: 126 MMHG

## 2022-09-09 DIAGNOSIS — Z11.59 ENCOUNTER FOR HEPATITIS C SCREENING TEST FOR LOW RISK PATIENT: ICD-10-CM

## 2022-09-09 DIAGNOSIS — Z11.4 ENCOUNTER FOR SCREENING FOR HUMAN IMMUNODEFICIENCY VIRUS (HIV): ICD-10-CM

## 2022-09-09 DIAGNOSIS — Z13.1 ENCOUNTER FOR SCREENING EXAMINATION FOR IMPAIRED GLUCOSE REGULATION AND DIABETES MELLITUS: ICD-10-CM

## 2022-09-09 DIAGNOSIS — E03.2 HYPOTHYROIDISM DUE TO MEDICATION: ICD-10-CM

## 2022-09-09 DIAGNOSIS — I25.83 CORONARY ATHEROSCLEROSIS DUE TO LIPID RICH PLAQUE: ICD-10-CM

## 2022-09-09 DIAGNOSIS — I25.10 CORONARY ATHEROSCLEROSIS DUE TO LIPID RICH PLAQUE: ICD-10-CM

## 2022-09-09 DIAGNOSIS — Z00.00 PREVENTATIVE HEALTH CARE: Primary | ICD-10-CM

## 2022-09-09 DIAGNOSIS — I10 PRIMARY HYPERTENSION: ICD-10-CM

## 2022-09-09 LAB
ANION GAP SERPL CALCULATED.3IONS-SCNC: 15 MMOL/L (ref 3–16)
BUN BLDV-MCNC: 27 MG/DL (ref 7–20)
CALCIUM SERPL-MCNC: 10.2 MG/DL (ref 8.3–10.6)
CHLORIDE BLD-SCNC: 100 MMOL/L (ref 99–110)
CO2: 26 MMOL/L (ref 21–32)
CREAT SERPL-MCNC: 1 MG/DL (ref 0.8–1.3)
GFR AFRICAN AMERICAN: >60
GFR NON-AFRICAN AMERICAN: >60
GLUCOSE BLD-MCNC: 87 MG/DL (ref 70–99)
HCT VFR BLD CALC: 38.7 % (ref 40.5–52.5)
HEMOGLOBIN: 13 G/DL (ref 13.5–17.5)
HEPATITIS C ANTIBODY INTERPRETATION: NORMAL
MCH RBC QN AUTO: 28.9 PG (ref 26–34)
MCHC RBC AUTO-ENTMCNC: 33.5 G/DL (ref 31–36)
MCV RBC AUTO: 86.2 FL (ref 80–100)
PDW BLD-RTO: 14.6 % (ref 12.4–15.4)
PLATELET # BLD: 367 K/UL (ref 135–450)
PMV BLD AUTO: 8.2 FL (ref 5–10.5)
POTASSIUM SERPL-SCNC: 5.3 MMOL/L (ref 3.5–5.1)
RBC # BLD: 4.49 M/UL (ref 4.2–5.9)
SODIUM BLD-SCNC: 141 MMOL/L (ref 136–145)
TSH REFLEX: 0.81 UIU/ML (ref 0.27–4.2)
WBC # BLD: 4.4 K/UL (ref 4–11)

## 2022-09-09 PROCEDURE — G0439 PPPS, SUBSEQ VISIT: HCPCS

## 2022-09-09 PROCEDURE — 36415 COLL VENOUS BLD VENIPUNCTURE: CPT

## 2022-09-09 ASSESSMENT — PATIENT HEALTH QUESTIONNAIRE - PHQ9
SUM OF ALL RESPONSES TO PHQ QUESTIONS 1-9: 0
1. LITTLE INTEREST OR PLEASURE IN DOING THINGS: 0
SUM OF ALL RESPONSES TO PHQ QUESTIONS 1-9: 0
2. FEELING DOWN, DEPRESSED OR HOPELESS: 0
SUM OF ALL RESPONSES TO PHQ QUESTIONS 1-9: 0
SUM OF ALL RESPONSES TO PHQ QUESTIONS 1-9: 0
SUM OF ALL RESPONSES TO PHQ9 QUESTIONS 1 & 2: 0

## 2022-09-09 ASSESSMENT — ENCOUNTER SYMPTOMS
SHORTNESS OF BREATH: 0
COLOR CHANGE: 0
COUGH: 0
WHEEZING: 0
ABDOMINAL PAIN: 0
BLOOD IN STOOL: 0
SORE THROAT: 0
CONSTIPATION: 0
DIARRHEA: 0

## 2022-09-09 NOTE — PROGRESS NOTES
Gary Green (:  1950) is a 74 y.o. male,New patient, here for evaluation of the following chief complaint(s):  New Patient (Would like to establish care with Kosta Kennedy today)         ASSESSMENT/PLAN:  1. Preventative health care  -Jazzmine Lau is a seemingly healthy 68-year-old male that has a difficult history with A. fib resulting in multiple ablations. Since the patient's last ablation and switching cardiologist he states this is the best he has felt. Patient also dealt with some urinary retention while in the hospital approximately 2 months ago. Patient was treated with Flomax and finasteride and is doing much better since. 2. Coronary atherosclerosis due to lipid rich plaque  -     Lipid Panel; Future  -Given patient's history and cardiac history plan to routinely monitor cholesterol. Patient has yet to have his cholesterol checked in greater than a year. - Patient is on pravastatin 80 mg with no side effects. 3. Encounter for screening examination for impaired glucose regulation and diabetes mellitus  -     Hemoglobin A1C; Future  -We will keep a closer eye on patient's hemoglobin A1c given cardiac history. 4. Encounter for hepatitis C screening test for low risk patient  -     Hepatitis C Antibody; Future  5. Primary hypertension  -     Basic Metabolic Panel; Future  -     CBC; Future  -Patient is currently on lisinopril 5 mg and blood pressure is well controlled at this time. 6. Encounter for screening for human immunodeficiency virus (HIV)  -     HIV Screen; Future  7. Hypothyroidism due to medication  -     TSH with Reflex; Future  -Patient is currently on levothyroxine 75 mcg. We will plan to check thyroid levels to ensure therapeutic dose    For the patient's A. fib patient last saw cardiology on 2022 at this time cardiology's plan was to continue metoprolol aspirin,dronedarone and follow-up in 6 months patient states that he is not taking these medications currently.   Advised patient's that he needs to continue follow-up with cardiology    Return in about 3 months (around 12/9/2022) for HTN. Subjective   SUBJECTIVE/OBJECTIVE:  HPI  Patient presents the office today as a new patient here to establish care. Patient states he previously saw PCP approximately 6 months ago. At this time during this visit patient was struggling with some A. fib he was recently hospitalized but he was not feeling well. At that time is advised that he see a cardiologist.  After seeing the PCP patient was hospitalized on 7-. He originally presented to the hospital for a fall resulting in closed rib fractures. At this time patient was found to have MSSA bacteremia. His prostate was also elevated he was difficulty urinating. During this admission patient was treated with IV antibiotics. Patient did get 4 weeks of IV antibiotics outpatient per infectious disease. Patient did have a urinary catheter in place per urology. Patient did have an elevated PSA of 6.65 patient was placed on Flomax and finasteride. Since then patient has followed up with cardiology and urology. Patient is not having any issues with urination. He states that he finally feels like he found a cardiology that addressed his concerns and is feeling much better. He states that he is well managed on his current medication dosage. Patient has no other acute complaints at this visit he is here to establish care. Review of Systems   HENT:  Negative for congestion and sore throat. Respiratory:  Negative for cough, shortness of breath and wheezing. Cardiovascular:  Negative for chest pain and leg swelling. Gastrointestinal:  Negative for abdominal pain, blood in stool, constipation and diarrhea. Endocrine: Negative for cold intolerance and heat intolerance. Genitourinary:  Negative for difficulty urinating, hematuria and urgency. Musculoskeletal:  Negative for arthralgias and gait problem.    Skin:  Negative for color change. Neurological:  Negative for dizziness, seizures, light-headedness and headaches. Psychiatric/Behavioral:  Negative for agitation and confusion. The patient is not nervous/anxious. Objective   Physical Exam  Constitutional:       General: He is not in acute distress. Appearance: Normal appearance. HENT:      Right Ear: Tympanic membrane normal.      Left Ear: Tympanic membrane normal.   Eyes:      Pupils: Pupils are equal, round, and reactive to light. Cardiovascular:      Rate and Rhythm: Normal rate and regular rhythm. Pulmonary:      Effort: Pulmonary effort is normal.      Breath sounds: Normal breath sounds. Abdominal:      General: Abdomen is flat. Bowel sounds are normal.      Palpations: Abdomen is soft. Musculoskeletal:         General: Normal range of motion. Skin:     General: Skin is warm. Neurological:      General: No focal deficit present. Mental Status: He is alert. Psychiatric:         Mood and Affect: Mood normal.         Behavior: Behavior normal.         Judgment: Judgment normal.          This note was generated completely or in part utilizing Dragon dictation speech recognition software. Occasionally, words are mistranscribed and despite editing, the text may contain inaccuracies due to incorrect word recognition. If further clarification is needed please contact the office at [phone]. An electronic signature was used to authenticate this note.     --DARSHANA Lenoe - CNP

## 2022-09-10 DIAGNOSIS — E03.4 HYPOTHYROIDISM DUE TO ACQUIRED ATROPHY OF THYROID: ICD-10-CM

## 2022-09-10 LAB
ESTIMATED AVERAGE GLUCOSE: 119.8 MG/DL
HBA1C MFR BLD: 5.8 %
HIV AG/AB: NORMAL
HIV ANTIGEN: NORMAL
HIV-1 ANTIBODY: NORMAL
HIV-2 AB: NORMAL

## 2022-09-12 RX ORDER — LEVOTHYROXINE SODIUM 0.07 MG/1
75 TABLET ORAL DAILY
Qty: 90 TABLET | Refills: 0 | Status: SHIPPED | OUTPATIENT
Start: 2022-09-12

## 2022-09-13 ENCOUNTER — NURSE ONLY (OUTPATIENT)
Dept: FAMILY MEDICINE CLINIC | Age: 72
End: 2022-09-13
Payer: MEDICARE

## 2022-09-13 DIAGNOSIS — I25.10 CORONARY ATHEROSCLEROSIS DUE TO LIPID RICH PLAQUE: ICD-10-CM

## 2022-09-13 DIAGNOSIS — I25.83 CORONARY ATHEROSCLEROSIS DUE TO LIPID RICH PLAQUE: ICD-10-CM

## 2022-09-13 LAB
CHOLESTEROL, TOTAL: 225 MG/DL (ref 0–199)
HDLC SERPL-MCNC: 58 MG/DL (ref 40–60)
LDL CHOLESTEROL CALCULATED: 150 MG/DL
TRIGL SERPL-MCNC: 86 MG/DL (ref 0–150)
VLDLC SERPL CALC-MCNC: 17 MG/DL

## 2022-09-13 PROCEDURE — 36415 COLL VENOUS BLD VENIPUNCTURE: CPT

## 2022-09-20 ENCOUNTER — OFFICE VISIT (OUTPATIENT)
Dept: FAMILY MEDICINE CLINIC | Age: 72
End: 2022-09-20
Payer: MEDICARE

## 2022-09-20 ENCOUNTER — TELEPHONE (OUTPATIENT)
Dept: FAMILY MEDICINE CLINIC | Age: 72
End: 2022-09-20

## 2022-09-20 VITALS
BODY MASS INDEX: 25.06 KG/M2 | SYSTOLIC BLOOD PRESSURE: 130 MMHG | HEIGHT: 69 IN | OXYGEN SATURATION: 97 % | DIASTOLIC BLOOD PRESSURE: 80 MMHG | HEART RATE: 81 BPM | WEIGHT: 169.2 LBS | RESPIRATION RATE: 16 BRPM

## 2022-09-20 DIAGNOSIS — J18.9 COMMUNITY ACQUIRED PNEUMONIA OF LEFT LOWER LOBE OF LUNG: Primary | ICD-10-CM

## 2022-09-20 PROCEDURE — 99213 OFFICE O/P EST LOW 20 MIN: CPT

## 2022-09-20 PROCEDURE — 1123F ACP DISCUSS/DSCN MKR DOCD: CPT

## 2022-09-20 RX ORDER — LEVOFLOXACIN 500 MG/1
500 TABLET, FILM COATED ORAL DAILY
Qty: 10 TABLET | Refills: 0 | Status: SHIPPED | OUTPATIENT
Start: 2022-09-20 | End: 2022-09-30

## 2022-09-20 ASSESSMENT — ENCOUNTER SYMPTOMS
SORE THROAT: 0
BLOOD IN STOOL: 0
WHEEZING: 0
ABDOMINAL PAIN: 0
DIARRHEA: 0
COUGH: 0
CONSTIPATION: 0
SHORTNESS OF BREATH: 0
COLOR CHANGE: 0

## 2022-09-20 NOTE — TELEPHONE ENCOUNTER
[facility] pharmacy called and said the levoquin has a potential reaction with the dofetilide

## 2022-09-20 NOTE — TELEPHONE ENCOUNTER
Spoke with pharmacist, Alina Bailey and informed her that patient can take both medications  Patient informed directly

## 2022-09-20 NOTE — PROGRESS NOTES
heat intolerance. Genitourinary:  Negative for difficulty urinating, hematuria and urgency. Musculoskeletal:  Negative for arthralgias and gait problem. Skin:  Negative for color change. Neurological:  Negative for dizziness, seizures, light-headedness and headaches. Psychiatric/Behavioral:  Negative for agitation and confusion. The patient is not nervous/anxious. Objective   Physical Exam  Constitutional:       General: He is not in acute distress. Appearance: Normal appearance. HENT:      Right Ear: Tympanic membrane normal.      Left Ear: Tympanic membrane normal.   Eyes:      Pupils: Pupils are equal, round, and reactive to light. Cardiovascular:      Rate and Rhythm: Normal rate and regular rhythm. Pulmonary:      Effort: Pulmonary effort is normal.      Breath sounds: Decreased air movement present. Examination of the right-lower field reveals rhonchi. Examination of the left-lower field reveals rhonchi. Decreased breath sounds and rhonchi present. Abdominal:      General: Abdomen is flat. Bowel sounds are normal.      Palpations: Abdomen is soft. Musculoskeletal:         General: Normal range of motion. Skin:     General: Skin is warm. Neurological:      General: No focal deficit present. Mental Status: He is alert. Psychiatric:         Mood and Affect: Mood normal.         Behavior: Behavior normal.         Judgment: Judgment normal.          This note was generated completely or in part utilizing Dragon dictation speech recognition software. Occasionally, words are mistranscribed and despite editing, the text may contain inaccuracies due to incorrect word recognition. If further clarification is needed please contact the office at [phone]. An electronic signature was used to authenticate this note.     --DARSHANA George - CNP

## 2022-09-27 ENCOUNTER — HOSPITAL ENCOUNTER (OUTPATIENT)
Dept: GENERAL RADIOLOGY | Age: 72
Discharge: HOME | End: 2022-09-27
Payer: MEDICARE

## 2022-09-27 ENCOUNTER — HOSPITAL ENCOUNTER (OUTPATIENT)
Age: 72
Discharge: HOME | End: 2022-09-27
Payer: MEDICARE

## 2022-09-27 ENCOUNTER — TELEPHONE (OUTPATIENT)
Dept: FAMILY MEDICINE CLINIC | Age: 72
End: 2022-09-27

## 2022-09-27 DIAGNOSIS — R05.9 COUGH: ICD-10-CM

## 2022-09-27 DIAGNOSIS — J30.2 SEASONAL ALLERGIES: Primary | ICD-10-CM

## 2022-09-27 DIAGNOSIS — R05.9 COUGH: Primary | ICD-10-CM

## 2022-09-27 PROCEDURE — 71046 X-RAY EXAM CHEST 2 VIEWS: CPT

## 2022-09-27 RX ORDER — FLUTICASONE PROPIONATE 50 MCG
1 SPRAY, SUSPENSION (ML) NASAL DAILY
Qty: 32 G | Refills: 1 | Status: SHIPPED | OUTPATIENT
Start: 2022-09-27

## 2022-09-27 RX ORDER — CETIRIZINE HYDROCHLORIDE 10 MG/1
10 TABLET ORAL DAILY
Qty: 30 TABLET | Refills: 3 | Status: SHIPPED | OUTPATIENT
Start: 2022-09-27

## 2022-09-27 NOTE — TELEPHONE ENCOUNTER
Patient was seen on 09/20/2022 for community acquired pneumonia. Patient called in today with the following symptoms: Congestion, dizziness, unable to work, no fever, cough, has 3 days left of Levaquin but does not feel like it is working or even helping. He is asking if he needs to be seen again or if something different can be called in for him.

## 2022-09-29 ENCOUNTER — TELEPHONE (OUTPATIENT)
Dept: FAMILY MEDICINE CLINIC | Age: 72
End: 2022-09-29

## 2022-09-29 NOTE — TELEPHONE ENCOUNTER
Patient calling in, he has finished the antibiotics has been taking there zyrtec and flonase, still with congestion, cough, states that his discharge is clear.

## 2022-10-03 ENCOUNTER — OFFICE VISIT (OUTPATIENT)
Dept: FAMILY MEDICINE CLINIC | Age: 72
End: 2022-10-03
Payer: MEDICARE

## 2022-10-03 VITALS
RESPIRATION RATE: 16 BRPM | HEART RATE: 88 BPM | HEIGHT: 69 IN | SYSTOLIC BLOOD PRESSURE: 122 MMHG | DIASTOLIC BLOOD PRESSURE: 64 MMHG | OXYGEN SATURATION: 99 % | WEIGHT: 170 LBS | BODY MASS INDEX: 25.18 KG/M2

## 2022-10-03 DIAGNOSIS — J30.2 SEASONAL ALLERGIES: Primary | ICD-10-CM

## 2022-10-03 PROCEDURE — 1123F ACP DISCUSS/DSCN MKR DOCD: CPT

## 2022-10-03 PROCEDURE — 99213 OFFICE O/P EST LOW 20 MIN: CPT

## 2022-10-03 RX ORDER — METHYLPREDNISOLONE 4 MG/1
TABLET ORAL
Qty: 1 KIT | Refills: 0 | Status: SHIPPED | OUTPATIENT
Start: 2022-10-03 | End: 2022-10-09

## 2022-10-03 ASSESSMENT — ENCOUNTER SYMPTOMS
CONSTIPATION: 0
SORE THROAT: 0
SHORTNESS OF BREATH: 0
SINUS PAIN: 1
DIARRHEA: 0
WHEEZING: 0
ABDOMINAL PAIN: 0
COLOR CHANGE: 0
COUGH: 0
BLOOD IN STOOL: 0
SINUS PRESSURE: 1

## 2022-10-03 NOTE — PROGRESS NOTES
Margarita Wesley (:  1950) is a 74 y.o. male,Established patient, here for evaluation of the following chief complaint(s):  Cough (X 2 weeks, finished antibiotics) and Congestion         ASSESSMENT/PLAN:  1. Seasonal allergies  -     methylPREDNISolone (MEDROL DOSEPACK) 4 MG tablet; Take by mouth., Disp-1 kit, R-0Normal  -Patient endorses that symptoms have somewhat gotten better over the last few days. Patient still does have runny nose with cough that is worse in the morning and sometimes at night when he lays down at night. Overall patient had a normal chest x-ray. Patient did receive treatment with antibiotics for community-acquired pneumonia in the past.  Overall plan to treat seasonal allergies with Medrol Dosepak. Patient is currently on Flonase Zyrtec at this time. We will plan to continue to monitor symptoms. Patient educated on worsening signs and symptoms of respiratory failure when to seek further care. Subjective   SUBJECTIVE/OBJECTIVE:  HPI  Patient presents the office today for an acute visit. Patient states that he still having ongoing nasal congestion with cough. Patient states that his cough is worse in the morning. He states after he wakes he coughs up some phlegm for a little while and that it does get better throughout the day. Patient does endorse a slight runny nose but not too serious. Patient denies any shortness of breath difficulty breathing. Patient denies having any fever chills or fever or COVID-like symptoms. Patient was previously treated for a pneumonia. Patient had a normal chest x-ray. Patient has since still had ongoing issues. Patient did have a recent hospitalization that was fairly traumatic. Patient did have some bacteremia and was treated by infectious disease with a PICC line. Patient has no other acute complaints at this time. Review of Systems   Constitutional:  Negative for activity change, chills and fever.    HENT:  Positive for postnasal drip, sinus pressure, sinus pain and sneezing. Negative for congestion and sore throat. Respiratory:  Negative for cough, shortness of breath and wheezing. Cardiovascular:  Negative for chest pain and leg swelling. Gastrointestinal:  Negative for abdominal pain, blood in stool, constipation and diarrhea. Endocrine: Negative for cold intolerance and heat intolerance. Genitourinary:  Negative for difficulty urinating, hematuria and urgency. Musculoskeletal:  Negative for arthralgias and gait problem. Skin:  Negative for color change. Neurological:  Negative for dizziness, seizures, light-headedness and headaches. Psychiatric/Behavioral:  Negative for agitation and confusion. The patient is not nervous/anxious. Objective   Physical Exam  Constitutional:       General: He is not in acute distress. Appearance: Normal appearance. HENT:      Right Ear: Tympanic membrane normal.      Left Ear: Tympanic membrane normal.   Eyes:      Pupils: Pupils are equal, round, and reactive to light. Cardiovascular:      Rate and Rhythm: Normal rate and regular rhythm. Pulmonary:      Effort: Pulmonary effort is normal.      Breath sounds: Normal breath sounds. Abdominal:      General: Abdomen is flat. Bowel sounds are normal.      Palpations: Abdomen is soft. Musculoskeletal:         General: Normal range of motion. Skin:     General: Skin is warm. Neurological:      General: No focal deficit present. Mental Status: He is alert. Psychiatric:         Mood and Affect: Mood normal.         Behavior: Behavior normal.         Judgment: Judgment normal.          This note was generated completely or in part utilizing Dragon dictation speech recognition software. Occasionally, words are mistranscribed and despite editing, the text may contain inaccuracies due to incorrect word recognition. If further clarification is needed please contact the office at [phone].      An electronic signature was used to authenticate this note.     --Bonita Delatorre, DARSHANA - CNP

## 2022-10-07 ENCOUNTER — TELEPHONE (OUTPATIENT)
Dept: FAMILY MEDICINE CLINIC | Age: 72
End: 2022-10-07

## 2022-10-07 DIAGNOSIS — E78.2 MIXED HYPERLIPIDEMIA: Primary | ICD-10-CM

## 2022-10-07 RX ORDER — PRAVASTATIN SODIUM 80 MG/1
80 TABLET ORAL DAILY
Qty: 90 TABLET | Refills: 1 | OUTPATIENT
Start: 2022-10-07

## 2022-10-07 NOTE — TELEPHONE ENCOUNTER
Called and spoke with patient per telephone encounter 06/24/22 DD NP filled script at time instructed to have future refill with PCP. He VU he states will call PCP office Wilber Haji CNP for refill.

## 2022-10-07 NOTE — TELEPHONE ENCOUNTER
Refill Request       Last Seen: Last Seen Department: 10/3/2022  Last Seen by PCP: 10/3/2022    Last Written: 06/24/2022      Next Appointment:   Future Appointments   Date Time Provider Stuart Aguilar   10/11/2022 10:30 AM Jeny Dee APRN - CNP [facility]   12/2/2022  8:30 AM Saundra Valdez, APRN - CNP [facility]           Requested Prescriptions     Pending Prescriptions Disp Refills    pravastatin (PRAVACHOL) 80 MG tablet 90 tablet 1     Sig: Take 1 tablet by mouth daily

## 2022-10-10 RX ORDER — PRAVASTATIN SODIUM 80 MG/1
80 TABLET ORAL DAILY
Qty: 90 TABLET | Refills: 1 | Status: SHIPPED | OUTPATIENT
Start: 2022-10-10

## 2022-10-11 ENCOUNTER — OFFICE VISIT (OUTPATIENT)
Dept: FAMILY MEDICINE CLINIC | Age: 72
End: 2022-10-11
Payer: MEDICARE

## 2022-10-11 VITALS — WEIGHT: 165 LBS | BODY MASS INDEX: 24.44 KG/M2 | HEIGHT: 69 IN

## 2022-10-11 DIAGNOSIS — Z23 NEED FOR PROPHYLACTIC VACCINATION AGAINST DIPHTHERIA-TETANUS-PERTUSSIS (DTP): ICD-10-CM

## 2022-10-11 DIAGNOSIS — Z00.00 MEDICARE ANNUAL WELLNESS VISIT, SUBSEQUENT: Primary | ICD-10-CM

## 2022-10-11 DIAGNOSIS — Z23 NEEDS FLU SHOT: ICD-10-CM

## 2022-10-11 PROCEDURE — G8484 FLU IMMUNIZE NO ADMIN: HCPCS

## 2022-10-11 PROCEDURE — G0439 PPPS, SUBSEQ VISIT: HCPCS

## 2022-10-11 PROCEDURE — 3017F COLORECTAL CA SCREEN DOC REV: CPT

## 2022-10-11 PROCEDURE — 1123F ACP DISCUSS/DSCN MKR DOCD: CPT

## 2022-10-11 ASSESSMENT — LIFESTYLE VARIABLES
HOW OFTEN DURING THE LAST YEAR HAVE YOU NEEDED AN ALCOHOLIC DRINK FIRST THING IN THE MORNING TO GET YOURSELF GOING AFTER A NIGHT OF HEAVY DRINKING: 0
HAVE YOU OR SOMEONE ELSE BEEN INJURED AS A RESULT OF YOUR DRINKING: 0
HOW OFTEN DURING THE LAST YEAR HAVE YOU FAILED TO DO WHAT WAS NORMALLY EXPECTED FROM YOU BECAUSE OF DRINKING: 0
HOW OFTEN DO YOU HAVE A DRINK CONTAINING ALCOHOL: 2-3 TIMES A WEEK
HOW OFTEN DURING THE LAST YEAR HAVE YOU HAD A FEELING OF GUILT OR REMORSE AFTER DRINKING: 0
HOW OFTEN DURING THE LAST YEAR HAVE YOU FOUND THAT YOU WERE NOT ABLE TO STOP DRINKING ONCE YOU HAD STARTED: 0
HOW MANY STANDARD DRINKS CONTAINING ALCOHOL DO YOU HAVE ON A TYPICAL DAY: 3 OR 4
HOW OFTEN DURING THE LAST YEAR HAVE YOU BEEN UNABLE TO REMEMBER WHAT HAPPENED THE NIGHT BEFORE BECAUSE YOU HAD BEEN DRINKING: 0
HAS A RELATIVE, FRIEND, DOCTOR, OR ANOTHER HEALTH PROFESSIONAL EXPRESSED CONCERN ABOUT YOUR DRINKING OR SUGGESTED YOU CUT DOWN: 0

## 2022-10-11 ASSESSMENT — PATIENT HEALTH QUESTIONNAIRE - PHQ9
1. LITTLE INTEREST OR PLEASURE IN DOING THINGS: 0
SUM OF ALL RESPONSES TO PHQ9 QUESTIONS 1 & 2: 0
SUM OF ALL RESPONSES TO PHQ QUESTIONS 1-9: 0
2. FEELING DOWN, DEPRESSED OR HOPELESS: 0
SUM OF ALL RESPONSES TO PHQ QUESTIONS 1-9: 0

## 2022-10-11 NOTE — PROGRESS NOTES
Medicare Annual Wellness Visit    Kp Hagen is here for Medicare AWV    Assessment & Plan   Medicare annual wellness visit, subsequent  Needs flu shot  Need for prophylactic vaccination against diphtheria-tetanus-pertussis (DTP)  -Patient is negative for flu and Tdap vaccination at this time. Patient declines flu at this time patient states that he always gets it at his local pharmacy. As far as Tdap patient states that he will get it is at his next appointment. Order will be placed at that appointment. Patient has no acute concerns overall patient states that he is doing well. Recommendations for Preventive Services Due: see orders and patient instructions/AVS.  Recommended screening schedule for the next 5-10 years is provided to the patient in written form: see Patient Instructions/AVS.     Return for Medicare Annual Wellness Visit in 1 year. Subjective       Patient's complete Health Risk Assessment and screening values have been reviewed and are found in Flowsheets. The following problems were reviewed today and where indicated follow up appointments were made and/or referrals ordered. Positive Risk Factor Screenings with Interventions:    Fall Risk:  Do you feel unsteady or are you worried about falling? 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 Patient states that he still able to do the work that he was when he was younger he just takes a little while longer to do it.             General Health and ACP:  General  In general, how would you say your health is?: Very Good  In the past 7 days, have you experienced any of the following: New or Increased Pain, New or Increased Fatigue, Loneliness, Social Isolation, Stress or Anger?: No  Do you get the social and emotional support that you need?: Yes  Do you have a Living Will?: Yes    Advance Directives       Power of [facility] Will ACP-Advance Directive ACP-Power of     Not on File Not on File Not on File Not on File        General Health Risk Interventions:  No Living Will: Advance Care Planning addressed with patient today  Discussed with patient his advanced directive and living will. Patient does state that he has a living well educated patient whenever he has a chance to bring this in office    Cardiovascular Disease Risk Counseling: Assessed the patient's risk to develop cardiovascular disease and reviewed main risk factors. Reviewed steps to reduce disease risk including:   Quitting tobacco use, reducing amount smoked, or not starting the habit  Making healthy food choices  Being physically active and gradualy increasing activity levels   Reduce weight and determine a healthy BMI goal  Monitor blood pressure and treat if higher than 140/90 mmHg  Maintain blood total cholesterol levels under 5 mmol/l or 190 mg/dl  Maintain LDL cholesterol levels under 3.0 mmol/l or 115 mg/dl   Control blood glucose levels  Consider taking aspirin (75 mg daily), once blood pressure is controlled   Provided a follow up plan. Time spent (minutes): 5    Advance Care Planning   Advanced Care Planning: Discussed the patients choices for care and treatment in case of a health event that adversely affects decision-making abilities. Also discussed the patients long-term treatment options. Reviewed with the patient the appropriate state-specific advance directive documents. Reviewed the process of designating a competent adult as an Agent (or -in-fact) that could take make health care decisions for the patient if incompetent. Patient was asked to complete the declaration forms, either acknowledge the forms by a public notary or an eligible witness and provide a signed copy to the practice office.   Time spent (minutes): 4                 Objective   Vitals:    10/11/22 1301 Weight: 165 lb (74.8 kg)   Height: 5' 9\" (1.753 m)      Body mass index is 24.37 kg/m². No Known Allergies  Prior to Visit Medications    Medication Sig Taking? Authorizing Provider   pravastatin (PRAVACHOL) 80 MG tablet Take 1 tablet by mouth daily Yes Rola Cones, APRN - CNP   cetirizine (ZYRTEC ALLERGY) 10 MG tablet Take 1 tablet by mouth daily Yes Rola Cones, APRN - CNP   fluticasone (FLONASE) 50 MCG/ACT nasal spray 1 spray by Each Nostril route daily Yes Rola Cones, APRN - CNP   levothyroxine (SYNTHROID) 75 MCG tablet TAKE 1 TABLET BY MOUTH DAILY Yes Rola Cones, APRN - CNP   lactobacillus (CULTURELLE) capsule Take 1 capsule by mouth in the morning and 1 capsule in the evening. Take with meals. Yes DARSHANA Lindsey - CNP   tamsulosin (FLOMAX) 0.4 MG capsule Take 1 capsule by mouth in the morning. Yes Maxi Jimenez MD   finasteride (PROSCAR) 5 MG tablet Take 1 tablet by mouth in the morning.  Yes Maxi Jimenez MD   pantoprazole (PROTONIX) 40 MG tablet TAKE 1 TABLET BY MOUTH TWICE DAILY BEFORE MEALS Yes DARSHANA Tompkins - CNP   lisinopril (PRINIVIL;ZESTRIL) 5 MG tablet Take 1 tablet by mouth daily Yes Patrick Acosta MD   dofetilide (TIKOSYN) 500 MCG capsule TAKE 1 CAPSULE BY MOUTH EVERY 12 HOURS Yes Gama Dugan MD   prednisoLONE acetate (PRED FORTE) 1 % ophthalmic suspension SHAKE LIQUID AND INSTILL 1 DROP IN RIGHT EYE THREE TIMES DAILY Yes Historical Provider, MD   aspirin 325 MG tablet Take by mouth Yes Historical Provider, MD   moxifloxacin (VIGAMOX) 0.5 % ophthalmic solution INSTILL 1 DROP IN LEFT EYE FOUR TIMES DAILY AFTER SURGERY Yes Historical Provider, MD   loratadine (CLARITIN) 10 MG capsule Take 10 mg by mouth daily Yes Historical Provider, MD   dorzolamide-timolol (COSOPT) 22.3-6.8 MG/ML ophthalmic solution INT 1 GTT INTO OU BID Yes Historical Provider, MD   Multiple Vitamins-Minerals (MULTIVITAMIN-MINERALS) TABS tablet Take 1 tablet by mouth daily  DARSHANA Wallace CNP       CareTeam (Including outside providers/suppliers regularly involved in providing care):   Patient Care Team:  DARSHANA Braswell CNP as PCP - General (Nurse Practitioner)  DARSHANA Braswell CNP as PCP - [facility] Empaneled Provider  Dionicia Phoenix, MD as Surgeon (Orthopedic Surgery)  DARSHANA Jeronimo CNP as Nurse Practitioner (Family Nurse Practitioner)     Reviewed and updated this visit:  Tobacco  Allergies  Meds  Problems  Med Hx  Surg Hx  Soc Hx  Fam Hx

## 2022-10-11 NOTE — PATIENT INSTRUCTIONS
Personalized Preventive Plan for Kortney Amanda - 10/11/2022  Medicare offers a range of preventive health benefits. Some of the tests and screenings are paid in full while other may be subject to a deductible, co-insurance, and/or copay. Some of these benefits include a comprehensive review of your medical history including lifestyle, illnesses that may run in your family, and various assessments and screenings as appropriate. After reviewing your medical record and screening and assessments performed today your provider may have ordered immunizations, labs, imaging, and/or referrals for you. A list of these orders (if applicable) as well as your Preventive Care list are included within your After Visit Summary for your review. Other Preventive Recommendations:    A preventive eye exam performed by an eye specialist is recommended every 1-2 years to screen for glaucoma; cataracts, macular degeneration, and other eye disorders. A preventive dental visit is recommended every 6 months. Try to get at least 150 minutes of exercise per week or 10,000 steps per day on a pedometer . Order or download the FREE \"Exercise & Physical Activity: Your Everyday Guide\" from [employer]. Call 8-467.460.7083 or search [employer] online. You need 2432-4118 mg of calcium and 9568-4543 IU of vitamin D per day. It is possible to meet your calcium requirement with diet alone, but a vitamin D supplement is usually necessary to meet this goal.  When exposed to the sun, use a sunscreen that protects against both UVA and UVB radiation with an SPF of 30 or greater. Reapply every 2 to 3 hours or after sweating, drying off with a towel, or swimming. Always wear a seat belt when traveling in a car. Always wear a helmet when riding a bicycle or motorcycle.

## 2022-12-01 NOTE — PROGRESS NOTES
[address]   Electrophysiology Outpatient Note              Date:  December 2, 2022  Patient name: Teri Peguero  YOB: 1950    Primary Care physician: DARSHANA Connelly CNP    HISTORY OF PRESENT ILLNESS: The patient is a 74 y.o.  male with a history of PAF, NICM with recovered EF, HTN, HLD, DM, GI bleed, and acquired hypothyroidism. He has had numerous [place]CCV [place] [facility]. In 2019 he underwent RFCA of AF and CTI. In 12/2019 he underwent Watchman Device implant. Amiodarone was stopped due to hypothyroidism. In 12/2020 he underwent DCCV. In 4/2020, required DCCV for AF. In 5/2020, was admitted for symptomatic AT. He was started on Multaq and converted to sinus. In 1/2021, he was in [place] and had an echo that showed EF 47-66% and diastolic dysfunction. In 2/2021, he was admitted in [place] for AF, was switched to Sotalol and had a DCCV. In 10/2021 he was admitted with AF and was started on Tikosyn. In 2/2022 he had an AF ablation. In 6/2022 he remained in [place]. In 7/2022 he fell and was found to have rib fracture from ribs 3-10. He was treated for pneumonia and sepsis. Today he is being seen for AF. EKG shows SR with a HR of 67. He has no complaints today. He is very active. 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 He is constantly on his feet and walking. In the winter his rides his stationary bike an hour a day. He denies chest pain, palpitations, shortness of breath, and dizziness. He has not felt any episodes of AF. No recent falls. BP is typically around 140/80. Past Medical History:   has a past medical history of Arthritis, Atrial fibrillation ([address] Hyperlipidemia, Hypertension, SCC (squamous cell carcinoma), Sleep apnea, and Systolic heart failure ([address] Past Surgical History:   has a past surgical history that includes Cardioversion (2005); Total hip arthroplasty (Left, 2013); Colonoscopy ([id number]); and ablation of dysrhythmic focus.      Home Medications:    Prior to Admission medications    Medication Sig Start Date End Date Taking? Authorizing Provider   pravastatin (PRAVACHOL) 80 MG tablet Take 1 tablet by mouth daily 10/10/22  Yes DARSHANA Nelson CNP   fluticasone (FLONASE) 50 MCG/ACT nasal spray 1 spray by Each Nostril route daily 9/27/22  Yes DARSHANA Nelson CNP   levothyroxine (SYNTHROID) 75 MCG tablet TAKE 1 TABLET BY MOUTH DAILY 9/12/22  Yes DARSHANA Nelson CNP   lactobacillus (CULTURELLE) capsule Take 1 capsule by mouth in the morning and 1 capsule in the evening. Take with meals.  7/27/22  Yes DARSHANA Alexis CNP   tamsulosin (FLOMAX) 0.4 MG capsule Take 1 capsule by mouth in the morning. 7/25/22  Yes Earnestine Anand MD   finasteride (PROSCAR) 5 MG tablet Take 1 tablet by mouth in the morning. 7/24/22  Yes Earnestine Anand MD   pantoprazole (PROTONIX) 40 MG tablet TAKE 1 TABLET BY MOUTH TWICE DAILY BEFORE MEALS 7/21/22  Yes DARSHANA De Luna CNP   lisinopril (PRINIVIL;ZESTRIL) 5 MG tablet Take 1 tablet by mouth daily 4/5/22  Yes Alta Germain MD   dofetilide (TIKOSYN) 500 MCG capsule TAKE 1 CAPSULE BY MOUTH EVERY 12 HOURS 12/28/21  Yes Ellen Xiong MD   prednisoLONE acetate (PRED FORTE) 1 % ophthalmic suspension SHAKE LIQUID AND INSTILL 1 DROP IN RIGHT EYE THREE TIMES DAILY 10/15/21  Yes Historical Provider, MD   aspirin 325 MG tablet Take by mouth   Yes Historical Provider, MD   loratadine (CLARITIN) 10 MG capsule Take 10 mg by mouth daily   Yes Historical Provider, MD   dorzolamide-timolol (COSOPT) 22.3-6.8 MG/ML ophthalmic solution INT 1 GTT INTO OU BID 4/14/20  Yes Historical Provider, MD   moxifloxacin (VIGAMOX) 0.5 % ophthalmic solution INSTILL 1 DROP IN LEFT EYE FOUR TIMES DAILY AFTER SURGERY  Patient not taking: Reported on 12/2/2022 4/1/21   Historical Provider, MD   Multiple Vitamins-Minerals (MULTIVITAMIN-MINERALS) TABS tablet Take 1 tablet by mouth daily 7/24/19 7/27/22  Dayami Javed Ray Rodas, APRN - CNP     Allergies:  Patient has no known allergies. Social History:   reports that he has never smoked. He has never used smokeless tobacco. He reports that he does not drink alcohol and does not use drugs. Family History: family history includes Cancer in his mother; Heart Disease (age of onset: 79) in his father; High Blood Pressure in his mother. All 14 point review of systems are completed and pertinent positives are mentioned in the history of present illness. Other systems are reviewed and are negative. PHYSICAL EXAM:    Vital signs:    BP (!) 140/80   Pulse 72   Ht 5' 9\" (1.753 m)   Wt 171 lb (77.6 kg)   SpO2 100%   BMI 25.25 kg/m²      Constitutional and general appearance: alert, cooperative, no distress, and appears stated age  HEENT: PERRL, no cervical lymphadenopathy. No masses palpable. Normal oral mucosa  Respiratory:  Normal excursion and expansion without use of accessory muscles  Resp auscultation: Normal breath sounds without wheezing, rhonchi, and rales  Cardiovascular: The apical impulse is not displaced  Heart tones are crisp and normal. regular S1 and S2.  Jugular venous pulsation Normal  The carotid upstroke is normal in amplitude and contour without delay or bruit  Peripheral pulses are symmetrical and full   Abdomen:  No masses or tenderness  Bowel sounds present  Extremities:   No cyanosis or clubbing   No lower extremity edema   Skin: warm and dry  Neurological:  Alert and oriented  Moves all extremities well  No abnormalities of mood, affect, memory, mentation, or behavior are noted    DATA:    ECG 12/02/2022:  SR, HR 67. QTC  423 ms     Echo 7/2022:   Summary   Normal left ventricular systolic function with ejection fraction of 55-60%. No regional wall motion abnormalites are seen. Left ventricular diastolic filling pressure is normal.   Compared to previous study left ventricle systolic function appears same as   echo on 11-.    Mild mitral and aortic regurgitation. No vegetations are seen, LUIZA recommended   if clinically indicated. Systolic pulmonic artery pressure (SPAP) is normal estimated at 32 mmHg   (Right atrial pressure of 3 mmHg). ECHO: 1/22/21   A complete echo was performed using color flow Doppler, spectral   Doppler, M-mode and echo contrast.     Lumason contrast was used during the study. Left ventricle cavity appears normal.     Left ventricular wall thickness is normal.     The left ventricular EF by visual approximation is 55-60%. Normal left   ventricular systolic function. Left ventricular wall motion is within normal limits. There is grade III left ventricular diastolic dysfunction and elevated   left atrial pressure. Left atrium cavity is mildly dilated. There is mild aortic valve regurgitation. There is mild mitral valve regurgitation. There is mild pulmonary hypertension. The estimated right ventricular   systolic pressure is 25.8 mmHg. No previous echocardiogram available to compare. All labs and testing reviewed. CARDIOLOGY LABS:   CBC: No results for input(s): WBC, HGB, HCT, PLT in the last 72 hours. BMP: No results for input(s): NA, K, CO2, BUN, CREATININE, LABGLOM, GLUCOSE in the last 72 hours. PT/INR: No results for input(s): PROTIME, INR in the last 72 hours. APTT:No results for input(s): APTT in the last 72 hours. FASTING LIPID PANEL:  Lab Results   Component Value Date/Time    HDL 58 09/13/2022 08:13 AM    HDL 61 10/21/2011 09:13 AM    LDLCALC 150 09/13/2022 08:13 AM    TRIG 86 09/13/2022 08:13 AM     LIVER PROFILE:No results for input(s): AST, ALT, ALB in the last 72 hours.     IMPRESSION:    Patient Active Problem List   Diagnosis    Hyperlipidemia    HTN (hypertension)    Squamous cell carcinoma of skin    Bilateral shoulder tendinopathy    Primary osteoarthritis of both shoulders    History of joint swelling    Pre-diabetes    Normocytic normochromic anemia Moderate obstructive sleep apnea    Presence of Watchman left atrial appendage closure device    Paroxysmal atrial fibrillation (HCC)    Hypercalcemia    Coronary artery calcification seen on CT scan    HH (hiatus hernia)    Prostate enlargement    Splenic artery aneurysm (HCC)    Calculus of gallbladder without cholecystitis without obstruction    Diverticulosis of colon without diverticulitis    Periodic limb movement sleep disorder    Pulmonary infiltrate    Anemia due to chronic blood loss    Atypical chest pain    BPH (benign prostatic hyperplasia)    Chronic anticoagulation    Coronary atherosclerosis    ED (erectile dysfunction)    Elevated serum cholesterol    Encounter for monitoring sotalol therapy    Essential thrombocytosis (HCC)    Gastroesophageal reflux disease without esophagitis    Generalized osteoarthritis    GI bleed    History of seronegative inflammatory arthritis    Intestinal malabsorption    Osteoarthritis of left hip    Overweight with body mass index (BMI) 25.0-29.9    Status post left hip replacement    Benign essential hypertension    Rhinitis, chronic    S/P placement of VNS (vagus nerve stimulation) device    Hypothyroidism due to medication    Atrial fibrillation with RVR (HCC)    Bilateral cataracts    Entropion of both eyes    Atrial fibrillation (HCC)    Gram-negative pneumonia (HCC)    Multiple closed fractures of ribs of left side    Acute midline low back pain without sciatica    MSSA bacteremia    Elevated PSA     Assessment:   Paroxysmal atrial fibrillation: stable   -s/p DCCV 4/2020 and 2/2021    -s/p RFCA for PAF and CTI 7/2019  -s/p RFCA of AF 2/2022   -s/p Watchman device implant 2019   -amiodarone stopped due to hypothyroidism    -Multaq and sotalol failed to suppress AF   -started on Tikosyn 10/2021  NICM with recovered EF  Mild mitral regurgitation on echo 7/2022  Mild aortic regurgitation on echo 7/2022  HTN: controlled   HLD  DM   Hx of GI bleed  Acquired hypothyroidism       Plan:   1. Continue Tikosyn, aspirin, and lisinopril   2. Repeat BMP to assess potassium levels due to hyperkalemia seen on lab work in 9/2022  3. Monitor BP at home and call if consistently out of goal ranges   4.  Follow up in 6 months or sooner if needed    DARSHANA Martinez-CNP  [facility]  [phone]

## 2022-12-02 ENCOUNTER — OFFICE VISIT (OUTPATIENT)
Dept: CARDIOLOGY CLINIC | Age: 72
End: 2022-12-02

## 2022-12-02 VITALS
HEART RATE: 72 BPM | OXYGEN SATURATION: 100 % | SYSTOLIC BLOOD PRESSURE: 140 MMHG | BODY MASS INDEX: 25.33 KG/M2 | HEIGHT: 69 IN | WEIGHT: 171 LBS | DIASTOLIC BLOOD PRESSURE: 80 MMHG

## 2022-12-02 DIAGNOSIS — Z79.899 VISIT FOR MONITORING TIKOSYN THERAPY: ICD-10-CM

## 2022-12-02 DIAGNOSIS — Z51.81 VISIT FOR MONITORING TIKOSYN THERAPY: ICD-10-CM

## 2022-12-02 DIAGNOSIS — I48.0 PAROXYSMAL ATRIAL FIBRILLATION (HCC): Primary | ICD-10-CM

## 2022-12-02 DIAGNOSIS — I10 PRIMARY HYPERTENSION: ICD-10-CM

## 2022-12-02 NOTE — PATIENT INSTRUCTIONS
No changes today, continue your current medications    Have blood work done to check potassium levels    Monitor BP at home and call if consistently out of goal ranges     Follow up in 6 months or sooner if needed

## 2022-12-09 DIAGNOSIS — Z79.899 VISIT FOR MONITORING TIKOSYN THERAPY: ICD-10-CM

## 2022-12-09 DIAGNOSIS — Z51.81 VISIT FOR MONITORING TIKOSYN THERAPY: ICD-10-CM

## 2022-12-09 DIAGNOSIS — I48.0 PAROXYSMAL ATRIAL FIBRILLATION (HCC): ICD-10-CM

## 2022-12-09 DIAGNOSIS — I10 PRIMARY HYPERTENSION: ICD-10-CM

## 2022-12-09 LAB
ANION GAP SERPL CALCULATED.3IONS-SCNC: 12 MMOL/L (ref 3–16)
BUN BLDV-MCNC: 21 MG/DL (ref 7–20)
CALCIUM SERPL-MCNC: 9.8 MG/DL (ref 8.3–10.6)
CHLORIDE BLD-SCNC: 101 MMOL/L (ref 99–110)
CO2: 27 MMOL/L (ref 21–32)
CREAT SERPL-MCNC: 1.1 MG/DL (ref 0.8–1.3)
GFR SERPL CREATININE-BSD FRML MDRD: >60 ML/MIN/{1.73_M2}
GLUCOSE BLD-MCNC: 96 MG/DL (ref 70–99)
POTASSIUM SERPL-SCNC: 4.6 MMOL/L (ref 3.5–5.1)
SODIUM BLD-SCNC: 140 MMOL/L (ref 136–145)

## 2022-12-12 ENCOUNTER — TELEPHONE (OUTPATIENT)
Dept: CARDIOLOGY CLINIC | Age: 72
End: 2022-12-12

## 2022-12-12 DIAGNOSIS — E03.4 HYPOTHYROIDISM DUE TO ACQUIRED ATROPHY OF THYROID: ICD-10-CM

## 2022-12-12 NOTE — TELEPHONE ENCOUNTER
Attempted to call pt to relay message. LVM relaying message (okay per pt HIPAA). Instructed pt to call back with any questions     ----- Message from DARSHANA Benjamin CNP sent at 12/12/2022 11:13 AM EST -----  Lab work reviewed. Potassium is normal. No changes at this time.

## 2022-12-13 RX ORDER — LEVOTHYROXINE SODIUM 0.07 MG/1
75 TABLET ORAL DAILY
Qty: 90 TABLET | Refills: 3 | Status: SHIPPED | OUTPATIENT
Start: 2022-12-13

## 2023-01-03 RX ORDER — PANTOPRAZOLE SODIUM 40 MG/1
TABLET, DELAYED RELEASE ORAL
Qty: 180 TABLET | Refills: 2 | Status: SHIPPED | OUTPATIENT
Start: 2023-01-03

## 2023-01-03 NOTE — TELEPHONE ENCOUNTER
Refill Request           Last Seen: Last Seen Department: 10/11/2022  Last Seen by PCP: 10/11/2022    Last Written: 07/21/2022        Next Appointment:   Future Appointments   Date Time Provider Stuart Aguilar   6/1/2023  3:00 PM DARSHANA Reed - [facility]         Requested Prescriptions     Pending Prescriptions Disp Refills    pantoprazole (PROTONIX) 40 MG tablet 180 tablet 0     Sig: TAKE 1 TABLET BY MOUTH TWICE DAILY BEFORE MEALS

## 2023-01-04 RX ORDER — DOFETILIDE 0.5 MG/1
500 CAPSULE ORAL EVERY 12 HOURS SCHEDULED
Qty: 180 CAPSULE | Refills: 2 | Status: SHIPPED | OUTPATIENT
Start: 2023-01-04

## 2023-03-31 DIAGNOSIS — E78.2 MIXED HYPERLIPIDEMIA: ICD-10-CM

## 2023-03-31 RX ORDER — PRAVASTATIN SODIUM 80 MG/1
80 TABLET ORAL DAILY
Qty: 90 TABLET | Refills: 1 | Status: SHIPPED | OUTPATIENT
Start: 2023-03-31

## 2023-03-31 NOTE — TELEPHONE ENCOUNTER
Refill Request     CONFIRM preferred pharmacy with the patient. If Mail Order Rx - Pend for 90 day refill.       Last Seen: Last Seen Department: 10/11/2022  Last Seen by PCP: 10/11/2022    Last Written: pravastatin - 10/10/2022, 90 tablets, 1 refill        Next Appointment:   Future Appointments   Date Time Provider Stuart Aguilar   6/1/2023  3:00 PM Valentina Herzog, APRN - [facility]           Requested Prescriptions     Pending Prescriptions Disp Refills    pravastatin (PRAVACHOL) 80 MG tablet [Pharmacy Med Name: PRAVASTATIN 80MG TABLETS] 90 tablet 1     Sig: TAKE 1 TABLET BY MOUTH DAILY

## 2023-04-03 RX ORDER — LISINOPRIL 5 MG/1
5 TABLET ORAL DAILY
Qty: 90 TABLET | Refills: 2 | Status: SHIPPED | OUTPATIENT
Start: 2023-04-03

## 2023-07-17 RX ORDER — DOFETILIDE 0.5 MG/1
500 CAPSULE ORAL EVERY 12 HOURS SCHEDULED
Qty: 180 CAPSULE | Refills: 0 | Status: SHIPPED | OUTPATIENT
Start: 2023-07-17

## 2023-09-21 DIAGNOSIS — E03.4 HYPOTHYROIDISM DUE TO ACQUIRED ATROPHY OF THYROID: ICD-10-CM

## 2023-09-21 RX ORDER — LEVOTHYROXINE SODIUM 0.07 MG/1
75 TABLET ORAL DAILY
Qty: 90 TABLET | Refills: 3 | OUTPATIENT
Start: 2023-09-21

## 2023-09-28 DIAGNOSIS — E78.2 MIXED HYPERLIPIDEMIA: ICD-10-CM

## 2023-09-28 RX ORDER — PRAVASTATIN SODIUM 80 MG/1
80 TABLET ORAL DAILY
Qty: 90 TABLET | Refills: 1 | Status: SHIPPED | OUTPATIENT
Start: 2023-09-28

## 2023-09-28 NOTE — LETTER
[facility]   Electrophysiology Consult Note              Date:  June 26, 2019  Patient name: Noah Contreras  YOB: 1950    Reason for Consult:  Atrial Fibrillation    Requesting Physician: Abimbola Mendoza MD    HISTORY OF PRESENT ILLNESS: Noah Contreras is a 68 y.o. male who presents for evaluation for atrial fibrillation. He has a history of GI bleed on Eliquis and has undergone cardioversion 6 times for afib at [facility]. He reports today that he feels his heart racing when he is in afib. He states he gets very short of breath and has difficulty even walking up stairs. He states that after his cardioversions he will stay in rhythm for a couple of days and feel great. He states that he is tired of living like this and he is not able to enjoy life. He is taking his medications as prescribed without issue. He has not had testing for sleep apnea. Past Medical History:   has a past medical history of Atrial fibrillation ([address] Hyperlipidemia, Hypertension, and SCC (squamous cell carcinoma). Past Surgical History:   has a past surgical history that includes Cardioversion (2005); Total hip arthroplasty (Left, 2013); and Colonoscopy ([id number]). Allergies:  Patient has no known allergies. Social History:   reports that he has never smoked. He has never used smokeless tobacco. He reports that he does not drink alcohol or use drugs. Family History: family history includes Cancer in his mother; Heart Disease (age of onset: 79) in his father; High Blood Pressure in his mother. Home Medications:    Prior to Admission medications    Medication Sig Start Date End Date Taking?  Authorizing Provider   ROMANA GARCIA by Combination route   Yes Historical Provider, MD   diltiazem (CARDIZEM CD) 120 MG extended release capsule Take 120 mg by mouth daily   Yes Historical Provider, MD   amiodarone (CORDARONE) 200 MG tablet TAKE 1 TABLET BY MOUTH TWICE DAILY 4/24/19  Yes Historical Provider, MD   pantoprazole (PROTONIX) 40 MG tablet Take 40 mg by mouth 6/7/19  Yes Historical Provider, MD   sulfaSALAzine (AZULFIDINE) 500 MG tablet TAKE 1 TABLET BY MOUTH TWICE DAILY AS DIRECTED 6/3/19  Yes Historical Provider, MD   ferrous sulfate 325 (65 Fe) MG tablet Take 325 mg by mouth 6/7/19  Yes Historical Provider, MD   apixaban (ELIQUIS) 5 MG TABS tablet Take 5 mg by mouth 9/28/18  Yes Historical Provider, MD   loratadine (CLARITIN) 10 MG tablet Take 10 mg by mouth   Yes Historical Provider, MD   Multiple Vitamins-Minerals (MULTIVITAMIN-MINERALS) TABS tablet Take 1 tablet by mouth   Yes Historical Provider, MD   zoster recombinant adjuvanted vaccine (SHINGRIX) 50 MCG SUSR injection 50 MCG IM then repeat 2-6 months. 10/4/18 10/4/19 Yes Edmar Lobo MD   simvastatin (ZOCOR) 80 MG tablet TAKE ONE TABLET BY MOUTH EVERY EVENING 9/24/13  Yes Lesley Kerns MD   lisinopril-hydrochlorothiazide (PRINZIDE;ZESTORETIC) 10-12.5 MG per tablet Take 1 tablet by mouth daily. 7/15/13  Yes Bessie Patino MD   atenolol (TENORMIN) 50 MG tablet Take 1 tablet by mouth daily. 12/27/12  Yes Heena Guthrie MD   indomethacin (INDOCIN) 50 MG capsule Take 1 capsule by mouth 3 times daily for 10 days 9/18/18 9/28/18  ANIKET Kendrick          REVIEW OF SYSTEMS:      All 14-point review of systems are completed and  pertinent positives are mentioned in the history of present illness. Other  systems are reviewed and are negative. Physical Examination:    /70   Pulse 74   Ht 5' 8.5\" (1.74 m)   Wt 197 lb 3.2 oz (89.4 kg)   SpO2 97%   BMI 29.55 kg/m²       Constitutional and General Appearance:    alert, cooperative, no distress and appears stated age  [de-identified]:    PERRLA, no cervical lymphadenopathy. No masses palpable.  Normal oral  mucosa  Respiratory:  · Normal excursion and expansion without use of accessory muscles  · Resp Auscultation: Normal breath sounds without dullness or wheezing 3. Essential hypertension    RECOMMENDATIONS:  1. Discussed Atrial Fibrillation Ablation. Risk, benefit, alternative, rationale of the procedure were discussed with the patient which included but were not limited to allergic reaction to the medications, pain, bleeding, infection, nerve injury, injury to diaphragm(breathing muscle), pulmonary embolus(blood clot in lungs), deep vein blood clot, pneumothorax, hemothorax, acute renal failure, cardiac perforation,  tamponade, need for emergent surgery (open heart), need for future surgery, permanent pacemaker, pulmonary vein stenosis, left atrial to esophageal fistula, stroke, myocardial infarction and death. Given the complex nature of the disease no guarantee was given on the success of the procedure    Watchman discussed    I discussed left atrial colsure with watchman device. Alternatives including surgical ligation of ARCENIO also discussed. I explained to the patient that most ischemic stroke in patient with atrial fibrillation are due to a thrombus/clot in the left atrial appendage. However some patients do have a stroke with different reasons including hemorrhage. Watchman device only protects against the stroke associated with left atrial appendage related clots/thrombus. The procedure has been discussed in detail with patient and family members along with the risk and benefits. Success rate and complications associated with such a procedure have been discussed. Continued treatment with anti-coagulation agents will also be a reasonable strategy and can be pursued. It was also emphasized that the watchman procedure can only be pursued if there is no evidence of thrombus in the left atrial appendage. It was also explained the need for short term coumadin therapy followed by antiplatelet therapy. Patient will need a preoperative LUIZA. All the questions were answered. Patient wishes to proceed.  We will proceed with further workup including if necessary insurance approval.      Watchman protocol: discussed    ·  Before implant start Coumadin and remain on ASA  ·        LUIZA to size left atrial appendage  ·  After implant ASA and Coumadin for 45 days. ·  Transesophageal echo on day 45. If no leak around watchman, discontinue Coumadin and start Plavix along with ASA  ·  Transesophageal echo at 6 months. If no leak around watchman, discontinue Plavix and continue ASA for life      QUALITY MEASURES  1. Tobacco Cessation Counseling: NA  2. Retake of BP if >140/90:   NA  3. Documentation to PCP/referring for new patient:  Sent to PCP at close of office visit  4. CAD patient on anti-platelet: NA  5. CAD patient on STATIN therapy:  Yes  6. Patient with CHF and aFib on anticoagulation:  Yes, [place]'s attestation: This note was scribed in the presence of Dr. Mehran Barrett by Yasmine Alonso RN  I, Kiera Tam, personally performed the services described in this documentation, as scribed by the above signed scribe in my presence. It is both accurate and complete to my knowledge. I agree with the details independently gathered by the clinical support staff, while the remaining scribed note accurately describes my personal service to the patient.        All questions and concerns were addressed to the patient/family. Alternatives to my treatment were discussed. ISSA Grhaam.C. Electrophysiology  [facility].   [place], [address]  Phone: [phone]  Fax: [phone] Medications

## 2023-09-28 NOTE — TELEPHONE ENCOUNTER
Refill Request     Last Seen: Last Seen Department: 10/11/2022  Last Seen by PCP: 10/11/2022    Last Written: 3/31/23       Next Appointment:   No future appointments.         Requested Prescriptions     Pending Prescriptions Disp Refills    pravastatin (PRAVACHOL) 80 MG tablet [Pharmacy Med Name: PRAVASTATIN 80MG TABLETS] 90 tablet 1     Sig: TAKE 1 TABLET BY MOUTH DAILY

## 2023-10-06 RX ORDER — PANTOPRAZOLE SODIUM 40 MG/1
TABLET, DELAYED RELEASE ORAL
Qty: 180 TABLET | Refills: 2 | Status: SHIPPED | OUTPATIENT
Start: 2023-10-06

## 2023-10-06 NOTE — TELEPHONE ENCOUNTER
Refill Request       Last Seen: Last Seen Department: 10/11/2022  Last Seen by PCP: 10/11/2022    Last Written: 01/03/2023        Next Appointment:   Future Appointments   Date Time Provider [address]   10/26/2023  3:30 PM Davonte Guerin, [address]             Requested Prescriptions     Pending Prescriptions Disp Refills    pantoprazole (PROTONIX) 40 MG tablet [Pharmacy Med Name: PANTOPRAZOLE 40MG TABLETS] 180 tablet 2     Sig: TAKE 1 TABLET BY MOUTH TWICE DAILY BEFORE MEALS

## 2023-10-13 DIAGNOSIS — N13.8 BPH WITH URINARY OBSTRUCTION: ICD-10-CM

## 2023-10-13 DIAGNOSIS — N40.1 BPH WITH URINARY OBSTRUCTION: ICD-10-CM

## 2023-10-13 NOTE — TELEPHONE ENCOUNTER
Refill Request     Last Seen: Last Seen Department: 10/22/2021  Last Seen by PCP: Visit date not found    Last Written: proscar - 07/24/2022, 30 tablets, 5 refills      Next Appointment:   Future Appointments   Date Time Provider [address]   10/26/2023  3:30 PM Wilson Sampson, [address]           Requested Prescriptions     Pending Prescriptions Disp Refills    finasteride (PROSCAR) 5 MG tablet [Pharmacy Med Name: FINASTERIDE 5MG TABLETS] 90 tablet      Sig: TAKE 1 TABLET BY MOUTH EVERY DAY

## 2023-10-16 RX ORDER — FINASTERIDE 5 MG/1
5 TABLET, FILM COATED ORAL DAILY
Qty: 90 TABLET | Refills: 0 | Status: SHIPPED | OUTPATIENT
Start: 2023-10-16

## 2023-11-02 NOTE — TELEPHONE ENCOUNTER
LOV 06/02/2023  Upcoming appt none  BMP 1209/2022  EKG 12/02/2022    ZULEYMA OOT    Front- please call pt and schedule annual f/u with ZULEYMA OR ADDISON

## 2023-11-03 RX ORDER — DOFETILIDE 0.5 MG/1
500 CAPSULE ORAL EVERY 12 HOURS SCHEDULED
Qty: 180 CAPSULE | Refills: 0 | Status: SHIPPED | OUTPATIENT
Start: 2023-11-03

## 2023-11-14 ENCOUNTER — OFFICE VISIT (OUTPATIENT)
Dept: FAMILY MEDICINE CLINIC | Age: 73
End: 2023-11-14

## 2023-11-14 VITALS
RESPIRATION RATE: 16 BRPM | HEIGHT: 69 IN | SYSTOLIC BLOOD PRESSURE: 130 MMHG | DIASTOLIC BLOOD PRESSURE: 76 MMHG | WEIGHT: 183.8 LBS | OXYGEN SATURATION: 99 % | BODY MASS INDEX: 27.22 KG/M2 | HEART RATE: 60 BPM

## 2023-11-14 DIAGNOSIS — N40.0 PROSTATE ENLARGEMENT: ICD-10-CM

## 2023-11-14 DIAGNOSIS — E78.2 MIXED HYPERLIPIDEMIA: ICD-10-CM

## 2023-11-14 DIAGNOSIS — Z00.00 MEDICARE ANNUAL WELLNESS VISIT, SUBSEQUENT: Primary | ICD-10-CM

## 2023-11-14 DIAGNOSIS — E11.9 TYPE 2 DIABETES MELLITUS WITHOUT COMPLICATION, WITHOUT LONG-TERM CURRENT USE OF INSULIN (HCC): ICD-10-CM

## 2023-11-14 DIAGNOSIS — E03.4 HYPOTHYROIDISM DUE TO ACQUIRED ATROPHY OF THYROID: ICD-10-CM

## 2023-11-14 DIAGNOSIS — I10 PRIMARY HYPERTENSION: ICD-10-CM

## 2023-11-14 PROBLEM — I72.8 SPLENIC ARTERY ANEURYSM (HCC): Status: RESOLVED | Noted: 2020-05-19 | Resolved: 2023-11-14

## 2023-11-14 ASSESSMENT — PATIENT HEALTH QUESTIONNAIRE - PHQ9
SUM OF ALL RESPONSES TO PHQ QUESTIONS 1-9: 0
SUM OF ALL RESPONSES TO PHQ QUESTIONS 1-9: 0
8. MOVING OR SPEAKING SO SLOWLY THAT OTHER PEOPLE COULD HAVE NOTICED. OR THE OPPOSITE, BEING SO FIGETY OR RESTLESS THAT YOU HAVE BEEN MOVING AROUND A LOT MORE THAN USUAL: 0
10. IF YOU CHECKED OFF ANY PROBLEMS, HOW DIFFICULT HAVE THESE PROBLEMS MADE IT FOR YOU TO DO YOUR WORK, TAKE CARE OF THINGS AT HOME, OR GET ALONG WITH OTHER PEOPLE: 0
SUM OF ALL RESPONSES TO PHQ QUESTIONS 1-9: 0
9. THOUGHTS THAT YOU WOULD BE BETTER OFF DEAD, OR OF HURTING YOURSELF: 0
3. TROUBLE FALLING OR STAYING ASLEEP: 0
7. TROUBLE CONCENTRATING ON THINGS, SUCH AS READING THE NEWSPAPER OR WATCHING TELEVISION: 0
SUM OF ALL RESPONSES TO PHQ9 QUESTIONS 1 & 2: 0
5. POOR APPETITE OR OVEREATING: 0
4. FEELING TIRED OR HAVING LITTLE ENERGY: 0
1. LITTLE INTEREST OR PLEASURE IN DOING THINGS: 0
SUM OF ALL RESPONSES TO PHQ QUESTIONS 1-9: 0
6. FEELING BAD ABOUT YOURSELF - OR THAT YOU ARE A FAILURE OR HAVE LET YOURSELF OR YOUR FAMILY DOWN: 0
2. FEELING DOWN, DEPRESSED OR HOPELESS: 0

## 2023-11-14 ASSESSMENT — LIFESTYLE VARIABLES
HOW OFTEN DO YOU HAVE A DRINK CONTAINING ALCOHOL: 2-3 TIMES A WEEK
HOW MANY STANDARD DRINKS CONTAINING ALCOHOL DO YOU HAVE ON A TYPICAL DAY: 1 OR 2

## 2023-11-14 NOTE — PROGRESS NOTES
Medicare Annual Wellness Visit    Mal Smith is here for Medicare AWV (Pt is here for Medicare AWV )    Assessment & Plan   Medicare annual wellness visit, subsequent  - Overall patient is doing well did have lengthy discussion with patient regarding living will. Patient has a living will but does not have advanced directive educated patient on the importance of completing advanced directive  Mixed hyperlipidemia  -     Lipid Panel; Future  Hypothyroidism due to acquired atrophy of thyroid  -     TSH with Reflex; Future  - We will recheck thyroid at this time titrations will be made based off of thyroid levels. Primary hypertension  -     Comprehensive Metabolic Panel; Future  -     CBC with Auto Differential; Future  - Blood pressure in office today is stable no acute concerns we will continue blood pressure medication. Prostate enlargement  -     PSA Screening; Future  - Patient does have elevated prostate is on medication and will recheck prostate level at this time  Type 2 diabetes mellitus without complication, without long-term current use of insulin (HCC)  -     Hemoglobin A1C; Future  -Recheck A1c at this time currently not on medication prediabetic. Would be aggressive in treating diabetes given patient's cardiac history. Recommendations for Preventive Services Due: see orders and patient instructions/AVS.  Recommended screening schedule for the next 5-10 years is provided to the patient in written form: see Patient Instructions/AVS.     Return in about 6 months (around 5/14/2024) for Follow up. Subjective   The following acute and/or chronic problems were also addressed today:  -Discussed multiple chronic health conditions    Patient's complete Health Risk Assessment and screening values have been reviewed and are found in Flowsheets. The following problems were reviewed today and where indicated follow up appointments were made and/or referrals ordered.     No Positive Risk Factors

## 2023-11-15 LAB
ALBUMIN SERPL-MCNC: 4 G/DL (ref 3.4–5)
ALBUMIN/GLOB SERPL: 1.7 {RATIO} (ref 1.1–2.2)
ALP SERPL-CCNC: 67 U/L (ref 40–129)
ALT SERPL-CCNC: 13 U/L (ref 10–40)
ANION GAP SERPL CALCULATED.3IONS-SCNC: 9 MMOL/L (ref 3–16)
AST SERPL-CCNC: 18 U/L (ref 15–37)
BASOPHILS # BLD: 0.1 K/UL (ref 0–0.2)
BASOPHILS NFR BLD: 1.1 %
BILIRUB SERPL-MCNC: 0.3 MG/DL (ref 0–1)
BUN SERPL-MCNC: 17 MG/DL (ref 7–20)
CALCIUM SERPL-MCNC: 9.6 MG/DL (ref 8.3–10.6)
CHLORIDE SERPL-SCNC: 102 MMOL/L (ref 99–110)
CO2 SERPL-SCNC: 27 MMOL/L (ref 21–32)
CREAT SERPL-MCNC: 1 MG/DL (ref 0.8–1.3)
DEPRECATED RDW RBC AUTO: 14.6 % (ref 12.4–15.4)
EOSINOPHIL # BLD: 0.1 K/UL (ref 0–0.6)
EOSINOPHIL NFR BLD: 2.6 %
EST. AVERAGE GLUCOSE BLD GHB EST-MCNC: 114 MG/DL
GFR SERPLBLD CREATININE-BSD FMLA CKD-EPI: >60 ML/MIN/{1.73_M2}
GLUCOSE SERPL-MCNC: 84 MG/DL (ref 70–99)
HBA1C MFR BLD: 5.6 %
HCT VFR BLD AUTO: 39.3 % (ref 40.5–52.5)
HGB BLD-MCNC: 13 G/DL (ref 13.5–17.5)
LYMPHOCYTES # BLD: 1.3 K/UL (ref 1–5.1)
LYMPHOCYTES NFR BLD: 23.2 %
MCH RBC QN AUTO: 29.4 PG (ref 26–34)
MCHC RBC AUTO-ENTMCNC: 33.2 G/DL (ref 31–36)
MCV RBC AUTO: 88.7 FL (ref 80–100)
MONOCYTES # BLD: 0.7 K/UL (ref 0–1.3)
MONOCYTES NFR BLD: 13.6 %
NEUTROPHILS # BLD: 3.3 K/UL (ref 1.7–7.7)
NEUTROPHILS NFR BLD: 59.5 %
PLATELET # BLD AUTO: 321 K/UL (ref 135–450)
PMV BLD AUTO: 8.6 FL (ref 5–10.5)
POTASSIUM SERPL-SCNC: 4.5 MMOL/L (ref 3.5–5.1)
PROT SERPL-MCNC: 6.3 G/DL (ref 6.4–8.2)
PSA SERPL DL<=0.01 NG/ML-MCNC: 2.87 NG/ML (ref 0–4)
RBC # BLD AUTO: 4.43 M/UL (ref 4.2–5.9)
SODIUM SERPL-SCNC: 138 MMOL/L (ref 136–145)
TSH SERPL DL<=0.005 MIU/L-ACNC: 1.72 UIU/ML (ref 0.27–4.2)
WBC # BLD AUTO: 5.5 K/UL (ref 4–11)

## 2024-01-06 DIAGNOSIS — E03.4 HYPOTHYROIDISM DUE TO ACQUIRED ATROPHY OF THYROID: ICD-10-CM

## 2024-01-08 RX ORDER — LEVOTHYROXINE SODIUM 0.07 MG/1
75 TABLET ORAL DAILY
Qty: 90 TABLET | Refills: 3 | Status: SHIPPED | OUTPATIENT
Start: 2024-01-08

## 2024-01-08 NOTE — TELEPHONE ENCOUNTER
Refill Request     Last Seen: Last Seen Department: 10/22/2021  Last Seen by PCP: 11/14/2023    Last Written: 12/13/22      Next Appointment:   Future Appointments   Date Time Provider Department Center   1/11/2024  9:15 AM ADRIANA Wan Jr., MD [facility]   5/14/2024  3:30 PM Domenic Sampson, APRN - CNP [place] [facility]           Requested Prescriptions     Pending Prescriptions Disp Refills    levothyroxine (SYNTHROID) 75 MCG tablet [Pharmacy Med Name: LEVOTHYROXINE 0.075MG (75MCG) TABS] 90 tablet 3     Sig: TAKE 1 TABLET BY MOUTH DAILY

## 2024-01-09 NOTE — PROGRESS NOTES
and personally reviewed by me in its entirety. I confirm that the note above accurately reflects all work, physical examination, the discussion of treatments and procedures, and medical decision making performed by me.      I, ADRIANA Wan Jr, MD personally performed the services described in this documentation as scribed by nurse in my presence, and is both accurate and complete.    Electronically signed by ADRIANA Wan Jr, MD on 1/11/2024 at 9:53 AM     Dr. BOAZ Wan MD  Electrophysiology  [facility].  [address] [place] [address]. [address].  [place] [address]  Phone: [phone]  Fax: [phone]   1/11/2024     NOTE: This report was transcribed using voice recognition software. Every effort was made to ensure accuracy, however, inadvertent computerized transcription errors may be present.

## 2024-01-10 DIAGNOSIS — N40.1 BPH WITH URINARY OBSTRUCTION: ICD-10-CM

## 2024-01-10 DIAGNOSIS — N13.8 BPH WITH URINARY OBSTRUCTION: ICD-10-CM

## 2024-01-10 RX ORDER — FINASTERIDE 5 MG/1
5 TABLET, FILM COATED ORAL DAILY
Qty: 90 TABLET | Refills: 0 | Status: SHIPPED | OUTPATIENT
Start: 2024-01-10

## 2024-01-10 NOTE — TELEPHONE ENCOUNTER
Refill Request         Last Seen: Last Seen Department: 10/22/2021  Last Seen by PCP: 11/14/2023    Last Written: 10/16/2023      Next Appointment:   Future Appointments   Date Time Provider Department Center   1/11/2024  9:15 AM ADRIANA Wan Jr., MD [facility]   5/14/2024  3:30 PM Domenic Sampson, APRN - CNP [place] [facility]         Requested Prescriptions     Pending Prescriptions Disp Refills    finasteride (PROSCAR) 5 MG tablet [Pharmacy Med Name: FINASTERIDE 5MG TABLETS] 90 tablet 0     Sig: TAKE 1 TABLET BY MOUTH EVERY DAY

## 2024-01-11 ENCOUNTER — OFFICE VISIT (OUTPATIENT)
Dept: CARDIOLOGY CLINIC | Age: 74
End: 2024-01-11
Payer: MEDICARE

## 2024-01-11 VITALS
SYSTOLIC BLOOD PRESSURE: 128 MMHG | HEIGHT: 69 IN | DIASTOLIC BLOOD PRESSURE: 76 MMHG | OXYGEN SATURATION: 99 % | BODY MASS INDEX: 27.55 KG/M2 | WEIGHT: 186 LBS | HEART RATE: 71 BPM

## 2024-01-11 DIAGNOSIS — I48.0 PAROXYSMAL ATRIAL FIBRILLATION (HCC): Primary | ICD-10-CM

## 2024-01-11 DIAGNOSIS — Z95.818 PRESENCE OF WATCHMAN LEFT ATRIAL APPENDAGE CLOSURE DEVICE: ICD-10-CM

## 2024-01-11 PROCEDURE — 1123F ACP DISCUSS/DSCN MKR DOCD: CPT | Performed by: INTERNAL MEDICINE

## 2024-01-11 PROCEDURE — 99214 OFFICE O/P EST MOD 30 MIN: CPT | Performed by: INTERNAL MEDICINE

## 2024-01-11 PROCEDURE — 1036F TOBACCO NON-USER: CPT | Performed by: INTERNAL MEDICINE

## 2024-01-11 PROCEDURE — G8427 DOCREV CUR MEDS BY ELIG CLIN: HCPCS | Performed by: INTERNAL MEDICINE

## 2024-01-11 PROCEDURE — 93000 ELECTROCARDIOGRAM COMPLETE: CPT | Performed by: INTERNAL MEDICINE

## 2024-01-11 PROCEDURE — G8419 CALC BMI OUT NRM PARAM NOF/U: HCPCS | Performed by: INTERNAL MEDICINE

## 2024-01-11 PROCEDURE — G8484 FLU IMMUNIZE NO ADMIN: HCPCS | Performed by: INTERNAL MEDICINE

## 2024-01-11 PROCEDURE — 3074F SYST BP LT 130 MM HG: CPT | Performed by: INTERNAL MEDICINE

## 2024-01-11 PROCEDURE — 3078F DIAST BP <80 MM HG: CPT | Performed by: INTERNAL MEDICINE

## 2024-01-11 PROCEDURE — 3017F COLORECTAL CA SCREEN DOC REV: CPT | Performed by: INTERNAL MEDICINE

## 2024-01-11 NOTE — PATIENT INSTRUCTIONS
RECOMMENDATIONS:  1. Continue Tikosyn.   2. Continue current plan of care.   3. Follow up in 1 year or sooner if needed

## 2024-01-31 RX ORDER — DOFETILIDE 0.5 MG/1
500 CAPSULE ORAL EVERY 12 HOURS SCHEDULED
Qty: 180 CAPSULE | Refills: 3 | Status: SHIPPED | OUTPATIENT
Start: 2024-01-31

## 2024-01-31 NOTE — TELEPHONE ENCOUNTER
Last ov 01/11/2024  CMP 11/2023  EKG 01/2024    
as per neuro can follow up outpt, cleared for dc home. pt amenable for dc home given return precautions

## 2024-04-03 DIAGNOSIS — E78.2 MIXED HYPERLIPIDEMIA: ICD-10-CM

## 2024-04-03 RX ORDER — PANTOPRAZOLE SODIUM 40 MG/1
TABLET, DELAYED RELEASE ORAL
Qty: 180 TABLET | Refills: 2 | Status: SHIPPED | OUTPATIENT
Start: 2024-04-03

## 2024-04-03 RX ORDER — PRAVASTATIN SODIUM 80 MG/1
80 TABLET ORAL DAILY
Qty: 90 TABLET | Refills: 1 | Status: SHIPPED | OUTPATIENT
Start: 2024-04-03

## 2024-04-03 NOTE — TELEPHONE ENCOUNTER
Refill Request       Last Seen: Last Seen Department: 11/14/2023  Last Seen by PCP: Visit date not found    Last Written: 09/28/2023      Next Appointment:   Future Appointments   Date Time Provider Department Center   5/14/2024  3:30 PM Domenic Sampson APRN - CNP [place] [facility]         Requested Prescriptions     Pending Prescriptions Disp Refills    pravastatin (PRAVACHOL) 80 MG tablet [Pharmacy Med Name: PRAVASTATIN 80MG TABLETS] 90 tablet 1     Sig: TAKE 1 TABLET BY MOUTH DAILY

## 2024-04-03 NOTE — TELEPHONE ENCOUNTER
Refill Request        Last Seen: Last Seen Department: 11/14/2023  Last Seen by PCP: 11/14/2023    Last Written: 10/06/2023        Next Appointment:   Future Appointments   Date Time Provider Department Center   5/14/2024  3:30 PM Domenic Sampson APRN - CNP [place] [facility]       Requested Prescriptions     Pending Prescriptions Disp Refills    pantoprazole (PROTONIX) 40 MG tablet [Pharmacy Med Name: PANTOPRAZOLE 40MG TABLETS] 180 tablet 2     Sig: TAKE 1 TABLET BY MOUTH TWICE DAILY BEFORE MEALS

## 2024-04-05 DIAGNOSIS — N40.1 BPH WITH URINARY OBSTRUCTION: ICD-10-CM

## 2024-04-05 DIAGNOSIS — N13.8 BPH WITH URINARY OBSTRUCTION: ICD-10-CM

## 2024-04-05 RX ORDER — FINASTERIDE 5 MG/1
5 TABLET, FILM COATED ORAL DAILY
Qty: 90 TABLET | Refills: 0 | Status: SHIPPED | OUTPATIENT
Start: 2024-04-05

## 2024-04-05 NOTE — TELEPHONE ENCOUNTER
Refill Request         Last Seen: Last Seen Department: 10/22/2021  Last Seen by PCP: 11/14/2023    Last Written: 01/10/2024        Next Appointment:   Future Appointments   Date Time Provider Department Center   5/14/2024  3:30 PM Domenic Sampson APRN - CNP [place] [facility]             Requested Prescriptions     Pending Prescriptions Disp Refills    finasteride (PROSCAR) 5 MG tablet [Pharmacy Med Name: FINASTERIDE 5MG TABLETS] 90 tablet 0     Sig: TAKE 1 TABLET BY MOUTH EVERY DAY

## 2024-05-14 ENCOUNTER — OFFICE VISIT (OUTPATIENT)
Dept: FAMILY MEDICINE CLINIC | Age: 74
End: 2024-05-14
Payer: MEDICARE

## 2024-05-14 VITALS
RESPIRATION RATE: 16 BRPM | BODY MASS INDEX: 27.34 KG/M2 | HEART RATE: 69 BPM | WEIGHT: 184.6 LBS | SYSTOLIC BLOOD PRESSURE: 110 MMHG | DIASTOLIC BLOOD PRESSURE: 70 MMHG | HEIGHT: 69 IN | OXYGEN SATURATION: 98 %

## 2024-05-14 DIAGNOSIS — E03.2 HYPOTHYROIDISM DUE TO MEDICATION: ICD-10-CM

## 2024-05-14 DIAGNOSIS — I10 PRIMARY HYPERTENSION: Primary | ICD-10-CM

## 2024-05-14 DIAGNOSIS — E11.9 TYPE 2 DIABETES MELLITUS WITHOUT COMPLICATION, WITHOUT LONG-TERM CURRENT USE OF INSULIN (HCC): ICD-10-CM

## 2024-05-14 DIAGNOSIS — Z23 NEED FOR PNEUMOCOCCAL 20-VALENT CONJUGATE VACCINATION: ICD-10-CM

## 2024-05-14 DIAGNOSIS — D47.3 ESSENTIAL THROMBOCYTOSIS (HCC): ICD-10-CM

## 2024-05-14 PROBLEM — I48.91 ATRIAL FIBRILLATION (HCC): Status: RESOLVED | Noted: 2022-02-28 | Resolved: 2024-05-14

## 2024-05-14 PROBLEM — I48.91 ATRIAL FIBRILLATION WITH RVR (HCC): Status: RESOLVED | Noted: 2021-10-11 | Resolved: 2024-05-14

## 2024-05-14 LAB
ALBUMIN SERPL-MCNC: 4 G/DL (ref 3.4–5)
ALBUMIN/GLOB SERPL: 1.5 {RATIO} (ref 1.1–2.2)
ALP SERPL-CCNC: 91 U/L (ref 40–129)
ALT SERPL-CCNC: 17 U/L (ref 10–40)
ANION GAP SERPL CALCULATED.3IONS-SCNC: 13 MMOL/L (ref 3–16)
AST SERPL-CCNC: 19 U/L (ref 15–37)
BASOPHILS # BLD: 0.1 K/UL (ref 0–0.2)
BASOPHILS NFR BLD: 0.8 %
BILIRUB SERPL-MCNC: <0.2 MG/DL (ref 0–1)
BUN SERPL-MCNC: 19 MG/DL (ref 7–20)
CALCIUM SERPL-MCNC: 9.8 MG/DL (ref 8.3–10.6)
CHLORIDE SERPL-SCNC: 104 MMOL/L (ref 99–110)
CO2 SERPL-SCNC: 25 MMOL/L (ref 21–32)
CREAT SERPL-MCNC: 1 MG/DL (ref 0.8–1.3)
DEPRECATED RDW RBC AUTO: 14.1 % (ref 12.4–15.4)
EOSINOPHIL # BLD: 0.3 K/UL (ref 0–0.6)
EOSINOPHIL NFR BLD: 3.8 %
GFR SERPLBLD CREATININE-BSD FMLA CKD-EPI: 78 ML/MIN/{1.73_M2}
GLUCOSE SERPL-MCNC: 136 MG/DL (ref 70–99)
HCT VFR BLD AUTO: 38.4 % (ref 40.5–52.5)
HGB BLD-MCNC: 12.8 G/DL (ref 13.5–17.5)
LYMPHOCYTES # BLD: 1.8 K/UL (ref 1–5.1)
LYMPHOCYTES NFR BLD: 26.6 %
MCH RBC QN AUTO: 28.9 PG (ref 26–34)
MCHC RBC AUTO-ENTMCNC: 33.2 G/DL (ref 31–36)
MCV RBC AUTO: 87.1 FL (ref 80–100)
MONOCYTES # BLD: 1 K/UL (ref 0–1.3)
MONOCYTES NFR BLD: 15.7 %
NEUTROPHILS # BLD: 3.5 K/UL (ref 1.7–7.7)
NEUTROPHILS NFR BLD: 53.1 %
PLATELET # BLD AUTO: 303 K/UL (ref 135–450)
PMV BLD AUTO: 8.3 FL (ref 5–10.5)
POTASSIUM SERPL-SCNC: 4.4 MMOL/L (ref 3.5–5.1)
PROT SERPL-MCNC: 6.6 G/DL (ref 6.4–8.2)
RBC # BLD AUTO: 4.42 M/UL (ref 4.2–5.9)
SODIUM SERPL-SCNC: 142 MMOL/L (ref 136–145)
TSH SERPL DL<=0.005 MIU/L-ACNC: 1.6 UIU/ML (ref 0.27–4.2)
WBC # BLD AUTO: 6.6 K/UL (ref 4–11)

## 2024-05-14 PROCEDURE — 36415 COLL VENOUS BLD VENIPUNCTURE: CPT

## 2024-05-14 PROCEDURE — 1036F TOBACCO NON-USER: CPT

## 2024-05-14 PROCEDURE — 99214 OFFICE O/P EST MOD 30 MIN: CPT

## 2024-05-14 PROCEDURE — 3074F SYST BP LT 130 MM HG: CPT

## 2024-05-14 PROCEDURE — 1123F ACP DISCUSS/DSCN MKR DOCD: CPT

## 2024-05-14 PROCEDURE — G8419 CALC BMI OUT NRM PARAM NOF/U: HCPCS

## 2024-05-14 PROCEDURE — G0009 ADMIN PNEUMOCOCCAL VACCINE: HCPCS

## 2024-05-14 PROCEDURE — 3078F DIAST BP <80 MM HG: CPT

## 2024-05-14 PROCEDURE — 90677 PCV20 VACCINE IM: CPT

## 2024-05-14 PROCEDURE — G8427 DOCREV CUR MEDS BY ELIG CLIN: HCPCS

## 2024-05-14 RX ORDER — LEVOTHYROXINE SODIUM 0.07 MG/1
75 TABLET ORAL DAILY
Qty: 90 TABLET | Refills: 3 | Status: SHIPPED | OUTPATIENT
Start: 2024-05-14

## 2024-05-14 SDOH — ECONOMIC STABILITY: FOOD INSECURITY: WITHIN THE PAST 12 MONTHS, THE FOOD YOU BOUGHT JUST DIDN'T LAST AND YOU DIDN'T HAVE MONEY TO GET MORE.: NEVER TRUE

## 2024-05-14 SDOH — ECONOMIC STABILITY: FOOD INSECURITY: WITHIN THE PAST 12 MONTHS, YOU WORRIED THAT YOUR FOOD WOULD RUN OUT BEFORE YOU GOT MONEY TO BUY MORE.: NEVER TRUE

## 2024-05-14 SDOH — ECONOMIC STABILITY: HOUSING INSECURITY
IN THE LAST 12 MONTHS, WAS THERE A TIME WHEN YOU DID NOT HAVE A STEADY PLACE TO SLEEP OR SLEPT IN A SHELTER (INCLUDING NOW)?: NO

## 2024-05-14 SDOH — ECONOMIC STABILITY: INCOME INSECURITY: HOW HARD IS IT FOR YOU TO PAY FOR THE VERY BASICS LIKE FOOD, HOUSING, MEDICAL CARE, AND HEATING?: NOT HARD AT ALL

## 2024-05-14 ASSESSMENT — PATIENT HEALTH QUESTIONNAIRE - PHQ9
4. FEELING TIRED OR HAVING LITTLE ENERGY: NOT AT ALL
6. FEELING BAD ABOUT YOURSELF - OR THAT YOU ARE A FAILURE OR HAVE LET YOURSELF OR YOUR FAMILY DOWN: NOT AT ALL
9. THOUGHTS THAT YOU WOULD BE BETTER OFF DEAD, OR OF HURTING YOURSELF: NOT AT ALL
3. TROUBLE FALLING OR STAYING ASLEEP: NOT AT ALL
1. LITTLE INTEREST OR PLEASURE IN DOING THINGS: NOT AT ALL
SUM OF ALL RESPONSES TO PHQ QUESTIONS 1-9: 0
8. MOVING OR SPEAKING SO SLOWLY THAT OTHER PEOPLE COULD HAVE NOTICED. OR THE OPPOSITE, BEING SO FIGETY OR RESTLESS THAT YOU HAVE BEEN MOVING AROUND A LOT MORE THAN USUAL: NOT AT ALL
SUM OF ALL RESPONSES TO PHQ QUESTIONS 1-9: 0
7. TROUBLE CONCENTRATING ON THINGS, SUCH AS READING THE NEWSPAPER OR WATCHING TELEVISION: NOT AT ALL
SUM OF ALL RESPONSES TO PHQ QUESTIONS 1-9: 0
SUM OF ALL RESPONSES TO PHQ9 QUESTIONS 1 & 2: 0
5. POOR APPETITE OR OVEREATING: NOT AT ALL
SUM OF ALL RESPONSES TO PHQ QUESTIONS 1-9: 0
10. IF YOU CHECKED OFF ANY PROBLEMS, HOW DIFFICULT HAVE THESE PROBLEMS MADE IT FOR YOU TO DO YOUR WORK, TAKE CARE OF THINGS AT HOME, OR GET ALONG WITH OTHER PEOPLE: NOT DIFFICULT AT ALL
2. FEELING DOWN, DEPRESSED OR HOPELESS: NOT AT ALL

## 2024-05-14 ASSESSMENT — ENCOUNTER SYMPTOMS
CONSTIPATION: 0
SORE THROAT: 0
COUGH: 0
DIARRHEA: 0
WHEEZING: 0
BLOOD IN STOOL: 0
COLOR CHANGE: 0
ABDOMINAL PAIN: 0
SHORTNESS OF BREATH: 0

## 2024-05-14 NOTE — PROGRESS NOTES
Facundo Woods (:  1950) is a 74 y.o. male,Established patient, here for evaluation of the following chief complaint(s):  6 Month Follow-Up (Pt is here for a 6 month follow up )      Assessment & Plan   ASSESSMENT/PLAN:  1. Primary hypertension  -     Comprehensive Metabolic Panel; Future  -     CBC with Auto Differential; Future  - Patient recently got refills on blood pressure medication and however blood pressure is doing extremely well in office today we will continue lisinopril 5 mg.  This has been previously prescribed by cardiology.  Patient was educated if he needs refills on medications to breeze reach out to cardiology or our office.  Overall continue medication check labs as listed above doing well.  BP Readings from Last 3 Encounters:   24 110/70   24 128/76   23 130/76        2. Essential thrombocytosis (HCC)  -     CBC with Auto Differential; Future  - Currently stable continue to monitor recheck labs as listed above  3. Type 2 diabetes mellitus without complication, without long-term current use of insulin (HCC)  -     Hemoglobin A1C; Future  - Not currently on diabetic medication at this time continues to work vigorously with heavy exercising and working outside.  Will continue to monitor without medication.  Lab Results   Component Value Date    LABA1C 5.6 2023    LABA1C 5.8 2022    LABA1C 6.1 2018     Lab Results   Component Value Date    .0 2023    .8 2022    .4 2018        4. Hypothyroidism due to medication  -     TSH with Reflex; Future  -     levothyroxine (SYNTHROID) 75 MCG tablet; Take 1 tablet by mouth daily, Disp-90 tablet, [id number] refills remain on this prescription. Your patient is requesting advance approval of refills for this medication to PREVENT ANY MISSED DOSESNormal  - Doing extremely well on medication no reports of any issues at this time.  Taking medication as prescribed.  Will recheck thyroid

## 2024-05-15 LAB
EST. AVERAGE GLUCOSE BLD GHB EST-MCNC: 114 MG/DL
HBA1C MFR BLD: 5.6 %

## 2024-05-30 ENCOUNTER — TELEMEDICINE (OUTPATIENT)
Dept: FAMILY MEDICINE CLINIC | Age: 74
End: 2024-05-30

## 2024-05-30 DIAGNOSIS — Z71.89 ACP (ADVANCE CARE PLANNING): ICD-10-CM

## 2024-05-30 DIAGNOSIS — Z00.00 MEDICARE ANNUAL WELLNESS VISIT, SUBSEQUENT: Primary | ICD-10-CM

## 2024-05-30 ASSESSMENT — PATIENT HEALTH QUESTIONNAIRE - PHQ9
10. IF YOU CHECKED OFF ANY PROBLEMS, HOW DIFFICULT HAVE THESE PROBLEMS MADE IT FOR YOU TO DO YOUR WORK, TAKE CARE OF THINGS AT HOME, OR GET ALONG WITH OTHER PEOPLE: NOT DIFFICULT AT ALL
SUM OF ALL RESPONSES TO PHQ QUESTIONS 1-9: 0
SUM OF ALL RESPONSES TO PHQ QUESTIONS 1-9: 0
4. FEELING TIRED OR HAVING LITTLE ENERGY: NOT AT ALL
SUM OF ALL RESPONSES TO PHQ QUESTIONS 1-9: 0
7. TROUBLE CONCENTRATING ON THINGS, SUCH AS READING THE NEWSPAPER OR WATCHING TELEVISION: NOT AT ALL
1. LITTLE INTEREST OR PLEASURE IN DOING THINGS: NOT AT ALL
SUM OF ALL RESPONSES TO PHQ9 QUESTIONS 1 & 2: 0
SUM OF ALL RESPONSES TO PHQ QUESTIONS 1-9: 0
8. MOVING OR SPEAKING SO SLOWLY THAT OTHER PEOPLE COULD HAVE NOTICED. OR THE OPPOSITE, BEING SO FIGETY OR RESTLESS THAT YOU HAVE BEEN MOVING AROUND A LOT MORE THAN USUAL: NOT AT ALL
5. POOR APPETITE OR OVEREATING: NOT AT ALL
9. THOUGHTS THAT YOU WOULD BE BETTER OFF DEAD, OR OF HURTING YOURSELF: NOT AT ALL
3. TROUBLE FALLING OR STAYING ASLEEP: NOT AT ALL
2. FEELING DOWN, DEPRESSED OR HOPELESS: NOT AT ALL
6. FEELING BAD ABOUT YOURSELF - OR THAT YOU ARE A FAILURE OR HAVE LET YOURSELF OR YOUR FAMILY DOWN: NOT AT ALL

## 2024-05-30 NOTE — PROGRESS NOTES
dyslipidemia, FH premature CV disease, and male gender, were discussed, as well as the likely benefits of lifestyle changes. Based upon patient's motivation to change his behavior, the following plan was agreed upon to work toward lowering cardiovascular disease risk: low saturated fat, low cholesterol diet, Mediterranean diet, DASH diet, and at least 150 minutes of exercise/week.  Aspirin use for primary prevention of cardiovascular disease for men 45-79 and women 55-79: Indicated- continue daily aspirin. Educational materials for lifestyle changes were provided. Patient will follow-up in 6 month(s) with cardiologist. Provider spent 6 minutes counseling patient.                              Objective      Patient-Reported Vitals  Patient-Reported Weight: 184lb  Patient-Reported Height: 5'9.02            No Known Allergies  Prior to Visit Medications    Medication Sig Taking? Authorizing Provider   levothyroxine (SYNTHROID) 75 MCG tablet Take 1 tablet by mouth daily Yes Domenic Sampson APRN - CNP   finasteride (PROSCAR) 5 MG tablet TAKE 1 TABLET BY MOUTH EVERY DAY Yes Domenic Sampson APRN - CNP   pravastatin (PRAVACHOL) 80 MG tablet TAKE 1 TABLET BY MOUTH DAILY Yes Domenic Sampson APRN - CNP   pantoprazole (PROTONIX) 40 MG tablet TAKE 1 TABLET BY MOUTH TWICE DAILY BEFORE MEALS Yes Domenic Sampson APRN - CNP   dofetilide (TIKOSYN) 500 MCG capsule TAKE 1 CAPSULE BY MOUTH EVERY 12 HOURS Yes ADRIANA Wan Jr., MD   lisinopril (PRINIVIL;ZESTRIL) 5 MG tablet Take 1 tablet by mouth daily Yes ADRIANA Wan Jr., MD   fluticasone (FLONASE) 50 MCG/ACT nasal spray 1 spray by Each Nostril route daily Yes Domenic Sampson APRN - CNP   tamsulosin (FLOMAX) 0.4 MG capsule Take 1 capsule by mouth in the morning. Yes Amilcar Curry MD   prednisoLONE acetate (PRED FORTE) 1 % ophthalmic suspension SHAKE LIQUID AND INSTILL 1 DROP IN RIGHT EYE THREE TIMES DAILY Yes ProviderNunu MD   aspirin 325 MG tablet Take by

## 2024-07-01 DIAGNOSIS — N13.8 BPH WITH URINARY OBSTRUCTION: ICD-10-CM

## 2024-07-01 DIAGNOSIS — N40.1 BPH WITH URINARY OBSTRUCTION: ICD-10-CM

## 2024-07-02 RX ORDER — FINASTERIDE 5 MG/1
5 TABLET, FILM COATED ORAL DAILY
Qty: 90 TABLET | Refills: 0 | Status: SHIPPED | OUTPATIENT
Start: 2024-07-02

## 2024-09-24 RX ORDER — LISINOPRIL 5 MG/1
5 TABLET ORAL DAILY
Qty: 90 TABLET | Refills: 2 | Status: SHIPPED | OUTPATIENT
Start: 2024-09-24

## 2024-11-15 ENCOUNTER — OFFICE VISIT (OUTPATIENT)
Dept: FAMILY MEDICINE CLINIC | Age: 74
End: 2024-11-15

## 2024-11-15 VITALS
WEIGHT: 187.8 LBS | HEART RATE: 71 BPM | HEIGHT: 69 IN | DIASTOLIC BLOOD PRESSURE: 70 MMHG | SYSTOLIC BLOOD PRESSURE: 116 MMHG | OXYGEN SATURATION: 98 % | BODY MASS INDEX: 27.81 KG/M2

## 2024-11-15 DIAGNOSIS — N40.1 BPH WITH URINARY OBSTRUCTION: ICD-10-CM

## 2024-11-15 DIAGNOSIS — I10 PRIMARY HYPERTENSION: Primary | ICD-10-CM

## 2024-11-15 DIAGNOSIS — E03.2 HYPOTHYROIDISM DUE TO MEDICATION: ICD-10-CM

## 2024-11-15 DIAGNOSIS — E78.2 MIXED HYPERLIPIDEMIA: ICD-10-CM

## 2024-11-15 DIAGNOSIS — K44.9 HH (HIATUS HERNIA): ICD-10-CM

## 2024-11-15 DIAGNOSIS — N40.0 BENIGN PROSTATIC HYPERPLASIA, UNSPECIFIED WHETHER LOWER URINARY TRACT SYMPTOMS PRESENT: ICD-10-CM

## 2024-11-15 DIAGNOSIS — K21.9 GASTROESOPHAGEAL REFLUX DISEASE WITHOUT ESOPHAGITIS: ICD-10-CM

## 2024-11-15 DIAGNOSIS — J30.2 SEASONAL ALLERGIES: ICD-10-CM

## 2024-11-15 DIAGNOSIS — N13.8 BPH WITH URINARY OBSTRUCTION: ICD-10-CM

## 2024-11-15 RX ORDER — PANTOPRAZOLE SODIUM 40 MG/1
TABLET, DELAYED RELEASE ORAL
Qty: 180 TABLET | Refills: 2 | Status: SHIPPED | OUTPATIENT
Start: 2024-11-15

## 2024-11-15 RX ORDER — PRAVASTATIN SODIUM 80 MG/1
80 TABLET ORAL DAILY
Qty: 90 TABLET | Refills: 1 | Status: SHIPPED | OUTPATIENT
Start: 2024-11-15

## 2024-11-15 RX ORDER — FINASTERIDE 5 MG/1
5 TABLET, FILM COATED ORAL DAILY
Qty: 90 TABLET | Refills: 1 | Status: SHIPPED | OUTPATIENT
Start: 2024-11-15

## 2024-11-15 RX ORDER — FLUTICASONE PROPIONATE 50 MCG
1 SPRAY, SUSPENSION (ML) NASAL DAILY
Qty: 32 G | Refills: 1 | Status: SHIPPED | OUTPATIENT
Start: 2024-11-15

## 2024-11-15 RX ORDER — FINASTERIDE 5 MG/1
5 TABLET, FILM COATED ORAL DAILY
Qty: 90 TABLET | Refills: 0 | Status: SHIPPED | OUTPATIENT
Start: 2024-11-15 | End: 2024-11-15 | Stop reason: DRUGHIGH

## 2024-11-15 ASSESSMENT — ENCOUNTER SYMPTOMS
ABDOMINAL DISTENTION: 0
DIARRHEA: 0
SHORTNESS OF BREATH: 0
VOMITING: 0
NAUSEA: 0
COLOR CHANGE: 1

## 2024-11-15 NOTE — PROGRESS NOTES
PROGRESS NOTE  Date of Service:  11/15/2024    SUBJECTIVE:  Patient ID: Facundo Woods is a 74 y.o. 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 Qiu    Sleep apnea     Systolic heart failure (HCC)       Past Surgical History:   Procedure Laterality Date    ABLATION OF DYSRHYTHMIC FOCUS      Afib PVI etc / right sided flutter line    CARDIOVERSION  2005    COLONOSCOPY  [id number]    tics    TOTAL HIP ARTHROPLASTY Left 2013      Social History     Tobacco Use    Smoking status: Never    Smokeless tobacco: Never   Substance Use Topics    Alcohol use: No     Alcohol/week: 0.0 standard drinks of alcohol     Comment: social      Family History   Problem Relation Age of Onset    Cancer Mother         BONE    High Blood Pressure Mother     Heart Disease Father 70     Current Outpatient Medications on File Prior to Visit   Medication Sig Dispense Refill    lisinopril (PRINIVIL;ZESTRIL) 5 MG tablet TAKE 1 TABLET BY MOUTH DAILY 90 tablet 2    levothyroxine (SYNTHROID) 75 MCG tablet Take 1 tablet by mouth daily 90 tablet 3    dofetilide (TIKOSYN) 500 MCG capsule TAKE 1 CAPSULE BY MOUTH EVERY 12 HOURS 180 capsule 3    tamsulosin (FLOMAX)

## 2025-02-20 ENCOUNTER — OFFICE VISIT (OUTPATIENT)
Dept: CARDIOLOGY CLINIC | Age: 75
End: 2025-02-20
Payer: MEDICARE

## 2025-02-20 VITALS
BODY MASS INDEX: 28.02 KG/M2 | WEIGHT: 189.2 LBS | HEART RATE: 77 BPM | OXYGEN SATURATION: 98 % | HEIGHT: 69 IN | SYSTOLIC BLOOD PRESSURE: 116 MMHG | DIASTOLIC BLOOD PRESSURE: 64 MMHG

## 2025-02-20 DIAGNOSIS — I48.0 PAROXYSMAL ATRIAL FIBRILLATION (HCC): Primary | ICD-10-CM

## 2025-02-20 PROCEDURE — 3078F DIAST BP <80 MM HG: CPT | Performed by: INTERNAL MEDICINE

## 2025-02-20 PROCEDURE — 93000 ELECTROCARDIOGRAM COMPLETE: CPT | Performed by: INTERNAL MEDICINE

## 2025-02-20 PROCEDURE — 1123F ACP DISCUSS/DSCN MKR DOCD: CPT | Performed by: INTERNAL MEDICINE

## 2025-02-20 PROCEDURE — 1159F MED LIST DOCD IN RCRD: CPT | Performed by: INTERNAL MEDICINE

## 2025-02-20 PROCEDURE — 99214 OFFICE O/P EST MOD 30 MIN: CPT | Performed by: INTERNAL MEDICINE

## 2025-02-20 PROCEDURE — 3074F SYST BP LT 130 MM HG: CPT | Performed by: INTERNAL MEDICINE

## 2025-02-20 NOTE — PROGRESS NOTES
[facility]   Electrophysiology Note    Date: 2/20/25  Patient Name: Facundo Woods  YOB: 1950     Chief Complaint: 1 Year Follow Up and Atrial Fibrillation    Primary Care Physician:  Christie Devries APRN - CNP    HISTORY OF PRESENT ILLNESS: Facundo Woods is a 75 y.o. male who initially presented for follow up for paroxysmal atrial fibrillation. He has a history of GI bleed on Eliquis and has undergone cardioversion 6 times for AF at [facility].  Underwent Watchman device implantation on 12/18/2019.     He underwent a cardioversion on 4/1/2020 for paroxsymal atrial fibrillation. His EKG on 12/10/20 demonstrated SR with HR 61 BPM. On 12/10/20 he reported he is doing well from a cardiac standpoint. He reported he is taking his dronedarone and metoprolol as prescribed and tolerates them well. He reported that he is active around his home and tolerates that well.    In February, 2021, while patient was in [place], he went to ED there for AF with RVR. He underwent a CV while in [place] and he was switched from Multaq to sotalol. On 6/22/2021, ECG demonstrates SB (57). He reports that he has been in and out of AF since January and that he experiences near syncope and dizziness when he is in AF.    Patient was admitted in 10/2021 with symptomatic AF and was started on Tikosyn. He had an AF ablation on 2/28/2022. He reported feeling much better post ablation.     Interval History since last office visit: Today, 2/20/2025, ECG demonstrates SR 77 BPM. He reports he feels well. He does not believe he has had a recurrence of AF. He is scheduled for cataract removal 3/6/2025. Patient is taking all cardiac medications as prescribed and tolerates them well. Patient denies current edema, chest pain, shortness of breath, palpitations, dizziness or syncope.     Past Medical History:   has a past medical history of Arthritis, Atrial fibrillation (HCC), Atrial fibrillation (HCC), Atrial fibrillation with

## 2025-02-20 NOTE — PATIENT INSTRUCTIONS
RECOMMENDATIONS:  Discussed risks and benefits of discontinuing tikosyn to see if AF comes back. Patient would like to stay on tikosyn at this time.   OK to proceed with cataract removal as planned.   Continue cardiac medications as prescribed.   Follow up with EP NP in 1 year.   
Yes

## 2025-02-23 DIAGNOSIS — N40.1 BPH WITH URINARY OBSTRUCTION: ICD-10-CM

## 2025-02-23 DIAGNOSIS — N13.8 BPH WITH URINARY OBSTRUCTION: ICD-10-CM

## 2025-02-24 RX ORDER — FINASTERIDE 5 MG/1
5 TABLET, FILM COATED ORAL DAILY
Qty: 90 TABLET | Refills: 1 | Status: SHIPPED | OUTPATIENT
Start: 2025-02-24

## 2025-02-24 NOTE — PROGRESS NOTES
Airway    Performed by: de Ranieri, Aladino, MD  Authorized by: de Ranieri, Aladino, MD    Final Airway Type:  Endotracheal airway  Final Endotracheal Airway*:  ETT  ETT Size (mm)*:  7.0  Cuff*:  Regular  Technique Used for Successful ETT Placement:  Video laryngoscopy  Intubation Procedure*:  ETCO2, Preoxygenation, Atraumatic and Dentition Unchanged  Insertion Site:  Oral  Blade Type*:  MAC  Blade Size*:  4  Placement Verified by: auscultation, bronchoscopy and equal breath sounds    Glottic View*:  1 - full view of glottis  Attempts*:  1  Number of Other Approaches Attempted:  0   Patient Identified, Procedure confirmed, Emergency equipment available and Safety protocols followed  Location:  OR  Urgency:  Elective  Difficult Airway: No    Indications for Airway Management:  Anesthesia  Spontaneous Ventilation: present    Sedation Level:  Anesthetized  MILS Maintained Throughout: No    Mask Difficulty Assessment:  1 - vent by mask  Start Time: 2/24/2025 7:45 AM       Occupational Therapy  Facility/Department: [facility] C3 TELE/MED SURG/ONC  Occupational Therapy Initial/discharge Assessment  1 x only      Name: Yasmeen Muhammad  : 1950  MRN: [id number]  Date of Service: 2022    Discharge Recommendations:  Home with assist PRN (No Driving, use of reacher)          Patient Diagnosis(es): The primary encounter diagnosis was Closed fracture of multiple ribs of left side, initial encounter. Diagnoses of Leukocytosis, unspecified type and Infiltrate of lung present on computed tomography were also pertinent to this visit. Past Medical History:  has a past medical history of Arthritis, Atrial fibrillation ([facility] [address] Hyperlipidemia, Hypertension, SCC (squamous cell carcinoma), Sleep apnea, and Systolic heart failure ([facility] [address] Past Surgical History:  has a past surgical history that includes Cardioversion (); Total hip arthroplasty (Left, ); Colonoscopy ([id number]); and ablation of dysrhythmic focus.            Assessment   Performance deficits / Impairments: Decreased high-level IADLs  REQUIRES OT FOLLOW-UP: No  Activity Tolerance  Activity Tolerance: Patient Tolerated treatment well  Activity Tolerance Comments: sitting on RA:  BP = 121/69, HR = 86, 97 % O 2 sats;        Plan   OT eval only     Restrictions  Restrictions/Precautions  Restrictions/Precautions: General Precautions, Fall Risk    Subjective   General  Chart Reviewed: Yes  Patient assessed for rehabilitation services?: Yes  Family / Caregiver Present: No  Referring Practitioner: Dr. Nicholas Crespo  Diagnosis: fall 2 weeks ago with Left side rib fx; pneumonia  General Comment  Comments: RN cleared pt for OT eval; pt sitting up on couch, agreeable to therapy eval     Social/Functional History  Social/Functional History  Lives With: Spouse (available to assist)  Type of Home: House  Home Layout: One level, Work area in basement (office & rec room in lower level)  Home Access: Stairs to enter without rails  Entrance Stairs - Number of Steps: 1  Bathroom Shower/Tub: Walk-in shower  Bathroom Toilet: Handicap height  Bathroom Equipment: Grab bars in shower  Home Equipment:  (INSPIRE-CPAP; adjustable bed, electric recliner, but not LiFT chair)  Has the patient had two or more falls in the past year or any fall with injury in the past year?: Yes (fall 2 weeks ago)  Receives Help From: Family (since fall 2 weeks ago for LE dressing & transfers; normally independent)  ADL Assistance: Independent  Ambulation Assistance: Independent (without AD)  Transfer Assistance: Independent  Active : Yes  Mode of Transportation: Truck  Occupation: Self employed  Type of Occupation: [employer]  Additional Comments: reports sleeping in recliner since fall; not working much 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       Objective   Heart Rate: 83  Heart Rate Source: Monitor  BP: 113/69  BP Location: Left upper arm  BP Method: Automatic  Patient Position: Semi fowlers  MAP (Calculated): 83.67  Resp: 18  SpO2: 93 %  O2 Device: None (Room air)             Safety Devices  Type of Devices: Left in chair;Nurse notified;Call light within reach; All caroline prominences offloaded     Toilet Transfers  Toilet - Technique: Ambulating (without AD)  Equipment Used: Standard toilet  Toilet Transfer: Independent (without use of grab bars (no grab bars or walls next to toilet at home))    AROM: Generally decreased, functional  Strength: Generally decreased, functional  Coordination: Within functional limits  Tone: Normal    ADL  Feeding: Independent  Grooming: Independent (standing at sink to wash hands)  UE Dressing: Minimal assistance  LE Dressing: Independent (standing to manage shorts for toileting; mod assist with threading shorts over feet &  dependent with socks due to Left side rib pain)  Toileting: Independent        Bed mobility  Supine to Sit: Unable to assess (already up on couch)  Sit to Supine: Unable to assess (Left up on couch, pt's preference)  Scooting: Modified independent  Transfers  Sit to stand: Modified independent  Stand to sit: Modified independent    Vision  Vision: Within Functional Limits    Hearing  Hearing: Within functional limits    Cognition  Overall Cognitive Status: WFL  Orientation  Overall Orientation Status: Within Functional Limits                  Education Given To: Patient  Education Provided: Role of Therapy;Plan of Care;Precautions  Education Provided Comments: disease specific: importance of use of RED /nurse call light for assist with ADL's/mobility as needed, use of LE AE(from THR in past)  Education Method: Verbal  Barriers to Learning: None  Education Outcome: Verbalized understanding         AM-PAC Score        AM-[facility] Inpatient Daily Activity Raw Score: 21 (07/23/22 1601)  AM-PAC Inpatient ADL T-Scale Score : 44.27 (07/23/22 1601)  ADL Inpatient CMS 0-100% Score: 32.79 (07/23/22 1601)  ADL Inpatient CMS G-Code Modifier : Susi Bolivar (07/23/22 1601)    Goals  Patient Goals   Patient goals : less pain with mobility       Therapy Time   Individual Concurrent Group Co-treatment   Time In 1520         Time Out 1550         Minutes [address] [place], [place]

## 2025-02-25 ENCOUNTER — OFFICE VISIT (OUTPATIENT)
Dept: FAMILY MEDICINE CLINIC | Age: 75
End: 2025-02-25
Payer: MEDICARE

## 2025-02-25 VITALS
DIASTOLIC BLOOD PRESSURE: 68 MMHG | BODY MASS INDEX: 28.26 KG/M2 | OXYGEN SATURATION: 100 % | HEIGHT: 69 IN | HEART RATE: 68 BPM | SYSTOLIC BLOOD PRESSURE: 122 MMHG | WEIGHT: 190.8 LBS

## 2025-02-25 DIAGNOSIS — Z01.818 PREOP EXAMINATION: Primary | ICD-10-CM

## 2025-02-25 LAB
INFLUENZA A ANTIGEN, POC: NEGATIVE
INFLUENZA B ANTIGEN, POC: NEGATIVE
LOT EXPIRE DATE: NORMAL
LOT KIT NUMBER: NORMAL
SARS-COV-2, POC: NORMAL
VALID INTERNAL CONTROL: PRESENT
VENDOR AND KIT NAME POC: NORMAL

## 2025-02-25 PROCEDURE — 3078F DIAST BP <80 MM HG: CPT | Performed by: NURSE PRACTITIONER

## 2025-02-25 PROCEDURE — 3074F SYST BP LT 130 MM HG: CPT | Performed by: NURSE PRACTITIONER

## 2025-02-25 PROCEDURE — 1160F RVW MEDS BY RX/DR IN RCRD: CPT | Performed by: NURSE PRACTITIONER

## 2025-02-25 PROCEDURE — 1159F MED LIST DOCD IN RCRD: CPT | Performed by: NURSE PRACTITIONER

## 2025-02-25 PROCEDURE — 1123F ACP DISCUSS/DSCN MKR DOCD: CPT | Performed by: NURSE PRACTITIONER

## 2025-02-25 PROCEDURE — 99213 OFFICE O/P EST LOW 20 MIN: CPT | Performed by: NURSE PRACTITIONER

## 2025-02-25 PROCEDURE — 87428 SARSCOV & INF VIR A&B AG IA: CPT | Performed by: NURSE PRACTITIONER

## 2025-02-25 SDOH — ECONOMIC STABILITY: FOOD INSECURITY: WITHIN THE PAST 12 MONTHS, THE FOOD YOU BOUGHT JUST DIDN'T LAST AND YOU DIDN'T HAVE MONEY TO GET MORE.: NEVER TRUE

## 2025-02-25 SDOH — ECONOMIC STABILITY: FOOD INSECURITY: WITHIN THE PAST 12 MONTHS, YOU WORRIED THAT YOUR FOOD WOULD RUN OUT BEFORE YOU GOT MONEY TO BUY MORE.: NEVER TRUE

## 2025-02-25 ASSESSMENT — PATIENT HEALTH QUESTIONNAIRE - PHQ9
2. FEELING DOWN, DEPRESSED OR HOPELESS: NOT AT ALL
SUM OF ALL RESPONSES TO PHQ QUESTIONS 1-9: 0
1. LITTLE INTEREST OR PLEASURE IN DOING THINGS: NOT AT ALL
SUM OF ALL RESPONSES TO PHQ9 QUESTIONS 1 & 2: 0
SUM OF ALL RESPONSES TO PHQ QUESTIONS 1-9: 0

## 2025-02-25 ASSESSMENT — ENCOUNTER SYMPTOMS
SHORTNESS OF BREATH: 0
SORE THROAT: 0
COUGH: 1
SINUS PRESSURE: 1
SINUS PAIN: 1
WHEEZING: 1

## 2025-02-25 NOTE — PROGRESS NOTES
Chief Complaint   Patient presents with    Pre-op Exam     Patient is here for pre-op eye sx on march 6. Patient stated he also needs to be seen for cold he's had for 3 weeks.        No covid testing  Fever chils body aches and cough for 3 weeks  Has sinus drainage and congestion   Has tried zycam and mucinex and nasal spray     Indication: cataract left eye  Surgery: cataract surgery with astigmatic reduction toric intraocular lens  Surgeon: Dr Ross  Date: 3/6/2025  History of previous anesthesia complications: none   Agreeable to receive blood products:yes      FUNCTIONAL CAPACITY (Bolded line indicates patient's functional capacity level):  1-3      Due to pain*  Watching television  Eating, dressing, cooking, using the toilet  Walking one or two blocks on level ground at 2-3 miles per hour  Doing light housework (ex dusting)     4-10        Climbing a flight of stairs     Walking on level ground at 4 miles per hour  Running a short distance  Doing heavy chores around the house (ex scrubbing floors, lifting furniture)  Playing moderately strenuous sports (ex golf, dance, bowling)   >10  >10    Playing strenuous sports (ex tennis, basketball)        RISK OF MAJOR CARDIAC COMPLICATIONS FOLLOWING NONCARDIAC SURGERY (Bolded applies to this patient)     Assign one point for each of the following six risk factors:  1 point high risk surgical procedure:   (intraperitoneal, intrathoracic, suprainguinal vascular)   1 point history of MI   history of positive stress test  current chest pain due to myocardial ischemia  current nitrate treatment  Q waves   1 point history of CHF  history of pulmonary edema  history of paroxysmal nocturnal dyspnea  current BL rales  current S3 gallop  current CXR with pulmonary congestion   1 point history of cerebrovascular disease (CVA or TIA)   1 point preoperative tx with insulin   1 point preoperative creatinine >2      Score               Risk Index class          Risk of major

## 2025-02-28 ENCOUNTER — TELEPHONE (OUTPATIENT)
Dept: FAMILY MEDICINE CLINIC | Age: 75
End: 2025-02-28

## 2025-02-28 DIAGNOSIS — J01.90 ACUTE BACTERIAL SINUSITIS: Primary | ICD-10-CM

## 2025-02-28 DIAGNOSIS — B96.89 ACUTE BACTERIAL SINUSITIS: Primary | ICD-10-CM

## 2025-02-28 RX ORDER — AMOXICILLIN 500 MG/1
500 CAPSULE ORAL 3 TIMES DAILY
Qty: 30 CAPSULE | Refills: 0 | Status: SHIPPED | OUTPATIENT
Start: 2025-02-28 | End: 2025-03-10

## 2025-02-28 NOTE — TELEPHONE ENCOUNTER
Patient calling in he is not feeling any better, would like to have an antibiotic sent to [facility] in [place]. Pt would like a call back once something has been sent.

## 2025-03-27 PROBLEM — Z01.818 PREOP EXAMINATION: Status: RESOLVED | Noted: 2025-02-25 | Resolved: 2025-03-27

## 2025-04-28 RX ORDER — DOFETILIDE 0.5 MG/1
500 CAPSULE ORAL EVERY 12 HOURS SCHEDULED
Qty: 180 CAPSULE | Refills: 2 | Status: SHIPPED | OUTPATIENT
Start: 2025-04-28

## 2025-04-28 NOTE — TELEPHONE ENCOUNTER
Last Office Visit: 2025 Provider: ZULEYMA  **Is provider OOT? Yes    Next Office Visit: NONE Provider: NPAM  **If no OV, when does pt need to be seen? in 1 year(s)    Lab orders needed? no   Encounter provider correct? Yes If not, change provider  Script changes since last refill? no    LAST LABS:   BMP:  Lab Results   Component Value Date/Time     2024 04:00 PM    K 4.4 2024 04:00 PM    K 3.5 2022 05:26 AM     2024 04:00 PM    CO2 25 2024 04:00 PM    BUN 19 2024 04:00 PM    CREATININE 1.0 2024 04:00 PM    GLUCOSE 136 2024 04:00 PM    CALCIUM 9.8 2024 04:00 PM    LABGLOM 78 2024 04:00 PM    LABGLOM >60 2023 10:00 AM      [id number]

## 2025-04-28 NOTE — TELEPHONE ENCOUNTER
ZULEYMA is OOT. RX for Tikosyn pending.     RECOMMENDATIONS:  Discussed risks and benefits of discontinuing tikosyn to see if AF comes back. Patient would like to stay on tikosyn at this time.   OK to proceed with cataract removal as planned.   Continue cardiac medications as prescribed.   Follow up with EP NP in 1 year.

## 2025-07-11 DIAGNOSIS — E78.2 MIXED HYPERLIPIDEMIA: ICD-10-CM

## 2025-07-11 RX ORDER — PRAVASTATIN SODIUM 80 MG/1
80 TABLET ORAL DAILY
Qty: 30 TABLET | Refills: 0 | Status: SHIPPED | OUTPATIENT
Start: 2025-07-11

## 2025-07-14 RX ORDER — PRAVASTATIN SODIUM 80 MG/1
80 TABLET ORAL DAILY
Qty: 90 TABLET | OUTPATIENT
Start: 2025-07-14

## 2025-07-16 ENCOUNTER — APPOINTMENT (OUTPATIENT)
Dept: GENERAL RADIOLOGY | Age: 75
End: 2025-07-16
Payer: MEDICARE

## 2025-07-16 ENCOUNTER — HOSPITAL ENCOUNTER (EMERGENCY)
Age: 75
Discharge: HOME | End: 2025-07-16
Payer: MEDICARE

## 2025-07-16 VITALS
BODY MASS INDEX: 27.88 KG/M2 | RESPIRATION RATE: 18 BRPM | HEIGHT: 69 IN | HEART RATE: 72 BPM | WEIGHT: 188.2 LBS | DIASTOLIC BLOOD PRESSURE: 83 MMHG | SYSTOLIC BLOOD PRESSURE: 158 MMHG | TEMPERATURE: 98.4 F | OXYGEN SATURATION: 97 %

## 2025-07-16 DIAGNOSIS — S49.91XA INJURY OF RIGHT SHOULDER, INITIAL ENCOUNTER: ICD-10-CM

## 2025-07-16 DIAGNOSIS — S62.327A CLOSED DISPLACED FRACTURE OF SHAFT OF FIFTH METACARPAL BONE OF LEFT HAND, INITIAL ENCOUNTER: ICD-10-CM

## 2025-07-16 DIAGNOSIS — M25.511 ACUTE PAIN OF RIGHT SHOULDER: ICD-10-CM
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
## 2025-07-16 PROCEDURE — 99283 EMERGENCY DEPT VISIT LOW MDM: CPT

## 2025-07-16 PROCEDURE — 29125 APPL SHORT ARM SPLINT STATIC: CPT

## 2025-07-16 PROCEDURE — 73030 X-RAY EXAM OF SHOULDER: CPT

## 2025-07-16 PROCEDURE — 73130 X-RAY EXAM OF HAND: CPT

## 2025-07-16 RX ORDER — OXYCODONE AND ACETAMINOPHEN 5; 325 MG/1; MG/1
1 TABLET ORAL EVERY 12 HOURS PRN
Qty: 12 TABLET | Refills: 0 | Status: SHIPPED | OUTPATIENT
Start: 2025-07-16 | End: 2025-07-22

## 2025-07-16 ASSESSMENT — PAIN DESCRIPTION - PAIN TYPE: TYPE: ACUTE PAIN

## 2025-07-16 ASSESSMENT — LIFESTYLE VARIABLES
HOW MANY STANDARD DRINKS CONTAINING ALCOHOL DO YOU HAVE ON A TYPICAL DAY: PATIENT DOES NOT DRINK
HOW OFTEN DO YOU HAVE A DRINK CONTAINING ALCOHOL: NEVER

## 2025-07-16 ASSESSMENT — PAIN DESCRIPTION - ORIENTATION: ORIENTATION: RIGHT;LEFT

## 2025-07-16 ASSESSMENT — PAIN DESCRIPTION - LOCATION: LOCATION: HAND;SHOULDER

## 2025-07-16 ASSESSMENT — PAIN SCALES - GENERAL: PAINLEVEL_OUTOF10: 10

## 2025-07-16 ASSESSMENT — PAIN - FUNCTIONAL ASSESSMENT: PAIN_FUNCTIONAL_ASSESSMENT: 0-10

## 2025-07-16 NOTE — ED PROVIDER NOTES
[facility] EMERGENCY DEPARTMENT  EMERGENCY DEPARTMENT ENCOUNTER        Pt Name: Facundo Woods  MRN: [id number]  Birthdate 1950  Date of evaluation: 7/16/2025  Provider: EVANGELIST FOFANA PA-C  PCP: Christie Devries APRN - CNP  ED Attending: MD Nicolas      ALBA. I have evaluated this patient.      CHIEF COMPLAINT:     Chief Complaint   Patient presents with    Fall     Patient fell yesterday,  tripped over unlevel concrete and into grass.   Patient complaining of right shoulder pain and left hand. Denies hitting head or any LOC.        HISTORY OF PRESENT ILLNESS:      History provided by the patient. No limitations.    Facundo Woods is a 75 y.o. male who arrives to the ED by private vehicle.  Patient accompanied by wife.  He is here to be evaluated for injuries from a fall that occurred yesterday around 1 PM.  He was walking on an uneven concrete driveway when he tripped and fell.  He fell onto the grass.  He denies striking his head or losing consciousness.  He denies neck or back pain, chest or abdominal pain.  He complains of pain in his right shoulder as well as somewhat in his left hand.  The pain to his right shoulder is rated 10/10.  He also has an abrasion to his right knee but denies pain to his knee.  States his Tdap has been updated within the last couple of years.    Nursing Notes were reviewed     REVIEW OF SYSTEMS:     Review of Systems  Positives and pertinent negatives as per HPI.      PAST MEDICAL HISTORY:     Past Medical History:   Diagnosis Date    Arthritis     Atrial fibrillation (HCC) 2007    S/P CARDIOVERSION, EF 49% on stress test 2009    Atrial fibrillation ([facility]) 02/28/2022    Atrial fibrillation with RVR ([facility]) 10/11/2021    Hyperlipidemia     Hypertension     SCC (squamous cell carcinoma)     Dr. Qiu    Sleep apnea     Systolic heart failure (HCC)        SURGICAL HISTORY:      Past Surgical History:   Procedure Laterality Date    ABLATION OF DYSRHYTHMIC FOCUS      Afib

## 2025-07-18 ENCOUNTER — OFFICE VISIT (OUTPATIENT)
Dept: ORTHOPEDIC SURGERY | Age: 75
End: 2025-07-18

## 2025-07-18 VITALS — HEIGHT: 69 IN | WEIGHT: 188 LBS | BODY MASS INDEX: 27.85 KG/M2

## 2025-07-18 DIAGNOSIS — S62.327A DISPLACED FRACTURE OF SHAFT OF FIFTH METACARPAL BONE, LEFT HAND, INITIAL ENCOUNTER FOR CLOSED FRACTURE: Primary | ICD-10-CM

## 2025-07-18 NOTE — PROGRESS NOTES
Date:  2025    Name:  Facundo Woods  Address:  [address] [place] [id number]    :  1950      Age:   75 y.o.    SSN:  [id number]      Medical Record Number:  [id number]    Reason for Visit:    Chief Complaint    Hand Pain (Left)      DOS:2025     HPI: Facundo Woods is a 75 y.o. male here today for new patient evaluation regarding his left hand    History of Present Illness  The patient is a 75-year-old male who presents today for a new patient evaluation regarding his left hand. He is right-handed and experienced a fall on uneven concrete 2 days ago. He was able to maintain his balance and avoid falling on the concrete, but he believes he may have stubbed his hand in the process. He reports minimal pain in his hand.           ROS: All systems reviewed on patient intake form.  Pertinent items are noted in HPI.        Past Medical History:   Diagnosis Date    Arthritis     Atrial fibrillation (HCC)     S/P CARDIOVERSION, EF 49% on stress test     Atrial fibrillation (HCC) 2022    Atrial fibrillation with RVR ([facility]) 10/11/2021    Hyperlipidemia     Hypertension     SCC (squamous cell carcinoma)     Dr. Qiu    Sleep apnea     Systolic heart failure (HCC)         Past Surgical History:   Procedure Laterality Date    ABLATION OF DYSRHYTHMIC FOCUS      Afib PVI etc / right sided flutter line    CARDIOVERSION  2005    CATARACT EXTRACTION Right     COLONOSCOPY  2013    tics    INTERMITTENT POSITIVE PRESSURE BREATHING      inspire for sleep apnea    PACEMAKER PLACEMENT      TOTAL HIP ARTHROPLASTY Left        Family History   Problem Relation Age of Onset    Cancer Mother         BONE    High Blood Pressure Mother     Heart Disease Father 70       Social History     Socioeconomic History    Marital status:    Tobacco Use    Smoking status: Never    Smokeless tobacco: Never   Vaping Use    Vaping status: Never Used   Substance and Sexual Activity    Alcohol use: No

## 2025-08-03 DIAGNOSIS — E78.2 MIXED HYPERLIPIDEMIA: ICD-10-CM

## 2025-08-04 RX ORDER — PRAVASTATIN SODIUM 80 MG/1
80 TABLET ORAL DAILY
Qty: 30 TABLET | Refills: 0 | Status: SHIPPED | OUTPATIENT
Start: 2025-08-04

## 2025-08-05 ENCOUNTER — OFFICE VISIT (OUTPATIENT)
Dept: FAMILY MEDICINE CLINIC | Age: 75
End: 2025-08-05
Payer: MEDICARE

## 2025-08-05 VITALS
SYSTOLIC BLOOD PRESSURE: 128 MMHG | DIASTOLIC BLOOD PRESSURE: 76 MMHG | HEIGHT: 69 IN | OXYGEN SATURATION: 97 % | HEART RATE: 81 BPM | BODY MASS INDEX: 27.25 KG/M2 | WEIGHT: 184 LBS

## 2025-08-05 DIAGNOSIS — K21.9 GASTROESOPHAGEAL REFLUX DISEASE WITHOUT ESOPHAGITIS: ICD-10-CM

## 2025-08-05 DIAGNOSIS — E03.2 HYPOTHYROIDISM DUE TO MEDICATION: ICD-10-CM

## 2025-08-05 DIAGNOSIS — D47.3 ESSENTIAL THROMBOCYTOSIS (HCC): ICD-10-CM

## 2025-08-05 DIAGNOSIS — E03.4 HYPOTHYROIDISM DUE TO ACQUIRED ATROPHY OF THYROID: ICD-10-CM

## 2025-08-05 DIAGNOSIS — Z00.00 MEDICARE ANNUAL WELLNESS VISIT, SUBSEQUENT: Primary | ICD-10-CM

## 2025-08-05 DIAGNOSIS — N40.0 BENIGN PROSTATIC HYPERPLASIA, UNSPECIFIED WHETHER LOWER URINARY TRACT SYMPTOMS PRESENT: ICD-10-CM

## 2025-08-05 DIAGNOSIS — E11.9 TYPE 2 DIABETES MELLITUS WITHOUT COMPLICATION, WITHOUT LONG-TERM CURRENT USE OF INSULIN (HCC): ICD-10-CM

## 2025-08-05 DIAGNOSIS — E78.2 MIXED HYPERLIPIDEMIA: ICD-10-CM

## 2025-08-05 LAB
ALBUMIN SERPL-MCNC: 4.2 G/DL (ref 3.4–5)
ALBUMIN/GLOB SERPL: 1.5 {RATIO} (ref 1.1–2.2)
ALP SERPL-CCNC: 89 U/L (ref 40–129)
ALT SERPL-CCNC: 16 U/L (ref 10–40)
ANION GAP SERPL CALCULATED.3IONS-SCNC: 11 MMOL/L (ref 3–16)
AST SERPL-CCNC: 16 U/L (ref 15–37)
BASOPHILS # BLD: 0.1 K/UL (ref 0–0.2)
BASOPHILS NFR BLD: 0.9 %
BILIRUB SERPL-MCNC: 0.5 MG/DL (ref 0–1)
BUN SERPL-MCNC: 20 MG/DL (ref 7–20)
CALCIUM SERPL-MCNC: 10.1 MG/DL (ref 8.3–10.6)
CHLORIDE SERPL-SCNC: 104 MMOL/L (ref 99–110)
CHOLEST SERPL-MCNC: 227 MG/DL (ref 0–199)
CO2 SERPL-SCNC: 26 MMOL/L (ref 21–32)
CREAT SERPL-MCNC: 1.1 MG/DL (ref 0.8–1.3)
DEPRECATED RDW RBC AUTO: 13.9 % (ref 12.4–15.4)
EOSINOPHIL # BLD: 0.4 K/UL (ref 0–0.6)
EOSINOPHIL NFR BLD: 5.2 %
EST. AVERAGE GLUCOSE BLD GHB EST-MCNC: 119.8 MG/DL
GFR SERPLBLD CREATININE-BSD FMLA CKD-EPI: 69 ML/MIN/{1.73_M2}
GLUCOSE SERPL-MCNC: 109 MG/DL (ref 70–99)
HBA1C MFR BLD: 5.8 %
HCT VFR BLD AUTO: 39.8 % (ref 40.5–52.5)
HDLC SERPL-MCNC: 60 MG/DL (ref 40–60)
HGB BLD-MCNC: 13.3 G/DL (ref 13.5–17.5)
LDLC SERPL CALC-MCNC: 136 MG/DL
LYMPHOCYTES # BLD: 1.5 K/UL (ref 1–5.1)
LYMPHOCYTES NFR BLD: 18.5 %
MCH RBC QN AUTO: 28.8 PG (ref 26–34)
MCHC RBC AUTO-ENTMCNC: 33.3 G/DL (ref 31–36)
MCV RBC AUTO: 86.3 FL (ref 80–100)
MONOCYTES # BLD: 1.1 K/UL (ref 0–1.3)
MONOCYTES NFR BLD: 13.4 %
NEUTROPHILS # BLD: 5.1 K/UL (ref 1.7–7.7)
NEUTROPHILS NFR BLD: 62 %
PLATELET # BLD AUTO: 380 K/UL (ref 135–450)
PMV BLD AUTO: 8.3 FL (ref 5–10.5)
POTASSIUM SERPL-SCNC: 4.6 MMOL/L (ref 3.5–5.1)
PROT SERPL-MCNC: 7 G/DL (ref 6.4–8.2)
RBC # BLD AUTO: 4.61 M/UL (ref 4.2–5.9)
SODIUM SERPL-SCNC: 141 MMOL/L (ref 136–145)
TRIGL SERPL-MCNC: 154 MG/DL (ref 0–150)
TSH SERPL DL<=0.005 MIU/L-ACNC: 2.85 UIU/ML (ref 0.27–4.2)
VLDLC SERPL CALC-MCNC: 31 MG/DL
WBC # BLD AUTO: 8.3 K/UL (ref 4–11)

## 2025-08-05 PROCEDURE — 3074F SYST BP LT 130 MM HG: CPT | Performed by: NURSE PRACTITIONER

## 2025-08-05 PROCEDURE — G0439 PPPS, SUBSEQ VISIT: HCPCS | Performed by: NURSE PRACTITIONER

## 2025-08-05 PROCEDURE — 99213 OFFICE O/P EST LOW 20 MIN: CPT | Performed by: NURSE PRACTITIONER

## 2025-08-05 PROCEDURE — 3078F DIAST BP <80 MM HG: CPT | Performed by: NURSE PRACTITIONER

## 2025-08-05 PROCEDURE — 1159F MED LIST DOCD IN RCRD: CPT | Performed by: NURSE PRACTITIONER

## 2025-08-05 PROCEDURE — 1123F ACP DISCUSS/DSCN MKR DOCD: CPT | Performed by: NURSE PRACTITIONER

## 2025-08-05 PROCEDURE — 1160F RVW MEDS BY RX/DR IN RCRD: CPT | Performed by: NURSE PRACTITIONER

## 2025-08-05 RX ORDER — PANTOPRAZOLE SODIUM 40 MG/1
TABLET, DELAYED RELEASE ORAL
Qty: 180 TABLET | Refills: 2 | Status: SHIPPED | OUTPATIENT
Start: 2025-08-05

## 2025-08-05 RX ORDER — LISINOPRIL 5 MG/1
5 TABLET ORAL DAILY
Qty: 90 TABLET | Refills: 1 | Status: SHIPPED | OUTPATIENT
Start: 2025-08-05

## 2025-08-05 ASSESSMENT — PATIENT HEALTH QUESTIONNAIRE - PHQ9
1. LITTLE INTEREST OR PLEASURE IN DOING THINGS: NOT AT ALL
DEPRESSION UNABLE TO ASSESS: PT REFUSES
SUM OF ALL RESPONSES TO PHQ QUESTIONS 1-9: 0
SUM OF ALL RESPONSES TO PHQ QUESTIONS 1-9: 0
2. FEELING DOWN, DEPRESSED OR HOPELESS: NOT AT ALL
SUM OF ALL RESPONSES TO PHQ QUESTIONS 1-9: 0
SUM OF ALL RESPONSES TO PHQ QUESTIONS 1-9: 0

## 2025-08-06 ENCOUNTER — RESULTS FOLLOW-UP (OUTPATIENT)
Dept: FAMILY MEDICINE CLINIC | Age: 75
End: 2025-08-06

## 2025-08-17 DIAGNOSIS — N13.8 BPH WITH URINARY OBSTRUCTION: ICD-10-CM

## 2025-08-17 DIAGNOSIS — N40.1 BPH WITH URINARY OBSTRUCTION: ICD-10-CM

## 2025-08-18 RX ORDER — FINASTERIDE 5 MG/1
5 TABLET, FILM COATED ORAL DAILY
Qty: 90 TABLET | Refills: 1 | OUTPATIENT
Start: 2025-08-18

## 2025-08-18 RX ORDER — FINASTERIDE 5 MG/1
5 TABLET, FILM COATED ORAL DAILY
Qty: 90 TABLET | Refills: 1 | Status: SHIPPED | OUTPATIENT
Start: 2025-08-18